# Patient Record
Sex: FEMALE | Race: BLACK OR AFRICAN AMERICAN | Employment: FULL TIME | URBAN - METROPOLITAN AREA
[De-identification: names, ages, dates, MRNs, and addresses within clinical notes are randomized per-mention and may not be internally consistent; named-entity substitution may affect disease eponyms.]

---

## 2017-02-06 ENCOUNTER — OFFICE VISIT (OUTPATIENT)
Dept: FAMILY MEDICINE CLINIC | Age: 37
End: 2017-02-06

## 2017-02-06 VITALS
TEMPERATURE: 97 F | HEART RATE: 97 BPM | OXYGEN SATURATION: 100 % | WEIGHT: 293 LBS | DIASTOLIC BLOOD PRESSURE: 69 MMHG | SYSTOLIC BLOOD PRESSURE: 104 MMHG | RESPIRATION RATE: 18 BRPM | BODY MASS INDEX: 48.82 KG/M2 | HEIGHT: 65 IN

## 2017-02-06 DIAGNOSIS — R73.03 PREDIABETES: ICD-10-CM

## 2017-02-06 DIAGNOSIS — N89.8 VAGINAL ITCHING: Primary | ICD-10-CM

## 2017-02-06 RX ORDER — FLUCONAZOLE 150 MG/1
150 TABLET ORAL DAILY
Qty: 1 TAB | Refills: 0 | Status: SHIPPED | OUTPATIENT
Start: 2017-02-06 | End: 2017-02-07

## 2017-02-06 NOTE — PROGRESS NOTES
Patient: Aicha Rick MRN: [id number]  SSN: [id number]    YOB: 1980  Age: 39 y.o. Sex: female      Date of Service: 2/6/2017   Provider: Nathalie Briggs   Office Location:   [address] [place] [address] [facility], [address] [place] [address], [address]  Office Phone: [phone]  Office Fax: [phone]        REASON FOR VISIT:   Chief Complaint   Patient presents with    Yeast Infection     pt states she is itching     Numbness     pt states she is having a tingling filling in her fingertips        VITALS:   Visit Vitals    /69 (BP 1 Location: Left arm, BP Patient Position: Sitting)    Pulse 97    Temp 97 °F (36.1 °C) (Oral)    Resp 18    Ht 5' 5\" (1.651 m)    Wt 314 lb (142.4 kg)    LMP 01/25/2017    SpO2 100%    BMI 52.25 kg/m2       MEDICATIONS:   Current Outpatient Prescriptions   Medication Sig Dispense Refill    gabapentin (NEURONTIN) 300 mg capsule Take 1 Cap by mouth three (3) times daily. 90 Cap 1    metFORMIN (GLUCOPHAGE) 500 mg tablet Take 1 Tab by mouth daily (with breakfast). 30 Tab 3    ibuprofen (MOTRIN) 800 mg tablet Take 1 Tab by mouth every eight (8) hours as needed for Pain. 90 Tab 3    methocarbamol (ROBAXIN) 500 mg tablet Take 1 Tab by mouth every twelve (12) hours as needed. 30 Tab 1    gabapentin (NEURONTIN) 300 mg capsule Take 1 Cap by mouth nightly. 30 Cap 3    HYDROcodone-acetaminophen (NORCO) 5-325 mg per tablet       methylPREDNISolone (MEDROL DOSEPACK) 4 mg tablet Per dose pack instructions 1 Dose Pack 0    amLODIPine (NORVASC) 2.5 mg tablet Take 1 Tab by mouth daily.  30 Tab 3        ALLERGIES:   No Known Allergies     ACTIVE MEDICAL PROBLEMS:  Patient Active Problem List   Diagnosis Code    Obesity E66.9    Prediabetes R73.03    Back pain M54.9        MEDICAL/SURGICAL HISTORY:  Past Medical History   Diagnosis Date    Abnormal MRI of head 2010     Nonspecific focus of low attenuation at the right internal capsule on CT and MRI    Anxiety     Chronic low back pain 2014     unknown cause- normal xrays    Depression     Hypertension     Prediabetes     Scoliosis       History reviewed. No pertinent past surgical history. FAMILY HISTORY:  Family History   Problem Relation Age of Onset    Diabetes Other     Hypertension Other     Heart Disease Other     Asthma Other     Arthritis-osteo Other         SOCIAL HISTORY:  Social History   Substance Use Topics    Smoking status: Never Smoker    Smokeless tobacco: Never Used    Alcohol use Yes      Comment: socially            HISTORY OF PRESENT ILLNESS:   Jamie Amin is a 39 y.o. female who presents to the office complaining of vaginal itching x 3-4 days and intermittent tingling in her fingers. Vaginal Itching -   Patient states she has a yeast infection, as she has had these symptoms numerous times in the past. She denies vaginal discharge, odor, or lesions. She refuses a pelvic exam today, and states \"I just want to get the pill,\" referring to Diflucan. Paresthesias -   Patient would also like to discuss a tingling sensation in her fingers. She has noticed over the past few months, whenever she bumps her hand or fingers on a surface, she will experience some transient tingling in those fingers. She is unsure if it affects both hands, or just one side. She is concerned that this may be due to her pre-diabetes. She admits she has not been very compliant with her metformin. Patient denies any skin changes or rash to her affected fingers. Denies pain in her wrists, elbows, or shoulders, but does admit to some intermittent stiffness in her neck following a fall at work a few years ago. However, she has not noted any association between neck stiffness and finger tingling. States only provocative factor is \"when I hit my fingers on something. \"     REVIEW OF SYSTEMS:  Review of Systems   Constitutional: Negative for chills (  admits to \"hot flashes\") and fever. Respiratory: Negative for cough, shortness of breath and wheezing. Cardiovascular: Negative for chest pain and palpitations. Gastrointestinal: Negative for abdominal pain, nausea and vomiting. Skin: Negative for rash. Neurological: Positive for tingling. Negative for dizziness and focal weakness. PHYSICAL EXAMINATION:  Physical Exam   Constitutional: She is well-developed, well-nourished, and in no distress. Cardiovascular: Normal rate, regular rhythm and normal heart sounds. Exam reveals no gallop and no friction rub. No murmur heard. Pulmonary/Chest: Effort normal and breath sounds normal. She has no wheezes. She has no rales. Genitourinary:   Genitourinary Comments: refuses   Musculoskeletal:        Cervical back: She exhibits decreased range of motion (active extension and rotation to both sides limited by stiffness/pain) and spasm ( bilateral). She exhibits no bony tenderness and no deformity. Negative Tinel's sign at carpal and cubital tunnel. Negative Phalen's sign. Skin: Skin is warm and dry. No rash noted. RESULTS:  No results found for this visit on 02/06/17. ASSESSMENT/PLAN:  Jessa was seen today for yeast infection and numbness. Diagnoses and all orders for this visit:    Vaginal itching  - Will send empiric Diflucan  - Advised patient to return for pelvic exam and nuswab if symptoms do not resolve after taking this medication  -     fluconazole (DIFLUCAN) 150 mg tablet; Take 1 Tab by mouth daily for 1 day. FDA advises cautious prescribing of oral fluconazole in pregnancy. Prediabetes  - Discussed importance of diet and exercise in reducing risk of progression to DM  - Discussed with patient that I feel it is unlikely that her symptoms of finger tingling are related to diabetes, but it is possible. We discussed that the best way to control diabetic neuropathy is to control blood sugar.  Differential could also include cervical radiculopathy, carpal tunnel syndrome, cubital tunnel syndrome   - Unable to check A1c at this visit due to lack of office supplies. Offered to give order for labs, but patient states she will wait for her routine follow up with her PCP   -     Cancel: AMB POC HEMOGLOBIN A1C    Follow up within 1 month for routine chronic disease follow up with PCP Dennise Phelps    Patient expresses understanding and is agreeable with the above plan.         PATIENT CARE TEAM:   Patient Care Team:  Dennise Phelps NP as PCP - General (Nurse Practitioner)  Eileen Gore MD (Physical Medicine and Rehab)       [facility] [place], [place]   February 6, 2017    6:00 PM

## 2017-02-06 NOTE — MR AVS SNAPSHOT
Visit Information Date & Time Provider Department Dept. Phone Encounter #  
 2/6/2017  5:00 PM Ford Murray, [address] [facility] [phone] [id number] Your Appointments 3/7/2017  5:00 PM  
FOLLOW UP EXAM with Richard Lord NP [address] [facility] ([address]) Appt Note: 1 month f/u  
 [address] [facility] [id number]  
[facility] [id number] Upcoming Health Maintenance Date Due DTaP/Tdap/Td series (1 - Tdap) 6/7/2001 PAP AKA CERVICAL CYTOLOGY 6/7/2001 INFLUENZA AGE 9 TO ADULT 8/1/2016 Allergies as of 2/6/2017  Review Complete On: 2/6/2017 By: Deyanira Santiago LPN No Known Allergies Current Immunizations  Never Reviewed No immunizations on file. Not reviewed this visit You Were Diagnosed With   
  
 Codes Comments Vaginal itching    -  Primary ICD-10-CM: L29.8 ICD-9-CM: 698.1 Prediabetes     ICD-10-CM: R73.03 
ICD-9-CM: 790.29 Vitals BP Pulse Temp Resp Height(growth percentile) Weight(growth percentile) 104/69 (BP 1 Location: Left arm, BP Patient Position: Sitting) 97 97 °F (36.1 °C) (Oral) 18 5' 5\" (1.651 m) 314 lb (142.4 kg) LMP SpO2 BMI OB Status Smoking Status 01/25/2017 100% 52.25 kg/m2 Having regular periods Never Smoker Vitals History BMI and BSA Data Body Mass Index Body Surface Area  
 52.25 kg/m 2 2.56 m 2 Preferred Pharmacy Pharmacy Name Phone [facility] - [address] [phone] Your Updated Medication List  
  
   
This list is accurate as of: 2/6/17  5:39 PM.  Always use your most recent med list. amLODIPine 2.5 mg tablet Commonly known as:  Izetta Harder Take 1 Tab by mouth daily. fluconazole 150 mg tablet Commonly known as:  DIFLUCAN Take 1 Tab by mouth daily for 1 day.  FDA advises cautious prescribing of oral fluconazole in pregnancy. * gabapentin 300 mg capsule Commonly known as:  NEURONTIN Take 1 Cap by mouth nightly. * gabapentin 300 mg capsule Commonly known as:  NEURONTIN Take 1 Cap by mouth three (3) times daily. HYDROcodone-acetaminophen 5-325 mg per tablet Commonly known as:  NORCO  
  
 ibuprofen 800 mg tablet Commonly known as:  MOTRIN Take 1 Tab by mouth every eight (8) hours as needed for Pain.  
  
 metFORMIN 500 mg tablet Commonly known as:  GLUCOPHAGE Take 1 Tab by mouth daily (with breakfast). methocarbamol 500 mg tablet Commonly known as:  ROBAXIN Take 1 Tab by mouth every twelve (12) hours as needed. methylPREDNISolone 4 mg tablet Commonly known as:  Acquanetta Kanner Per dose pack instructions * Notice: This list has 2 medication(s) that are the same as other medications prescribed for you. Read the directions carefully, and ask your doctor or other care provider to review them with you. Prescriptions Sent to Pharmacy Refills  
 fluconazole (DIFLUCAN) 150 mg tablet 0 Sig: Take 1 Tab by mouth daily for 1 day. FDA advises cautious prescribing of oral fluconazole in pregnancy. Class: Normal  
 Pharmacy: [facility] [address] [address] Ph #: [phone] Route: Oral  
  
We Performed the Following AMB POC HEMOGLOBIN A1C [81000 CPT(R)] Introducing [facility] SERVICES! Carol Ann Ceballos introduces Powered patient portal. Now you can access parts of your medical record, email your doctor's office, and request medication refills online. 1. In your internet browser, go to [email or web address] 2. Click on the First Time User? Click Here link in the Sign In box. You will see the New Member Sign Up page. 3. Enter your Powered Access Code exactly as it appears below. You will not need to use this code after youve completed the sign-up process.  If you do not sign up before the expiration date, you must request a new code. · miacosa Access Code: [email or web address] Expires: 5/7/2017  5:38 PM 
 
4. Enter the last four digits of your Social Security Number (xxxx) and Date of Birth (mm/dd/yyyy) as indicated and click Submit. You will be taken to the next sign-up page. 5. Create a miacosa ID. This will be your miacosa login ID and cannot be changed, so think of one that is secure and easy to remember. 6. Create a miacosa password. You can change your password at any time. 7. Enter your Password Reset Question and Answer. This can be used at a later time if you forget your password. 8. Enter your e-mail address. You will receive e-mail notification when new information is available in [address] 9. Click Sign Up. You can now view and download portions of your medical record. 10. Click the Download Summary menu link to download a portable copy of your medical information. If you have questions, please visit the Frequently Asked Questions section of the miacosa website. Remember, miacosa is NOT to be used for urgent needs. For medical emergencies, dial 911. Now available from your iPhone and Android! Please provide this summary of care documentation to your next provider. Your primary care clinician is listed as Roya Golden. If you have any questions after today's visit, please call [phone].

## 2017-02-06 NOTE — PROGRESS NOTES
Chief Complaint   Patient presents with    Yeast Infection     pt states she is itching     Numbness     pt states she is having a tingling filling in her fingertips

## 2017-03-17 ENCOUNTER — TELEPHONE (OUTPATIENT)
Dept: FAMILY MEDICINE CLINIC | Age: 37
End: 2017-03-17

## 2017-03-17 NOTE — TELEPHONE ENCOUNTER
Attempted to call Pt on both numbers, no voice mail , unable to leave message and the other was the wrong number.  Pt need to schedule a Chronic Condition 30 min follow up Per Sima Garcia NP.

## 2017-03-20 ENCOUNTER — TELEPHONE (OUTPATIENT)
Dept: FAMILY MEDICINE CLINIC | Age: 37
End: 2017-03-20

## 2017-06-27 ENCOUNTER — OFFICE VISIT (OUTPATIENT)
Dept: FAMILY MEDICINE CLINIC | Age: 37
End: 2017-06-27

## 2017-06-27 ENCOUNTER — TELEPHONE (OUTPATIENT)
Dept: FAMILY MEDICINE CLINIC | Age: 37
End: 2017-06-27

## 2017-06-27 VITALS
WEIGHT: 293 LBS | HEART RATE: 84 BPM | RESPIRATION RATE: 18 BRPM | TEMPERATURE: 98.1 F | DIASTOLIC BLOOD PRESSURE: 75 MMHG | HEIGHT: 65 IN | OXYGEN SATURATION: 100 % | SYSTOLIC BLOOD PRESSURE: 120 MMHG | BODY MASS INDEX: 48.82 KG/M2

## 2017-06-27 DIAGNOSIS — R07.89 RIGHT-SIDED CHEST WALL PAIN: ICD-10-CM

## 2017-06-27 DIAGNOSIS — R73.03 PREDIABETES: ICD-10-CM

## 2017-06-27 DIAGNOSIS — M41.9 SCOLIOSIS OF THORACIC SPINE, UNSPECIFIED SCOLIOSIS TYPE: ICD-10-CM

## 2017-06-27 DIAGNOSIS — R53.83 FATIGUE, UNSPECIFIED TYPE: ICD-10-CM

## 2017-06-27 DIAGNOSIS — I10 ESSENTIAL HYPERTENSION: Primary | ICD-10-CM

## 2017-06-27 DIAGNOSIS — M54.41 CHRONIC BILATERAL LOW BACK PAIN WITH RIGHT-SIDED SCIATICA: ICD-10-CM

## 2017-06-27 DIAGNOSIS — G89.29 CHRONIC BILATERAL LOW BACK PAIN WITH RIGHT-SIDED SCIATICA: ICD-10-CM

## 2017-06-27 LAB — HBA1C MFR BLD HPLC: 5.4 %

## 2017-06-27 RX ORDER — GABAPENTIN 300 MG/1
CAPSULE ORAL
Qty: 60 CAP | Refills: 3 | Status: SHIPPED | OUTPATIENT
Start: 2017-06-27 | End: 2017-11-18

## 2017-06-27 RX ORDER — METHOCARBAMOL 500 MG/1
500 TABLET, FILM COATED ORAL
Qty: 30 TAB | Refills: 1 | Status: SHIPPED | OUTPATIENT
Start: 2017-06-27 | End: 2017-11-18

## 2017-06-27 RX ORDER — IBUPROFEN 800 MG/1
800 TABLET ORAL
Qty: 90 TAB | Refills: 0 | Status: SHIPPED | OUTPATIENT
Start: 2017-06-27 | End: 2018-05-11 | Stop reason: SDUPTHER

## 2017-06-27 NOTE — PROGRESS NOTES
HISTORY OF PRESENT ILLNESS  Kristal Dobson is a 40 y.o. female. 6/27/2017  3:21 PM    Chief Complaint   Patient presents with    Fatigue     very tired since Friday    Shortness of Breath     chest tighness on right side since yesterday took aspirin and it got better and chest is tender on the outside       HPI: Here today for multiple complaints. Has not been seen by me in about a year- last seen in office 2/2017 by HANK Umanzor PA-C for acute complaints. Overdue for chronic disease follow up. Last labs done 8/2015 routinely. Does not follow with any other providers. HTN/PreDM: Currently not taking any medications. Interested in taking Metformin again because it helped manage her weight. Does not check BP at home or glucose. Last A1c 6/2016 5.4%. Chronic LBP: Reports taking Gabapentin 600 mg at HS. Has been using OTC Ibuprofen as well. Has been evaluated by spine specialist in past- last seen 10/2015. She reports back pain has been happening since a fall in hallway at work in 8/2014. Patient has received a trigger point injection by Dr. Breanna Martinez over a year ago which offered good relief. She attended physical therapy x2 without relief. Complains of fatigue that has been happening since Friday. She also complains of chest pain that has been happening since yesterday. Denies any possible causes that she can think of. Denies any colds or cold symptoms lately. She reports that she has to take a deep breath every now and then- feels like she is not getting enough air although denies any SOB, no cough. She took an ASA and this helped with the symptoms. Denies any headaches, dizziness, or weakness. No worsening of symptoms with activity. Review of Systems   Constitutional: Positive for malaise/fatigue. Negative for chills and fever. Eyes: Negative for double vision, photophobia and pain. Respiratory: Positive for shortness of breath. Negative for cough and wheezing.     Cardiovascular: Positive for chest pain. Negative for palpitations and leg swelling. Gastrointestinal: Negative for abdominal pain, constipation, diarrhea, nausea and vomiting. Genitourinary: Negative for dysuria, frequency and urgency. Musculoskeletal: Positive for back pain. Negative for joint pain and myalgias. Neurological: Negative for dizziness, tingling, sensory change, weakness and headaches. PHQ Screening   PHQ over the last two weeks 2/6/2017   PHQ Not Done -   Little interest or pleasure in doing things Not at all   Feeling down, depressed or hopeless Not at all   Total Score PHQ 2 0         History  Past Medical History:   Diagnosis Date    Abnormal MRI of head 2010    Nonspecific focus of low attenuation at the right internal capsule on CT and MRI    Anxiety     Chronic low back pain 2014    unknown cause- normal xrays    Depression     Hypertension     Prediabetes     Scoliosis        History reviewed. No pertinent surgical history. Social History     Social History    Marital status: SINGLE     Spouse name: N/A    Number of children: N/A    Years of education: N/A     Occupational History    Not on file. Social History Main Topics    Smoking status: Never Smoker    Smokeless tobacco: Never Used    Alcohol use 0.6 oz/week     1 Glasses of wine per week      Comment: socially    Drug use: No    Sexual activity: Yes     Partners: Female, Male     Birth control/ protection: Condom     Other Topics Concern    Not on file     Social History Narrative       No Known Allergies       Current Outpatient Prescriptions   Medication Sig Dispense Refill    ibuprofen (MOTRIN) 800 mg tablet Take 1 Tab by mouth every eight (8) hours as needed for Pain. 90 Tab 0    gabapentin (NEURONTIN) 300 mg capsule Take 2 capsules at night.  60 Cap 3       Health Maintenance and Screenings  *will follow up on      Advance Care Planning:   Patient was offered the opportunity to discuss advance care planning NO   Does patient have an Advance Directive:  NO   If no, did you provide information on Caring Connections? Patient Care Team:  Patient Care Team:  Blanca Pace NP as PCP - General (Nurse Practitioner)  Eduardo العراقي MD (Physical Medicine and Rehab)        LABS:     Ref. Range 6/27/2017 15:50   Hemoglobin A1c (POC) Latest Units: % 5.4       RADIOLOGY:    SACRUM/COCCYX  CPT CODE: 34821  HISTORY: Worsening back pain and coccygeal pain after falling one year ago. ,  shooting pain in the lower back. COMPARISON: Coccyx August 24, 2014   FINDINGS:  Two views obtained. There is no fracture. Mineralization is normal.  IMPRESSION:  Negative. Lumbar Spine Limited Two Views  CPT CODE: 24781  HISTORY: Worsening low back pain and tail bone pain status post fall one year  ago, lower back pain with pressure, burning, weakness. COMPARISON: Lumbar spine series and coccyx August 24, 2014. FINDINGS:  Two views obtained. The vertebra are normal in height. Mild left bending curve  of the lumbar spine unchanged . The disc spaces are preserved. The pedicles  are intact. There is no evidence of fracture or dislocation. Mineralization is  normal.  IMPRESSION:  No acute finding      Thoracic Spine Series  CPT CODE: 11144  HISTORY: Worsening back pain status post fall one year ago, back pain and spasm  after fall. COMPARISON: Chest x-ray October 13, 2011. FINDINGS:  Two views obtained. The lateral view demonstrates from upper thoracic level to  upper lumbar level. The vertebra are normal in height. There is moderate right  curvature of the midthoracic spine without change. There is straightening of the  usual thoracic kyphosis. The disc spaces are preserved. The pedicles are  intact. There is no evidence of fracture or dislocation. IMPRESSION:  Scoliosis        Physical Exam   Constitutional: She is oriented to person, place, and time. She appears well-developed and well-nourished. No distress.    Neck: Normal range of motion. Neck supple. Cardiovascular: Normal rate, regular rhythm and normal heart sounds. No murmur heard. Pulmonary/Chest: Effort normal and breath sounds normal. No respiratory distress. She exhibits tenderness. Abdominal: Soft. Bowel sounds are normal. There is no tenderness. Musculoskeletal: She exhibits no edema. Lumbar back: She exhibits decreased range of motion, tenderness and pain. She exhibits no swelling and no spasm.   -bilateral arm and  strength 5/5  -bilateral leg and foot strength 5/5  -straight leg raise negative  -decreased ROM due to reports of pain lumbar spine   Neurological: She is alert and oriented to person, place, and time. She exhibits normal muscle tone. Coordination normal.   Skin: Skin is warm and dry. Vitals:    06/27/17 1513   BP: 120/75   Pulse: 84   Resp: 18   Temp: 98.1 °F (36.7 °C)   SpO2: 100%   Weight: 319 lb 6.4 oz (144.9 kg)   Height: 5' 5\" (1.651 m)   PainSc:   0 - No pain   LMP: 06/13/2017       ASSESSMENT and PLAN  Jessa was seen today for fatigue and shortness of breath. Diagnoses and all orders for this visit:    Essential hypertension  *Not on medication, WNL BP today. Will continue to monitor. Prediabetes  *A1c checked today and stable. She desires to start on Metformin therapy despite normal A1c.   -     AMB POC HEMOGLOBIN A1C  -     metFORMIN (GLUCOPHAGE) 500 mg tablet; Take 1 Tab by mouth daily (with breakfast). Fatigue, unspecified type  *Discussed with patient about work up including labs and possible sleep study- declines at this time. She reports that if persisting, she might decide to do work up. Right-sided chest wall pain  *Discussed with patient about chest wall pain. Advised on EKG and chest xray- declines at this time. Will do NSAID and muscle relaxer trial.   *Discussed with patient about symptoms that would require an ER visit.  Patient understands symptoms that are an emergency and will go to the ER if they occur.   -     methocarbamol (ROBAXIN) 500 mg tablet; Take 1 Tab by mouth every twelve (12) hours as needed. -     ibuprofen (MOTRIN) 800 mg tablet; Take 1 Tab by mouth every eight (8) hours as needed for Pain. Chronic bilateral low back pain with right-sided sciatica  *Continue with Gabapentin. PRN NSAID and muscle relaxer. Recommend heat and ice PRN. Has seen orthopedics in the past.   -     methocarbamol (ROBAXIN) 500 mg tablet; Take 1 Tab by mouth every twelve (12) hours as needed. -     ibuprofen (MOTRIN) 800 mg tablet; Take 1 Tab by mouth every eight (8) hours as needed for Pain.  -     gabapentin (NEURONTIN) 300 mg capsule; Take 2 capsules at night. Scoliosis of thoracic spine, unspecified scoliosis type  *Noted on past xrays- may be a contributing factor. *Plan of care reviewed with patient. Patient in agreement with plan and expresses understanding. All questions answered and patient encouraged to call or RTO if further questions or concerns. Follow-up Disposition:  Return in about 2 weeks (around 7/11/2017) for follow up on complaints.

## 2017-06-27 NOTE — TELEPHONE ENCOUNTER
Received call from pt stating that she was having tightness on the right side of her chest. I offered pt a same day appt at 10:30a,

## 2017-06-27 NOTE — MR AVS SNAPSHOT
Visit Information Date & Time Provider Department Dept. Phone Encounter #  
 6/27/2017  3:15 PM Blaire Ding [facility] [phone] [id number] Follow-up Instructions Return in about 2 weeks (around 7/11/2017) for follow up on complaints. Your Appointments 7/14/2017 11:15 AM  
FOLLOW UP EXAM with Blaire Ding NP [facility] ([facility]) Appt Note: 2 week f/u  
 [address] [address]  
[facility] [address] Upcoming Health Maintenance Date Due DTaP/Tdap/Td series (1 - Tdap) 6/7/2001 PAP AKA CERVICAL CYTOLOGY 6/7/2001 INFLUENZA AGE 9 TO ADULT 8/1/2017 Allergies as of 6/27/2017  Review Complete On: 6/27/2017 By: Blaire Ding NP No Known Allergies Current Immunizations  Never Reviewed No immunizations on file. Not reviewed this visit You Were Diagnosed With   
  
 Codes Comments Essential hypertension    -  Primary ICD-10-CM: I10 
ICD-9-CM: 401.9 Prediabetes     ICD-10-CM: R73.03 
ICD-9-CM: 790.29 Right-sided chest wall pain     ICD-10-CM: R07.89 ICD-9-CM: 786.52 Chronic bilateral low back pain with right-sided sciatica     ICD-10-CM: M54.41, G89.29 ICD-9-CM: 724.2, 724.3, 338.29 Vitals BP Pulse Temp Resp Height(growth percentile) Weight(growth percentile) 120/75 (BP 1 Location: Right arm, BP Patient Position: Sitting) 84 98.1 °F (36.7 °C) 18 5' 5\" (1.651 m) 319 lb 6.4 oz (144.9 kg) LMP SpO2 BMI OB Status Smoking Status 06/13/2017 (Approximate) 100% 53.15 kg/m2 Having regular periods Never Smoker Vitals History BMI and BSA Data Body Mass Index Body Surface Area  
 53.15 kg/m 2 2.58 m 2 Preferred Pharmacy Pharmacy Name Phone [facility] [address] [phone] Your Updated Medication List  
  
   
 This list is accurate as of: 6/27/17  4:06 PM.  Always use your most recent med list.  
  
  
  
  
 gabapentin 300 mg capsule Commonly known as:  NEURONTIN Take 2 capsules at night. ibuprofen 800 mg tablet Commonly known as:  MOTRIN Take 1 Tab by mouth every eight (8) hours as needed for Pain.  
  
 metFORMIN 500 mg tablet Commonly known as:  GLUCOPHAGE Take 1 Tab by mouth daily (with breakfast). methocarbamol 500 mg tablet Commonly known as:  ROBAXIN Take 1 Tab by mouth every twelve (12) hours as needed. Prescriptions Sent to Pharmacy Refills  
 methocarbamol (ROBAXIN) 500 mg tablet 1 Sig: Take 1 Tab by mouth every twelve (12) hours as needed. Class: Normal  
 Pharmacy: [place] [address]. Ph #: [phone] Route: Oral  
 ibuprofen (MOTRIN) 800 mg tablet 0 Sig: Take 1 Tab by mouth every eight (8) hours as needed for Pain. Class: Normal  
 Pharmacy: [place] [address]. Ph #: [phone] Route: Oral  
 gabapentin (NEURONTIN) 300 mg capsule 3 Sig: Take 2 capsules at night. Class: Normal  
 Pharmacy: [place] [address]. Ph #: [phone] We Performed the Following AMB POC HEMOGLOBIN A1C [97734 CPT(R)] Follow-up Instructions Return in about 2 weeks (around 7/11/2017) for follow up on complaints. Introducing [facility] HEALTH SERVICES! [facility] introduces [employer] patient portal. Now you can access parts of your medical record, email your doctor's office, and request medication refills online. 1. In your internet browser, go to [email or web address] 2. Click on the First Time User? Click Here link in the Sign In box. You will see the New Member Sign Up page. 3. Enter your [employer] Access Code exactly as it appears below.  You will not need to use this code after youve completed the sign-up process. If you do not sign up before the expiration date, you must request a new code. · [employer] Access Code: [email or web address] Expires: 9/25/2017  4:06 PM 
 
4. Enter the last four digits of your Social Security Number (xxxx) and Date of Birth (mm/dd/yyyy) as indicated and click Submit. You will be taken to the next sign-up page. 5. Create a [employer] ID. This will be your [employer] login ID and cannot be changed, so think of one that is secure and easy to remember. 6. Create a [employer] password. You can change your password at any time. 7. Enter your Password Reset Question and Answer. This can be used at a later time if you forget your password. 8. Enter your e-mail address. You will receive e-mail notification when new information is available in [address]. 9. Click Sign Up. You can now view and download portions of your medical record. 10. Click the Download Summary menu link to download a portable copy of your medical information. If you have questions, please visit the Frequently Asked Questions section of the [employer] website. Remember, [employer] is NOT to be used for urgent needs. For medical emergencies, dial 911. Now available from your iPhone and Android! Please provide this summary of care documentation to your next provider. Your primary care clinician is listed as Rachel Kerr. If you have any questions after today's visit, please call [phone].

## 2017-06-29 PROBLEM — M41.9 SCOLIOSIS OF THORACIC SPINE: Status: ACTIVE | Noted: 2017-06-29

## 2017-06-29 RX ORDER — METFORMIN HYDROCHLORIDE 500 MG/1
500 TABLET ORAL
Qty: 30 TAB | Refills: 3 | Status: SHIPPED | OUTPATIENT
Start: 2017-06-29 | End: 2017-11-18

## 2017-07-07 ENCOUNTER — TELEPHONE (OUTPATIENT)
Dept: FAMILY MEDICINE CLINIC | Age: 37
End: 2017-07-07

## 2017-07-07 NOTE — LETTER
NOTIFICATION RETURN TO WORK / SCHOOL 
 
7/11/2017 9:11 AM 
 
Ms. Kole Mei [address] [facility] [address] To Whom It May Concern: 
 
Kole Mei is currently under the care of [facility]. She has chronic low back pain and it would be beneficial for her health to have access to the elevator at her current place of residence. If there are questions or concerns please have the patient contact our office.  
 
 
 
Sincerely, 
 
 
Osvaldo Garcia, NP

## 2017-07-07 NOTE — TELEPHONE ENCOUNTER
Patient called requesting a letter to see if she can elevator access due to her back pain. 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

## 2017-07-11 ENCOUNTER — TELEPHONE (OUTPATIENT)
Dept: FAMILY MEDICINE CLINIC | Age: 37
End: 2017-07-11

## 2017-07-21 ENCOUNTER — OFFICE VISIT (OUTPATIENT)
Dept: FAMILY MEDICINE CLINIC | Age: 37
End: 2017-07-21

## 2017-07-21 VITALS
RESPIRATION RATE: 18 BRPM | BODY MASS INDEX: 48.82 KG/M2 | DIASTOLIC BLOOD PRESSURE: 99 MMHG | OXYGEN SATURATION: 100 % | HEART RATE: 92 BPM | HEIGHT: 65 IN | TEMPERATURE: 98.4 F | WEIGHT: 293 LBS | SYSTOLIC BLOOD PRESSURE: 150 MMHG

## 2017-07-21 DIAGNOSIS — R73.03 PREDIABETES: ICD-10-CM

## 2017-07-21 DIAGNOSIS — Z11.3 SCREEN FOR STD (SEXUALLY TRANSMITTED DISEASE): ICD-10-CM

## 2017-07-21 DIAGNOSIS — Z11.59 SCREENING FOR VIRAL DISEASE: ICD-10-CM

## 2017-07-21 DIAGNOSIS — M79.10 MYALGIA: ICD-10-CM

## 2017-07-21 DIAGNOSIS — G89.29 CHRONIC BILATERAL LOW BACK PAIN WITH RIGHT-SIDED SCIATICA: ICD-10-CM

## 2017-07-21 DIAGNOSIS — Z13.6 ENCOUNTER FOR LIPID SCREENING FOR CARDIOVASCULAR DISEASE: ICD-10-CM

## 2017-07-21 DIAGNOSIS — Z13.220 ENCOUNTER FOR LIPID SCREENING FOR CARDIOVASCULAR DISEASE: ICD-10-CM

## 2017-07-21 DIAGNOSIS — R07.9 CHEST PAIN, UNSPECIFIED TYPE: ICD-10-CM

## 2017-07-21 DIAGNOSIS — M41.9 SCOLIOSIS OF THORACIC SPINE, UNSPECIFIED SCOLIOSIS TYPE: ICD-10-CM

## 2017-07-21 DIAGNOSIS — Z11.4 SCREENING FOR HIV (HUMAN IMMUNODEFICIENCY VIRUS): ICD-10-CM

## 2017-07-21 DIAGNOSIS — E66.01 MORBID OBESITY, UNSPECIFIED OBESITY TYPE (HCC): ICD-10-CM

## 2017-07-21 DIAGNOSIS — Z11.3 SCREENING FOR BACTERIAL AND SPIROCHETAL SEXUALLY TRANSMITTED DISEASES: ICD-10-CM

## 2017-07-21 DIAGNOSIS — I10 ESSENTIAL HYPERTENSION: Primary | ICD-10-CM

## 2017-07-21 DIAGNOSIS — M54.41 CHRONIC BILATERAL LOW BACK PAIN WITH RIGHT-SIDED SCIATICA: ICD-10-CM

## 2017-07-21 DIAGNOSIS — G89.29 OTHER CHRONIC PAIN: ICD-10-CM

## 2017-07-21 DIAGNOSIS — R53.83 FATIGUE, UNSPECIFIED TYPE: ICD-10-CM

## 2017-07-21 RX ORDER — DULOXETIN HYDROCHLORIDE 30 MG/1
30 CAPSULE, DELAYED RELEASE ORAL DAILY
Qty: 30 CAP | Refills: 0 | Status: SHIPPED | OUTPATIENT
Start: 2017-07-21 | End: 2017-11-18

## 2017-07-21 NOTE — PROGRESS NOTES
HISTORY OF PRESENT ILLNESS  Monique Greer is a 40 y.o. female. 7/21/2017  3:38 PM    Chief Complaint   Patient presents with    Follow-up     fatique and back pain  pt states \" that pt continues to still have pain in back pt states \" that pt has her good days and bad days \"        HPI: Here today for short term follow up on chronic disease and follow up on complaints. Last labs done 8/2015 routinely. Does not follow with any other providers. HTN/PreDM: Currently not taking any medications for BP. Started on Metformin a few weeks ago to help manage weight and prediabetes- denies any bothersome side effects- reports some diarrhea intermittently. Does not check BP at home or glucose. Last A1c 6/2017 5.4%. Chronic LBP: Reports taking Gabapentin 600 mg at HS. Uses Ibuprofen and muscle relaxer PRN. Has been evaluated by spine specialist in past- last seen 10/2015. She reports back pain has been happening since a fall in hallway at work in 8/2014. Patient has received a trigger point injection by Dr. Alberto Stoner over a year ago which offered good relief. She attended physical therapy x2 without relief. In addition, she reports that she just hurts all over- states that her neck hurts, her entire back hurts, her legs hurt, just all over body pain. She reports this has been happening since her fall in 2014 and no one seems to be able to figure out why it is happening. She states that it is hard to get out of bed sometimes and she hurts even just sitting in a chair. Denies any headaches, dizziness, or weakness. At 6/27 visit, she complained of fatigue and right sided chest wall pain. Fatigue has not improved. Still has intermittent right sided chest wall pain- states that it comes and goes- no new symptoms- using ASA when it occurs which helps. Chest pain not worse with activity. Review of Systems   Constitutional: Positive for malaise/fatigue. Negative for chills and fever.    Eyes: Negative for double vision, photophobia and pain. Respiratory: Negative for cough, shortness of breath and wheezing. Cardiovascular: Positive for chest pain. Negative for palpitations and leg swelling. Gastrointestinal: Negative for abdominal pain, constipation, diarrhea, nausea and vomiting. Genitourinary: Negative for dysuria, frequency and urgency. Musculoskeletal: Positive for back pain, joint pain, myalgias and neck pain. Neurological: Negative for dizziness, tingling, sensory change, weakness and headaches. Psychiatric/Behavioral: Negative for depression and substance abuse. The patient is not nervous/anxious. PHQ Screening   PHQ over the last two weeks 2/6/2017   PHQ Not Done -   Little interest or pleasure in doing things Not at all   Feeling down, depressed or hopeless Not at all   Total Score PHQ 2 0         History  Past Medical History:   Diagnosis Date    Abnormal MRI of head 2010    Nonspecific focus of low attenuation at the right internal capsule on CT and MRI    Anxiety     Chronic low back pain 2014    unknown cause- normal xrays    Depression     Hypertension     Prediabetes     Scoliosis        History reviewed. No pertinent surgical history. Social History     Social History    Marital status: SINGLE     Spouse name: N/A    Number of children: N/A    Years of education: N/A     Occupational History    Not on file. Social History Main Topics    Smoking status: Never Smoker    Smokeless tobacco: Never Used    Alcohol use 0.6 oz/week     1 Glasses of wine per week      Comment: socially    Drug use: No    Sexual activity: Yes     Partners: Female, Male     Birth control/ protection: Condom     Other Topics Concern    Not on file     Social History Narrative       No Known Allergies       Current Outpatient Prescriptions   Medication Sig Dispense Refill    metFORMIN (GLUCOPHAGE) 500 mg tablet Take 1 Tab by mouth daily (with breakfast).  30 Tab 3    methocarbamol (ROBAXIN) 500 mg tablet Take 1 Tab by mouth every twelve (12) hours as needed. 30 Tab 1    ibuprofen (MOTRIN) 800 mg tablet Take 1 Tab by mouth every eight (8) hours as needed for Pain. 90 Tab 0    gabapentin (NEURONTIN) 300 mg capsule Take 2 capsules at night. 60 Cap 3         Health Maintenance and Screenings  Colon Cancer: Not indicated for early screening   COPD Screen/ Lung Cancer: Never smoker  CAD Screen: LDL 68 Trig 143 8/2015  Diabetes: PreDM history- A1c 6/2017 5.4%- on Metformin  STD and HIV Screen: Opts to have labs ordered today- well woman recommended  Breast Cancer: Not indicated for early screening   Cervical Cancer: PAP recommended  Osteoporosis Screen: Not indicated for early screening   Abdominal Aneurysm Screen: Not indicated  Opthalmology:   Dentistry Services:   Hearing Impairment:   Skin Cancer Screen:   Obesity Screen: Body mass index is 53.25 kg/(m^2). Flu Vaccination: Out of season  Pneumococcal Vaccine: Not indicated for early vaccination   Shingles Vaccine: Not indicated for early vaccination  Tetanus Booster: Unsure of last booster- no medical indication today  Hepatitis B Vaccinations: Unsure if she had series- not high risk    *will follow up on        Advance Care Planning:   Patient was offered the opportunity to discuss advance care planning NO   Does patient have an Advance Directive:  NO   If no, did you provide information on C[facility] Connections? Patient Care Team:  Patient Care Team:  Steven Osuna NP as PCP - General (Nurse Practitioner)  Dm Cates MD (Physical Medicine and Rehab)        LABS:  None new to review    RADIOLOGY:    SACRUM/COCCYX  CPT CODE: 09619  HISTORY: Worsening back pain and coccygeal pain after falling one year ago. ,  shooting pain in the lower back. COMPARISON: Coccyx August 24, 2014   FINDINGS:  Two views obtained. There is no fracture. Mineralization is normal.  IMPRESSION:  Negative.       Lumbar Spine Limited Two Views  CPT CODE: 00433  HISTORY: Worsening low back pain and tail bone pain status post fall one year  ago, lower back pain with pressure, burning, weakness. COMPARISON: Lumbar spine series and coccyx August 24, 2014. FINDINGS:  Two views obtained. The vertebra are normal in height. Mild left bending curve  of the lumbar spine unchanged . The disc spaces are preserved. The pedicles  are intact. There is no evidence of fracture or dislocation. Mineralization is  normal.  IMPRESSION:  No acute finding      Thoracic Spine Series  CPT CODE: 64209  HISTORY: Worsening back pain status post fall one year ago, back pain and spasm  after fall. COMPARISON: Chest x-ray October 13, 2011. FINDINGS:  Two views obtained. The lateral view demonstrates from upper thoracic level to  upper lumbar level. The vertebra are normal in height. There is moderate right  curvature of the midthoracic spine without change. There is straightening of the  usual thoracic kyphosis. The disc spaces are preserved. The pedicles are  intact. There is no evidence of fracture or dislocation. IMPRESSION:  Scoliosis      Physical Exam   Constitutional: She is oriented to person, place, and time. She appears well-developed and well-nourished. No distress. Neck: Normal range of motion. Neck supple. No thyromegaly present. Cardiovascular: Normal rate, regular rhythm and normal heart sounds. No murmur heard. Pulmonary/Chest: Effort normal and breath sounds normal. No respiratory distress. She exhibits no tenderness. Abdominal: Soft. Bowel sounds are normal. There is no tenderness. Musculoskeletal: She exhibits no edema. Lumbar back: She exhibits decreased range of motion, tenderness and pain.  She exhibits no swelling and no spasm.   -bilateral arm and  strength 5/5  -bilateral leg and foot strength 5/5  -straight leg raise negative  -decreased ROM due to reports of pain lumbar spine  -all over pain reported   Neurological: She is alert and oriented to person, place, and time. She exhibits normal muscle tone. Coordination normal.   Skin: Skin is warm and dry. Vitals:    07/21/17 1528   BP: (!) 150/99   Pulse: 92   Resp: 18   Temp: 98.4 °F (36.9 °C)   TempSrc: Oral   SpO2: 100%   Weight: 320 lb (145.2 kg)   Height: 5' 5\" (1.651 m)   PainSc:   4   PainLoc: Back   LMP: 06/13/2017     BP Readings from Last 3 Encounters:   07/21/17 (!) 150/99   06/27/17 120/75   02/06/17 104/69       ASSESSMENT and PLAN  Jessa was seen today for follow up on complaints from last visit. Diagnoses and all orders for this visit:      Essential hypertension  *Not on medication, BP elevated today but has been normal recently. Will continue to monitor.   - METABOLIC PANEL, COMPREHENSIVE; Future    Prediabetes  *Continue on Metformin. Will continue to monitor.   - METABOLIC PANEL, COMPREHENSIVE; Future    Chronic bilateral low back pain with right-sided sciatica/ Other chronic pain/ Myalgia  *Discussed with patient. I am not sure exactly what all work up she has had completed- no joint labs noted in system- will check. Appears to have scoliosis as below but not sure if this is contributing to the amount of pain she is reporting. *Consider fibromyalgia evaluation with rheumatology. *Discussed with patient about management- will trial on Cymbalta. Continue with HS Gabapentin, PRN NSAID, and PRN muscle relaxer.   - SED RATE (ESR); Future  - HOLLI COMPREHENSIVE PANEL; Future  - C REACTIVE PROTEIN, QT; Future  - RHEUMATOID FACTOR, QL; Future  - DULoxetine (CYMBALTA) 30 mg capsule; Take 1 Cap by mouth daily. Dispense: 30 Cap; Refill: 0    Scoliosis of thoracic spine, unspecified scoliosis type  *As above. Fatigue, unspecified type  *Persisting. Will check some labs. Advised on healthy diet, exercise, and good night sleep. She declines sleep study still.   - CBC W/O DIFF; Future  - TSH 3RD GENERATION;  Future  - VITAMIN D, 1, 25 DIHYDROXY; Future    Chest pain, unspecified type  *At last visit, chest pain reproducible to palpation of chest wall- today this is not occurring. Will do cardiology referral due to persistent symptoms.   - REFERRAL TO CARDIOLOGY    Morbid obesity, unspecified obesity type ([facility]  *Advised on calorie counting, healthy lifestyle, and weight loss. Encounter for lipid screening for cardiovascular disease  - LIPID PANEL; Future    Screening for HIV (human immunodeficiency virus)/ Screening for viral disease/ Screening for bacterial and spirochetal sexually transmitted diseases/ Screen for STD (sexually transmitted disease)  - RPR; Future  - HEPATITIS C AB; Future  - HEP B SURFACE AG; Future  - HIV 1/2 AG/AB, 4TH GENERATION,W RFLX CONFIRM; Future        *Plan of care reviewed with patient. Patient in agreement with plan and expresses understanding. All questions answered and patient encouraged to call or RTO if further questions or concerns. Follow-up Disposition:  Return in about 1 month (around 8/21/2017) for review of studies, well woman, and short term follow up on chronic conditions- 45 mins. Rigoberto Valentine

## 2017-07-21 NOTE — MR AVS SNAPSHOT
Visit Information Date & Time Provider Department Dept. Phone Encounter #  
 7/21/2017  3:30 PM Ann Rod, [facility] [phone] [id number] Follow-up Instructions Return in about 3 weeks (around 8/10/2017) for review of studies, well woman, and short term follow up on chronic conditions- 45 mins. Tom Duncan Upcoming Health Maintenance Date Due DTaP/Tdap/Td series (1 - Tdap) 6/7/2001 PAP AKA CERVICAL CYTOLOGY 6/7/2001 INFLUENZA AGE 9 TO ADULT 8/1/2017 Allergies as of 7/21/2017  Review Complete On: 7/21/2017 By: Ann Rod NP No Known Allergies Current Immunizations  Never Reviewed No immunizations on file. Not reviewed this visit You Were Diagnosed With   
  
 Codes Comments Essential hypertension    -  Primary ICD-10-CM: I10 
ICD-9-CM: 401.9 Prediabetes     ICD-10-CM: R73.03 
ICD-9-CM: 790.29 Fatigue, unspecified type     ICD-10-CM: R53.83 ICD-9-CM: 780.79 Chest pain, unspecified type     ICD-10-CM: R07.9 ICD-9-CM: 786.50 Chronic bilateral low back pain with right-sided sciatica     ICD-10-CM: M54.41, G89.29 ICD-9-CM: 724.2, 724.3, 338.29 Other chronic pain     ICD-10-CM: G89.29 ICD-9-CM: 338.29 Myalgia     ICD-10-CM: M79.1 ICD-9-CM: 729.1 Scoliosis of thoracic spine, unspecified scoliosis type     ICD-10-CM: M41.9 ICD-9-CM: 737.30 Morbid obesity, unspecified obesity type ([facility]     ICD-10-CM: E66.01 
ICD-9-CM: 278.01 Encounter for lipid screening for cardiovascular disease     ICD-10-CM: Z13.220, Z13.6 ICD-9-CM: V77.91, V81.2 Screening for HIV (human immunodeficiency virus)     ICD-10-CM: Z11.4 ICD-9-CM: V73.89 Screening for viral disease     ICD-10-CM: Z11.59 
ICD-9-CM: V73.99 Screening for bacterial and spirochetal sexually transmitted diseases     ICD-10-CM: Z11.3 ICD-9-CM: V74.5 Screen for STD (sexually transmitted disease)     ICD-10-CM: Z11.3 ICD-9-CM: V74.5 Vitals BP Pulse Temp Resp Height(growth percentile) Weight(growth percentile) (!) 150/99 92 98.4 °F (36.9 °C) (Oral) 18 5' 5\" (1.651 m) 320 lb (145.2 kg) LMP SpO2 BMI OB Status Smoking Status 06/13/2017 (Approximate) 100% 53.25 kg/m2 Having regular periods Never Smoker Vitals History BMI and BSA Data Body Mass Index Body Surface Area  
 53.25 kg/m 2 2.58 m 2 Preferred Pharmacy Pharmacy Name Phone [facility] [address] [phone] Your Updated Medication List  
  
   
This list is accurate as of: 7/21/17  4:05 PM.  Always use your most recent med list.  
  
  
  
  
 DULoxetine 30 mg capsule Commonly known as:  CYMBALTA Take 1 Cap by mouth daily. gabapentin 300 mg capsule Commonly known as:  NEURONTIN Take 2 capsules at night. ibuprofen 800 mg tablet Commonly known as:  MOTRIN Take 1 Tab by mouth every eight (8) hours as needed for Pain.  
  
 metFORMIN 500 mg tablet Commonly known as:  GLUCOPHAGE Take 1 Tab by mouth daily (with breakfast). methocarbamol 500 mg tablet Commonly known as:  ROBAXIN Take 1 Tab by mouth every twelve (12) hours as needed. Prescriptions Sent to Pharmacy Refills DULoxetine (CYMBALTA) 30 mg capsule 0 Sig: Take 1 Cap by mouth daily. Class: Normal  
 Pharmacy: [place] [address]. Ph #: [phone] Route: Oral  
  
We Performed the Following REFERRAL TO CARDIOLOGY [BKL96 Custom] Comments:  
 Please evaluate for chest pains. Follow-up Instructions Return in about 3 weeks (around 8/10/2017) for review of studies, well woman, and short term follow up on chronic conditions- 45 mins. Jeri Cleveland To-Do List   
 07/21/2017 Lab:  HOLLI COMPREHENSIVE PANEL Around 07/21/2017 Lab:  C REACTIVE PROTEIN, QT   
  
 07/21/2017   Lab:  HEP B SURFACE AG   
  
 07/21/2017 Lab:  HEPATITIS C AB   
  
 07/21/2017 Lab:  HIV 1/2 AG/AB, 4TH GENERATION,W RFLX CONFIRM   
  
 07/21/2017 Lab:  RHEUMATOID FACTOR, QL Around 07/22/2017 Lab:  CBC W/O DIFF Around 07/22/2017 Lab:  LIPID PANEL Around 07/22/2017 Lab:  METABOLIC PANEL, COMPREHENSIVE Around 07/22/2017 Lab:  RPR Around 07/22/2017 Lab:  SED RATE (ESR) Around 07/22/2017 Lab:  TSH 3RD GENERATION   
  
 07/22/2017 Lab:  VITAMIN D, 1, 25 DIHYDROXY Referral Information Referral ID Referred By Referred To  
  
 [id number] Jackie Velasquez MD   
   [address] [facility] [address], [address] [place] [address]  Phone: [phone] Fax: [phone] Visits Status Start Date End Date 1 New Request 7/21/17 7/21/18 If your referral has a status of pending review or denied, additional information will be sent to support the outcome of this decision. Introducing [facility] & HEALTH SERVICES! [facility] introduces [employer] patient portal. Now you can access parts of your medical record, email your doctor's office, and request medication refills online. 1. In your internet browser, go to [email or web address] 2. Click on the First Time User? Click Here link in the Sign In box. You will see the New Member Sign Up page. 3. Enter your [employer] Access Code exactly as it appears below. You will not need to use this code after youve completed the sign-up process. If you do not sign up before the expiration date, you must request a new code. · [employer] Access Code: [email or web address] Expires: 9/25/2017  4:06 PM 
 
4. Enter the last four digits of your Social Security Number (xxxx) and Date of Birth (mm/dd/yyyy) as indicated and click Submit. You will be taken to the next sign-up page. 5. Create a [employer] ID.  This will be your [employer] login ID and cannot be changed, so think of one that is secure and easy to remember. 6. Create a [employer] password. You can change your password at any time. 7. Enter your Password Reset Question and Answer. This can be used at a later time if you forget your password. 8. Enter your e-mail address. You will receive e-mail notification when new information is available in [address] 9. Click Sign Up. You can now view and download portions of your medical record. 10. Click the Download Summary menu link to download a portable copy of your medical information. If you have questions, please visit the Frequently Asked Questions section of the [employer] website. Remember, [employer] is NOT to be used for urgent needs. For medical emergencies, dial 911. Now available from your iPhone and Android! Please provide this summary of care documentation to your next provider. Your primary care clinician is listed as Chevis Second. If you have any questions after today's visit, please call [phone].

## 2017-07-22 DIAGNOSIS — M79.10 MYALGIA: ICD-10-CM

## 2017-07-22 DIAGNOSIS — R73.03 PREDIABETES: ICD-10-CM

## 2017-07-22 DIAGNOSIS — M54.41 CHRONIC BILATERAL LOW BACK PAIN WITH RIGHT-SIDED SCIATICA: ICD-10-CM

## 2017-07-22 DIAGNOSIS — G89.29 CHRONIC BILATERAL LOW BACK PAIN WITH RIGHT-SIDED SCIATICA: ICD-10-CM

## 2017-07-22 DIAGNOSIS — Z11.3 SCREENING FOR BACTERIAL AND SPIROCHETAL SEXUALLY TRANSMITTED DISEASES: ICD-10-CM

## 2017-07-22 DIAGNOSIS — Z13.220 ENCOUNTER FOR LIPID SCREENING FOR CARDIOVASCULAR DISEASE: ICD-10-CM

## 2017-07-22 DIAGNOSIS — R53.83 FATIGUE, UNSPECIFIED TYPE: ICD-10-CM

## 2017-07-22 DIAGNOSIS — I10 ESSENTIAL HYPERTENSION: ICD-10-CM

## 2017-07-22 DIAGNOSIS — Z11.3 SCREEN FOR STD (SEXUALLY TRANSMITTED DISEASE): ICD-10-CM

## 2017-07-22 DIAGNOSIS — Z13.6 ENCOUNTER FOR LIPID SCREENING FOR CARDIOVASCULAR DISEASE: ICD-10-CM

## 2017-07-22 DIAGNOSIS — R07.9 CHEST PAIN, UNSPECIFIED TYPE: ICD-10-CM

## 2017-07-25 ENCOUNTER — TELEPHONE (OUTPATIENT)
Dept: FAMILY MEDICINE CLINIC | Age: 37
End: 2017-07-25

## 2017-07-25 DIAGNOSIS — B37.31 CANDIDA VAGINITIS: Primary | ICD-10-CM

## 2017-07-25 RX ORDER — FLUCONAZOLE 150 MG/1
150 TABLET ORAL DAILY
Qty: 1 TAB | Refills: 0 | Status: SHIPPED | OUTPATIENT
Start: 2017-07-25 | End: 2017-07-26

## 2017-07-25 NOTE — TELEPHONE ENCOUNTER
7/25/2017  2:46 PM    Chief Complaint   Patient presents with    Request For New Medication       Noted request for Diflucan and that this was discussed previously. I am unable to recall this exactly; however, Rx sent at this time. Will need to have GYN exam if continues to have symptoms.

## 2017-07-25 NOTE — TELEPHONE ENCOUNTER
Patient states that she was suppose to be called in  [address] [place] [address] during her last office visit and phone call with the provider and it was never called in.

## 2017-08-04 ENCOUNTER — TELEPHONE (OUTPATIENT)
Dept: FAMILY MEDICINE CLINIC | Age: 37
End: 2017-08-04

## 2017-08-04 NOTE — TELEPHONE ENCOUNTER
It looks like Kristen sent Diflucan to [facility] on 7/25.  It says receipt confirmed by pharmacy 7/25/17 2:47 PM. If she is still experiencing symptoms, Kristen had recommended an office visit

## 2017-08-04 NOTE — TELEPHONE ENCOUNTER
I called pt to inform pt that I spoke with Valdez Yap and medication Diflucan was put back due to it took to long for pt to  but they will refill it. I called pt on contact number [phone] could not leave message mailbox was full.

## 2017-08-04 NOTE — TELEPHONE ENCOUNTER
Patient called and states she never received her Rx for Diflucan. I checked the chart and confirmed that Rx was not sent. Patient is requesting Diflucan again.

## 2017-08-16 ENCOUNTER — OFFICE VISIT (OUTPATIENT)
Dept: FAMILY MEDICINE CLINIC | Age: 37
End: 2017-08-16

## 2017-08-16 VITALS
DIASTOLIC BLOOD PRESSURE: 75 MMHG | OXYGEN SATURATION: 98 % | BODY MASS INDEX: 48.82 KG/M2 | SYSTOLIC BLOOD PRESSURE: 116 MMHG | HEIGHT: 65 IN | WEIGHT: 293 LBS | TEMPERATURE: 99.5 F | RESPIRATION RATE: 18 BRPM | HEART RATE: 85 BPM

## 2017-08-16 DIAGNOSIS — R07.9 CHEST PAIN, UNSPECIFIED TYPE: ICD-10-CM

## 2017-08-16 DIAGNOSIS — M79.10 MYALGIA: ICD-10-CM

## 2017-08-16 DIAGNOSIS — M54.41 CHRONIC BILATERAL LOW BACK PAIN WITH RIGHT-SIDED SCIATICA: Primary | ICD-10-CM

## 2017-08-16 DIAGNOSIS — G89.29 OTHER CHRONIC PAIN: ICD-10-CM

## 2017-08-16 DIAGNOSIS — G89.29 CHRONIC BILATERAL LOW BACK PAIN WITH RIGHT-SIDED SCIATICA: Primary | ICD-10-CM

## 2017-08-16 DIAGNOSIS — M41.9 SCOLIOSIS OF THORACIC SPINE, UNSPECIFIED SCOLIOSIS TYPE: ICD-10-CM

## 2017-08-16 DIAGNOSIS — R53.83 FATIGUE, UNSPECIFIED TYPE: ICD-10-CM

## 2017-08-16 NOTE — LETTER
NOTIFICATION RETURN TO WORK / SCHOOL 
 
8/16/2017 2:35 PM 
 
Ms. Reji Richmond [address] [facility] [address] To Whom It May Concern: 
 
Reji Richmond is currently under the care of [facility]. She was seen today in our office. Please excuse her from work today. If there are questions or concerns please have the patient contact our office.  
 
 
 
Sincerely, 
 
 
Vanda Haas NP

## 2017-08-16 NOTE — PROGRESS NOTES
HISTORY OF PRESENT ILLNESS  Luis Amaya is a 40 y.o. female. 8/16/2017  2:26 PM    Chief Complaint   Patient presents with   [address]     started having bad lower back pain two days ago, denies any urinary issues or abdominal pain    Medication Reaction     Started on Cymbalta a week ago and started feeling dizziness and light headed, and feels tired all the time medication helped with the tenderness        HPI: Here today for complaints of worsening back pain the last few days and possible reaction to Cymbalta. Diagnosed with chronic LBP with findings of scoliosis on xrays. She is not sure of possible cause for increased back pain the last few days- radiation into the right buttock. Cymbalta started a week ago and she reports that although it helped with overall tenderness, she has been having some dizziness and fatigue. No new symptoms such as urinary or bowel incontinence. Has not gotten joint panel labs done as ordered at last visit. Last HPI:  Chronic LBP: Reports taking Gabapentin 600 mg at HS. Uses Ibuprofen and muscle relaxer PRN. Has been evaluated by spine specialist in past- last seen 10/2015. She reports back pain has been happening since a fall in hallway at work in 8/2014. Patient has received a trigger point injection by Dr. Corby Humphries over a year ago which offered good relief. She attended physical therapy x2 without relief. In addition, she reports that she just hurts all over- states that her neck hurts, her entire back hurts, her legs hurt, just all over body pain. She reports this has been happening since her fall in 2014 and no one seems to be able to figure out why it is happening. She states that it is hard to get out of bed sometimes and she hurts even just sitting in a chair. Denies any headaches, dizziness, or weakness. At 6/27 visit, she complained of fatigue and right sided chest wall pain. Fatigue has not improved.  Still has intermittent right sided chest wall pain- states that it comes and goes- no new symptoms- using ASA when it occurs which helps. Chest pain not worse with activity. No show to cardiology appointment. Review of Systems   Constitutional: Positive for malaise/fatigue. Cardiovascular: Positive for chest pain. Gastrointestinal: Negative for abdominal pain, diarrhea, nausea and vomiting. Genitourinary: Negative for dysuria, frequency, hematuria and urgency. Musculoskeletal: Positive for back pain, joint pain and myalgias. Neurological: Negative for dizziness, tingling, sensory change, weakness and headaches. PHQ Screening   PHQ over the last two weeks 2/6/2017   PHQ Not Done -   Little interest or pleasure in doing things Not at all   Feeling down, depressed or hopeless Not at all   Total Score PHQ 2 0         History  Past Medical History:   Diagnosis Date    Abnormal MRI of head 2010    Nonspecific focus of low attenuation at the right internal capsule on CT and MRI    Anxiety     Chronic low back pain 2014    unknown cause- normal xrays    Depression     Hypertension     Prediabetes     Scoliosis        History reviewed. No pertinent surgical history. Social History     Social History    Marital status: SINGLE     Spouse name: N/A    Number of children: N/A    Years of education: N/A     Occupational History    Not on file. Social History Main Topics    Smoking status: Never Smoker    Smokeless tobacco: Never Used    Alcohol use 0.6 oz/week     1 Glasses of wine per week      Comment: socially    Drug use: No    Sexual activity: Yes     Partners: Male     Birth control/ protection: Condom     Other Topics Concern    Not on file     Social History Narrative       No Known Allergies       Current Outpatient Prescriptions   Medication Sig Dispense Refill    DULoxetine (CYMBALTA) 30 mg capsule Take 1 Cap by mouth daily.  30 Cap 0    ibuprofen (MOTRIN) 800 mg tablet Take 1 Tab by mouth every eight (8) hours as needed for Pain. 90 Tab 0    gabapentin (NEURONTIN) 300 mg capsule Take 2 capsules at night. 60 Cap 3    metFORMIN (GLUCOPHAGE) 500 mg tablet Take 1 Tab by mouth daily (with breakfast). 30 Tab 3    methocarbamol (ROBAXIN) 500 mg tablet Take 1 Tab by mouth every twelve (12) hours as needed. 30 Tab 1         Patient Care Team:  Patient Care Team:  Magdalena Mccracken NP as PCP - General (Nurse Practitioner)  Daja Nolasco MD (Physical Medicine and Rehab)        LABS:  None new to review    RADIOLOGY:    SACRUM/COCCYX  CPT CODE: 00307  HISTORY: Worsening back pain and coccygeal pain after falling one year ago. ,  shooting pain in the lower back. COMPARISON: Coccyx August 24, 2014   FINDINGS:  Two views obtained. There is no fracture. Mineralization is normal.  IMPRESSION:  Negative. Lumbar Spine Limited Two Views  CPT CODE: 77815  HISTORY: Worsening low back pain and tail bone pain status post fall one year  ago, lower back pain with pressure, burning, weakness. COMPARISON: Lumbar spine series and coccyx August 24, 2014. FINDINGS:  Two views obtained. The vertebra are normal in height. Mild left bending curve  of the lumbar spine unchanged . The disc spaces are preserved. The pedicles  are intact. There is no evidence of fracture or dislocation. Mineralization is  normal.  IMPRESSION:  No acute finding      Thoracic Spine Series  CPT CODE: 60052  HISTORY: Worsening back pain status post fall one year ago, back pain and spasm  after fall. COMPARISON: Chest x-ray October 13, 2011. FINDINGS:  Two views obtained. The lateral view demonstrates from upper thoracic level to  upper lumbar level. The vertebra are normal in height. There is moderate right  curvature of the midthoracic spine without change. There is straightening of the  usual thoracic kyphosis. The disc spaces are preserved. The pedicles are  intact. There is no evidence of fracture or dislocation.   IMPRESSION:  Scoliosis      Physical Exam   Constitutional: She is oriented to person, place, and time. She appears well-developed and well-nourished. No distress. Neck: Normal range of motion. Neck supple. Pulmonary/Chest: Effort normal. No respiratory distress. She exhibits no tenderness. Abdominal: Soft. Musculoskeletal: She exhibits no edema. Lumbar back: She exhibits decreased range of motion, tenderness and pain. She exhibits no swelling and no spasm.   -bilateral arm and  strength 5/5  -bilateral leg and foot strength 5/5  -straight leg raise negative  -decreased ROM due to reports of pain lumbar spine  -all over pain reported   Neurological: She is alert and oriented to person, place, and time. She exhibits normal muscle tone. Coordination normal.   Skin: Skin is warm and dry. Vitals:    08/16/17 1409   BP: 116/75   Pulse: 85   Resp: 18   Temp: 99.5 °F (37.5 °C)   TempSrc: Oral   SpO2: 98%   Weight: 317 lb 3.2 oz (143.9 kg)   Height: 5' 5\" (1.651 m)   PainSc:   5   PainLoc: Back   LMP: 08/06/2017     BP Readings from Last 3 Encounters:   08/16/17 116/75   07/21/17 (!) 150/99   06/27/17 120/75       ASSESSMENT and PLAN    Diagnoses and all orders for this visit:      Chronic bilateral low back pain with right-sided sciatica/ Other chronic pain/ Myalgia  *Discussed with patient about current acute on chronic back pain. Advised on medication changes that can be done- she is not sure if she wants to make changes. Advised on side effects of Cymbalta and that if tolerable, they should resolve in about 2 weeks. She is not sure if she will continue or not. *Awaiting joint panel of labs- encouraged to get done. *Appears to have scoliosis as below but not sure if this is contributing to the amount of pain she is reporting. Weight may also be a contributing factor; however, she denies desire to work out or attend PT due to increase in pain. *Consider fibromyalgia evaluation with rheumatology.      Scoliosis of thoracic spine, unspecified scoliosis type  *As above. Fatigue, unspecified type  *Persisting. Awaiting labs. Chest pain, unspecified type  *Did not attend cardiology appointment. Given contact information for her to reschedule. *Plan of care reviewed with patient. Patient in agreement with plan and expresses understanding. All questions answered and patient encouraged to call or RTO if further questions or concerns. Follow-up Disposition:  Return for already scheduled appointment in 2 days for well woman and review of labs. Josie Valadez

## 2017-08-16 NOTE — PATIENT INSTRUCTIONS
Cardiology    [facility]  [address] [place] [address], [address] [place] [address], [facility]   Phone: [phone]  Fax: [phone]  &  Address: [address], [address] [place] [address]   Phone: [phone]

## 2017-08-17 ENCOUNTER — HOSPITAL ENCOUNTER (OUTPATIENT)
Dept: LAB | Age: 37
Discharge: HOME | End: 2017-08-17

## 2017-08-17 ENCOUNTER — TELEPHONE (OUTPATIENT)
Dept: FAMILY MEDICINE CLINIC | Age: 37
End: 2017-08-17

## 2017-08-17 LAB — SENTARA SPECIMEN COL,SENBCF: NORMAL

## 2017-08-17 PROCEDURE — 99001 SPECIMEN HANDLING PT-LAB: CPT | Performed by: NURSE PRACTITIONER

## 2017-08-17 NOTE — TELEPHONE ENCOUNTER
Call made to Pt, no answer, left message for her to get ordered labs completed prior to scheduled visit tomorrow and to call office if she has any questions or need to reschedule. Forwarded to provider for review.

## 2017-08-18 LAB
A-G RATIO,AGRAT: 1.1 RATIO (ref 1.1–2.6)
ALBUMIN SERPL-MCNC: 3.9 G/DL (ref 3.5–5)
ALP SERPL-CCNC: 89 U/L (ref 25–115)
ALT SERPL-CCNC: 11 U/L (ref 5–40)
ANION GAP SERPL CALC-SCNC: 16 MMOL/L
ANTI-DNA (DS) AB QN, 1189: 1 IU/ML
AST SERPL W P-5'-P-CCNC: 11 U/L (ref 10–37)
BILIRUB SERPL-MCNC: 0.2 MG/DL (ref 0.2–1.2)
BUN SERPL-MCNC: 6 MG/DL (ref 6–22)
C-REACTIVE PROTEIN, QT, 006627: 0.8 MG/DL (ref 0–0.5)
CALCIUM SERPL-MCNC: 8.8 MG/DL (ref 8.4–10.5)
CENTROMERE B ANTIBODY, 601143: <0.2 AI
CHLORIDE SERPL-SCNC: 93 MMOL/L (ref 98–110)
CHOLEST SERPL-MCNC: 143 MG/DL (ref 110–200)
CHROMATIN ANTIBODY: <0.2 AI
CO2 SERPL-SCNC: 24 MMOL/L (ref 20–32)
CREAT SERPL-MCNC: 0.6 MG/DL (ref 0.5–1.2)
ENA SS-A AB SER-ACNC: <0.2 AI
ENA SS-B AB SER-ACNC: <0.2 AI
ERYTHROCYTE [DISTWIDTH] IN BLOOD BY AUTOMATED COUNT: 17 % (ref 10–16)
GFRAA, 66117: >60
GFRNA, 66118: >60
GLOBULIN,GLOB: 3.5 G/DL (ref 2–4)
GLUCOSE SERPL-MCNC: 86 MG/DL (ref 65–99)
HCT VFR BLD AUTO: 37.1 % (ref 35.1–46.5)
HCV AB SER IA-ACNC: NORMAL
HDLC SERPL-MCNC: 38 MG/DL (ref 40–59)
HEP B SURFACE AG SCRN, 006510: NORMAL
HGB BLD-MCNC: 10.8 G/DL (ref 11.7–15.5)
HIV -1/0/2 AG/AB WITH REFLEX, 13463: NON REACTIVE
HIV 1 & 2 AB SER-IMP: NORMAL
JO1 ANTIBODY, 8107: <0.2 AI
LDLC SERPL CALC-MCNC: 76 MG/DL (ref 50–99)
MCH RBC QN AUTO: 22 PG (ref 26–34)
MCHC RBC AUTO-ENTMCNC: 29 G/DL (ref 32–36)
MCV RBC AUTO: 74 FL (ref 80–95)
PLATELET # BLD AUTO: 278 K/UL (ref 140–440)
POTASSIUM SERPL-SCNC: 4.1 MMOL/L (ref 3.5–5.5)
PROT SERPL-MCNC: 7.4 G/DL (ref 6.4–8.3)
RBC # BLD AUTO: 5.03 M/UL (ref 3.8–5.2)
RHEUMATOID FACTOR QUANT, IMMUNOTURBIDIMETRIC: <20 IU/ML (ref 0–20)
RNP ABS, 016354: <0.2 AI
SCLERODERMA AB (SCL-70), 601116: <0.2 AI
SED RATE (ESR): 28 MM/HR (ref 0–20)
SMITH ABS, 016362: <0.2 AI
SODIUM SERPL-SCNC: 133 MMOL/L (ref 133–145)
SYPHILIS (T. PALLIDUM) IGG, 15809: NON REACTIVE
TRIGL SERPL-MCNC: 145 MG/DL (ref 40–149)
TSH SERPL DL<=0.005 MIU/L-ACNC: 2.26 MCU/ML (ref 0.27–4.2)
VLDLC SERPL CALC-MCNC: 29 MG/DL (ref 8–30)
WBC # BLD AUTO: 7.6 K/UL (ref 4–11)

## 2017-08-21 LAB — CALCITRIOL, 081092: 37.2 PG/ML

## 2017-11-18 ENCOUNTER — HOSPITAL ENCOUNTER (EMERGENCY)
Age: 37
Discharge: HOME | End: 2017-11-18
Attending: EMERGENCY MEDICINE
Payer: MEDICAID

## 2017-11-18 VITALS
HEIGHT: 66 IN | DIASTOLIC BLOOD PRESSURE: 106 MMHG | SYSTOLIC BLOOD PRESSURE: 147 MMHG | HEART RATE: 105 BPM | RESPIRATION RATE: 20 BRPM | OXYGEN SATURATION: 100 % | WEIGHT: 293 LBS | BODY MASS INDEX: 47.09 KG/M2

## 2017-11-18 DIAGNOSIS — L50.9 URTICARIAL RASH: Primary | ICD-10-CM

## 2017-11-18 DIAGNOSIS — R03.0 ELEVATED BLOOD PRESSURE READING: ICD-10-CM

## 2017-11-18 PROCEDURE — 74011636637 HC RX REV CODE- 636/637: Performed by: PHYSICIAN ASSISTANT

## 2017-11-18 PROCEDURE — 99283 EMERGENCY DEPT VISIT LOW MDM: CPT

## 2017-11-18 RX ORDER — DIPHENHYDRAMINE HCL 25 MG
CAPSULE ORAL
Qty: 16 CAP | Refills: 0 | Status: SHIPPED | OUTPATIENT
Start: 2017-11-18 | End: 2018-01-09

## 2017-11-18 RX ORDER — PREDNISONE 20 MG/1
60 TABLET ORAL
Status: COMPLETED | OUTPATIENT
Start: 2017-11-18 | End: 2017-11-18

## 2017-11-18 RX ORDER — PREDNISONE 20 MG/1
60 TABLET ORAL DAILY
Qty: 15 TAB | Refills: 0 | Status: SHIPPED | OUTPATIENT
Start: 2017-11-18 | End: 2017-11-23

## 2017-11-18 RX ADMIN — PREDNISONE 60 MG: 20 TABLET ORAL at 16:57

## 2017-11-18 NOTE — DISCHARGE INSTRUCTIONS
Elevated Blood Pressure: Care Instructions  Your Care Instructions    Blood pressure is a measure of how hard the blood pushes against the walls of your arteries. It's normal for blood pressure to go up and down throughout the day. But if it stays up over time, you have high blood pressure. Two numbers tell you your blood pressure. The first number is the systolic pressure. It shows how hard the blood pushes when your heart is pumping. The second number is the diastolic pressure. It shows how hard the blood pushes between heartbeats, when your heart is relaxed and filling with blood. An ideal blood pressure in adults is less than 120/80 (say \"120 over 80\"). High blood pressure is 140/90 or higher. You have high blood pressure if your top number is 140 or higher or your bottom number is 90 or higher, or both. The main test for high blood pressure is simple, fast, and painless. To diagnose high blood pressure, your doctor will test your blood pressure at different times. After testing your blood pressure, your doctor may ask you to test it again when you are home. If you are diagnosed with high blood pressure, you can work with your doctor to make a long-term plan to manage it. Follow-up care is a key part of your treatment and safety. Be sure to make and go to all appointments, and call your doctor if you are having problems. It's also a good idea to know your test results and keep a list of the medicines you take. How can you care for yourself at home? · Do not smoke. Smoking increases your risk for heart attack and stroke. If you need help quitting, talk to your doctor about stop-smoking programs and medicines. These can increase your chances of quitting for good. · Stay at a healthy weight. · Try to limit how much sodium you eat to less than 2,300 milligrams (mg) a day. Your doctor may ask you to try to eat less than 1,500 mg a day. · Be physically active.  Get at least 30 minutes of exercise on most days of the week. Walking is a good choice. You also may want to do other activities, such as running, swimming, cycling, or playing tennis or team sports. · Avoid or limit alcohol. Talk to your doctor about whether you can drink any alcohol. · Eat plenty of fruits, vegetables, and low-fat dairy products. Eat less saturated and total fats. · Learn how to check your blood pressure at home. When should you call for help? Call your doctor now or seek immediate medical care if:  ? · Your blood pressure is much higher than normal (such as 180/110 or higher). ? · You think high blood pressure is causing symptoms such as:  ¨ Severe headache. ¨ Blurry vision. ? Watch closely for changes in your health, and be sure to contact your doctor if:  ? · You do not get better as expected. Where can you learn more? Go to http://vij[employer] Technologiesjeff.info/. Enter Y882 in the search box to learn more about \"Elevated Blood Pressure: Care Instructions. \"  Current as of: September 21, 2016  Content Version: 11.4  © 4939-8333 [employer]. Care instructions adapted under license by [employer] (which disclaims liability or warranty for this information). If you have questions about a medical condition or this instruction, always ask your healthcare professional. [address] any warranty or liability for your use of this information. Hives: Care Instructions  Your Care Instructions  Hives are raised, red, itchy patches of skin. They are also called wheals or welts. They usually have red borders and pale centers. Hives range in size from ¼ inch to 3 inches or more across. They may seem to move from place to place on the skin. Several hives may form a large area of raised, red skin. You can get hives after an insect sting, after taking medicine or eating certain foods, or because of infection or stress.  Other causes include plants, things you breathe in, makeup, heat, cold, sunlight, and latex. You cannot spread hives to other people. Hives may last a few minutes or a few days, but a single spot may last less than 36 hours. Follow-up care is a key part of your treatment and safety. Be sure to make and go to all appointments, and call your doctor if you are having problems. It's also a good idea to know your test results and keep a list of the medicines you take. How can you care for yourself at home? · Avoid whatever you think may have caused your hives, such as a certain food or medicine. However, you may not know the cause. · Put a cool, wet towel on the area to relieve itching. · Take an over-the-counter antihistamine, such as diphenhydramine (Benadryl), cetirizine (Zyrtec), or loratadine (Claritin), to help stop the hives and calm the itching. Read and follow directions on the label. These medicines can make you feel sleepy. Do not drive while using them. · Stay away from strong soaps, detergents, and chemicals. These can make itching worse. When should you call for help? Call 911 anytime you think you may need emergency care. For example, call if:  ? · You have symptoms of a severe allergic reaction. These may include:  ¨ Sudden raised, red areas (hives) all over your body. ¨ Swelling of the throat, mouth, lips, or tongue. ¨ Trouble breathing. ¨ Passing out (losing consciousness). Or you may feel very lightheaded or suddenly feel weak, confused, or restless. ?Call your doctor now or seek immediate medical care if:  ? · You have symptoms of an allergic reaction, such as:  ¨ A rash or hives (raised, red areas on the skin). ¨ Itching. ¨ Swelling. ¨ Belly pain, nausea, or vomiting. ? · You get hives after you start a new medicine. ? · Hives have not gone away after 24 hours. ? Watch closely for changes in your health, and be sure to contact your doctor if:  ? · You do not get better as expected. Where can you learn more?   Go to http://vijay-jeff.info/. Enter P106 in the search box to learn more about \"Hives: Care Instructions. \"  Current as of: March 20, 2017  Content Version: 11.4  © 7490-9130 [employer]. Care instructions adapted under license by [employer] (which disclaims liability or warranty for this information). If you have questions about a medical condition or this instruction, always ask your healthcare professional. [place] [address] any warranty or liability for your use of this information.

## 2017-11-18 NOTE — ED TRIAGE NOTES
Pt c/o rash to arms and legs for a week. States thinks she has bed bugs.  Stopped taking her BP meds \"a while ago\"

## 2017-11-18 NOTE — ED PROVIDER NOTES
HPI Comments: Pt reports \"insect bites\" to arms and legs for 1 week. Reports worsening itching and fluctuating \"raised bumps. \"  States bumps have improved but itching still present. Denies fever, chills, abdominal pain, NVD, swelling, sob, cp, wheezing or any other complaints. Denies new exposures, no new medications. The history is provided by the patient. Past Medical History:   Diagnosis Date    Abnormal MRI of head 2010    Nonspecific focus of low attenuation at the right internal capsule on CT and MRI    Anxiety     Chronic low back pain 2014    unknown cause- normal xrays    Depression     Hypertension     Prediabetes     Scoliosis        No past surgical history on file. Family History:   Problem Relation Age of Onset    Diabetes Other     Hypertension Other     Heart Disease Other     Asthma Other     Arthritis-osteo Other     Diabetes Mother     Hypertension Mother        Social History     Social History    Marital status: SINGLE     Spouse name: N/A    Number of children: N/A    Years of education: N/A     Occupational History    Not on file. Social History Main Topics    Smoking status: Never Smoker    Smokeless tobacco: Never Used    Alcohol use 0.6 oz/week     1 Glasses of wine per week      Comment: socially    Drug use: No    Sexual activity: Yes     Partners: Male     Birth control/ protection: Condom     Other Topics Concern    Not on file     Social History Narrative         ALLERGIES: Review of patient's allergies indicates no known allergies. Review of Systems    There were no vitals filed for this visit. Physical Exam   Constitutional: She is oriented to person, place, and time. She appears well-developed and well-nourished. No distress. HENT:   Head: Normocephalic and atraumatic. Mouth/Throat: Oropharynx is clear and moist.   Eyes: Conjunctivae are normal.   Neck: Normal range of motion. Neck supple.    Cardiovascular: Normal rate, regular rhythm and normal heart sounds. Pulmonary/Chest: Effort normal and breath sounds normal. No respiratory distress. She has no decreased breath sounds. She has no wheezes. She exhibits no tenderness. Abdominal: Soft. She exhibits no distension. There is no tenderness. Musculoskeletal: Normal range of motion. Neurological: She is alert and oriented to person, place, and time. No cranial nerve deficit. Coordination normal.   Skin: Skin is warm and dry. No rash noted. She is not diaphoretic. Mild urticarial rash to arms and legs. No vesicles, pustules or lesions c/w insect bites. No swelling to lips or tongue or throat. Psychiatric: She has a normal mood and affect. Nursing note and vitals reviewed. MDM  Number of Diagnoses or Management Options  Elevated blood pressure reading:   Urticarial rash:   Diagnosis management comments: Rx for prednisone, benadryl and f/u if worse or as needed. BP reading elevated while in ED with no previous or mentioned hx. Pt is made aware and will f/u with PCP. She understands risks of prolonged elevated BP including end organ disease. Discussed proper way to take medications. Discussed treatment plan, return precautions, symptomatic relief, and expected time to improvement. All questions answered. Patient is stable for discharge and outpatient management.        ED Course       Procedures

## 2017-11-29 ENCOUNTER — OFFICE VISIT (OUTPATIENT)
Dept: FAMILY MEDICINE CLINIC | Age: 37
End: 2017-11-29

## 2017-11-29 VITALS
HEIGHT: 66 IN | TEMPERATURE: 98.5 F | OXYGEN SATURATION: 99 % | HEART RATE: 108 BPM | WEIGHT: 293 LBS | SYSTOLIC BLOOD PRESSURE: 136 MMHG | BODY MASS INDEX: 47.09 KG/M2 | RESPIRATION RATE: 18 BRPM | DIASTOLIC BLOOD PRESSURE: 72 MMHG

## 2017-11-29 DIAGNOSIS — N89.8 VAGINAL DISCHARGE: ICD-10-CM

## 2017-11-29 DIAGNOSIS — N89.8 VAGINAL DISCHARGE: Primary | ICD-10-CM

## 2017-11-29 PROBLEM — E66.01 OBESITY, MORBID (HCC): Status: ACTIVE | Noted: 2017-11-29

## 2017-11-29 RX ORDER — FLUCONAZOLE 150 MG/1
TABLET ORAL
Qty: 1 TAB | Refills: 0 | Status: SHIPPED | OUTPATIENT
Start: 2017-11-29 | End: 2018-01-09

## 2017-11-29 NOTE — PROGRESS NOTES
Patient: Marcial Canas MRN: [id number]  SSN: [id number]    YOB: 1980  Age: 40 y.o. Sex: female      Date of Service: 11/29/2017   Provider: FIFI Lemus   Office Location:   [address] [facility], [address] [place] [address], [address]  Office Phone: [phone]  Office Fax: [phone]        REASON FOR VISIT:   Chief Complaint   Patient presents with    Vaginal Discharge     pt presents for vaginal discharge for about 3 weeks and irritation         VITALS:   Visit Vitals    /72    Pulse (!) 108    Temp 98.5 °F (36.9 °C) (Oral)    Resp 18    Ht 5' 6\" (1.676 m)    Wt 326 lb (147.9 kg)    LMP 11/06/2017    SpO2 99%    BMI 52.62 kg/m2       MEDICATIONS:   Current Outpatient Prescriptions   Medication Sig Dispense Refill    diphenhydrAMINE (BENADRYL) 25 mg capsule Take 1-2 tabs every 6 hours as needed. 16 Cap 0    ibuprofen (MOTRIN) 800 mg tablet Take 1 Tab by mouth every eight (8) hours as needed for Pain. 90 Tab 0        ALLERGIES:   No Known Allergies     ACTIVE MEDICAL PROBLEMS:  Patient Active Problem List   Diagnosis Code    Obesity E66.9    Prediabetes R73.03    Back pain M54.9    Scoliosis of thoracic spine M41.9    Obesity, morbid ([facility] [address] E66.01        MEDICAL/SURGICAL HISTORY:  Past Medical History:   Diagnosis Date    Abnormal MRI of head 2010    Nonspecific focus of low attenuation at the right internal capsule on CT and MRI    Anxiety     Chronic low back pain 2014    unknown cause- normal xrays    Depression     Hypertension     Prediabetes     Scoliosis       History reviewed. No pertinent surgical history.      FAMILY HISTORY:  Family History   Problem Relation Age of Onset    Diabetes Other     Hypertension Other     Heart Disease Other     Asthma Other     Arthritis-osteo Other     Diabetes Mother     Hypertension Mother         SOCIAL HISTORY:  Social History   Substance Use Topics    Smoking status: Never Smoker  Smokeless tobacco: Never Used    Alcohol use 0.6 oz/week     1 Glasses of wine per week      Comment: socially        HISTORY OF PRESENT ILLNESS:   Bruno Yap is a 40 y.o. female who presents to the office for acute care. Patient complains of 3 week history of vaginal irritation, described as \"burning\" and vaginal discharge, described as clear-white \"jelly-like,\" with slight fishy odor. She does report a history of frequent yeast infections, and states this feels similar. She is sexually active and has had a new male partner, but reports she always uses protection. LMP: 3 weeks ago, denies chance of pregnancy. Unable to leave specimen for pregnancy test today. REVIEW OF SYSTEMS:  Review of Systems   Constitutional: Negative for chills, fever and malaise/fatigue. Respiratory: Negative for cough, shortness of breath and wheezing. Cardiovascular: Negative for chest pain and palpitations. Gastrointestinal: Negative for abdominal pain, nausea and vomiting. Genitourinary: Negative for dysuria, frequency, hematuria and urgency. PHYSICAL EXAMINATION:  Physical Exam   Constitutional: She is well-developed, well-nourished, and in no distress. Cardiovascular: Normal rate, regular rhythm and normal heart sounds. Exam reveals no gallop and no friction rub. No murmur heard. Pulmonary/Chest: Effort normal and breath sounds normal. She has no wheezes. She has no rales. Abdominal: Soft. Bowel sounds are normal. She exhibits no distension. There is no tenderness. There is no rebound and no guarding. Genitourinary: Cervix normal and vulva normal. Cervix exhibits no motion tenderness and no lesion. Vulva exhibits no erythema, no lesion and no rash. Thin  thick  fishy  white  yellow and vaginal discharge found. RESULTS:  No results found for this visit on 11/29/17. ASSESSMENT/PLAN:  Diagnoses and all orders for this visit:    1.  Vaginal discharge  - Patient reports symptoms seem consistent with prior yeast infections, so will treat empirically with Diflucan as below  - Based on odor I am also suspicious for BV or trich but will send nuswab for confirmation prior to treating  Orders:    -     NUSWAB VAGINITIS PLUS; Future  -     fluconazole (DIFLUCAN) 150 mg tablet; take 1 tab PO x 1 dose now      Follow up as needed if symptoms persist or worsen    Patient expresses understanding and is agreeable with the above plan.       PATIENT CARE TEAM:   Patient Care Team:  Mateo Abbott NP as PCP - General (Nurse Practitioner)  Tania Rogers MD (Physical Medicine and Rehab)       FIFI Perez   November 29, 2017    4:12 PM

## 2018-01-09 ENCOUNTER — OFFICE VISIT (OUTPATIENT)
Dept: FAMILY MEDICINE CLINIC | Facility: CLINIC | Age: 38
End: 2018-01-09

## 2018-01-09 VITALS
RESPIRATION RATE: 16 BRPM | WEIGHT: 293 LBS | BODY MASS INDEX: 47.09 KG/M2 | HEIGHT: 66 IN | OXYGEN SATURATION: 97 % | TEMPERATURE: 97.9 F | HEART RATE: 99 BPM | DIASTOLIC BLOOD PRESSURE: 75 MMHG | SYSTOLIC BLOOD PRESSURE: 142 MMHG

## 2018-01-09 DIAGNOSIS — J20.9 BRONCHITIS WITH BRONCHOSPASM: Primary | ICD-10-CM

## 2018-01-09 RX ORDER — AZITHROMYCIN 250 MG/1
TABLET, FILM COATED ORAL
Qty: 6 TAB | Refills: 0 | Status: SHIPPED | OUTPATIENT
Start: 2018-01-09 | End: 2018-09-12

## 2018-01-09 RX ORDER — CODEINE PHOSPHATE AND GUAIFENESIN 10; 100 MG/5ML; MG/5ML
5 SOLUTION ORAL
Qty: 118 ML | Refills: 0 | Status: SHIPPED | OUTPATIENT
Start: 2018-01-09 | End: 2018-09-12

## 2018-01-09 RX ORDER — PREDNISONE 20 MG/1
40 TABLET ORAL
Qty: 14 TAB | Refills: 0 | Status: SHIPPED | OUTPATIENT
Start: 2018-01-09 | End: 2018-01-16

## 2018-01-09 RX ORDER — ALBUTEROL SULFATE 90 UG/1
2 AEROSOL, METERED RESPIRATORY (INHALATION)
Qty: 1 INHALER | Refills: 1 | Status: SHIPPED | OUTPATIENT
Start: 2018-01-09 | End: 2018-09-12

## 2018-01-09 NOTE — PROGRESS NOTES
HISTORY OF PRESENT ILLNESS  Kelli Delgado is a 40 y.o. female. HPI Comments: Presents with 3 days of cough, nasal congestion, scratchy sore throat, chills/sweats (no measured temperatures), myalgias, wheezing, lightheadedness. No GI symptoms, facial pain. No ill contacts. Taking OTC cough syrup (not much help). No history of asthma. She has been out of work with this. Cold Symptoms   Associated symptoms include chills, sore throat, myalgias and wheezing. Pertinent negatives include no headaches (mild), no nausea and no vomiting. Cough   Pertinent negatives include no headaches (mild). Past Medical History:   Diagnosis Date    Abnormal MRI of head 2010    Nonspecific focus of low attenuation at the right internal capsule on CT and MRI    Anxiety     Chronic low back pain 2014    unknown cause- normal xrays    Depression     Hypertension     Prediabetes     Scoliosis        History reviewed. No pertinent surgical history. History   Smoking Status    Never Smoker   Smokeless Tobacco    Never Used     Current Outpatient Prescriptions   Medication Sig    ibuprofen (MOTRIN) 800 mg tablet Take 1 Tab by mouth every eight (8) hours as needed for Pain. No current facility-administered medications for this visit. Review of Systems   Constitutional: Positive for chills and diaphoresis. HENT: Positive for congestion and sore throat. Negative for sinus pain. Respiratory: Positive for cough and wheezing. Gastrointestinal: Negative for nausea and vomiting. Musculoskeletal: Positive for myalgias. Neurological: Positive for dizziness. Negative for headaches (mild). Visit Vitals    /75    Pulse 99    Temp 97.9 °F (36.6 °C)    Resp 16    Ht 5' 6\" (1.676 m)    Wt 326 lb (147.9 kg)    LMP 01/04/2018    SpO2 97%    BMI 52.62 kg/m2       Physical Exam   Constitutional: She is oriented to person, place, and time. She appears well-developed and well-nourished.  No distress. HENT:   Right Ear: Tympanic membrane, external ear and ear canal normal.   Left Ear: Tympanic membrane, external ear and ear canal normal.   Nose: Mucosal edema present. Mouth/Throat: Oropharynx is clear and moist.   Neck: Neck supple. No thyromegaly present. Cardiovascular: Normal rate and regular rhythm. Exam reveals no gallop and no friction rub. No murmur heard. Pulmonary/Chest: Effort normal. No respiratory distress. She has wheezes (coarse). She has no rales. Lymphadenopathy:     She has no cervical adenopathy. Neurological: She is alert and oriented to person, place, and time. Skin: Skin is warm and dry. Psychiatric: She has a normal mood and affect. Her behavior is normal. Judgment and thought content normal.       ASSESSMENT and PLAN    ICD-10-CM ICD-9-CM    1. Bronchitis with bronchospasm J20.9 490 azithromycin (ZITHROMAX) 250 mg tablet      predniSONE (DELTASONE) 20 mg tablet      guaiFENesin-codeine (ROBITUSSIN AC) 100-10 mg/5 mL solution      albuterol (PROVENTIL HFA, VENTOLIN HFA, PROAIR HFA) 90 mcg/actuation inhaler     Follow-up Disposition:  Return if symptoms worsen or fail to improve. the following changes in treatment are made: Will treat for bronchitis as noted. Push fluids, fever control as needed. reviewed medications and side effects in detail  Plan of care reviewed - patient verbalize(s) understanding and agreement.

## 2018-01-09 NOTE — MR AVS SNAPSHOT
Visit Information Date & Time Provider Department Dept. Phone Encounter #  
 1/9/2018  1:00 PM Hayder Hernandez MD [facility] [phone] [id number] Follow-up Instructions Return if symptoms worsen or fail to improve. Upcoming Health Maintenance Date Due DTaP/Tdap/Td series (1 - Tdap) 6/7/2001 PAP AKA CERVICAL CYTOLOGY 6/7/2001 Influenza Age 5 to Adult 8/1/2017 Allergies as of 1/9/2018  Review Complete On: 1/9/2018 By: Hayder Hernandez MD  
 No Known Allergies Current Immunizations  Never Reviewed No immunizations on file. Not reviewed this visit You Were Diagnosed With   
  
 Codes Comments Bronchitis with bronchospasm    -  Primary ICD-10-CM: J20.9 ICD-9-CM: 804 Vitals BP Pulse Temp Resp Height(growth percentile) Weight(growth percentile) 142/75 99 97.9 °F (36.6 °C) 16 5' 6\" (1.676 m) 326 lb (147.9 kg) LMP SpO2 BMI OB Status Smoking Status 01/04/2018 97% 52.62 kg/m2 Having regular periods Never Smoker Vitals History BMI and BSA Data Body Mass Index Body Surface Area  
 52.62 kg/m 2 2.62 m 2 Preferred Pharmacy Pharmacy Name Phone [address] [place] [address]. [phone] Your Updated Medication List  
  
   
This list is accurate as of: 1/9/18  2:25 PM.  Always use your most recent med list.  
  
  
  
  
 albuterol 90 mcg/actuation inhaler Commonly known as:  PROVENTIL HFA, VENTOLIN HFA, PROAIR HFA Take 2 Puffs by inhalation every six (6) hours as needed for Wheezing. azithromycin 250 mg tablet Commonly known as:  Sydney Melvin Take 2 tablets today, then take 1 tablet daily  
  
 guaiFENesin-codeine 100-10 mg/5 mL solution Commonly known as:  ROBITUSSIN AC Take 5 mL by mouth four (4) times daily as needed for Cough. Max Daily Amount: 20 mL. Indications: Cough  
  
 ibuprofen 800 mg tablet Commonly known as:  MOTRIN Take 1 Tab by mouth every eight (8) hours as needed for Pain. predniSONE 20 mg tablet Commonly known as:  Lewis Haynes Take 2 Tabs by mouth daily (with breakfast) for 7 days. Prescriptions Printed Refills  
 guaiFENesin-codeine (ROBITUSSIN AC) 100-10 mg/5 mL solution 0 Sig: Take 5 mL by mouth four (4) times daily as needed for Cough. Max Daily Amount: 20 mL. Indications: Cough Class: Print Route: Oral  
  
Prescriptions Sent to Pharmacy Refills  
 azithromycin (ZITHROMAX) 250 mg tablet 0 Sig: Take 2 tablets today, then take 1 tablet daily Class: Normal  
 [facility] [address] [place] [address]. Ph #: [phone]  
 predniSONE (DELTASONE) 20 mg tablet 0 Sig: Take 2 Tabs by mouth daily (with breakfast) for 7 days. Class: Normal  
 [facility] [place] [address]. Ph #: [phone] Route: Oral  
 albuterol (PROVENTIL HFA, VENTOLIN HFA, PROAIR HFA) 90 mcg/actuation inhaler 1 Sig: Take 2 Puffs by inhalation every six (6) hours as needed for Wheezing. Class: Normal  
 Pharmacy: [facility] [place] [address]. Ph #: [phone] Route: Inhalation Follow-up Instructions Return if symptoms worsen or fail to improve. Introducing [facility]! Corey Fisher introduces [employer] patient portal. Now you can access parts of your medical record, email your doctor's office, and request medication refills online. 1. In your internet browser, go to [email or web address] 2. Click on the First Time User? Click Here link in the Sign In box. You will see the New Member Sign Up page. 3. Enter your [employer] Access Code exactly as it appears below. You will not need to use this code after youve completed the sign-up process. If you do not sign up before the expiration date, you must request a new code. · [employer] Access Code: [email or web address] Expires: 2/16/2018  4:52 PM 
 
4. Enter the last four digits of your Social Security Number (xxxx) and Date of Birth (mm/dd/yyyy) as indicated and click Submit. You will be taken to the next sign-up page. 5. Create a [employer] ID. This will be your [employer] login ID and cannot be changed, so think of one that is secure and easy to remember. 6. Create a [employer] password. You can change your password at any time. 7. Enter your Password Reset Question and Answer. This can be used at a later time if you forget your password. 8. Enter your e-mail address. You will receive e-mail notification when new information is available in [address] 9. Click Sign Up. You can now view and download portions of your medical record. 10. Click the Download Summary menu link to download a portable copy of your medical information. If you have questions, please visit the Frequently Asked Questions section of the [employer] website. Remember, [employer] is NOT to be used for urgent needs. For medical emergencies, dial 911. Now available from your iPhone and Android! Please provide this summary of care documentation to your next provider. Your primary care clinician is listed as Deni Suggs. If you have any questions after today's visit, please call [phone].

## 2018-01-09 NOTE — LETTER
1/9/2018 2:22 PM 
 
Ms. Janelle Fenton [address] [facility] [address] To Whom It May Concern: 
 
 
Janelle Fenton is under the care of [facility]. She should be excused from work/school from January 8, 2018 to January 10, 2018. She may return to work/school Thursday if she is feeling better. If there are questions or concerns, please have the patient contact our office. Sincerely, Jac Stone MD

## 2018-05-11 ENCOUNTER — OFFICE VISIT (OUTPATIENT)
Dept: FAMILY MEDICINE CLINIC | Age: 38
End: 2018-05-11

## 2018-05-11 VITALS
BODY MASS INDEX: 47.09 KG/M2 | HEIGHT: 66 IN | TEMPERATURE: 99 F | RESPIRATION RATE: 18 BRPM | OXYGEN SATURATION: 98 % | SYSTOLIC BLOOD PRESSURE: 118 MMHG | HEART RATE: 92 BPM | WEIGHT: 293 LBS | DIASTOLIC BLOOD PRESSURE: 78 MMHG

## 2018-05-11 DIAGNOSIS — E66.01 MORBID OBESITY WITH BMI OF 50.0-59.9, ADULT (HCC): ICD-10-CM

## 2018-05-11 DIAGNOSIS — Z13.220 SCREENING, LIPID: ICD-10-CM

## 2018-05-11 DIAGNOSIS — R53.83 FATIGUE, UNSPECIFIED TYPE: ICD-10-CM

## 2018-05-11 DIAGNOSIS — M54.41 CHRONIC BILATERAL LOW BACK PAIN WITH RIGHT-SIDED SCIATICA: ICD-10-CM

## 2018-05-11 DIAGNOSIS — F32.A DEPRESSION, UNSPECIFIED DEPRESSION TYPE: ICD-10-CM

## 2018-05-11 DIAGNOSIS — F41.9 ANXIETY: ICD-10-CM

## 2018-05-11 DIAGNOSIS — R07.89 RIGHT-SIDED CHEST WALL PAIN: Primary | ICD-10-CM

## 2018-05-11 DIAGNOSIS — R61 DIAPHORESIS: ICD-10-CM

## 2018-05-11 DIAGNOSIS — F32.89 OTHER DEPRESSION: ICD-10-CM

## 2018-05-11 DIAGNOSIS — G89.29 CHRONIC BILATERAL LOW BACK PAIN WITH RIGHT-SIDED SCIATICA: ICD-10-CM

## 2018-05-11 RX ORDER — IBUPROFEN 800 MG/1
800 TABLET ORAL
Qty: 60 TAB | Refills: 0 | Status: SHIPPED | OUTPATIENT
Start: 2018-05-11 | End: 2018-09-12

## 2018-05-11 RX ORDER — ESCITALOPRAM OXALATE 10 MG/1
10 TABLET ORAL DAILY
Qty: 30 TAB | Refills: 0 | Status: SHIPPED | OUTPATIENT
Start: 2018-05-11 | End: 2018-09-12

## 2018-05-11 NOTE — MR AVS SNAPSHOT
[address] [place] [address] 
 
 
 [address] [address] 
[phone] Patient: Vaughn Vanegas MRN: [id number] Visit Information Date & Time Provider Department Dept. Phone Encounter #  
 5/11/2018  1:30 PM Yayo Mcpherson [phone] [facility] [id number] Follow-up Instructions Return in about 1 week (around 5/18/2018) for to review labs. Upcoming Health Maintenance Date Due DTaP/Tdap/Td series (1 - Tdap) 6/7/2001 PAP AKA CERVICAL CYTOLOGY 6/7/2001 Influenza Age 5 to Adult 8/1/2018 Allergies as of 5/11/2018  Review Complete On: 5/11/2018 By: Francis Orozco No Known Allergies Current Immunizations  Never Reviewed No immunizations on file. Not reviewed this visit You Were Diagnosed With   
  
 Codes Comments Anxiety    -  Primary ICD-10-CM: F41.9 ICD-9-CM: 300.00 Right-sided chest wall pain     ICD-10-CM: R07.89 ICD-9-CM: 786.52 Chronic bilateral low back pain with right-sided sciatica     ICD-10-CM: M54.41, G89.29 ICD-9-CM: 724.2, 724.3, 338.29 Depression, unspecified depression type     ICD-10-CM: F32.9 ICD-9-CM: 264 Other depression     ICD-10-CM: F32.89 ICD-9-CM: 444 Morbid obesity with BMI of 50.0-59.9, adult (HCC)     ICD-10-CM: E66.01, Z68.43 
ICD-9-CM: 278.01, V85.43 Diaphoresis     ICD-10-CM: R61 
ICD-9-CM: 780.8 Fatigue, unspecified type     ICD-10-CM: R53.83 ICD-9-CM: 780.79 Screening, lipid     ICD-10-CM: R52.831 ICD-9-CM: V77.91 Vitals BP Pulse Temp Resp Height(growth percentile) Weight(growth percentile) 118/78 (BP 1 Location: Left arm, BP Patient Position: Sitting) 92 99 °F (37.2 °C) (Oral) 18 5' 6\" (1.676 m) 333 lb (151 kg) LMP SpO2 BMI OB Status Smoking Status 05/01/2018 98% 53.75 kg/m2 Having regular periods Never Smoker BMI and BSA Data Body Mass Index Body Surface Area 53.75 kg/m 2 2.65 m 2 Preferred Pharmacy Pharmacy Name Phone [address] [place] [address]. [phone] Your Updated Medication List  
  
   
This list is accurate as of 5/11/18  2:00 PM.  Always use your most recent med list.  
  
  
  
  
 albuterol 90 mcg/actuation inhaler Commonly known as:  PROVENTIL HFA, VENTOLIN HFA, PROAIR HFA Take 2 Puffs by inhalation every six (6) hours as needed for Wheezing. azithromycin 250 mg tablet Commonly known as:  [place] Take 2 tablets today, then take 1 tablet daily  
  
 escitalopram oxalate 10 mg tablet Commonly known as:  Chely Li Take 1 Tab by mouth daily. guaiFENesin-codeine 100-10 mg/5 mL solution Commonly known as:  ROBITUSSIN AC Take 5 mL by mouth four (4) times daily as needed for Cough. Max Daily Amount: 20 mL. Indications: Cough  
  
 ibuprofen 800 mg tablet Commonly known as:  MOTRIN Take 1 Tab by mouth every eight (8) hours as needed for Pain. Prescriptions Sent to Pharmacy Refills  
 ibuprofen (MOTRIN) 800 mg tablet 0 Sig: Take 1 Tab by mouth every eight (8) hours as needed for Pain. Class: Normal  
 Pharmacy: [facility] DR RENATA ALONSO [address] [place] [address]. Ph #: [phone] Route: Oral  
 escitalopram oxalate (LEXAPRO) 10 mg tablet 0 Sig: Take 1 Tab by mouth daily. Class: Normal  
 Pharmacy: [facility] DR RENATA ALONSO [address] [place] [address]. Ph #: [phone] Route: Oral  
  
Follow-up Instructions Return in about 1 week (around 5/18/2018) for to review labs. To-Do List   
 05/11/2018 Lab:  CBC WITH AUTOMATED DIFF   
  
 05/11/2018 Lab:  HEMOGLOBIN A1C WITH EAG   
  
 05/11/2018 Lab:  LIPID PANEL   
  
 05/11/2018 Lab:  METABOLIC PANEL, COMPREHENSIVE   
  
 05/11/2018 Lab:  T4, FREE   
  
 05/11/2018 Lab:  TSH 3RD GENERATION   
  
 05/11/2018   Lab:  VITAMIN B12   
  
 05/11/2018 Lab:  VITAMIN D, 25 HYDROXY Patient Instructions Learning About the [address] Diet What is the Mediterranean diet? The Mediterranean diet is a style of eating rather than a diet plan. It features foods eaten in [place], [place], [place] and [place], and other countries along the [facility]. It emphasizes eating foods like fish, fruits, vegetables, beans, high-fiber breads and whole grains, nuts, and olive oil. This style of eating includes limited red meat, cheese, and sweets. Why choose the Mediterranean diet? A Mediterranean-style diet may improve heart health. It contains more fat than other heart-healthy diets. But the fats are mainly from nuts, unsaturated oils (such as fish oils and olive oil), and certain nut or seed oils (such as canola, soybean, or flaxseed oil). These fats may help protect the heart and blood vessels. How can you get started on the Mediterranean diet? Here are some things you can do to switch to a more Mediterranean way of eating. What to eat · Eat a variety of fruits and vegetables each day, such as grapes, blueberries, tomatoes, broccoli, peppers, figs, olives, spinach, eggplant, beans, lentils, and chickpeas. · Eat a variety of whole-grain foods each day, such as oats, brown rice, and whole wheat bread, pasta, and couscous. · Eat fish at least 2 times a week. Try tuna, salmon, mackerel, lake trout, herring, or sardines. · Eat moderate amounts of low-fat dairy products, such as milk, cheese, or yogurt. · Eat moderate amounts of poultry and eggs. · Choose healthy (unsaturated) fats, such as nuts, olive oil, and certain nut or seed oils like canola, soybean, and flaxseed. · Limit unhealthy (saturated) fats, such as butter, palm oil, and coconut oil. And limit fats found in animal products, such as meat and dairy products made with whole milk. Try to eat red meat only a few times a month in very small amounts. · Limit sweets and desserts to only a few times a week. This includes sugar-sweetened drinks like soda. The Mediterranean diet may also include red wine with your meal-1 glass each day for women and up to 2 glasses a day for men. Tips for eating at home · Use herbs, spices, garlic, lemon zest, and citrus juice instead of salt to add flavor to foods. · Add avocado slices to your sandwich instead of reevse. · Have fish for lunch or dinner instead of red meat. Brush the fish with olive oil, and broil or grill it. · Sprinkle your salad with seeds or nuts instead of cheese. · Cook with olive or canola oil instead of butter or oils that are high in saturated fat. · Switch from 2% milk or whole milk to 1% or fat-free milk. · Dip raw vegetables in a vinaigrette dressing or hummus instead of dips made from mayonnaise or sour cream. 
· Have a piece of fruit for dessert instead of a piece of cake. Try baked apples, or have some dried fruit. Tips for eating out · Try broiled, grilled, baked, or poached fish instead of having it fried or breaded. · Ask your  to have your meals prepared with olive oil instead of butter. · Order dishes made with marinara sauce or sauces made from olive oil. Avoid sauces made from cream or mayonnaise. · Choose whole-grain breads, whole wheat pasta and pizza crust, brown rice, beans, and lentils. · Cut back on butter or margarine on bread. Instead, you can dip your bread in a small amount of olive oil. · Ask for a side salad or grilled vegetables instead of french fries or chips. Where can you learn more? Go to http://vijay-jeff.info/. Enter 441-776-4924 in the search box to learn more about \"Learning About the Mediterranean Diet. \" Current as of: May 12, 2017 Content Version: 11.4 © 1163-4975 [employer].  Care instructions adapted under license by [employer] (which disclaims liability or warranty for this information). If you have questions about a medical condition or this instruction, always ask your healthcare professional. [address] any warranty or liability for your use of this information. Fatigue: Care Instructions Your Care Instructions Fatigue is a feeling of tiredness, exhaustion, or lack of energy. You may feel fatigue because of too much or not enough activity. It can also come from stress, lack of sleep, boredom, and poor diet. Many medical problems, such as viral infections, can cause fatigue. Emotional problems, especially depression, are often the cause of fatigue. Fatigue is most often a symptom of another problem. Treatment for fatigue depends on the cause. For example, if you have fatigue because you have a certain health problem, treating this problem also treats your fatigue. If depression or anxiety is the cause, treatment may help. Follow-up care is a key part of your treatment and safety. Be sure to make and go to all appointments, and call your doctor if you are having problems. It's also a good idea to know your test results and keep a list of the medicines you take. How can you care for yourself at home? · Get regular exercise. But don't overdo it. Go back and forth between rest and exercise. · Get plenty of rest. 
· Eat a healthy diet. Do not skip meals, especially breakfast. 
· Reduce your use of caffeine, tobacco, and alcohol. Caffeine is most often found in coffee, tea, cola drinks, and chocolate. · Limit medicines that can cause fatigue. This includes tranquilizers and cold and allergy medicines. When should you call for help? Watch closely for changes in your health, and be sure to contact your doctor if: 
? · You have new symptoms such as fever or a rash. ? · Your fatigue gets worse. ? · You have been feeling down, depressed, or hopeless. Or you may have lost interest in things that you usually enjoy. ? · You are not getting better as expected. Where can you learn more? Go to http://vijay-jeff.info/. Enter M280 in the search box to learn more about \"Fatigue: Care Instructions. \" Current as of: March 20, 2017 Content Version: 11.4 © 8797-0247 [employer]. Care instructions adapted under license by [employer] (which disclaims liability or warranty for this information). If you have questions about a medical condition or this instruction, always ask your healthcare professional. [address] any warranty or liability for your use of this information. Starting a Weight Loss Plan: Care Instructions Your Care Instructions If you are thinking about losing weight, it can be hard to know where to start. Your doctor can help you set up a weight loss plan that best meets your needs. You may want to take a class on nutrition or exercise, or join a weight loss support group. If you have questions about how to make changes to your eating or exercise habits, ask your doctor about seeing a registered dietitian or an exercise specialist. 
It can be a big challenge to lose weight. But you do not have to make huge changes at once. Make small changes, and stick with them. When those changes become habit, add a few more changes. If you do not think you are ready to make changes right now, try to pick a date in the future. Make an appointment to see your doctor to discuss whether the time is right for you to start a plan. Follow-up care is a key part of your treatment and safety. Be sure to make and go to all appointments, and call your doctor if you are having problems. It's also a good idea to know your test results and keep a list of the medicines you take. How can you care for yourself at home? · Set realistic goals. Many people expect to lose much more weight than is likely.  A weight loss of 5% to 10% of your body weight may be enough to improve your health. · Get family and friends involved to provide support. Talk to them about why you are trying to lose weight, and ask them to help. They can help by participating in exercise and having meals with you, even if they may be eating something different. · Find what works best for you. If you do not have time or do not like to cook, a program that offers meal replacement bars or shakes may be better for you. Or if you like to prepare meals, finding a plan that includes daily menus and recipes may be best. 
· Ask your doctor about other health professionals who can help you achieve your weight loss goals. ¨ A dietitian can help you make healthy changes in your diet. ¨ An exercise specialist or  can help you develop a safe and effective exercise program. 
¨ A counselor or psychiatrist can help you cope with issues such as depression, anxiety, or family problems that can make it hard to focus on weight loss. · Consider joining a support group for people who are trying to lose weight. Your doctor can suggest groups in your area. Where can you learn more? Go to http://vijay-jeff.info/. Enter Y571 in the search box to learn more about \"Starting a Weight Loss Plan: Care Instructions. \" Current as of: October 13, 2016 Content Version: 11.4 © 2264-3236 [employer]. Care instructions adapted under license by [employer] (which disclaims liability or warranty for this information). If you have questions about a medical condition or this instruction, always ask your healthcare professional. [place] [address] any warranty or liability for your use of this information. Introducing [facility]! Yina Cornejo introduces [employer] patient portal. Now you can access parts of your medical record, email your doctor's office, and request medication refills online.    
 
1. In your internet browser, go to [email or web address] 2. Click on the First Time User? Click Here link in the Sign In box. You will see the New Member Sign Up page. 3. Enter your [employer] Access Code exactly as it appears below. You will not need to use this code after youve completed the sign-up process. If you do not sign up before the expiration date, you must request a new code. · [employer] Access Code: [email or web address] Expires: 8/9/2018  2:00 PM 
 
4. Enter the last four digits of your Social Security Number (xxxx) and Date of Birth (mm/dd/yyyy) as indicated and click Submit. You will be taken to the next sign-up page. 5. Create a Mindbloomt ID. This will be your [employer] login ID and cannot be changed, so think of one that is secure and easy to remember. 6. Create a [employer] password. You can change your password at any time. 7. Enter your Password Reset Question and Answer. This can be used at a later time if you forget your password. 8. Enter your e-mail address. You will receive e-mail notification when new information is available in [address] 9. Click Sign Up. You can now view and download portions of your medical record. 10. Click the Download Summary menu link to download a portable copy of your medical information. If you have questions, please visit the Frequently Asked Questions section of the [employer] website. Remember, [employer] is NOT to be used for urgent needs. For medical emergencies, dial 911. Now available from your iPhone and Android! Please provide this summary of care documentation to your next provider. Your primary care clinician is listed as Rosa Murphy. If you have any questions after today's visit, please call [phone].

## 2018-05-11 NOTE — LETTER
NOTIFICATION RETURN TO WORK  
 
5/11/2018 2:07 PM 
 
Ms. Swann Patient [address] [facility] [phone] To Whom It May Concern: 
 
Hue Patient [facility] the [facility]. She will return to work on: Monday 5/14/2018 If there are questions or concerns please have the patient contact our office.  
 
 
 
Sincerely, 
 
 
Fito Fletcher, DO

## 2018-05-11 NOTE — PROGRESS NOTES
Chief Complaint   Patient presents with   Jason Saint    Stress     patient states that job is stressful       HPI:    Chanel Guaman is a 40 y.o. morbidly obese  female who comes in today with several complaints. She has been feeling bad and anxious lately resulting in night sweats, diaphoresis, and fatigue, and also feeling depressed lately causing sleep disturbance with stress at job where she is employed in [employer]. She also feels sad especially as she continues to gain weight though she eat late at night and consume junk food, fast food, and chinese food. She has chronic low back pain for which she takes Ibuprofen and she requests refill today. Otherwise, she denies CP, SOB, palpitation, orthopnea, PND, headache, dizziness, abdominal pain, or other complaints today. Past Medical History:   Diagnosis Date    Abnormal MRI of head 2010    Nonspecific focus of low attenuation at the right internal capsule on CT and MRI    Anxiety     Chronic low back pain 2014    unknown cause- normal xrays    Depression     Hypertension     Prediabetes     Scoliosis      No Known Allergies    Current Outpatient Prescriptions   Medication Sig Dispense Refill    ibuprofen (MOTRIN) 800 mg tablet Take 1 Tab by mouth every eight (8) hours as needed for Pain. 60 Tab 0    escitalopram oxalate (LEXAPRO) 10 mg tablet Take 1 Tab by mouth daily. 30 Tab 0    azithromycin (ZITHROMAX) 250 mg tablet Take 2 tablets today, then take 1 tablet daily 6 Tab 0    guaiFENesin-codeine (ROBITUSSIN AC) 100-10 mg/5 mL solution Take 5 mL by mouth four (4) times daily as needed for Cough. Max Daily Amount: 20 mL. Indications: Cough 118 mL 0    albuterol (PROVENTIL HFA, VENTOLIN HFA, PROAIR HFA) 90 mcg/actuation inhaler Take 2 Puffs by inhalation every six (6) hours as needed for Wheezing. 1 Inhaler 1     No past surgical history on file. Constitutional: Positive for fatigue.  Negative for fever or chills  Neurological: Negative for headache, dizziness, visual disturbance, or loss of conciousness  Respiratory: Negative for SOB, hemoptysis, or wheezing  Cardiovascular: Negative for chest pain, palpitation, or leg swelling  Gastrointestinal: Negative for abdominal pain, nausea, vomiting, diarrhea, blood in stool, melena, or heartburn  Musculoskeletal: Negative for falls  Psych: Positive for Anxiety and Depression        Physical Exam:  Visit Vitals    /78 (BP 1 Location: Left arm, BP Patient Position: Sitting)    Pulse 92    Temp 99 °F (37.2 °C) (Oral)    Resp 18    Ht 5' 6\" (1.676 m)    Wt 333 lb (151 kg)    LMP 05/01/2018    SpO2 98%    BMI 53.75 kg/m2       BP Readings from Last 3 Encounters:   05/11/18 118/78   01/09/18 142/75   11/29/17 136/72         General: Morbidly obese black female, a & o x 3, afebrile, well-nourished, interacting appropriately, in no acute distress  HEENT: head normocephalic and atraumatic, conjuctiva clear, PERRLA, EOMI, nose clear, ear canals clear, no erythema or swelling, pharynx and tonsils with no erythema or exudates, no sinus tenderness  Skin: warm and dry, no rashes , no bruises  Neck: supple, symmetrical, no thyromegaly  Respiratory: symmetrical chest expansion, lung sounds clear bilaterally, good air entry, good respiratory effort, no wheezes or crackles  Cardiovascular: normal S1S2, regular rate and rhythm, no murmurs, capillary refills < 2 sec, pulses palpable, no thrill, no carotid or abdominal bruits, no peripheral edema, no JVD  Abdomen: non-distended, normoactive bowel sounds x 4 quadrants, soft, non-tender to palpation, no guarding or rigidity, no palpable mass, no hepatomegaly or splenomegaly, no CVA tenderness  Musculoskeletal: Mild tenderness low back  Lymphatic: no cervical lymphadenopathy  Neurological: DTRs intact symmetrically and bilaterally, sensation and motor function intact  Psychiatry: Anxious appearing          Assessment/Plan:    ICD-10-CM ICD-9-CM    1. Chronic bilateral low back pain with right-sided sciatica M54.41 724.2 ibuprofen (MOTRIN) 800 mg tablet    G89.29 724.3      338.29    2. Anxiety F41.9 300.00 Lexapro started today  escitalopram oxalate (LEXAPRO) 10 mg tablet   3. Depression, unspecified depression type F32.9 311 Lexapro started today  escitalopram oxalate (LEXAPRO) 10 mg tablet   4. Other depression F32.89 311    5. Morbid obesity with BMI of 50.0-59.9, adult ([facility] [address] E66.01 278.01 I have reviewed/discussed the above normal BMI with the patient. I have recommended the following interventions: dietary management education, guidance, and counseling, encourage exercise and monitor weight . .      Z68.43 V85.43    6. Diaphoresis R61 780.8 TSH 3RD GENERATION      T4, FREE      TSH 3RD GENERATION      T4, FREE   7. Fatigue, unspecified type Z16.76 913.40 METABOLIC PANEL, COMPREHENSIVE      CBC WITH AUTOMATED DIFF      VITAMIN D, 25 HYDROXY      TSH 3RD GENERATION      T4, FREE      HEMOGLOBIN A1C WITH EAG      VITAMIN J08      METABOLIC PANEL, COMPREHENSIVE      CBC WITH AUTOMATED DIFF      VITAMIN D, 25 HYDROXY      TSH 3RD GENERATION      T4, FREE      HEMOGLOBIN A1C WITH EAG      VITAMIN B12   8.  Screening, lipid Z13.220 V77.91 LIPID PANEL      LIPID PANEL         Orders Placed This Encounter    METABOLIC PANEL, COMPREHENSIVE     Standing Status:   Future     Number of Occurrences:   1     Standing Expiration Date:   5/12/2019    CBC WITH AUTOMATED DIFF     Standing Status:   Future     Number of Occurrences:   1     Standing Expiration Date:   5/12/2019    LIPID PANEL     Standing Status:   Future     Number of Occurrences:   1     Standing Expiration Date:   5/12/2019    VITAMIN D, 25 HYDROXY     Standing Status:   Future     Number of Occurrences:   1     Standing Expiration Date:   5/6/2019    TSH 3RD GENERATION     Standing Status:   Future     Number of Occurrences:   1 Standing Expiration Date:   5/12/2019    T4, FREE     Standing Status:   Future     Number of Occurrences:   1     Standing Expiration Date:   5/12/2019    HEMOGLOBIN A1C WITH EAG     Standing Status:   Future     Number of Occurrences:   1     Standing Expiration Date:   5/12/2019    VITAMIN B12     Standing Status:   Future     Number of Occurrences:   1     Standing Expiration Date:   5/12/2019    HEMOGLOBIN A1C W/O EAG    REFLEX SLIDE REVIEW    ibuprofen (MOTRIN) 800 mg tablet     Sig: Take 1 Tab by mouth every eight (8) hours as needed for Pain. Dispense:  60 Tab     Refill:  0    escitalopram oxalate (LEXAPRO) 10 mg tablet     Sig: Take 1 Tab by mouth daily. Dispense:  30 Tab     Refill:  0         Additional Notes: Discussed today's diagnosis, treatment plans. Discussed medication indications and side effects. Weight Management: Counseled  After Visit Summary: Discussed provided printed patient instructions. Answered questions accordingly. Follow-up Disposition: In 1 week to review labs        Jus Humphrey DO, MPH  Internal Medicine        Total time spent for today's visit was 40 minutes with greater than 50% of time spent involved in counseling and coordination of care as outlined above.

## 2018-05-11 NOTE — PATIENT INSTRUCTIONS
Learning About the [facility] Diet  What is the Mediterranean diet? The Mediterranean diet is a style of eating rather than a diet plan. It features foods eaten in [place], [place], [place] and [place], and other countries along the [facility]. It emphasizes eating foods like fish, fruits, vegetables, beans, high-fiber breads and whole grains, nuts, and olive oil. This style of eating includes limited red meat, cheese, and sweets. Why choose the Mediterranean diet? A Mediterranean-style diet may improve heart health. It contains more fat than other heart-healthy diets. But the fats are mainly from nuts, unsaturated oils (such as fish oils and olive oil), and certain nut or seed oils (such as canola, soybean, or flaxseed oil). These fats may help protect the heart and blood vessels. How can you get started on the Mediterranean diet? Here are some things you can do to switch to a more Mediterranean way of eating. What to eat  · Eat a variety of fruits and vegetables each day, such as grapes, blueberries, tomatoes, broccoli, peppers, figs, olives, spinach, eggplant, beans, lentils, and chickpeas. · Eat a variety of whole-grain foods each day, such as oats, brown rice, and whole wheat bread, pasta, and couscous. · Eat fish at least 2 times a week. Try tuna, salmon, mackerel, lake trout, herring, or sardines. · Eat moderate amounts of low-fat dairy products, such as milk, cheese, or yogurt. · Eat moderate amounts of poultry and eggs. · Choose healthy (unsaturated) fats, such as nuts, olive oil, and certain nut or seed oils like canola, soybean, and flaxseed. · Limit unhealthy (saturated) fats, such as butter, palm oil, and coconut oil. And limit fats found in animal products, such as meat and dairy products made with whole milk. Try to eat red meat only a few times a month in very small amounts. · Limit sweets and desserts to only a few times a week.  This includes sugar-sweetened drinks like soda. The Mediterranean diet may also include red wine with your meal-1 glass each day for women and up to 2 glasses a day for men. Tips for eating at home  · Use herbs, spices, garlic, lemon zest, and citrus juice instead of salt to add flavor to foods. · Add avocado slices to your sandwich instead of reeves. · Have fish for lunch or dinner instead of red meat. Brush the fish with olive oil, and broil or grill it. · Sprinkle your salad with seeds or nuts instead of cheese. · Cook with olive or canola oil instead of butter or oils that are high in saturated fat. · Switch from 2% milk or whole milk to 1% or fat-free milk. · Dip raw vegetables in a vinaigrette dressing or hummus instead of dips made from mayonnaise or sour cream.  · Have a piece of fruit for dessert instead of a piece of cake. Try baked apples, or have some dried fruit. Tips for eating out  · Try broiled, grilled, baked, or poached fish instead of having it fried or breaded. · Ask your  to have your meals prepared with olive oil instead of butter. · Order dishes made with marinara sauce or sauces made from olive oil. Avoid sauces made from cream or mayonnaise. · Choose whole-grain breads, whole wheat pasta and pizza crust, brown rice, beans, and lentils. · Cut back on butter or margarine on bread. Instead, you can dip your bread in a small amount of olive oil. · Ask for a side salad or grilled vegetables instead of french fries or chips. Where can you learn more? Go to http://vijay-jeff.info/. Enter [phone] in the search box to learn more about \"Learning About the Mediterranean Diet. \"  Current as of: May 12, 2017  Content Version: 11.4  © 6977-4176 [employer]. Care instructions adapted under license by [employer] (which disclaims liability or warranty for this information).  If you have questions about a medical condition or this instruction, always ask your healthcare professional. [employer], [facility] disclaims any warranty or liability for your use of this information. Fatigue: Care Instructions  Your Care Instructions    Fatigue is a feeling of tiredness, exhaustion, or lack of energy. You may feel fatigue because of too much or not enough activity. It can also come from stress, lack of sleep, boredom, and poor diet. Many medical problems, such as viral infections, can cause fatigue. Emotional problems, especially depression, are often the cause of fatigue. Fatigue is most often a symptom of another problem. Treatment for fatigue depends on the cause. For example, if you have fatigue because you have a certain health problem, treating this problem also treats your fatigue. If depression or anxiety is the cause, treatment may help. Follow-up care is a key part of your treatment and safety. Be sure to make and go to all appointments, and call your doctor if you are having problems. It's also a good idea to know your test results and keep a list of the medicines you take. How can you care for yourself at home? · Get regular exercise. But don't overdo it. Go back and forth between rest and exercise. · Get plenty of rest.  · Eat a healthy diet. Do not skip meals, especially breakfast.  · Reduce your use of caffeine, tobacco, and alcohol. Caffeine is most often found in coffee, tea, cola drinks, and chocolate. · Limit medicines that can cause fatigue. This includes tranquilizers and cold and allergy medicines. When should you call for help? Watch closely for changes in your health, and be sure to contact your doctor if:  ? · You have new symptoms such as fever or a rash. ? · Your fatigue gets worse. ? · You have been feeling down, depressed, or hopeless. Or you may have lost interest in things that you usually enjoy. ? · You are not getting better as expected. Where can you learn more? Go to http://vijay-jeff.info/.   Enter M748 in the search box to learn more about \"Fatigue: Care Instructions. \"  Current as of: March 20, 2017  Content Version: 11.4  © 4829-0353 [employer]. Care instructions adapted under license by [employer] (which disclaims liability or warranty for this information). If you have questions about a medical condition or this instruction, always ask your healthcare professional. [facility] any warranty or liability for your use of this information. Starting a Weight Loss Plan: Care Instructions  Your Care Instructions    If you are thinking about losing weight, it can be hard to know where to start. Your doctor can help you set up a weight loss plan that best meets your needs. You may want to take a class on nutrition or exercise, or join a weight loss support group. If you have questions about how to make changes to your eating or exercise habits, ask your doctor about seeing a registered dietitian or an exercise specialist.  It can be a big challenge to lose weight. But you do not have to make huge changes at once. Make small changes, and stick with them. When those changes become habit, add a few more changes. If you do not think you are ready to make changes right now, try to pick a date in the future. Make an appointment to see your doctor to discuss whether the time is right for you to start a plan. Follow-up care is a key part of your treatment and safety. Be sure to make and go to all appointments, and call your doctor if you are having problems. It's also a good idea to know your test results and keep a list of the medicines you take. How can you care for yourself at home? · Set realistic goals. Many people expect to lose much more weight than is likely. A weight loss of 5% to 10% of your body weight may be enough to improve your health. · Get family and friends involved to provide support. Talk to them about why you are trying to lose weight, and ask them to help.  They can help by participating in exercise and having meals with you, even if they may be eating something different. · Find what works best for you. If you do not have time or do not like to cook, a program that offers meal replacement bars or shakes may be better for you. Or if you like to prepare meals, finding a plan that includes daily menus and recipes may be best.  · Ask your doctor about other health professionals who can help you achieve your weight loss goals. ¨ A dietitian can help you make healthy changes in your diet. ¨ An exercise specialist or  can help you develop a safe and effective exercise program.  ¨ A counselor or psychiatrist can help you cope with issues such as depression, anxiety, or family problems that can make it hard to focus on weight loss. · Consider joining a support group for people who are trying to lose weight. Your doctor can suggest groups in your area. Where can you learn more? Go to http://vijay-jeff.info/. Enter M319 in the search box to learn more about \"Starting a Weight Loss Plan: Care Instructions. \"  Current as of: October 13, 2016  Content Version: 11.4  © 4657-7192 [employer]. Care instructions adapted under license by [employer] Â® (which disclaims liability or warranty for this information). If you have questions about a medical condition or this instruction, always ask your healthcare professional. [address] any warranty or liability for your use of this information.

## 2018-05-11 NOTE — PROGRESS NOTES
Luis Amaya is a  40 y.o. female presents today for Anxiety patient presents no chest pains, SOB or palpations, she talks about being depress and that her job is stressful. 1. Have you been to the ER, urgent care clinic or hospitalized since your last visit? NO    2. Have you seen or consulted any other health care providers outside of the [address] [place] [address] since your last visit (Include any pap smears or colon screening)?  Yes Oncologist

## 2018-05-12 LAB
25(OH)D3 SERPL-MCNC: 11.3 NG/ML (ref 32–100)
A-G RATIO,AGRAT: 1.3 RATIO (ref 1.1–2.6)
ABSOLUTE LYMPHOCYTE COUNT, 10803: 3.5 K/UL (ref 1–4.8)
ALBUMIN SERPL-MCNC: 4 G/DL (ref 3.5–5)
ALP SERPL-CCNC: 100 U/L (ref 25–115)
ALT SERPL-CCNC: 10 U/L (ref 5–40)
ANION GAP SERPL CALC-SCNC: 14 MMOL/L
ANISOCYTOSIS: ABNORMAL
AST SERPL W P-5'-P-CCNC: 15 U/L (ref 10–37)
AVG GLU, 10930: 116 MG/DL (ref 91–123)
BASOPHILS # BLD: 0 K/UL (ref 0–0.2)
BASOPHILS NFR BLD: 0 % (ref 0–2)
BILIRUB SERPL-MCNC: 0.2 MG/DL (ref 0.2–1.2)
BUN SERPL-MCNC: 10 MG/DL (ref 6–22)
CALCIUM SERPL-MCNC: 8.9 MG/DL (ref 8.4–10.5)
CHLORIDE SERPL-SCNC: 99 MMOL/L (ref 98–110)
CHOLEST SERPL-MCNC: 136 MG/DL (ref 110–200)
CO2 SERPL-SCNC: 27 MMOL/L (ref 20–32)
CREAT SERPL-MCNC: 0.6 MG/DL (ref 0.5–1.2)
EOSINOPHIL # BLD: 0.2 K/UL (ref 0–0.5)
EOSINOPHIL NFR BLD: 2 % (ref 0–6)
ERYTHROCYTE [DISTWIDTH] IN BLOOD BY AUTOMATED COUNT: 17.1 % (ref 10–15.5)
GFRAA, 66117: >60
GFRNA, 66118: >60
GLOBULIN,GLOB: 3.2 G/DL (ref 2–4)
GLUCOSE SERPL-MCNC: 83 MG/DL (ref 70–99)
GRANULOCYTES,GRANS: 61 % (ref 40–75)
HBA1C MFR BLD HPLC: 5.7 % (ref 4.8–5.9)
HCT VFR BLD AUTO: 35.8 % (ref 35.1–46.5)
HDLC SERPL-MCNC: 3.7 MG/DL (ref 0–5)
HDLC SERPL-MCNC: 37 MG/DL (ref 40–59)
HGB BLD-MCNC: 10.3 G/DL (ref 11.7–15.5)
HYPOCHROMIA, 12007: ABNORMAL
LDLC SERPL CALC-MCNC: 58 MG/DL (ref 50–99)
LYMPHOCYTES, LYMLT: 32 % (ref 20–45)
MCH RBC QN AUTO: 21 PG (ref 26–34)
MCHC RBC AUTO-ENTMCNC: 29 G/DL (ref 31–36)
MCV RBC AUTO: 73 FL (ref 80–95)
MICROCYTES, 12013: ABNORMAL
MONOCYTES # BLD: 0.6 K/UL (ref 0.1–1)
MONOCYTES NFR BLD: 5 % (ref 3–12)
NEUTROPHILS # BLD AUTO: 6.7 K/UL (ref 1.8–7.7)
PLATELET # BLD AUTO: 300 K/UL (ref 140–440)
PMV BLD AUTO: 11.8 FL (ref 9–13)
POTASSIUM SERPL-SCNC: 4 MMOL/L (ref 3.5–5.5)
PROT SERPL-MCNC: 7.2 G/DL (ref 6.4–8.3)
RBC # BLD AUTO: 4.91 M/UL (ref 3.8–5.2)
SMEAR EVAL, 1131: ABNORMAL
SODIUM SERPL-SCNC: 140 MMOL/L (ref 133–145)
T4 FREE SERPL-MCNC: 1.2 NG/DL (ref 0.9–1.8)
TRIGL SERPL-MCNC: 206 MG/DL (ref 40–149)
TSH SERPL DL<=0.005 MIU/L-ACNC: 3.41 MCU/ML (ref 0.27–4.2)
VIT B12 SERPL-MCNC: 247 PG/ML (ref 211–911)
VLDLC SERPL CALC-MCNC: 41 MG/DL (ref 8–30)
WBC # BLD AUTO: 10.9 K/UL (ref 4–11)

## 2018-09-12 ENCOUNTER — OFFICE VISIT (OUTPATIENT)
Dept: FAMILY MEDICINE CLINIC | Facility: CLINIC | Age: 38
End: 2018-09-12

## 2018-09-12 VITALS
SYSTOLIC BLOOD PRESSURE: 136 MMHG | WEIGHT: 293 LBS | HEIGHT: 66 IN | RESPIRATION RATE: 18 BRPM | DIASTOLIC BLOOD PRESSURE: 79 MMHG | TEMPERATURE: 97.9 F | OXYGEN SATURATION: 99 % | HEART RATE: 92 BPM | BODY MASS INDEX: 47.09 KG/M2

## 2018-09-12 DIAGNOSIS — N39.3 STRESS INCONTINENCE IN FEMALE: ICD-10-CM

## 2018-09-12 DIAGNOSIS — M72.2 PLANTAR FASCIITIS, BILATERAL: Primary | ICD-10-CM

## 2018-09-12 PROBLEM — R61 DIAPHORESIS: Status: RESOLVED | Noted: 2018-05-11 | Resolved: 2018-09-12

## 2018-09-12 PROBLEM — R53.83 FATIGUE: Status: RESOLVED | Noted: 2018-05-11 | Resolved: 2018-09-12

## 2018-09-12 RX ORDER — MELOXICAM 15 MG/1
15 TABLET ORAL DAILY
Qty: 30 TAB | Refills: 11 | Status: SHIPPED | OUTPATIENT
Start: 2018-09-12 | End: 2020-03-26

## 2018-09-12 NOTE — MR AVS SNAPSHOT
[address] [place] [id number] 
[phone] Patient: Laura Malik MRN: [id number] TRO:2/3/3192 Visit Information Date & Time Provider Department Dept. Phone Encounter #  
 9/12/2018  2:00 PM Juventino Ha MD [employer] [phone] [id number] Follow-up Instructions Return if symptoms worsen or fail to improve. Upcoming Health Maintenance Date Due DTaP/Tdap/Td series (1 - Tdap) 6/7/2001 PAP AKA CERVICAL CYTOLOGY 6/7/2001 Influenza Age 5 to Adult 8/1/2018 Allergies as of 9/12/2018  Review Complete On: 9/12/2018 By: Juventino Ha MD  
 No Known Allergies Current Immunizations  Never Reviewed No immunizations on file. Not reviewed this visit You Were Diagnosed With   
  
 Codes Comments Plantar fasciitis, bilateral    -  Primary ICD-10-CM: M72.2 ICD-9-CM: 728.71 Stress incontinence in female     ICD-10-CM: N39.3 ICD-9-CM: 625.6 Vitals BP Pulse Temp Resp Height(growth percentile) Weight(growth percentile) 136/79 92 97.9 °F (36.6 °C) (Oral) 18 5' 6\" (1.676 m) 328 lb (148.8 kg) SpO2 BMI OB Status Smoking Status 99% 52.94 kg/m2 Having regular periods Never Smoker Vitals History BMI and BSA Data Body Mass Index Body Surface Area 52.94 kg/m 2 2.63 m 2 Preferred Pharmacy Pharmacy Name Phone [facility] [phone] Your Updated Medication List  
  
   
This list is accurate as of 9/12/18  3:06 PM.  Always use your most recent med list.  
  
  
  
  
 meloxicam 15 mg tablet Commonly known as:  MOBIC Take 1 Tab by mouth daily. Prescriptions Sent to Pharmacy Refills  
 meloxicam (MOBIC) 15 mg tablet 11 Sig: Take 1 Tab by mouth daily.   
 Class: Normal  
 Pharmacy: [address] [place] [address] Romie Cabrera.  #: [phone] Route: Oral  
  
Follow-up Instructions Return if symptoms worsen or fail to improve. To-Do List   
 Around 09/12/2018 Microbiology:  CULTURE, URINE   
  
 09/12/2018 Lab:  URINALYSIS W/ RFLX MICROSCOPIC Patient Instructions Plantar Fasciitis: Care Instructions Your Care Instructions Plantar fasciitis is pain and inflammation of the plantar fascia, the tissue at the bottom of your foot that connects the heel bone to the toes. The plantar fascia also supports the arch. If you strain the plantar fascia, it can develop small tears and cause heel pain when you stand or walk. Plantar fasciitis can be caused by running or other sports. It also may occur in people who are overweight or who have high arches or flat feet. You may get plantar fasciitis if you walk or stand for long periods, or have a tight Achilles tendon or calf muscles. You can improve your foot pain with rest and other care at home. It might take a few weeks to a few months for your foot to heal completely. Follow-up care is a key part of your treatment and safety. Be sure to make and go to all appointments, and call your doctor if you are having problems. It's also a good idea to know your test results and keep a list of the medicines you take. How can you care for yourself at home? · Rest your feet often. Reduce your activity to a level that lets you avoid pain. If possible, do not run or walk on hard surfaces. · Take pain medicines exactly as directed. ¨ If the doctor gave you a prescription medicine for pain, take it as prescribed. ¨ If you are not taking a prescription pain medicine, take an over-the-counter anti-inflammatory medicine for pain and swelling, such as ibuprofen (Advil, Motrin) or naproxen (Aleve). Read and follow all instructions on the label. · Use ice massage to help with pain and swelling. You can use an ice cube or an ice cup several times a day. To make an ice cup, fill a paper cup with water and freeze it. Cut off the top of the cup until a half-inch of ice shows. Hold onto the remaining paper to use the cup. Rub the ice in small circles over the area for 5 to 7 minutes. · Contrast baths, which alternate hot and cold water, can also help reduce swelling. But because heat alone may make pain and swelling worse, end a contrast bath with a soak in cold water. · Wear a night splint if your doctor suggests it. A night splint holds your foot with the toes pointed up and the foot and ankle at a 90-degree angle. This position gives the bottom of your foot a constant, gentle stretch. · Do simple exercises such as calf stretches and towel stretches 2 to 3 times each day, especially when you first get up in the morning. These can help the plantar fascia become more flexible. They also make the muscles that support your arch stronger. Hold these stretches for 15 to 30 seconds per stretch. Repeat 2 to 4 times. ¨ Stand about 1 foot from a wall. Place the palms of both hands against the wall at chest level. Lean forward against the wall, keeping one leg with the knee straight and heel on the ground while bending the knee of the other leg. ¨ Sit down on the floor or a mat with your feet stretched in front of you. Roll up a towel lengthwise, and loop it over the ball of your foot. Holding the towel at both ends, gently pull the towel toward you to stretch your foot. · Wear shoes with good arch support. Athletic shoes or shoes with a well-cushioned sole are good choices. · Try heel cups or shoe inserts (orthotics) to help cushion your heel. You can buy these at many shoe stores. · Put on your shoes as soon as you get out of bed. Going barefoot or wearing slippers may make your pain worse. · Reach and stay at a good weight for your height. This puts less strain on your feet. When should you call for help? Call your doctor now or seek immediate medical care if: 
  · You have heel pain with fever, redness, or warmth in your heel.  
  · You cannot put weight on the sore foot.  
 Watch closely for changes in your health, and be sure to contact your doctor if: 
  · You have numbness or tingling in your heel.  
  · Your heel pain lasts more than 2 weeks. Where can you learn more? Go to http://vijay-jeff.info/. Jaqui Holt in the search box to learn more about \"Plantar Fasciitis: Care Instructions. \" Current as of: November 29, 2017 Content Version: 11.7 © 8394-2509 [employer]. Care instructions adapted under license by [employer] (which disclaims liability or warranty for this information). If you have questions about a medical condition or this instruction, always ask your healthcare professional. [place] [address] any warranty or liability for your use of this information. Plantar Fasciitis: Exercises Your Care Instructions Here are some examples of typical rehabilitation exercises for your condition. Start each exercise slowly. Ease off the exercise if you start to have pain. Your doctor or physical therapist will tell you when you can start these exercises and which ones will work best for you. How to do the exercises Towel stretch 1. Sit with your legs extended and knees straight. 2. Place a towel around your foot just under the toes. 3. Hold each end of the towel in each hand, with your hands above your knees. 4. Pull back with the towel so that your foot stretches toward you. 5. Hold the position for at least 15 to 30 seconds. 6. Repeat 2 to 4 times a session, up to 5 sessions a day. Calf stretch This exercise stretches the muscles at the back of the lower leg (the calf) and the Achilles tendon. Do this exercise 3 or 4 times a day, 5 days a week. 1. Stand facing a wall with your hands on the wall at about eye level. Put the leg you want to stretch about a step behind your other leg. 2. Keeping your back heel on the floor, bend your front knee until you feel a stretch in the back leg. 3. Hold the stretch for 15 to 30 seconds. Repeat 2 to 4 times. Plantar fascia and calf stretch Stretching the plantar fascia and calf muscles can increase flexibility and decrease heel pain. You can do this exercise several times each day and before and after activity. 1. Stand on a step as shown above. Be sure to hold on to the banister. 2. Slowly let your heels down over the edge of the step as you relax your calf muscles. You should feel a gentle stretch across the bottom of your foot and up the back of your leg to your knee. 3. Hold the stretch about 15 to 30 seconds, and then tighten your calf muscle a little to bring your heel back up to the level of the step. Repeat 2 to 4 times. Towel curls Make this exercise more challenging by placing a weighted object, such as a soup can, on the other end of the towel. 1. While sitting, place your foot on a towel on the floor and scrunch the towel toward you with your toes. 2. Then, also using your toes, push the towel away from you. [place] pickups 1. Put marbles on the floor next to a cup. 
2. Using your toes, try to lift the marbles up from the floor and put them in the cup. Follow-up care is a key part of your treatment and safety. Be sure to make and go to all appointments, and call your doctor if you are having problems. It's also a good idea to know your test results and keep a list of the medicines you take. Where can you learn more? Go to http://vijay-jeff.info/. Mariann Charles in the search box to learn more about \"Plantar Fasciitis: Exercises. \" Current as of: November 29, 2017 Content Version: 11.7 © 6441-7298 Healthwise, [employer]. Care instructions adapted under license by [employer] (which disclaims liability or warranty for this information). If you have questions about a medical condition or this instruction, always ask your healthcare professional. [address] any warranty or liability for your use of this information. Stress Incontinence in Women: Care Instructions Your Care Instructions Stress incontinence is the accidental release of urine caused by activities that put pressure on your bladder. It may happen most often when you sneeze, cough, laugh, jog, or lift something heavy. This condition does not cause major health problems, but it can be embarrassing and interfere with your life. Treatment can cure or improve your symptoms. Follow-up care is a key part of your treatment and safety. Be sure to make and go to all appointments, and call your doctor if you are having problems. It's also a good idea to know your test results and keep a list of the medicines you take. How can you care for yourself at home? · Take your medicines exactly as prescribed. Call your doctor if you think you are having a problem with your medicine. · Limit caffeine and alcohol. They make you urinate more. · Do pelvic floor (Kegel) exercises, which tighten and strengthen pelvic muscles. To do Kegel exercises: 
¨ Squeeze the same muscles you would use to stop your urine. Your belly and thighs should not move. ¨ Hold the squeeze for 3 seconds, and then relax for 3 seconds. ¨ Start with 3 seconds. Then add 1 second each week until you are able to squeeze for 10 seconds. ¨ Repeat the exercise 10 to 15 times a session. Do three or more sessions a day. · Try wearing pads that absorb leaks. Or you may want to try to prevent leaks with a product like Poise Impressa, which you insert like a tampon. · Keep skin in the genital area dry.  Petroleum jelly (like Vaseline) spread on the area may help protect your skin. When should you call for help? Call your doctor now or seek immediate medical care if: 
  · You have new urinary symptoms. These may include leaking urine, having pain when urinating, or feeling like you need to urinate often.  
 Watch closely for changes in your health, and be sure to contact your doctor if: 
  · You do not get better as expected. Where can you learn more? Go to http://vijay-jeff.info/. Enter F937 in the search box to learn more about \"Stress Incontinence in Women: Care Instructions. \" Current as of: October 6, 2017 Content Version: 11.7 © 8204-6785 [employer]us. Care instructions adapted under license by [employer] (which disclaims liability or warranty for this information). If you have questions about a medical condition or this instruction, always ask your healthcare professional. [address] any warranty or liability for your use of this information. Kegel Exercises: Care Instructions Your Care Instructions Kegel exercises strengthen muscles around the bladder. These muscles control the flow of urine. Kegel exercises are sometime called \"pelvic floor\" exercises. They can help prevent urine leakage and keep the pelvic organs in place. A woman who just had a baby might want to try Kegel exercises. They can strengthen pelvic muscles that have been weakened by pregnancy and childbirth. A man or woman may use Kegel exercises to treat urine leakage. You do Kegel exercises by tightening the muscles you use when you urinate. You will likely need to do these exercises for several weeks to get better. Follow-up care is a key part of your treatment and safety. Be sure to make and go to all appointments, and call your doctor if you are having problems. It's also a good idea to know your test results and keep a list of the medicines you take. How can you care for yourself at home? · Do Kegel exercises. ¨ Find the muscles you need to strengthen. To do this, tighten the muscles that stop your urine while you are going to the bathroom. These are the same muscles you squeeze during Kegel exercises. ¨ Squeeze the muscles as hard as you can. Your belly and thighs should not move. ¨ Hold the squeeze for 3 seconds. Then relax for 3 seconds. ¨ Start with 3 seconds, and then add 1 second each week until you are able to squeeze for 10 seconds. ¨ Repeat the exercise 10 to 15 times for each session. Do three or more sessions each day. · You can check to see if you are using the right muscles. Place a finger in your vagina and squeeze around it. You are doing them right when you feel pressure around your finger. Your doctor may also suggest that you put special weights in your vagina while you do the exercises. · Do not smoke. It can irritate the bladder. If you need help quitting, talk to your doctor about stop-smoking programs and medicines. These can increase your chances of quitting for good. Where can you learn more? Go to http://vijay-jeff.info/. Enter L161 in the search box to learn more about \"Kegel Exercises: Care Instructions. \" Current as of: May 12, 2017 Content Version: 11.7 © 4521-2480 Healthwise, Incorporated. Care instructions adapted under license by [employer] (which disclaims liability or warranty for this information). If you have questions about a medical condition or this instruction, always ask your healthcare professional. [place] [address] any warranty or liability for your use of this information. Introducing [facility]! Jens Rasheed introduces [employer] patient portal. Now you can access parts of your medical record, email your doctor's office, and request medication refills online. 1. In your internet browser, go to [email or web address] 2. Click on the First Time User? Click Here link in the Sign In box. You will see the New Member Sign Up page. 3. Enter your Beestar Access Code exactly as it appears below. You will not need to use this code after youve completed the sign-up process. If you do not sign up before the expiration date, you must request a new code. · Beestar Access Code: [email or web address] Expires: 12/11/2018  3:06 PM 
 
4. Enter the last four digits of your Social Security Number (xxxx) and Date of Birth (mm/dd/yyyy) as indicated and click Submit. You will be taken to the next sign-up page. 5. Create a Beestar ID. This will be your Beestar login ID and cannot be changed, so think of one that is secure and easy to remember. 6. Create a Beestar password. You can change your password at any time. 7. Enter your Password Reset Question and Answer. This can be used at a later time if you forget your password. 8. Enter your e-mail address. You will receive e-mail notification when new information is available in [address] 9. Click Sign Up. You can now view and download portions of your medical record. 10. Click the Download Summary menu link to download a portable copy of your medical information. If you have questions, please visit the Frequently Asked Questions section of the Beestar website. Remember, Beestar is NOT to be used for urgent needs. For medical emergencies, dial 911. Now available from your iPhone and Android! Please provide this summary of care documentation to your next provider. Your primary care clinician is listed as Tinnie Ready. If you have any questions after today's visit, please call [phone].

## 2018-09-12 NOTE — PROGRESS NOTES
HISTORY OF PRESENT ILLNESS  Aicha Rick is a 45 y.o. female. HPI Comments: Presents with 2 complaints today. She has had pain in the bottom of her feet with weight bearing for the past 10 days, with some swelling and some numbness in her toes. This is not dependant on footgear. No previous trouble with this. Also concerned about stress incontinence of urine for the same period, without dysuria, but with urgency and frequency. She has had this trouble before (possibly OAB). Foot Pain   Pertinent negatives include no chest pain, no abdominal pain and no shortness of breath. Urgency   Pertinent negatives include no chest pain, no abdominal pain and no shortness of breath. Past Medical History:   Diagnosis Date    Abnormal MRI of head 2010    Nonspecific focus of low attenuation at the right internal capsule on CT and MRI    Anxiety     Chronic low back pain 2014    unknown cause- normal xrays    Depression     Hypertension     Prediabetes     Scoliosis        History reviewed. No pertinent surgical history. History   Smoking Status    Never Smoker   Smokeless Tobacco    Never Used     Current Outpatient Prescriptions   Medication Sig    ibuprofen (MOTRIN) 800 mg tablet Take 1 Tab by mouth every eight (8) hours as needed for Pain. No current facility-administered medications for this visit. Review of Systems   Constitutional: Negative for chills and fever. Respiratory: Negative for shortness of breath. Cardiovascular: Negative for chest pain. Gastrointestinal: Negative for abdominal pain. Genitourinary: Positive for frequency and urgency. Musculoskeletal: Positive for joint pain. Visit Vitals    /79    Pulse 92    Temp 97.9 °F (36.6 °C) (Oral)    Resp 18    Ht 5' 6\" (1.676 m)    Wt 328 lb (148.8 kg)    SpO2 99%    BMI 52.94 kg/m2       Physical Exam   Constitutional: She is oriented to person, place, and time.  She appears well-developed and well-nourished. No distress. Neck: Neck supple. No thyromegaly present. Cardiovascular: Normal rate, regular rhythm and intact distal pulses. Exam reveals no gallop and no friction rub. No murmur heard. Pulmonary/Chest: Effort normal and breath sounds normal. No respiratory distress. Abdominal: There is no tenderness. Musculoskeletal: She exhibits no edema. No foot tenderness - somewhat flat feet   Lymphadenopathy:     She has no cervical adenopathy. Neurological: She is alert and oriented to person, place, and time. Skin: Skin is warm and dry. Psychiatric: She has a normal mood and affect. Her behavior is normal. Judgment and thought content normal.       ASSESSMENT and PLAN    ICD-10-CM ICD-9-CM    1. Plantar fasciitis, bilateral M72.2 728.71 meloxicam (MOBIC) 15 mg tablet   2. Stress incontinence in female N39.3 625.6 URINALYSIS W/ RFLX MICROSCOPIC      CULTURE, URINE     Follow-up Disposition:  Return if symptoms worsen or fail to improve. Given handouts as noted - trial of Meloxicam.   lab results and schedule of future lab studies reviewed with patient  reviewed medications and side effects in detail  Plan of care reviewed - patient verbalize(s) understanding and agreement.

## 2018-09-12 NOTE — PATIENT INSTRUCTIONS
Plantar Fasciitis: Care Instructions  Your Care Instructions    Plantar fasciitis is pain and inflammation of the plantar fascia, the tissue at the bottom of your foot that connects the heel bone to the toes. The plantar fascia also supports the arch. If you strain the plantar fascia, it can develop small tears and cause heel pain when you stand or walk. Plantar fasciitis can be caused by running or other sports. It also may occur in people who are overweight or who have high arches or flat feet. You may get plantar fasciitis if you walk or stand for long periods, or have a tight Achilles tendon or calf muscles. You can improve your foot pain with rest and other care at home. It might take a few weeks to a few months for your foot to heal completely. Follow-up care is a key part of your treatment and safety. Be sure to make and go to all appointments, and call your doctor if you are having problems. It's also a good idea to know your test results and keep a list of the medicines you take. How can you care for yourself at home? · Rest your feet often. Reduce your activity to a level that lets you avoid pain. If possible, do not run or walk on hard surfaces. · Take pain medicines exactly as directed. ¨ If the doctor gave you a prescription medicine for pain, take it as prescribed. ¨ If you are not taking a prescription pain medicine, take an over-the-counter anti-inflammatory medicine for pain and swelling, such as ibuprofen (Advil, Motrin) or naproxen (Aleve). Read and follow all instructions on the label. · Use ice massage to help with pain and swelling. You can use an ice cube or an ice cup several times a day. To make an ice cup, fill a paper cup with water and freeze it. Cut off the top of the cup until a half-inch of ice shows. Hold onto the remaining paper to use the cup. Rub the ice in small circles over the area for 5 to 7 minutes.   · Contrast baths, which alternate hot and cold water, can also help reduce swelling. But because heat alone may make pain and swelling worse, end a contrast bath with a soak in cold water. · Wear a night splint if your doctor suggests it. A night splint holds your foot with the toes pointed up and the foot and ankle at a 90-degree angle. This position gives the bottom of your foot a constant, gentle stretch. · Do simple exercises such as calf stretches and towel stretches 2 to 3 times each day, especially when you first get up in the morning. These can help the plantar fascia become more flexible. They also make the muscles that support your arch stronger. Hold these stretches for 15 to 30 seconds per stretch. Repeat 2 to 4 times. ¨ Stand about 1 foot from a wall. Place the palms of both hands against the wall at chest level. Lean forward against the wall, keeping one leg with the knee straight and heel on the ground while bending the knee of the other leg. ¨ Sit down on the floor or a mat with your feet stretched in front of you. Roll up a towel lengthwise, and loop it over the ball of your foot. Holding the towel at both ends, gently pull the towel toward you to stretch your foot. · Wear shoes with good arch support. Athletic shoes or shoes with a well-cushioned sole are good choices. · Try heel cups or shoe inserts (orthotics) to help cushion your heel. You can buy these at many shoe stores. · Put on your shoes as soon as you get out of bed. Going barefoot or wearing slippers may make your pain worse. · Reach and stay at a good weight for your height. This puts less strain on your feet. When should you call for help? Call your doctor now or seek immediate medical care if:    · You have heel pain with fever, redness, or warmth in your heel.     · You cannot put weight on the sore foot.    Watch closely for changes in your health, and be sure to contact your doctor if:    · You have numbness or tingling in your heel.     · Your heel pain lasts more than 2 weeks. Where can you learn more? Go to http://vijay-jeff.info/. [place] in the search box to learn more about \"Plantar Fasciitis: Care Instructions. \"  Current as of: November 29, 2017  Content Version: 11.7  © 4717-9045 [employer]. Care instructions adapted under license by [employer] (which disclaims liability or warranty for this information). If you have questions about a medical condition or this instruction, always ask your healthcare professional. [address] any warranty or liability for your use of this information. Plantar Fasciitis: Exercises  Your Care Instructions  Here are some examples of typical rehabilitation exercises for your condition. Start each exercise slowly. Ease off the exercise if you start to have pain. Your doctor or physical therapist will tell you when you can start these exercises and which ones will work best for you. How to do the exercises  Towel stretch    1. Sit with your legs extended and knees straight. 2. Place a towel around your foot just under the toes. 3. Hold each end of the towel in each hand, with your hands above your knees. 4. Pull back with the towel so that your foot stretches toward you. 5. Hold the position for at least 15 to 30 seconds. 6. Repeat 2 to 4 times a session, up to 5 sessions a day. Calf stretch    This exercise stretches the muscles at the back of the lower leg (the calf) and the Achilles tendon. Do this exercise 3 or 4 times a day, 5 days a week. 1. Stand facing a wall with your hands on the wall at about eye level. Put the leg you want to stretch about a step behind your other leg. 2. Keeping your back heel on the floor, bend your front knee until you feel a stretch in the back leg. 3. Hold the stretch for 15 to 30 seconds. Repeat 2 to 4 times.   Plantar fascia and calf stretch    Stretching the plantar fascia and calf muscles can increase flexibility and decrease heel pain. You can do this exercise several times each day and before and after activity. 1. Stand on a step as shown above. Be sure to hold on to the banister. 2. Slowly let your heels down over the edge of the step as you relax your calf muscles. You should feel a gentle stretch across the bottom of your foot and up the back of your leg to your knee. 3. Hold the stretch about 15 to 30 seconds, and then tighten your calf muscle a little to bring your heel back up to the level of the step. Repeat 2 to 4 times. Towel curls    Make this exercise more challenging by placing a weighted object, such as a soup can, on the other end of the towel. 1. While sitting, place your foot on a towel on the floor and scrunch the towel toward you with your toes. 2. Then, also using your toes, push the towel away from you. [place] pickups    1. Put marbles on the floor next to a cup.  2. Using your toes, try to lift the marbles up from the floor and put them in the cup. Follow-up care is a key part of your treatment and safety. Be sure to make and go to all appointments, and call your doctor if you are having problems. It's also a good idea to know your test results and keep a list of the medicines you take. Where can you learn more? Go to http://vijay-jeff.info/. Gabe Villar in the search box to learn more about \"Plantar Fasciitis: Exercises. \"  Current as of: November 29, 2017  Content Version: 11.7  © 4068-1271 [employer]. Care instructions adapted under license by [employer] (which disclaims liability or warranty for this information). If you have questions about a medical condition or this instruction, always ask your healthcare professional. [place] [address] any warranty or liability for your use of this information.        Stress Incontinence in Women: Care Instructions  Your Care Instructions  Stress incontinence is the accidental release of urine caused by activities that put pressure on your bladder. It may happen most often when you sneeze, cough, laugh, jog, or lift something heavy. This condition does not cause major health problems, but it can be embarrassing and interfere with your life. Treatment can cure or improve your symptoms. Follow-up care is a key part of your treatment and safety. Be sure to make and go to all appointments, and call your doctor if you are having problems. It's also a good idea to know your test results and keep a list of the medicines you take. How can you care for yourself at home? · Take your medicines exactly as prescribed. Call your doctor if you think you are having a problem with your medicine. · Limit caffeine and alcohol. They make you urinate more. · Do pelvic floor (Kegel) exercises, which tighten and strengthen pelvic muscles. To do Kegel exercises:  ¨ Squeeze the same muscles you would use to stop your urine. Your belly and thighs should not move. ¨ Hold the squeeze for 3 seconds, and then relax for 3 seconds. ¨ Start with 3 seconds. Then add 1 second each week until you are able to squeeze for 10 seconds. ¨ Repeat the exercise 10 to 15 times a session. Do three or more sessions a day. · Try wearing pads that absorb leaks. Or you may want to try to prevent leaks with a product like Po[employer] Impressa, which you insert like a tampon. · Keep skin in the genital area dry. Petroleum jelly (like Vaseline) spread on the area may help protect your skin. When should you call for help? Call your doctor now or seek immediate medical care if:    · You have new urinary symptoms. These may include leaking urine, having pain when urinating, or feeling like you need to urinate often.    Watch closely for changes in your health, and be sure to contact your doctor if:    · You do not get better as expected. Where can you learn more? Go to http://vijay-jeff.info/.   Enter X892 in the search box to learn more about \"Stress Incontinence in Women: Care Instructions. \"  Current as of: October 6, 2017  Content Version: 11.7  © 3609-3131 [employer]. Care instructions adapted under license by [employer] (which disclaims liability or warranty for this information). If you have questions about a medical condition or this instruction, always ask your healthcare professional. [address] any warranty or liability for your use of this information. Kegel Exercises: Care Instructions  Your Care Instructions    Kegel exercises strengthen muscles around the bladder. These muscles control the flow of urine. Kegel exercises are sometime called \"pelvic floor\" exercises. They can help prevent urine leakage and keep the pelvic organs in place. A woman who just had a baby might want to try Kegel exercises. They can strengthen pelvic muscles that have been weakened by pregnancy and childbirth. A man or woman may use Kegel exercises to treat urine leakage. You do Kegel exercises by tightening the muscles you use when you urinate. You will likely need to do these exercises for several weeks to get better. Follow-up care is a key part of your treatment and safety. Be sure to make and go to all appointments, and call your doctor if you are having problems. It's also a good idea to know your test results and keep a list of the medicines you take. How can you care for yourself at home? · Do Kegel exercises. ¨ Find the muscles you need to strengthen. To do this, tighten the muscles that stop your urine while you are going to the bathroom. These are the same muscles you squeeze during Kegel exercises. ¨ Squeeze the muscles as hard as you can. Your belly and thighs should not move. ¨ Hold the squeeze for 3 seconds. Then relax for 3 seconds. ¨ Start with 3 seconds, and then add 1 second each week until you are able to squeeze for 10 seconds.   ¨ Repeat the exercise 10 to 15 times for each session. Do three or more sessions each day. · You can check to see if you are using the right muscles. Place a finger in your vagina and squeeze around it. You are doing them right when you feel pressure around your finger. Your doctor may also suggest that you put special weights in your vagina while you do the exercises. · Do not smoke. It can irritate the bladder. If you need help quitting, talk to your doctor about stop-smoking programs and medicines. These can increase your chances of quitting for good. Where can you learn more? Go to http://vijay-jeff.info/. Enter U885 in the search box to learn more about \"Kegel Exercises: Care Instructions. \"  Current as of: May 12, 2017  Content Version: 11.7  © 9504-5431 [employer]. Care instructions adapted under license by [employer] (which disclaims liability or warranty for this information). If you have questions about a medical condition or this instruction, always ask your healthcare professional. [address] any warranty or liability for your use of this information.

## 2018-09-12 NOTE — PROGRESS NOTES
Chief Complaint   Patient presents with    Foot Pain     pt presents for left and right foot pain and swelling     Urgency     pt states \" that pt can't hold her urine \"

## 2018-09-13 LAB — RESULT: NORMAL

## 2018-10-10 ENCOUNTER — TELEPHONE (OUTPATIENT)
Dept: FAMILY MEDICINE CLINIC | Facility: CLINIC | Age: 38
End: 2018-10-10

## 2018-10-10 DIAGNOSIS — N39.3 SUI (STRESS URINARY INCONTINENCE, FEMALE): Primary | ICD-10-CM

## 2018-10-10 NOTE — TELEPHONE ENCOUNTER
Patient says she is still smelling a sweet smell in her urin amosnd was checking on the results from her labs   and she also said that cant even stand up without urine just running down her leg please advise.  I also let the patient know that I can try top get her an acute appointment she asked could it be for Friday around 10:00

## 2018-10-23 NOTE — TELEPHONE ENCOUNTER
Called patients mother to request pt to call office because phone is not accepting calls. This was not relayed to the mother. ..but per Dr. Roger Castillo, she will give her a referral to urology and get labs drawn.

## 2019-03-05 ENCOUNTER — HOSPITAL ENCOUNTER (OUTPATIENT)
Dept: LAB | Age: 39
Discharge: HOME | End: 2019-03-05
Payer: SELF-PAY

## 2019-03-05 ENCOUNTER — OFFICE VISIT (OUTPATIENT)
Dept: FAMILY MEDICINE CLINIC | Age: 39
End: 2019-03-05

## 2019-03-05 VITALS
WEIGHT: 293 LBS | RESPIRATION RATE: 16 BRPM | TEMPERATURE: 98.5 F | DIASTOLIC BLOOD PRESSURE: 72 MMHG | BODY MASS INDEX: 47.09 KG/M2 | SYSTOLIC BLOOD PRESSURE: 126 MMHG | HEIGHT: 66 IN | OXYGEN SATURATION: 95 % | HEART RATE: 72 BPM

## 2019-03-05 DIAGNOSIS — N39.0 URINARY TRACT INFECTION WITHOUT HEMATURIA, SITE UNSPECIFIED: ICD-10-CM

## 2019-03-05 DIAGNOSIS — J40 BRONCHITIS: Primary | ICD-10-CM

## 2019-03-05 DIAGNOSIS — R82.90 ABNORMAL URINE ODOR: ICD-10-CM

## 2019-03-05 DIAGNOSIS — N39.3 SUI (STRESS URINARY INCONTINENCE, FEMALE): ICD-10-CM

## 2019-03-05 DIAGNOSIS — B37.31 CANDIDA VAGINITIS: ICD-10-CM

## 2019-03-05 LAB
BILIRUB UR QL STRIP: NEGATIVE
EST. AVERAGE GLUCOSE BLD GHB EST-MCNC: 120 MG/DL
GLUCOSE UR-MCNC: NEGATIVE MG/DL
HBA1C MFR BLD: 5.8 % (ref 4.2–5.6)
KETONES P FAST UR STRIP-MCNC: NORMAL MG/DL
PH UR STRIP: 7 [PH] (ref 4.6–8)
PROT UR QL STRIP: NORMAL
SP GR UR STRIP: 1.02 (ref 1–1.03)
UA UROBILINOGEN AMB POC: NORMAL (ref 0.2–1)
URINALYSIS CLARITY POC: NORMAL
URINALYSIS COLOR POC: NORMAL
URINE BLOOD POC: NEGATIVE
URINE LEUKOCYTES POC: NORMAL
URINE NITRITES POC: NEGATIVE

## 2019-03-05 PROCEDURE — 36415 COLL VENOUS BLD VENIPUNCTURE: CPT

## 2019-03-05 PROCEDURE — 83036 HEMOGLOBIN GLYCOSYLATED A1C: CPT

## 2019-03-05 RX ORDER — FLUCONAZOLE 150 MG/1
150 TABLET ORAL DAILY
Qty: 1 TAB | Refills: 0 | Status: SHIPPED | OUTPATIENT
Start: 2019-03-05 | End: 2019-03-06

## 2019-03-05 RX ORDER — ALBUTEROL SULFATE 90 UG/1
2 AEROSOL, METERED RESPIRATORY (INHALATION)
Qty: 1 INHALER | Refills: 0 | Status: SHIPPED | OUTPATIENT
Start: 2019-03-05 | End: 2020-06-24 | Stop reason: ALTCHOICE

## 2019-03-05 RX ORDER — SULFAMETHOXAZOLE AND TRIMETHOPRIM 400; 80 MG/1; MG/1
1 TABLET ORAL 2 TIMES DAILY
Qty: 10 TAB | Refills: 0 | Status: SHIPPED | OUTPATIENT
Start: 2019-03-05 | End: 2019-03-10

## 2019-03-05 RX ORDER — PROMETHAZINE HYDROCHLORIDE AND DEXTROMETHORPHAN HYDROBROMIDE 6.25; 15 MG/5ML; MG/5ML
5 SYRUP ORAL
Qty: 115 ML | Refills: 0 | Status: SHIPPED | OUTPATIENT
Start: 2019-03-05 | End: 2019-03-12

## 2019-03-05 RX ORDER — AZITHROMYCIN 250 MG/1
TABLET, FILM COATED ORAL
Qty: 6 TAB | Refills: 0 | Status: SHIPPED | OUTPATIENT
Start: 2019-03-05 | End: 2019-03-10

## 2019-03-05 NOTE — PROGRESS NOTES
SUBJECTIVE  Chief Complaint   Patient presents with    Cough     onset Wednesday February 27,2019     Shortness of Breath      along with chest tightness onset Wednesday February 27,2019     Hoarse     onset Wednesday February 27,2019     Urinary Odor     sweet odor     Vaginal Itching     The patient presents complaining of a 6 day history of cough. The cough is described as unproductive. The patient denies nasal congestion and drainage. The patient denies sinus pressure. The patient denies fevers. The patient denies shortness of breath, chest pain, chest tightness, or wheezing. The patient has tried Robitussin without significant relief. Also urine has a sweet odor at times. No dysuria. Has had a few days of vaginal itching. That is intermittent as well. She has had vaginal yeast infections and thinks she has one now. OBJECTIVE    Blood pressure 126/72, pulse 72, temperature 98.5 °F (36.9 °C), temperature source Oral, resp. rate 16, height 5' 6\" (1.676 m), weight 321 lb (145.6 kg), SpO2 95 %. General:  Alert, cooperative, well appearing, in no apparent distress. Eyes: The lids are without swelling, lesions, or drainage. The conjunctiva is clear and noninjected. ENT:  The septum is midline. The maxillary and frontal sinuses are nontender to palpation. The nasal turbinates are engorged with clear drainage. The tympanic membranes and external auditory canal are normal bilaterally. The tongue and mucous membranes are pink and moist without lesions. The pharynx is non-erythematous without exudates. There is scant evidence of postnasal drainage. Neck:  supple without adenopathy. CV:  The heart sounds are regular in rate and rhythm. There is a normal S1 and S2. There or no murmurs. Lungs: Inspiratory and expiratory efforts are full and unlabored. Lung sounds are clear and equal to auscultation throughout all lung fields without rhonchi, wheezing or rales.    Skin:  No rashes or jaundice. Psych: normal affect. Mood good. Oriented x 3. Judgement and insight intact. Results for orders placed or performed in visit on 03/05/19   AMB POC URINALYSIS DIP STICK AUTO W/ MICRO   Result Value Ref Range    Color (UA POC) Dark Yellow     Clarity (UA POC) Cloudy     Glucose (UA POC) Negative Negative    Bilirubin (UA POC) Negative Negative    Ketones (UA POC) Trace Negative    Specific gravity (UA POC) 1.025 1.001 - 1.035    Blood (UA POC) Negative Negative    pH (UA POC) 7.0 4.6 - 8.0    Protein (UA POC) 1+ Negative    Urobilinogen (UA POC) 1 mg/dL 0.2 - 1    Nitrites (UA POC) Negative Negative    Leukocyte esterase (UA POC) Trace Negative     ASSESSMENT / PLAN    ICD-10-CM ICD-9-CM    1. Bronchitis J40 490    2. Candida vaginitis B37.3 112.1 fluconazole (DIFLUCAN) 150 mg tablet      AMB POC URINALYSIS DIP STICK AUTO W/ MICRO      CULTURE, URINE   3. Abnormal urine odor R82.90 791.9 AMB POC URINALYSIS DIP STICK AUTO W/ MICRO      CULTURE, URINE   4. Urinary tract infection without hematuria, site unspecified N39.0 599.0      Bronchitis - start phenergan DM 5mL at night. Plenty of fluids. Monitor to resolution. Vaginal yeast infection - presumed based on her history of being familiar with this. Diflucan 150mg po x 1 dose. UTI - start Bactrin DS 1 tab po bid for days. Advised to establish care with one of our providers who also does well woman exams since she does not have an OB/GYN. All chart history elements were reviewed by me at the time of the visit even though marked at time of note closure. Patient understands our medical plan. Patient has provided input and agrees with goals. Alternatives have been explained and offered. All questions answered. The patient is to call if condition worsens or fails to improve.      Follow-up Disposition:  Return in about 1 month (around 4/5/2019) for routine care /well woman exam.

## 2019-03-05 NOTE — PATIENT INSTRUCTIONS
Candidiasis: Care Instructions  Your Care Instructions  Candidiasis (say \"sgl-ioe-IZ-uh-jarad\") is a yeast infection. Yeast normally lives in your body. But it can cause problems if your body's defenses don't work as they should. Some medicines can increase your chance of getting a yeast infection. These include antibiotics, steroids, and cancer drugs. And some diseases like AIDS and diabetes can make you more likely to get yeast infections. There are different types of yeast infections. John Serge is a yeast infection in the mouth. It usually occurs in people with weak immune systems. It causes white patches inside the mouth and throat. Yeast infections of the skin usually occur in skin folds where the skin stays moist. They cause red, oozing patches on your skin. Babies can get these infections under the diaper. People who often wear gloves can get them on their hands. Many women get vaginal yeast infections. They are most common when women take antibiotics. These infections can cause the vagina to itch and burn. They also cause white discharge that looks like cottage cheese. In rare cases, yeast infects the blood. This can cause serious disease. This kind of infection is treated with medicine given through a needle into a vein (IV). After you start treatment, a yeast infection usually goes away quickly. But if your immune system is weak, the infection may come back. Tell your doctor if you get yeast infections often. Follow-up care is a key part of your treatment and safety. Be sure to make and go to all appointments, and call your doctor if you are having problems. It's also a good idea to know your test results and keep a list of the medicines you take. How can you care for yourself at home? · Take your medicines exactly as prescribed. Call your doctor if you think you are having a problem with your medicine. · Use antibiotics only as directed by your doctor. · Eat yogurt with live cultures.  It has bacteria called lactobacillus. It may help prevent some types of yeast infections. · Keep your skin clean and dry. Put powder on moist places. · If you are using a cream or suppository to treat a vaginal yeast infection, don't use condoms or a diaphragm. Use a different type of birth control. · Eat a healthy diet and get regular exercise. This will help keep your immune system strong. When should you call for help? Watch closely for changes in your health, and be sure to contact your doctor if:    · You do not get better as expected. Where can you learn more? Go to http://vijay-jeff.info/. Enter C831 in the search box to learn more about \"Candidiasis: Care Instructions. \"  Current as of: May 14, 2018  Content Version: 11.9  © 4997-5450 [employer]. Care instructions adapted under license by [employer] (which disclaims liability or warranty for this information). If you have questions about a medical condition or this instruction, always ask your healthcare professional. [address] any warranty or liability for your use of this information. Hemoglobin A1c: About This Test  What is it? Hemoglobin A1c is a blood test that checks your average blood sugar level over the past 2 to 3 months. This test also is called a glycohemoglobin test or an A1c test.  Why is this test done? The A1c test is done to check how well your diabetes has been controlled over the past 2 to 3 months. Your doctor can use this information to adjust your medicine and diabetes treatment, if needed. This test is also one of the tests used to diagnose diabetes in adults.   How can you prepare for the test?  You don't need to stop eating before you have an A1c test. This test can be done at any time during the day, even after a meal.  What happens during the test?  The health professional taking a sample of your blood will:  · Wrap an elastic band around your upper arm. This makes the veins below the band larger so it is easier to put a needle into the vein. · Clean the needle site with alcohol. · Put the needle into the vein. · Attach a tube to the needle to fill it with blood. · Remove the band from your arm when enough blood is collected. · Put a gauze pad or cotton ball over the needle site as the needle is removed. · Put pressure on the site and then put on a bandage. What else should you know about the test?  The test result is usually given as a percentage. The normal A1c is less than 5.7%. You have a higher risk for diabetes if your A1c is 5.7% to 6.4%. If your level is 6.5% or higher, you have diabetes. The A1c test result also can be used to find your estimated average glucose, or eAG. Your eAG and A1c show the same thing in two different ways. They both help you learn more about your average blood sugar range over the past 2 to 3 months. A1c is shown as a percentage, while eAG uses the same units (mg/dl) as your glucose meter. Examples:  · 6% A1c = 126 mg/dL  · 7% A1c = 154 mg/dL  · 8% A1c = 183 mg/dL  · 9% A1c = 212 mg/dL  · 10% A1c = 240 mg/dL  · 11% A1c = 269 mg/dL  · 12% A1c = 298 mg/dL  Where can you learn more? Go to http://vijay-jeff.info/. Enter U216 in the search box to learn more about \"Hemoglobin A1c: About This Test.\"  Current as of: July 25, 2018  Content Version: 11.9  © 4566-7178 [employer]. Care instructions adapted under license by [employer] (which disclaims liability or warranty for this information). If you have questions about a medical condition or this instruction, always ask your healthcare professional. [place] [address] any warranty or liability for your use of this information.     Please follow up in 1 month to establish care, well woman exam with one of [facility] female providers

## 2019-03-05 NOTE — PROGRESS NOTES
1. Have you been to the ER, urgent care clinic since your last visit? Hospitalized since your last visit? No    2. Have you seen or consulted any other health care providers outside of the [address] [place] [address] since your last visit? Include any pap smears or colon screening.  No

## 2019-03-06 ENCOUNTER — HOSPITAL ENCOUNTER (OUTPATIENT)
Dept: LAB | Age: 39
Discharge: HOME | End: 2019-03-06
Payer: SELF-PAY

## 2019-03-06 DIAGNOSIS — B37.31 CANDIDA VAGINITIS: ICD-10-CM

## 2019-03-06 DIAGNOSIS — R82.90 ABNORMAL URINE ODOR: ICD-10-CM

## 2019-03-06 PROCEDURE — 87086 URINE CULTURE/COLONY COUNT: CPT

## 2019-03-08 LAB
BACTERIA SPEC CULT: NORMAL
SERVICE CMNT-IMP: NORMAL

## 2019-05-30 ENCOUNTER — OFFICE VISIT (OUTPATIENT)
Dept: FAMILY MEDICINE CLINIC | Age: 39
End: 2019-05-30

## 2019-05-30 ENCOUNTER — HOSPITAL ENCOUNTER (OUTPATIENT)
Dept: LAB | Age: 39
Discharge: HOME | End: 2019-05-30
Payer: SELF-PAY

## 2019-05-30 VITALS
OXYGEN SATURATION: 98 % | DIASTOLIC BLOOD PRESSURE: 89 MMHG | WEIGHT: 293 LBS | BODY MASS INDEX: 47.09 KG/M2 | HEIGHT: 66 IN | TEMPERATURE: 98.4 F | RESPIRATION RATE: 18 BRPM | SYSTOLIC BLOOD PRESSURE: 135 MMHG | HEART RATE: 88 BPM

## 2019-05-30 DIAGNOSIS — R35.0 URINARY FREQUENCY: Primary | ICD-10-CM

## 2019-05-30 DIAGNOSIS — R35.0 URINARY FREQUENCY: ICD-10-CM

## 2019-05-30 DIAGNOSIS — M54.16 CHRONIC RADICULAR LUMBAR PAIN: ICD-10-CM

## 2019-05-30 DIAGNOSIS — G89.29 CHRONIC RADICULAR LUMBAR PAIN: ICD-10-CM

## 2019-05-30 PROCEDURE — 87086 URINE CULTURE/COLONY COUNT: CPT

## 2019-05-30 RX ORDER — CYCLOBENZAPRINE HCL 10 MG
10 TABLET ORAL
Qty: 15 TAB | Refills: 0 | Status: SHIPPED | OUTPATIENT
Start: 2019-05-30 | End: 2019-06-04

## 2019-05-30 RX ORDER — METHYLPREDNISOLONE 4 MG/1
TABLET ORAL
Qty: 1 DOSE PACK | Refills: 0 | Status: SHIPPED | OUTPATIENT
Start: 2019-05-30 | End: 2020-01-31 | Stop reason: ALTCHOICE

## 2019-05-30 NOTE — PROGRESS NOTES
Chief Complaint   Patient presents with    Urinary Frequency    Tingling    Back Pain     1. Have you been to the ER, urgent care clinic since your last visit? Hospitalized since your last visit? No    2. Have you seen or consulted any other health care providers outside of the [address] [place] [address] since your last visit? Include any pap smears or colon screening.  No

## 2019-05-30 NOTE — PATIENT INSTRUCTIONS
Learning About How to Have a Healthy Back  What causes back pain? Back pain is often caused by overuse, strain, or injury. For example, people often hurt their backs playing sports or working in the yard, being jolted in a car accident, or lifting something too heavy. Aging plays a part too. Your bones and muscles tend to lose strength as you age, which makes injury more likely. The spongy discs between the bones of the spine (vertebrae) may suffer from wear and tear and no longer provide enough cushion between the bones. A disc that bulges or breaks open (herniated disc) can press on nerves, causing back pain. In some people, back pain is the result of arthritis, broken vertebrae caused by bone loss (osteoporosis), illness, or a spine problem. Although most people have back pain at one time or another, there are steps you can take to make it less likely. How can you have a healthy back? Reduce stress on your back through good posture  Slumping or slouching alone may not cause low back pain. But after the back has been strained or injured, bad posture can make pain worse. · Sleep in a position that maintains your back's normal curves and on a mattress that feels comfortable. Sleep on your side with a pillow between your knees, or sleep on your back with a pillow under your knees. These positions can reduce strain on your back. · Stand and sit up straight. \"Good posture\" generally means your ears, shoulders, and hips are in a straight line. · If you must stand for a long time, put one foot on a stool, ledge, or box. Switch feet every now and then. · Sit in a chair that is low enough to let you place both feet flat on the floor with both knees nearly level with your hips. If your chair or desk is too high, use a footrest to raise your knees. Place a small pillow, a rolled-up towel, or a lumbar roll in the curve of your back if you need extra support.   · Try a kneeling chair, which helps tilt your hips forward. This takes pressure off your lower back. · Try sitting on an exercise ball. It can rock from side to side, which helps keep your back loose. · When driving, keep your knees nearly level with your hips. Sit straight, and drive with both hands on the steering wheel. Your arms should be in a slightly bent position. Reduce stress on your back through careful lifting  · Squat down, bending at the hips and knees only. If you need to, put one knee to the floor and extend your other knee in front of you, bent at a right angle (half kneeling). · Press your chest straight forward. This helps keep your upper back straight while keeping a slight arch in your low back. · Hold the load as close to your body as possible, at the level of your belly button (navel). · Use your feet to change direction, taking small steps. · Lead with your hips as you change direction. Keep your shoulders in line with your hips as you move. · Set down your load carefully, squatting with your knees and hips only. Exercise and stretch your back  · Do some exercise on most days of the week, if your doctor says it is okay. You can walk, run, swim, or cycle. · Stretch your back muscles. Here are a few exercises to try:  ? Lie on your back, and gently pull one bent knee to your chest. Put that foot back on the floor, and then pull the other knee to your chest.  ? Do pelvic tilts. Lie on your back with your knees bent. Tighten your stomach muscles. Pull your belly button (navel) in and up toward your ribs. You should feel like your back is pressing to the floor and your hips and pelvis are slightly lifting off the floor. Hold for 6 seconds while breathing smoothly. ? Sit with your back flat against a wall. · Keep your core muscles strong. The muscles of your back, belly (abdomen), and buttocks support your spine. ? Pull in your belly and imagine pulling your navel toward your spine. Hold this for 6 seconds, then relax.  Remember to keep breathing normally as you tense your muscles. ? Do curl-ups. Always do them with your knees bent. Keep your low back on the floor, and curl your shoulders toward your knees using a smooth, slow motion. Keep your arms folded across your chest. If this bothers your neck, try putting your hands behind your neck (not your head), with your elbows spread apart. ? Lie on your back with your knees bent and your feet flat on the floor. Tighten your belly muscles, and then push with your feet and raise your buttocks up a few inches. Hold this position 6 seconds as you continue to breathe normally, then lower yourself slowly to the floor. Repeat 8 to 12 times. ? If you like group exercise, try Pilates or yoga. These classes have poses that strengthen the core muscles. Lead a healthy lifestyle  · Stay at a healthy weight to avoid strain on your back. · Do not smoke. Smoking increases the risk of osteoporosis, which weakens the spine. If you need help quitting, talk to your doctor about stop-smoking programs and medicines. These can increase your chances of quitting for good. Where can you learn more? Go to http://vijayJoopLoopjeff.info/. Enter L315 in the search box to learn more about \"Learning About How to Have a Healthy Back. \"  Current as of: September 20, 2018  Content Version: 11.9  © 7122-5476 [employer]. Care instructions adapted under license by [employer] (which disclaims liability or warranty for this information). If you have questions about a medical condition or this instruction, always ask your healthcare professional. [place] [address] any warranty or liability for your use of this information. Frequent Urination: Care Instructions  Your Care Instructions  An urge to urinate frequently but usually passing only small amounts of urine is a common symptom of urinary problems, such as urinary tract infections. The bladder may become inflamed. This can cause the urge to urinate. You may try to urinate more often than usual to try to soothe that urge. Frequent urination also may be caused by sexually transmitted infections (STIs) or kidney stones. Or it may happen when something irritates the tube that carries urine from the bladder to the outside of the body (urethra). It may also be a sign of diabetes. The cause may be hard to find. You may need tests. Follow-up care is a key part of your treatment and safety. Be sure to make and go to all appointments, and call your doctor if you are having problems. It's also a good idea to know your test results and keep a list of the medicines you take. How can you care for yourself at home? · Drink extra water for the next day or two. This will help make the urine less concentrated. (If you have kidney, heart, or liver disease and have to limit fluids, talk with your doctor before you increase the amount of fluids you drink.)  · Avoid drinks that are carbonated or have caffeine. They can irritate the bladder. For women:  · Urinate right after you have sex. · After you go to the bathroom, wipe from front to back. · Avoid douches, bubble baths, and feminine hygiene sprays. And avoid other feminine hygiene products that have deodorants. When should you call for help? Call your doctor now or seek immediate medical care if:    · You have new symptoms, such as fever, nausea, or vomiting.     · You have new or worse symptoms of a urinary problem. For example:  ? You have blood or pus in your urine. ? You have chills or body aches. ? It hurts to urinate. ? You have groin or belly pain. ? You have pain in your back just below your rib cage (the flank area).    Watch closely for changes in your health, and be sure to contact your doctor if you feel thirstier than usual.  Where can you learn more? Go to http://vijay-jeff.info/.   Enter 840 5615 in the search box to learn more about \"Frequent Urination: Care Instructions. \"  Current as of: March 20, 2018  Content Version: 11.9  © 5426-9599 [employer]. Care instructions adapted under license by "[employer] (which disclaims liability or warranty for this information). If you have questions about a medical condition or this instruction, always ask your healthcare professional. [place] [address] any warranty or liability for your use of this information.

## 2019-05-30 NOTE — PROGRESS NOTES
RUDDY Modi is a 45 y.o. female who presents today for back pain and urinary frequency. Pt had an injury causing low back pain 3 week(s) ago. The pain is positional with bending or lifting, with radiation down the legs. Mechanism of injury: pt was in the shower and leaned against a shower rail that broke and caused her to twist her back. Symptoms have been constant since that time. Prior history of back problems: recurrent self limited episodes of low back pain in the past. There is intermittent numbness in the legs. Pt also notes she has been dealing with urinary frequency and dry mouth for several months. Current Outpatient Medications   Medication Sig Dispense Refill    methylPREDNISolone (MEDROL DOSEPACK) 4 mg tablet Take as directed on pack 1 Dose Pack 0    cyclobenzaprine (FLEXERIL) 10 mg tablet Take 1 Tab by mouth three (3) times daily as needed for Muscle Spasm(s) for up to 5 days. 15 Tab 0    albuterol (PROVENTIL HFA, VENTOLIN HFA, PROAIR HFA) 90 mcg/actuation inhaler Take 2 Puffs by inhalation every four (4) hours as needed for Wheezing. 1 Inhaler 0    meloxicam (MOBIC) 15 mg tablet Take 1 Tab by mouth daily. 30 Tab 11      No Known Allergies    SUBJECTIVE:  Review of Systems   Constitutional: Negative for chills, fever and malaise/fatigue. Eyes: Negative for blurred vision. Respiratory: Negative for shortness of breath. Cardiovascular: Negative for chest pain, palpitations and leg swelling. Gastrointestinal: Negative for abdominal pain, diarrhea, nausea and vomiting. Genitourinary: Positive for frequency and urgency. Negative for dysuria. Musculoskeletal: Positive for back pain. Negative for falls. Neurological: Positive for sensory change. Negative for dizziness, tingling, weakness and headaches.         OBJECTIVE:  Visit Vitals  /89 (BP 1 Location: Left arm, BP Patient Position: Sitting)   Pulse 88   Temp 98.4 °F (36.9 °C) (Oral)   Resp 18   Ht 5' 6\" (1.676 m) Wt 319 lb (144.7 kg)   LMP 05/01/2019 (Approximate)   SpO2 98%   BMI 51.49 kg/m²      I have reviewed/discussed the above normal BMI with the patient. I have recommended the following interventions: dietary management education, guidance, and counseling, encourage exercise and monitor weight . Physical Exam   Constitutional: She is oriented to person, place, and time and well-developed, well-nourished, and in no distress. HENT:   Head: Normocephalic. Eyes: Pupils are equal, round, and reactive to light. Conjunctivae and EOM are normal.   Cardiovascular: Normal rate, regular rhythm and normal heart sounds. Pulmonary/Chest: Effort normal and breath sounds normal. She has no wheezes. She has no rales. She exhibits no tenderness. Musculoskeletal: She exhibits tenderness (lumbar-sacral tenderness, positive straight leg raise). She exhibits no edema. Neurological: She is alert and oriented to person, place, and time. Antalgic gait   Skin: Skin is warm and dry. Nursing note and vitals reviewed. ASSESSMENT:  Diagnoses and all orders for this visit:    1. Urinary frequency  -     CULTURE, URINE; Future    2. Chronic radicular lumbar pain  -     methylPREDNISolone (MEDROL DOSEPACK) 4 mg tablet; Take as directed on pack  -     cyclobenzaprine (FLEXERIL) 10 mg tablet; Take 1 Tab by mouth three (3) times daily as needed for Muscle Spasm(s) for up to 5 days. PLAN:  Urine culture ordered  Start Medrol dose pack and Flexeril PRN  For acute pain, rest, intermittent application of cold packs (later, may switch to heat, but do not sleep on heating pad), analgesics and muscle relaxants are recommended. Discussed longer term treatment plan of prn Tylenol and discussed a home back care exercise program with flexion exercise routine. Proper lifting with avoidance of heavy lifting discussed. Consider Physical Therapy and XRay studies if not improving.     I have discussed the diagnosis with the patient and the intended plan as seen in the above orders. The patient has received an after-visit summary and questions were answered concerning future plans. I have discussed medication side effects and warnings with the patient as well. Patient will call for further questions. Follow-up and Dispositions    · Return in about 1 month (around 6/27/2019) for follow up.        Naveed Simpson NP

## 2019-06-01 LAB
BACTERIA SPEC CULT: NORMAL
SERVICE CMNT-IMP: NORMAL

## 2019-07-26 ENCOUNTER — OFFICE VISIT (OUTPATIENT)
Dept: FAMILY MEDICINE CLINIC | Age: 39
End: 2019-07-26

## 2019-07-26 ENCOUNTER — HOSPITAL ENCOUNTER (OUTPATIENT)
Dept: LAB | Age: 39
Discharge: HOME | End: 2019-07-26
Payer: COMMERCIAL

## 2019-07-26 VITALS
TEMPERATURE: 98.7 F | BODY MASS INDEX: 47.09 KG/M2 | DIASTOLIC BLOOD PRESSURE: 84 MMHG | RESPIRATION RATE: 18 BRPM | HEIGHT: 66 IN | OXYGEN SATURATION: 97 % | WEIGHT: 293 LBS | SYSTOLIC BLOOD PRESSURE: 140 MMHG | HEART RATE: 86 BPM

## 2019-07-26 DIAGNOSIS — R53.83 FATIGUE, UNSPECIFIED TYPE: ICD-10-CM

## 2019-07-26 DIAGNOSIS — R35.0 URINARY FREQUENCY: Primary | ICD-10-CM

## 2019-07-26 DIAGNOSIS — R35.0 URINARY FREQUENCY: ICD-10-CM

## 2019-07-26 DIAGNOSIS — E66.01 CLASS 3 SEVERE OBESITY WITHOUT SERIOUS COMORBIDITY WITH BODY MASS INDEX (BMI) OF 50.0 TO 59.9 IN ADULT, UNSPECIFIED OBESITY TYPE (HCC): ICD-10-CM

## 2019-07-26 DIAGNOSIS — N39.41 URGE INCONTINENCE: ICD-10-CM

## 2019-07-26 LAB
BASOPHILS # BLD: 0 K/UL (ref 0–0.1)
BASOPHILS NFR BLD: 0 % (ref 0–2)
BILIRUB UR QL STRIP: NEGATIVE
DIFFERENTIAL METHOD BLD: ABNORMAL
EOSINOPHIL # BLD: 0.2 K/UL (ref 0–0.4)
EOSINOPHIL NFR BLD: 2 % (ref 0–5)
ERYTHROCYTE [DISTWIDTH] IN BLOOD BY AUTOMATED COUNT: 16.5 % (ref 11.6–14.5)
EST. AVERAGE GLUCOSE BLD GHB EST-MCNC: 114 MG/DL
GLUCOSE UR-MCNC: NEGATIVE MG/DL
HBA1C MFR BLD: 5.6 % (ref 4.2–5.6)
HCT VFR BLD AUTO: 34.1 % (ref 35–45)
HGB BLD-MCNC: 10.1 G/DL (ref 12–16)
KETONES P FAST UR STRIP-MCNC: NEGATIVE MG/DL
LYMPHOCYTES # BLD: 2.5 K/UL (ref 0.9–3.6)
LYMPHOCYTES NFR BLD: 28 % (ref 21–52)
MCH RBC QN AUTO: 20.7 PG (ref 24–34)
MCHC RBC AUTO-ENTMCNC: 29.5 G/DL (ref 31–37)
MCV RBC AUTO: 69.9 FL (ref 74–97)
MONOCYTES # BLD: 0.5 K/UL (ref 0.05–1.2)
MONOCYTES NFR BLD: 6 % (ref 3–10)
NEUTS SEG # BLD: 5.7 K/UL (ref 1.8–8)
NEUTS SEG NFR BLD: 64 % (ref 40–73)
PH UR STRIP: 6 [PH] (ref 4.6–8)
PLATELET # BLD AUTO: 261 K/UL (ref 135–420)
PROT UR QL STRIP: NEGATIVE
RBC # BLD AUTO: 4.89 M/UL (ref 4.2–5.3)
SP GR UR STRIP: 1.02 (ref 1–1.03)
UA UROBILINOGEN AMB POC: NORMAL (ref 0.2–1)
URINALYSIS CLARITY POC: CLEAR
URINALYSIS COLOR POC: YELLOW
URINE BLOOD POC: NEGATIVE
URINE LEUKOCYTES POC: NEGATIVE
URINE NITRITES POC: NEGATIVE
WBC # BLD AUTO: 9 K/UL (ref 4.6–13.2)

## 2019-07-26 PROCEDURE — 36415 COLL VENOUS BLD VENIPUNCTURE: CPT

## 2019-07-26 PROCEDURE — 83036 HEMOGLOBIN GLYCOSYLATED A1C: CPT

## 2019-07-26 PROCEDURE — 87086 URINE CULTURE/COLONY COUNT: CPT

## 2019-07-26 PROCEDURE — 85025 COMPLETE CBC W/AUTO DIFF WBC: CPT

## 2019-07-26 PROCEDURE — 82306 VITAMIN D 25 HYDROXY: CPT

## 2019-07-26 NOTE — PROGRESS NOTES
Chief Complaint   Patient presents with    Urinary Frequency     1. Have you been to the ER, urgent care clinic since your last visit? Hospitalized since your last visit? No    2. Have you seen or consulted any other health care providers outside of the [address] [place] [address] since your last visit? Include any pap smears or colon screening.  No

## 2019-07-26 NOTE — PROGRESS NOTES
RUDDY Clark is a 44 y.o. female who presents today for urinary frequency. Pt notes she continues to have urinary frequency and incontinence spells some time as soon as she gets up transitioning from sitting to stand. Pt also notes for several months she has noticed sweet smelling urine and fatigue. Pt is asking for referral for specialist for weight loss. Current Outpatient Medications   Medication Sig Dispense Refill    albuterol (PROVENTIL HFA, VENTOLIN HFA, PROAIR HFA) 90 mcg/actuation inhaler Take 2 Puffs by inhalation every four (4) hours as needed for Wheezing. 1 Inhaler 0    methylPREDNISolone (MEDROL DOSEPACK) 4 mg tablet Take as directed on pack 1 Dose Pack 0    meloxicam (MOBIC) 15 mg tablet Take 1 Tab by mouth daily. 30 Tab 11      No Known Allergies    SUBJECTIVE:  Review of Systems   Constitutional: Positive for malaise/fatigue. Negative for chills and fever. Eyes: Negative for blurred vision. Respiratory: Negative for shortness of breath. Cardiovascular: Negative for chest pain, palpitations and leg swelling. Gastrointestinal: Negative for abdominal pain, diarrhea, nausea and vomiting. Genitourinary: Positive for frequency. Negative for dysuria and urgency. Musculoskeletal: Positive for back pain. Negative for myalgias. Neurological: Negative for dizziness, tingling, sensory change and headaches. OBJECTIVE:  Visit Vitals  /84 (BP 1 Location: Left arm, BP Patient Position: Sitting)   Pulse 86   Temp 98.7 °F (37.1 °C) (Oral)   Resp 18   Ht 5' 6\" (1.676 m)   Wt 323 lb (146.5 kg)   LMP 06/26/2019 (Approximate)   SpO2 97%   BMI 52.13 kg/m²      I have reviewed/discussed the above normal BMI with the patient. I have recommended the following interventions: dietary management education, guidance, and counseling, encourage exercise and monitor weight .        Physical Exam   Constitutional: She is oriented to person, place, and time and well-developed, well-nourished, and in no distress. HENT:   Head: Normocephalic. Eyes: Pupils are equal, round, and reactive to light. Conjunctivae and EOM are normal.   Cardiovascular: Normal rate, regular rhythm and normal heart sounds. Pulmonary/Chest: Effort normal and breath sounds normal. She has no wheezes. She has no rales. She exhibits no tenderness. Musculoskeletal: She exhibits no edema or tenderness. Neurological: She is alert and oriented to person, place, and time. Gait normal.   Skin: Skin is warm and dry. No erythema. Nursing note and vitals reviewed. ASSESSMENT:  Diagnoses and all orders for this visit:    1. Urinary frequency  -     HEMOGLOBIN A1C WITH EAG; Future  -     CULTURE, URINE; Future  -     REFERRAL TO UROLOGY    2. Urge incontinence  -     REFERRAL TO UROLOGY    3. Fatigue, unspecified type  -     VITAMIN D, 25 HYDROXY; Future  -     CBC WITH AUTOMATED DIFF; Future    PLAN:  Labs today  Referral placed for urology    I have discussed the diagnosis with the patient and the intended plan as seen in the above orders. The patient has received an after-visit summary and questions were answered concerning future plans. I have discussed medication side effects and warnings with the patient as well. Patient will call for further questions. Follow-up and Dispositions    · Return in about 6 weeks (around 9/6/2019) for urinary symptoms.        Charlotte Harvey NP

## 2019-07-26 NOTE — LETTER
NOTIFICATION RETURN TO WORK / SCHOOL 
 
7/26/2019 1:45 PM 
 
Ms. Krysta Ramos [address] [id number] [facility] [address] To Whom It May Concern: 
 
Krysta Ramos [facility] the [facility] She will return to work/school on: 7/29/2019 If there are questions or concerns please have the patient contact our office. Sincerely, Nimisha Paul NP

## 2019-07-26 NOTE — PATIENT INSTRUCTIONS
Bladder Training: Care Instructions  Your Care Instructions    Bladder training is used to treat urge incontinence and stress incontinence. Urge incontinence means that the need to urinate comes on so fast that you can't get to a toilet in time. Stress incontinence means that you leak urine because of pressure on your bladder. For example, it may happen when you laugh, cough, or lift something heavy. Bladder training can increase how long you can wait before you have to urinate. It can also help your bladder hold more urine. And it can give you better control over the urge to urinate. It is important to remember that bladder training takes a few weeks to a few months to make a difference. You may not see results right away, but don't give up. Follow-up care is a key part of your treatment and safety. Be sure to make and go to all appointments, and call your doctor if you are having problems. It's also a good idea to know your test results and keep a list of the medicines you take. How can you care for yourself at home? Work with your doctor to come up with a bladder training program that is right for you. You may use one or more of the following methods. Delayed urination  · In the beginning, try to keep from urinating for 5 minutes after you first feel the need to go. · While you wait, take deep, slow breaths to relax. Kegel exercises can also help you delay the need to go to the bathroom. · After some practice, when you can easily wait 5 minutes to urinate, try to wait 10 minutes before you urinate. · Slowly increase the waiting period until you are able to control when you have to urinate. Scheduled urination  · Empty your bladder when you first wake up in the morning. · Schedule times throughout the day when you will urinate. · Start by going to the bathroom every hour, even if you don't need to go. · Slowly increase the time between trips to the bathroom.   · When you have found a schedule that works well for you, keep doing it. · If you wake up during the night and have to urinate, do it. Apply your schedule to waking hours only. Kegel exercises  These tighten and strengthen pelvic muscles, which can help you control the flow of urine. To do Kegel exercises:  · Squeeze the same muscles you would use to stop your urine. Your belly and thighs should not move. · Hold the squeeze for 3 seconds, and then relax for 3 seconds. · Start with 3 seconds. Then add 1 second each week until you are able to squeeze for 10 seconds. · Repeat the exercise 10 to 15 times a session. Do three or more sessions a day. When should you call for help? Watch closely for changes in your health, and be sure to contact your doctor if:    · Your incontinence is getting worse.     · You do not get better as expected. Where can you learn more? Go to http://vijay-jeff.info/. Enter N357 in the search box to learn more about \"Bladder Training: Care Instructions. \"  Current as of: December 19, 2018  Content Version: 12.1  © 9944-8093 [employer]. Care instructions adapted under license by [employer] (which disclaims liability or warranty for this information). If you have questions about a medical condition or this instruction, always ask your healthcare professional. [address] any warranty or liability for your use of this information. Fatigue: Care Instructions  Your Care Instructions    Fatigue is a feeling of tiredness, exhaustion, or lack of energy. You may feel fatigue because of too much or not enough activity. It can also come from stress, lack of sleep, boredom, and poor diet. Many medical problems, such as viral infections, can cause fatigue. Emotional problems, especially depression, are often the cause of fatigue. Fatigue is most often a symptom of another problem. Treatment for fatigue depends on the cause.  For example, if you have fatigue because you have a certain health problem, treating this problem also treats your fatigue. If depression or anxiety is the cause, treatment may help. Follow-up care is a key part of your treatment and safety. Be sure to make and go to all appointments, and call your doctor if you are having problems. It's also a good idea to know your test results and keep a list of the medicines you take. How can you care for yourself at home? · Get regular exercise. But don't overdo it. Go back and forth between rest and exercise. · Get plenty of rest.  · Eat a healthy diet. Do not skip meals, especially breakfast.  · Reduce your use of caffeine, tobacco, and alcohol. Caffeine is most often found in coffee, tea, cola drinks, and chocolate. · Limit medicines that can cause fatigue. This includes tranquilizers and cold and allergy medicines. When should you call for help? Watch closely for changes in your health, and be sure to contact your doctor if:    · You have new symptoms such as fever or a rash.     · Your fatigue gets worse.     · You have been feeling down, depressed, or hopeless. Or you may have lost interest in things that you usually enjoy.     · You are not getting better as expected. Where can you learn more? Go to http://vijay-jeff.info/. Enter U856 in the search box to learn more about \"Fatigue: Care Instructions. \"  Current as of: September 23, 2018  Content Version: 12.1  © 5649-2231 [employer]. Care instructions adapted under license by [employer] (which disclaims liability or warranty for this information). If you have questions about a medical condition or this instruction, always ask your healthcare professional. [address] any warranty or liability for your use of this information.

## 2019-07-27 LAB — 25(OH)D3 SERPL-MCNC: 11.5 NG/ML (ref 30–100)

## 2019-07-28 LAB
BACTERIA SPEC CULT: NORMAL
SERVICE CMNT-IMP: NORMAL

## 2019-10-08 ENCOUNTER — OFFICE VISIT (OUTPATIENT)
Dept: FAMILY MEDICINE CLINIC | Age: 39
End: 2019-10-08

## 2019-10-08 VITALS
TEMPERATURE: 98.5 F | RESPIRATION RATE: 17 BRPM | SYSTOLIC BLOOD PRESSURE: 134 MMHG | BODY MASS INDEX: 47.09 KG/M2 | OXYGEN SATURATION: 98 % | HEIGHT: 66 IN | HEART RATE: 86 BPM | WEIGHT: 293 LBS | DIASTOLIC BLOOD PRESSURE: 78 MMHG

## 2019-10-08 DIAGNOSIS — B37.31 YEAST VAGINITIS: Primary | ICD-10-CM

## 2019-10-08 RX ORDER — AMOXICILLIN 250 MG/1
CAPSULE ORAL 3 TIMES DAILY
COMMUNITY
End: 2020-01-31 | Stop reason: ALTCHOICE

## 2019-10-08 RX ORDER — FLUCONAZOLE 150 MG/1
TABLET ORAL
Qty: 2 TAB | Refills: 0 | Status: SHIPPED | OUTPATIENT
Start: 2019-10-08 | End: 2020-01-31 | Stop reason: ALTCHOICE

## 2019-10-08 NOTE — PROGRESS NOTES
HISTORY OF PRESENT ILLNESS  Cristhian Shrestha is a 44 y.o. female. Patient presents for concerns regarding vaginal discharge. HPI  Pt recently had dental work done and was given an antibiotic. She now has vaginal discharge and itching for the past three days. Review of Systems   Constitutional: Negative. Respiratory: Negative. Cardiovascular: Negative. Gastrointestinal: Positive for abdominal pain (suprapubic). Visit Vitals  /78 (BP 1 Location: Left arm, BP Patient Position: Sitting)   Pulse 86   Temp 98.5 °F (36.9 °C) (Oral)   Resp 17   Ht 5' 6\" (1.676 m)   Wt 321 lb (145.6 kg)   LMP 09/18/2019 (Approximate)   SpO2 98%   BMI 51.81 kg/m²     Physical Exam   Constitutional: She appears well-developed. No distress. Cardiovascular: Normal rate, regular rhythm and normal heart sounds. No murmur heard. Pulmonary/Chest: Effort normal and breath sounds normal. No respiratory distress. She has no wheezes. She has no rales. Abdominal: Soft. She exhibits no distension. There is tenderness (suprapubic). Genitourinary: There is no rash, tenderness or lesion on the right labia. There is no rash, tenderness or lesion on the left labia. There is tenderness in the vagina. Vaginal discharge found. ASSESSMENT and PLAN    ICD-10-CM ICD-9-CM    1. Yeast vaginitis B37.3 112.1 NUSWAB VAGINOSIS + CANDIDA      fluconazole (DIFLUCAN) 150 mg tablet     PLAN:  Continue the Amoxicillin  Pt advised to take the Diflucan now and may repeatin 7-10days. Pt is to call with any concerns. I have discussed the diagnosis with the patient and the intended plan as seen in the above orders. The patient has received an after-visit summary and questions were answered concerning future plans. I have discussed medication side effects and warnings with the patient as well. Patient will call for further questions. Follow-up and Dispositions    · Return if symptoms worsen or fail to improve.

## 2019-10-08 NOTE — PROGRESS NOTES
Chief Complaint   Patient presents with    Vaginal Discharge     for the past two days     Vaginal Itching     1. Have you been to the ER, urgent care clinic since your last visit? Hospitalized since your last visit? No     2. Have you seen or consulted any other health care providers outside of the [address] [place] [address] since your last visit? Include any pap smears or colon screening.  No

## 2019-10-22 ENCOUNTER — OFFICE VISIT (OUTPATIENT)
Dept: FAMILY MEDICINE CLINIC | Age: 39
End: 2019-10-22

## 2019-10-22 ENCOUNTER — HOSPITAL ENCOUNTER (OUTPATIENT)
Dept: LAB | Age: 39
Discharge: HOME | End: 2019-10-22
Payer: COMMERCIAL

## 2019-10-22 VITALS
SYSTOLIC BLOOD PRESSURE: 148 MMHG | WEIGHT: 293 LBS | BODY MASS INDEX: 47.09 KG/M2 | HEIGHT: 66 IN | RESPIRATION RATE: 20 BRPM | HEART RATE: 98 BPM | TEMPERATURE: 98.1 F | DIASTOLIC BLOOD PRESSURE: 83 MMHG | OXYGEN SATURATION: 98 %

## 2019-10-22 DIAGNOSIS — R41.3 MEMORY LOSS: ICD-10-CM

## 2019-10-22 DIAGNOSIS — F90.0 ATTENTION DEFICIT HYPERACTIVITY DISORDER (ADHD), PREDOMINANTLY INATTENTIVE TYPE: Primary | ICD-10-CM

## 2019-10-22 DIAGNOSIS — R35.89 POLYURIA: ICD-10-CM

## 2019-10-22 PROCEDURE — 81001 URINALYSIS AUTO W/SCOPE: CPT

## 2019-10-22 RX ORDER — DEXTROAMPHETAMINE SACCHARATE, AMPHETAMINE ASPARTATE, DEXTROAMPHETAMINE SULFATE AND AMPHETAMINE SULFATE 5; 5; 5; 5 MG/1; MG/1; MG/1; MG/1
20 TABLET ORAL 2 TIMES DAILY
Qty: 60 TAB | Refills: 0 | Status: SHIPPED | OUTPATIENT
Start: 2019-10-22 | End: 2020-06-24 | Stop reason: ALTCHOICE

## 2019-10-22 NOTE — PROGRESS NOTES
Patient is in the office today for memory loss. 1. Have you been to the ER, urgent care clinic since your last visit? Hospitalized since your last visit? No    2. Have you seen or consulted any other health care providers outside of the [address] [place] [address] since your last visit? Include any pap smears or colon screening.  No

## 2019-10-23 LAB
AMORPH CRY URNS QL MICRO: ABNORMAL
APPEARANCE UR: ABNORMAL
BACTERIA URNS QL MICRO: NEGATIVE /HPF
BILIRUB UR QL: NEGATIVE
COLOR UR: YELLOW
EPITH CASTS URNS QL MICRO: ABNORMAL /LPF (ref 0–5)
GLUCOSE UR STRIP.AUTO-MCNC: NEGATIVE MG/DL
HGB UR QL STRIP: NEGATIVE
KETONES UR QL STRIP.AUTO: NEGATIVE MG/DL
LEUKOCYTE ESTERASE UR QL STRIP.AUTO: NEGATIVE
NITRITE UR QL STRIP.AUTO: NEGATIVE
PH UR STRIP: 5.5 [PH] (ref 5–8)
PROT UR STRIP-MCNC: NEGATIVE MG/DL
RBC #/AREA URNS HPF: 0 /HPF (ref 0–5)
SP GR UR REFRACTOMETRY: 1.03 (ref 1–1.03)
UROBILINOGEN UR QL STRIP.AUTO: 0.2 EU/DL (ref 0.2–1)
WBC URNS QL MICRO: ABNORMAL /HPF (ref 0–4)

## 2019-11-02 NOTE — PROGRESS NOTES
Steven Munoz is a 44 y.o.  female and presents with Memory Loss (?ADD); Tingling; Attention Deficit Disorder; and Urinary Frequency      SUBJECTIVE:  Pt has been having problems with memory which has been going on for several years but worst with job which has a lot of responsibilities in the last few months. She has a hard time remembering different tasks and it is causing her to have problems organizing and getting tasks done at home. She has never been diagnosed with ADD. She was a C student in high school. Pt's screening for ADD showed severe inattentiveness and mild hyperactivity. Pt c/o urinary frequency. She denies any burning with urination or polydipsia or polyuria. Respiratory ROS: negative for - shortness of breath  Cardiovascular ROS: negative for - chest pain    Current Outpatient Medications   Medication Sig    dextroamphetamine-amphetamine (ADDERALL) 20 mg tablet Take 1 Tab by mouth two (2) times a day. Max Daily Amount: 40 mg.    amoxicillin (AMOXIL) 250 mg capsule Take  by mouth three (3) times daily. Indications: prescribed by dentist    fluconazole (DIFLUCAN) 150 mg tablet FDA advises cautious prescribing of oral fluconazole in pregnancy. Take one now and may repeat in 7-10days    methylPREDNISolone (MEDROL DOSEPACK) 4 mg tablet Take as directed on pack    albuterol (PROVENTIL HFA, VENTOLIN HFA, PROAIR HFA) 90 mcg/actuation inhaler Take 2 Puffs by inhalation every four (4) hours as needed for Wheezing.  meloxicam (MOBIC) 15 mg tablet Take 1 Tab by mouth daily. No current facility-administered medications for this visit.           OBJECTIVE:  alert, well appearing, and in no distress  Visit Vitals  /83 (BP 1 Location: Left arm, BP Patient Position: Sitting)   Pulse 98   Temp 98.1 °F (36.7 °C) (Oral)   Resp 20   Ht 5' 6\" (1.676 m)   Wt 323 lb (146.5 kg)   LMP 10/16/2019   SpO2 98%   BMI 52.13 kg/m²      well developed and well nourished  Chest - clear to auscultation, no wheezes, rales or rhonchi, symmetric air entry  Heart - normal rate, regular rhythm, normal S1, S2, no murmurs, rubs, clicks or gallops        Discussed the patient's BMI with her. The BMI follow up plan is as follows: I have counseled this patient on diet and exercise regimens. Assessment/Plan      ICD-10-CM ICD-9-CM    1. Attention deficit hyperactivity disorder (ADHD), predominantly inattentive type F90.0 314.00 Will start on dextroamphetamine-amphetamine (ADDERALL) 20 mg tablet BID and revaluate in 4 weeks since she is having a lot of problems with her job   2. Memory loss R41.3 780.93 REFERRAL TO NEUROPSYCHOLOGY for definitive testing    3. Polyuria R35.8 788.42 URINALYSIS W/MICROSCOPIC negative      Follow-up and Dispositions    · Return in about 4 weeks (around 11/19/2019) for ADD. Reviewed plan of care. Patient has provided input and agrees with goals.

## 2020-01-31 ENCOUNTER — OFFICE VISIT (OUTPATIENT)
Dept: FAMILY MEDICINE CLINIC | Age: 40
End: 2020-01-31

## 2020-01-31 VITALS
DIASTOLIC BLOOD PRESSURE: 77 MMHG | TEMPERATURE: 98 F | RESPIRATION RATE: 18 BRPM | SYSTOLIC BLOOD PRESSURE: 117 MMHG | OXYGEN SATURATION: 98 % | HEART RATE: 91 BPM | WEIGHT: 293 LBS | BODY MASS INDEX: 47.09 KG/M2 | HEIGHT: 66 IN

## 2020-01-31 DIAGNOSIS — M79.89 SWELLING OF HAND, UNSPECIFIED LATERALITY: ICD-10-CM

## 2020-01-31 DIAGNOSIS — R05.9 COUGH: ICD-10-CM

## 2020-01-31 DIAGNOSIS — J20.9 ACUTE BRONCHITIS, UNSPECIFIED ORGANISM: Primary | ICD-10-CM

## 2020-01-31 RX ORDER — AZITHROMYCIN 250 MG/1
TABLET, FILM COATED ORAL
Qty: 6 TAB | Refills: 0 | Status: SHIPPED | OUTPATIENT
Start: 2020-01-31 | End: 2020-02-05

## 2020-01-31 RX ORDER — HYDROCHLOROTHIAZIDE 25 MG/1
25 TABLET ORAL DAILY
Qty: 30 TAB | Refills: 0 | Status: SHIPPED | OUTPATIENT
Start: 2020-01-31 | End: 2020-06-24 | Stop reason: SDUPTHER

## 2020-01-31 RX ORDER — BENZONATATE 200 MG/1
200 CAPSULE ORAL
Qty: 21 CAP | Refills: 0 | Status: SHIPPED | OUTPATIENT
Start: 2020-01-31 | End: 2020-02-07

## 2020-01-31 NOTE — PROGRESS NOTES
HPI  Da Lofton is a 44 y.o. female who presents today for cold symptoms. Pt complains of congestion, dry cough, diarrhea and fatigue for 4 days. Pt denies a history of chest pain, dizziness, fevers, nausea, shortness of breath, wheezing and sputum production and denies a history of asthma. Patient denies smoke cigarettes. Pt has not tried any OTC medications for the symptoms. Pt has been having swelling to hands, she admits to moderate sodium intake. Current Outpatient Medications   Medication Sig Dispense Refill    dextroamphetamine-amphetamine (ADDERALL) 20 mg tablet Take 1 Tab by mouth two (2) times a day. Max Daily Amount: 40 mg. 60 Tab 0    albuterol (PROVENTIL HFA, VENTOLIN HFA, PROAIR HFA) 90 mcg/actuation inhaler Take 2 Puffs by inhalation every four (4) hours as needed for Wheezing. 1 Inhaler 0    meloxicam (MOBIC) 15 mg tablet Take 1 Tab by mouth daily. 30 Tab 11      No Known Allergies    SUBJECTIVE:  Review of Systems   Constitutional: Negative for chills, fever and malaise/fatigue. HENT: Positive for congestion. Negative for ear pain, sinus pain and sore throat. Eyes: Negative for blurred vision. Respiratory: Positive for cough. Negative for sputum production, shortness of breath and wheezing. Cardiovascular: Negative for chest pain, palpitations and leg swelling. Gastrointestinal: Negative for abdominal pain, diarrhea, nausea and vomiting. Genitourinary: Positive for frequency. Negative for dysuria and urgency. Neurological: Negative for dizziness, tingling, sensory change and headaches. OBJECTIVE:  Visit Vitals  /77   Pulse 91   Temp 98 °F (36.7 °C) (Oral)   Resp 18   Ht 5' 6\" (1.676 m)   Wt 317 lb 6.4 oz (144 kg)   LMP 01/31/2020 (Exact Date) Comment: current   SpO2 98%   BMI 51.23 kg/m²      I have reviewed/discussed the above normal BMI with the patient.   I have recommended the following interventions: dietary management education, guidance, and counseling, encourage exercise and monitor weight . Physical Exam  Vitals signs and nursing note reviewed. Constitutional:       Appearance: Normal appearance. HENT:      Head: Normocephalic. Cardiovascular:      Rate and Rhythm: Normal rate and regular rhythm. Pulses: Normal pulses. Heart sounds: Normal heart sounds. No murmur. No friction rub. No gallop. Pulmonary:      Effort: Pulmonary effort is normal.      Breath sounds: Normal breath sounds. No wheezing, rhonchi or rales. Comments: Cough present  Skin:     General: Skin is warm and dry. Neurological:      General: No focal deficit present. Mental Status: She is alert and oriented to person, place, and time. Psychiatric:         Mood and Affect: Mood normal.         ASSESSMENT:  Diagnoses and all orders for this visit:    1. Acute bronchitis, unspecified organism  -     azithromycin (ZITHROMAX) 250 mg tablet; Take 2 tablets today, then take 1 tablet daily    2. Cough  -     benzonatate (TESSALON) 200 mg capsule; Take 1 Cap by mouth three (3) times daily as needed for Cough for up to 7 days. 3. Swelling of hand, unspecified laterality  -     hydroCHLOROthiazide (HYDRODIURIL) 25 mg tablet; Take 1 Tab by mouth daily. PLAN:  Start Z-pack  Start HCTZ for edema  Start Tessalon Pearles PRN  Symptomatic therapy suggested: push fluids, rest, use vaporizer or mist prn, use acetaminophen prn and return office visit prn if symptoms persist or worsen. I have discussed the diagnosis with the patient and the intended plan as seen in the above orders. The patient has received an after-visit summary and questions were answered concerning future plans. I have discussed medication side effects and warnings with the patient as well. Patient will call for further questions. Follow-up and Dispositions    · Return in about 5 weeks (around 3/6/2020) for swelling follow up.        Richard Pressley NP

## 2020-01-31 NOTE — LETTER
NOTIFICATION RETURN TO WORK / SCHOOL 
 
1/31/2020 3:51 PM 
 
Ms. Laura Malik [address] [address] To Whom It May Concern: 
 
Laura Malik is currently under the care of [address] She will return to work/school on: 2/3/2020 If there are questions or concerns please have the patient contact our office. Sincerely, Jeremi Tellez NP

## 2020-01-31 NOTE — PROGRESS NOTES
Jamie Amin is a  44 y.o. female presents today for office visit for same day. Patient c/o chills, night sweats , body aches and chills x 3 days. Patient denies vomiting, but endorses diarrhea and nausea. 1. Have you been to the ER, urgent care clinic or hospitalized since your last visit? NO    2. Have you seen or consulted any other health care providers outside of the [address] [place] [address] since your last visit (Include any pap smears or colon screening)? NO

## 2020-01-31 NOTE — PATIENT INSTRUCTIONS
Bronchitis: Care Instructions Your Care Instructions Bronchitis is inflammation of the bronchial tubes, which carry air to the lungs. The tubes swell and produce mucus, or phlegm. The mucus and inflamed bronchial tubes make you cough. You may have trouble breathing. Most cases of bronchitis are caused by viruses like those that cause colds. Antibiotics usually do not help and they may be harmful. Bronchitis usually develops rapidly and lasts about 2 to 3 weeks in otherwise healthy people. Follow-up care is a key part of your treatment and safety. Be sure to make and go to all appointments, and call your doctor if you are having problems. It's also a good idea to know your test results and keep a list of the medicines you take. How can you care for yourself at home? · Take all medicines exactly as prescribed. Call your doctor if you think you are having a problem with your medicine. · Get some extra rest. 
· Take an over-the-counter pain medicine, such as acetaminophen (Tylenol), ibuprofen (Advil, Motrin), or naproxen (Aleve) to reduce fever and relieve body aches. Read and follow all instructions on the label. · Do not take two or more pain medicines at the same time unless the doctor told you to. Many pain medicines have acetaminophen, which is Tylenol. Too much acetaminophen (Tylenol) can be harmful. · Take an over-the-counter cough medicine that contains dextromethorphan to help quiet a dry, hacking cough so that you can sleep. Avoid cough medicines that have more than one active ingredient. Read and follow all instructions on the label. · Breathe moist air from a humidifier, hot shower, or sink filled with hot water. The heat and moisture will thin mucus so you can cough it out. · Do not smoke. Smoking can make bronchitis worse. If you need help quitting, talk to your doctor about stop-smoking programs and medicines. These can increase your chances of quitting for good. When should you call for help? Call 911 anytime you think you may need emergency care. For example, call if: 
  · You have severe trouble breathing.  
 Call your doctor now or seek immediate medical care if: 
  · You have new or worse trouble breathing.  
  · You cough up dark brown or bloody mucus (sputum).  
  · You have a new or higher fever.  
  · You have a new rash.  
 Watch closely for changes in your health, and be sure to contact your doctor if: 
  · You cough more deeply or more often, especially if you notice more mucus or a change in the color of your mucus.  
  · You are not getting better as expected. Where can you learn more? Go to http://vijay-jeff.info/. Enter H333 in the search box to learn more about \"Bronchitis: Care Instructions. \" Current as of: June 9, 2019 Content Version: 12.2 © 9474-8249 [employer]. Care instructions adapted under license by [employer] (which disclaims liability or warranty for this information). If you have questions about a medical condition or this instruction, always ask your healthcare professional. [address] any warranty or liability for your use of this information.

## 2020-03-26 ENCOUNTER — VIRTUAL VISIT (OUTPATIENT)
Dept: SURGERY | Age: 40
End: 2020-03-26

## 2020-03-26 ENCOUNTER — TELEPHONE (OUTPATIENT)
Dept: SURGERY | Age: 40
End: 2020-03-26

## 2020-03-26 VITALS — WEIGHT: 293 LBS | HEIGHT: 66 IN | BODY MASS INDEX: 47.09 KG/M2

## 2020-03-26 DIAGNOSIS — E66.01 MORBID OBESITY (HCC): Primary | ICD-10-CM

## 2020-03-26 DIAGNOSIS — I10 ESSENTIAL HYPERTENSION: ICD-10-CM

## 2020-03-26 NOTE — PROGRESS NOTES
Initial Telemedicine Consultation for Bariatric Surgery Template (Gastric Bypass)    Horacio Manning is a 44 y.o. female who consents today for a telemedicine evaluation for initial consultation for the surgical options for the treatment of morbid obesity. I am located at the [facility] office and the patient is located at her vehicle. The patient initially identified obesity at the age of 25 and at age 25 weighed 210lbs. She has tried a variety of unsupervised weight-loss attempts including self imposed and exercise, but has yet to meet with lasting success. Maximum weight lost on a diet is about 30 lbs, but that the weight loss always seems to return. She eats grits, chips, nabs, pizza, fast food. She drinks sweetened creamer in her coffee, sodas and Gatorade. Today, the patient is  Height: 5' 6\" (167.6 cm) tall, Weight: 143.8 kg (317 lb) lbs for a Body mass index is 51.17 kg/m². It is due to the patient's severe obesity, which is further complicated by hypertension, stress urinary incontinence and weight related arthopathies  that the patient is now seeking out bariatric surgery. Past Medical History:   Diagnosis Date    Abnormal MRI of head 2010    Nonspecific focus of low attenuation at the right internal capsule on CT and MRI    Anxiety     Chronic low back pain 2014    unknown cause- normal xrays    Depression     Hypertension     Prediabetes     Scoliosis        No past surgical history on file. Current Outpatient Medications on File Prior to Visit   Medication Sig Dispense Refill    albuterol (PROVENTIL HFA, VENTOLIN HFA, PROAIR HFA) 90 mcg/actuation inhaler Take 2 Puffs by inhalation every four (4) hours as needed for Wheezing. 1 Inhaler 0    hydroCHLOROthiazide (HYDRODIURIL) 25 mg tablet Take 1 Tab by mouth daily. 30 Tab 0    dextroamphetamine-amphetamine (ADDERALL) 20 mg tablet Take 1 Tab by mouth two (2) times a day.  Max Daily Amount: 40 mg. 60 Tab 0     No current facility-administered medications on file prior to visit. No Known Allergies    Social History     Tobacco Use    Smoking status: Never Smoker    Smokeless tobacco: Never Used   Substance Use Topics    Alcohol use: Yes     Alcohol/week: 1.0 standard drinks     Types: 1 Glasses of wine per week     Comment: socially    Drug use: No       Family History   Problem Relation Age of Onset    Diabetes Other     Hypertension Other     Heart Disease Other     Asthma Other     Arthritis-osteo Other     Diabetes Mother     Hypertension Mother        Family Status   Relation Name Status    Other  (Not Specified)    Mother  Alive    Father         Review of Systems:  Positive in BOLD    CONST: Fever, weight loss, fatigue or chills  GI: Nausea, vomiting, abdominal pain, change in bowel habits, hematochezia, melena, and GERD   INTEG: Dermatitis, abnormal moles  HEENT: Recent changes in vision, vertigo, epistaxis, dysphagia and hoarseness  CV: Chest pain, palpitations, HTN, edema and varicosities  RESP: Cough, shortness of breath, wheezing, hemoptysis, snoring and reactive airway disease  : Hematuria, dysuria, frequency, urgency, nocturia and stress urinary incontinence   MS: Weakness, joint pain and arthritis - feet knees and back  ENDO: Pre-diabetes, thyroid disease, polyuria, polydipsia, polyphagia, poor wound healing, heat intolerance, cold intolerance  LYMPH/HEME: Anemia, bruising and history of blood transfusions  NEURO: Dizziness, headache, fainting, seizures and stroke - says 2008  PSYCH: Anxiety and depression, ADHD      Physical Exam    Visit Vitals  Ht 5' 6\" (1.676 m)   Wt 143.8 kg (317 lb)   BMI 51.17 kg/m²               General: 44 y.o.) female in no acute distress.  Morbidly obese in abdomen, hips, arms - gynecoid pattern  HEENT: Normocephalic, atraumatic, Pupils equal and reactive, nasopharynx clear, oropharynx clear and moist without lesions  CV: Regular rate and rhythm, no murmurs, rubs or gallops. 3+/4 pulses in bilateral dorsalis pedis and posterior tibialis. No distal edema or varicosities. Psych: Alert and oriented to person, place, and time. Impression:    Jen Morrow is a 44 y.o. female who is suffering from morbid obesity with a self-reported BMI of 51  and comorbidities including hypertension, stress urinary incontinence and weight related arthopathies  who would benefit from bariatric surgery. We have had an extensive discussion with regard to the risks, benefits and likely outcomes of the operation. We've discussed the restrictive and malabsorptive nature of the gastric bypass and compared and contrasted with the sleeve gastrectomy. The patient understands the likelihood of losing approximately 80% of their excess weight in 12 to 18 months. She also understands the risks including but not limited to bleeding, infection, need for reoperation, ulcers, leaks and strictures, bowel obstruction secondary to adhesions and internal hernias, DVT, PE, heart attack, stroke, and death. Patient also understands risks of inadequate weight loss, excess weight loss, vitamin insufficiency, protein malnutrition, excess skin, and loss of hair. We have reviewed the components of a successful postoperative course including requirement for a high protein, low carbohydrate diet, 60 oz a day of zero calorie liquids, daily vitamin supplementation, daily exercise, regular follow-up, and participation in support groups. At this time we will enroll the patient in our bariatric program, undertake evaluation by  nutritionist as well as psychologist and pending their satisfactory completion of the preop evaluation, plan to perform a gastric bypass. This was primarily a counseling visit. It started at [phone] and completed at 1.

## 2020-03-31 ENCOUNTER — TELEPHONE (OUTPATIENT)
Dept: SURGERY | Age: 40
End: 2020-03-31

## 2020-03-31 NOTE — TELEPHONE ENCOUNTER
2nd attempt to call patient to talk with her about weight loss program. Her vm is full and unable to leave message.

## 2020-06-22 ENCOUNTER — TELEPHONE (OUTPATIENT)
Dept: FAMILY MEDICINE CLINIC | Age: 40
End: 2020-06-22

## 2020-06-22 NOTE — TELEPHONE ENCOUNTER
Called and spoke with patient she stated she has had increased right arm discomfort intermittently for the past week. She denied any shortness of breath, numbness or tingling. She stated her right side felt \"tight\" and that it did not radiate. Patient was advised if she experienced any chest pain, shortness of breath, numbness, tingling, dizziness, double or distorted vision to immediately go to the ED. Patient verbalized understanding and scheduled appointment for wed 06/25/20.   Robbin Dawson LPN

## 2020-06-22 NOTE — TELEPHONE ENCOUNTER
Pt called, states having pain and discomfort in her chests, numbness and tingling down her arm and rt eye pain, forwarded to  Nurse for triage.  Please adv [phone]

## 2020-06-24 ENCOUNTER — OFFICE VISIT (OUTPATIENT)
Dept: FAMILY MEDICINE CLINIC | Age: 40
End: 2020-06-24

## 2020-06-24 VITALS
RESPIRATION RATE: 16 BRPM | HEART RATE: 95 BPM | BODY MASS INDEX: 47.09 KG/M2 | TEMPERATURE: 98.6 F | DIASTOLIC BLOOD PRESSURE: 86 MMHG | WEIGHT: 293 LBS | HEIGHT: 66 IN | SYSTOLIC BLOOD PRESSURE: 144 MMHG | OXYGEN SATURATION: 99 %

## 2020-06-24 DIAGNOSIS — R07.89 OTHER CHEST PAIN: ICD-10-CM

## 2020-06-24 DIAGNOSIS — M79.89 SWELLING OF HAND, UNSPECIFIED LATERALITY: ICD-10-CM

## 2020-06-24 DIAGNOSIS — I10 ESSENTIAL HYPERTENSION: ICD-10-CM

## 2020-06-24 DIAGNOSIS — R90.89 ABNORMAL BRAIN MRI: ICD-10-CM

## 2020-06-24 DIAGNOSIS — R42 DIZZINESS: Primary | ICD-10-CM

## 2020-06-24 RX ORDER — HYDROCHLOROTHIAZIDE 25 MG/1
25 TABLET ORAL DAILY
Qty: 30 TAB | Refills: 6 | Status: SHIPPED | OUTPATIENT
Start: 2020-06-24 | End: 2021-08-18 | Stop reason: CLARIF

## 2020-06-24 NOTE — PROGRESS NOTES
Chief Complaint   Patient presents with    Arm Pain     right arm, 1 week      1. Have you been to the ER, urgent care clinic since your last visit? Hospitalized since your last visit? No    2. Have you seen or consulted any other health care providers outside of the [address] [place] [address] since your last visit? Include any pap smears or colon screening. No  HPI:  Augie Barrientos is a 36 y.o. female who presents today with   Chief Complaint   Patient presents with    Arm Pain     right arm, 1 week             Pt c/o feeling a tightness over her right eye and forehead with associated dizziness; Symptoms started last week; she thinks her body may have drifted foreward once as observed by someone else when she was talking to them. . She denies any visual changes. She has no Headache. she has not been diagnosed with HTN;  BP is some elevated today. No associated nausea or vomiting; She did not go to work. Rest did help her symptoms. She has not taken any meds for her symptoms. She states that she has had a \" stroke \" before. Per chart review, in 2010  she was admitted for similar sx as she describes today and was noted to have an abnormal MRI; she ruled out for CVA; there was a question of a demyelinating condition. She never had a follow up MRI at 6 months as recommended; she has never seen a neurologist. She reports anxiety and stress. She has difficulty going to work; she requests a work note. She also states that last week she had some chest tightness. She has none now. She has HTN; she is prescribed HCTZ; she takes med prn;  BP better at second check; advised pt to take HCTZ daily; She thinks her hands and feet are swollen; I am unable to appreciate any edema of the hands on exam today.        3 most recent PHQ Screens 3/5/2019   PHQ Not Done -   Little interest or pleasure in doing things Not at all   Feeling down, depressed, irritable, or hopeless Not at all   Total Score PHQ 2 0 PMH,  Meds, Allergies, Family History, Social history reviewed      Current Outpatient Medications   Medication Sig Dispense Refill    hydroCHLOROthiazide (HYDRODIURIL) 25 mg tablet Take 1 Tab by mouth daily. 30 Tab 6        No Known Allergies               Review of Systems   Constitutional: Positive for malaise/fatigue. Eyes: Negative for blurred vision and double vision. Respiratory: Negative for shortness of breath and wheezing. Cardiovascular: Positive for palpitations. Neurological: Positive for dizziness and tingling (in legs). All other systems reviewed and are negative. Visit Vitals  /86 (BP 1 Location: Right arm, BP Patient Position: Sitting)   Pulse 95   Temp 98.6 °F (37 °C) (Oral)   Resp 16   Ht 5' 6\" (1.676 m)   Wt 318 lb (144.2 kg)   LMP 06/15/2020   SpO2 99%   BMI 51.33 kg/m²     Physical Exam  Vitals signs and nursing note reviewed. Constitutional:       Appearance: Normal appearance. She is not ill-appearing or diaphoretic. Cardiovascular:      Rate and Rhythm: Normal rate and regular rhythm. Heart sounds: No murmur. No friction rub. No gallop. Pulmonary:      Effort: No respiratory distress. Breath sounds: Normal breath sounds. No wheezing. Neurological:      General: No focal deficit present. Mental Status: She is alert. Mental status is at baseline. Cranial Nerves: No cranial nerve deficit. Sensory: No sensory deficit. Motor: No weakness. Coordination: Coordination normal.      Deep Tendon Reflexes: Reflexes normal.       No edema of hands or feet;     Lab Results   Component Value Date/Time    Cholesterol, total 136 05/11/2018 02:03 PM    HDL Cholesterol 37 (L) 05/11/2018 02:03 PM    LDL, calculated 58 05/11/2018 02:03 PM    VLDL, calculated 41 (H) 05/11/2018 02:03 PM    Triglyceride 206 (H) 05/11/2018 02:03 PM     EKG:  wnl      Assessment/Plan:  Diagnoses and all orders for this visit:    1.  Dizziness  -     MRI BRAIN W WO CONT; Future    2. Other chest pain  -     AMB POC EKG ROUTINE W/ 12 LEADS, INTER & REP    3. Swelling of hand, unspecified laterality  -     hydroCHLOROthiazide (HYDRODIURIL) 25 mg tablet; Take 1 Tab by mouth daily. 4. Abnormal brain MRI  -     MRI BRAIN W WO CONT; Future    5. Essential hypertension        As above, not controlled   treatment plan as listed below  Orders Placed This Encounter    MRI BRAIN W WO CONT    AMB POC EKG ROUTINE W/ 12 LEADS, INTER & REP    hydroCHLOROthiazide (HYDRODIURIL) 25 mg tablet     Take HCTZ daily; this may help her perceived swelling of hand  See neurology  TO ED FOR CP , SOB, SYNCOPE, PESYNCOPE  Follow-up and Dispositions    · Return in about 4 weeks (around 7/22/2020), or BP/dizziness/MRI. This has been fully explained to the patient, who indicates understanding. An After Visit Summary was printed and given to the patient. As above, not controlled   treatment plan as listed below  Orders Placed This Encounter    MRI BRAIN W WO CONT    AMB POC EKG ROUTINE W/ 12 LEADS, INTER & REP    hydroCHLOROthiazide (HYDRODIURIL) 25 mg tablet     Take HCTZ daily  Recheck MRI   May need neuro consult  TO ED FOR CP, SOB, PRESYNCOPE, SYNCOPE  Work note given  Follow-up and Dispositions    · Return in about 4 weeks (around 7/22/2020), or BP/dizziness/MRI. An After Visit Summary was printed and given to the patient. This has been fully explained to the patient, who indicates understanding.            Hubert Griffin MD

## 2020-06-24 NOTE — PATIENT INSTRUCTIONS
Chest Pain: Care Instructions Your Care Instructions There are many things that can cause chest pain. Some are not serious and will get better on their own in a few days. But some kinds of chest pain need more testing and treatment. Your doctor may have recommended a follow-up visit in the next 8 to 12 hours. If you are not getting better, you may need more tests or treatment. Even though your doctor has released you, you still need to watch for any problems. The doctor carefully checked you, but sometimes problems can develop later. If you have new symptoms or if your symptoms do not get better, get medical care right away. If you have worse or different chest pain or pressure that lasts more than 5 minutes or you passed out (lost consciousness), [id number] or seek other emergency help right away. A medical visit is only one step in your treatment. Even if you feel better, you still need to do what your doctor recommends, such as going to all suggested follow-up appointments and taking medicines exactly as directed. This will help you recover and help prevent future problems. How can you care for yourself at home? · Rest until you feel better. · Take your medicine exactly as prescribed. Call your doctor if you think you are having a problem with your medicine. · Do not drive after taking a prescription pain medicine. When should you call for help? [id number]:  
· You passed out (lost consciousness). · You have severe difficulty breathing. · You have symptoms of a heart attack. These may include: 
? Chest pain or pressure, or a strange feeling in your chest. 
? Sweating. ? Shortness of breath. ? Nausea or vomiting. ? Pain, pressure, or a strange feeling in your back, neck, jaw, or upper belly or in one or both shoulders or arms. ? Lightheadedness or sudden weakness. ? A fast or irregular heartbeat.  
After you call 911, the  may tell you to chew 1 adult-strength or 2 to 4 low-dose aspirin. Wait for an ambulance. Do not try to drive yourself. Call your doctor today if:  
· You have any trouble breathing. · Your chest pain gets worse. · You are dizzy or lightheaded, or you feel like you may faint. · You are not getting better as expected. · You are having new or different chest pain. Where can you learn more? Go to http://vijay-jeff.info/ Enter A120 in the search box to learn more about \"Chest Pain: Care Instructions. \" Current as of: June 26, 2019               Content Version: 12.5 © 6635-2670 [employer]. Care instructions adapted under license by [employer] (which disclaims liability or warranty for this information). If you have questions about a medical condition or this instruction, always ask your healthcare professional. [place] [address] any warranty or liability for your use of this information. Anxiety Disorder: Care Instructions Your Care Instructions Anxiety is a normal reaction to stress. Difficult situations can cause you to have symptoms such as sweaty palms and a nervous feeling. In an anxiety disorder, the symptoms are far more severe. Constant worry, muscle tension, trouble sleeping, nausea and diarrhea, and other symptoms can make normal daily activities difficult or impossible. These symptoms may occur for no reason, and they can affect your work, school, or social life. Medicines, counseling, and self-care can all help. Follow-up care is a key part of your treatment and safety. Be sure to make and go to all appointments, and call your doctor if you are having problems. It's also a good idea to know your test results and keep a list of the medicines you take. How can you care for yourself at home? · Take medicines exactly as directed. Call your doctor if you think you are having a problem with your medicine. · Go to your counseling sessions and follow-up appointments. · Recognize and accept your anxiety. Then, when you are in a situation that makes you anxious, say to yourself, \"This is not an emergency. I feel uncomfortable, but I am not in danger. I can keep going even if I feel anxious. \" · Be kind to your body: 
? Relieve tension with exercise or a massage. ? Get enough rest. 
? Avoid alcohol, caffeine, nicotine, and illegal drugs. They can increase your anxiety level and cause sleep problems. ? Learn and do relaxation techniques. See below for more about these techniques. · Engage your mind. Get out and do something you enjoy. Go to a [place] movie, or take a walk or hike. Plan your day. Having too much or too little to do can make you anxious. · Keep a record of your symptoms. Discuss your fears with a good friend or family member, or join a support group for people with similar problems. Talking to others sometimes relieves stress. · Get involved in social groups, or volunteer to help others. Being alone sometimes makes things seem worse than they are. · Get at least 30 minutes of exercise on most days of the week to relieve stress. Walking is a good choice. You also may want to do other activities, such as running, swimming, cycling, or playing tennis or team sports. Relaxation techniques Do relaxation exercises 10 to 20 minutes a day. You can play soothing, relaxing music while you do them, if you wish. · Tell others in your house that you are going to do your relaxation exercises. Ask them not to disturb you. · Find a comfortable place, away from all distractions and noise. · Lie down on your back, or sit with your back straight. · Focus on your breathing. Make it slow and steady. · Breathe in through your nose. Breathe out through either your nose or mouth. · Breathe deeply, filling up the area between your navel and your rib cage. Breathe so that your belly goes up and down. · Do not hold your breath. · Breathe like this for 5 to 10 minutes. Notice the feeling of calmness throughout your whole body. As you continue to breathe slowly and deeply, relax by doing the following for another 5 to 10 minutes: · Tighten and relax each muscle group in your body. You can begin at your toes and work your way up to your head. · Imagine your muscle groups relaxing and becoming heavy. · Empty your mind of all thoughts. · Let yourself relax more and more deeply. · Become aware of the state of calmness that surrounds you. · When your relaxation time is over, you can bring yourself back to alertness by moving your fingers and toes and then your hands and feet and then stretching and moving your entire body. Sometimes people fall asleep during relaxation, but they usually wake up shortly afterward. · Always give yourself time to return to full alertness before you drive a car or do anything that might cause an accident if you are not fully alert. Never play a relaxation tape while you drive a car. When should you call for help? [id number] anytime you think you may need emergency care. For example, call if: 
· You feel you cannot stop from hurting yourself or someone else. Keep the numbers for these national suicide hotlines: 8-614-278-TALK (7-013-058-453.193.9001) and 1-112-CUSLRLE (8-478-345-547.121.9957). If you or someone you know talks about suicide or feeling hopeless, get help right away. Watch closely for changes in your health, and be sure to contact your doctor if: 
· You have anxiety or fear that affects your life. · You have symptoms of anxiety that are new or different from those you had before. Where can you learn more? Go to http://vijay-jeff.info/ Enter P754 in the search box to learn more about \"Anxiety Disorder: Care Instructions. \" Current as of: January 31, 2020               Content Version: 12.5 © 0687-0499 [employer]. Care instructions adapted under license by [employer] (which disclaims liability or warranty for this information). If you have questions about a medical condition or this instruction, always ask your healthcare professional. [address] any warranty or liability for your use of this information.

## 2020-07-14 ENCOUNTER — TELEPHONE (OUTPATIENT)
Dept: FAMILY MEDICINE CLINIC | Age: 40
End: 2020-07-14

## 2020-07-14 ENCOUNTER — HOSPITAL ENCOUNTER (OUTPATIENT)
Dept: BARIATRICS/WEIGHT MGMT | Age: 40
Discharge: HOME | End: 2020-07-14

## 2020-07-14 ENCOUNTER — DOCUMENTATION ONLY (OUTPATIENT)
Dept: BARIATRICS/WEIGHT MGMT | Age: 40
End: 2020-07-14

## 2020-07-14 DIAGNOSIS — F41.9 ANXIETY: Primary | ICD-10-CM

## 2020-07-14 NOTE — TELEPHONE ENCOUNTER
----- Message from Jeri Naranjo sent at 7/13/2020  1:36 PM EDT -----  Regarding: medication to calm nerves  Pt would like medication to calm her nerves before MRI.  Please contact pt  [phone]

## 2020-07-14 NOTE — PROGRESS NOTES
[facility] [address] [address]    Patient's Name: Fletcher Spears   Age: 36 y.o. YOB: 1980   Sex: female    Date:   7/14/2020    Insurance:              Session: 1 of 6  Revision:     Surgeon:  Dr. Marga Gitelman    Height: 5 f 6   Weight:    317      Lbs. BMI: 51.3   Pounds Lost since last month: 0                 Pounds Gained since last month: 0    Starting Weight: 317     Previous Months Weight: 317  Overall Pounds Lost: 0   Overall Pounds Gained: 0      Do you smoke? None    Alcohol intake:  Number of drinks at a time:  Socially  Number of times a week:     Class Guidelines    Guidelines are reviewed with patient at the start of every class. 1. Patient understands that weight loss trial classes must be consecutive. Patient understands if they miss a class, it is their responsibility to contact me to reschedule class. I will reach out to patient after their first no show. 2.  Patient understands the expectations that weight maintenance/weight loss is expected during the classes. Failure to demonstrate changes may result in one extra month of weight loss trial, followed by going back to see the surgeon. Patient understands that they CAN NOT gain any weight during the weight loss trial.  Gaining weight will result in extra classes. 3. Patient is also instructed to be doing their labs, blood work, psych visit, support group and any other test that the surgeon has used while they are working on their weight loss trial.  4.  Patient was instructed to bring their blue binder to every class and appointment. Other Pertinent Information:     Changes Made Since Last Class: None since seeing surgeon. Eating Habits and Behaviors    Today in class, we started talking about the key diet principles. We first focused on stopping liquid calories. Patient was also educated on carbohydrates.   Patient was instructed to start cutting out bread, rice, and pasta from the diet and start focusing more on meat and vegetables. I then gave a power point, which focused on Label Reading. In class, I gave patients a labels and we worked through a series of questions to help patients have a better understanding of label reading. Patient was instructed to review the serving size. Patient was encouraged to focus on protein and carbohydrates. We also did a few label reading activities to help the patient become more familiar with label reading. Patient's current diet habits include: Patient is eating 3 meals per day. Patient states she will normally have grits for breakfast.  Lunch:  She states she may grab something. She states she is trying to eat a salad here and there. She states she may have Popeyes chicken. Dinner: She may grab something as well. She states she does eat a lot of carbohydrates. She states she is drinking Vitamin Water with sugar, but is trying to wean to the no sugar one. She states she is drinking more water. She states she is drinking more water, but feels excess thirst because of the diabetes. Physical Activity/Exercise    Comments: We talked about exercise. Patient was given reasons of why exercise is so important and how that can help with their long-term success. I have encouraged patient to get a support system to help with the activity. Currently for activity, patient is doing very little activity. She states she had a job injury in 2014, which activity makes it worse. Patient feels she could get water aerobics in. Behavior Modification       Comments:  Behavior modifications were reinforced. This included not eating in front of the TV, which could lead to bigger portions and eating when one is not hungry. We also talked about the importance of eating 3 meals per day.   Patient was encouraged to food journal to keep their daily carbohydrates less than 30 grams per meal.      Goals that patient wants to work on includes:  1. Work on increasing activity.   [address] [facility], [address]  7/14/2020

## 2020-07-22 RX ORDER — LORAZEPAM 0.5 MG/1
0.5 TABLET ORAL ONCE
Qty: 1 TAB | Refills: 0 | Status: SHIPPED | OUTPATIENT
Start: 2020-07-22 | End: 2020-07-22

## 2020-08-05 ENCOUNTER — VIRTUAL VISIT (OUTPATIENT)
Dept: FAMILY MEDICINE CLINIC | Age: 40
End: 2020-08-05

## 2020-08-13 ENCOUNTER — VIRTUAL VISIT (OUTPATIENT)
Dept: FAMILY MEDICINE CLINIC | Age: 40
End: 2020-08-13

## 2020-08-13 DIAGNOSIS — I10 ESSENTIAL HYPERTENSION: ICD-10-CM

## 2020-08-13 DIAGNOSIS — E55.9 VITAMIN D DEFICIENCY: ICD-10-CM

## 2020-08-13 DIAGNOSIS — R73.03 PREDIABETES: ICD-10-CM

## 2020-08-13 DIAGNOSIS — R53.83 OTHER FATIGUE: Primary | ICD-10-CM

## 2020-08-13 NOTE — PROGRESS NOTES
Lanette Reyes is a 36 y.o. female who was seen by synchronous (real-time) audio-video technology on 8/13/2020 for Fatigue        Assessment & Plan:   Diagnoses and all orders for this visit:    1. Prediabetes  -     HEMOGLOBIN A1C WITH EAG; Future    2. Vitamin D deficiency  -     VITAMIN D, 25 HYDROXY; Future    3. Essential hypertension  -     METABOLIC PANEL, BASIC; Future  -     LIPID PANEL; Future      As above, fatigue is of uncertain etiology; will check blood work to rule out physiologic source. Could be due to anxiety/stress; monitor  Follow-up and Dispositions    · Return in about 3 months (around 11/13/2020) for fatigue. This has been fully explained to the patient, who indicates understanding. 712  Subjective:   Pt got a episode of  HA, nervousness, and shakiness last night  and got really tired last night. Her head was pounding. She reports a h/o prediabetes and has not had a lab work in sometime. She reports stressors unsure if she was anxious. Referred to [facility] for dietary concerns  Today she feels tired. She has no energy     Pt has prediabetes, vitamin D deficiency and HTN;  She has not had blood work in [place]. Lab Results   Component Value Date/Time    Sodium 140 05/11/2018 02:03 PM    Potassium 4.0 05/11/2018 02:03 PM    Chloride 99 05/11/2018 02:03 PM    CO2 27 05/11/2018 02:03 PM    Anion gap 14.0 05/11/2018 02:03 PM    Glucose 83 05/11/2018 02:03 PM    BUN 10 05/11/2018 02:03 PM    Creatinine 0.6 05/11/2018 02:03 PM    BUN/Creatinine ratio 16 08/11/2015 12:00 AM    GFR est  08/11/2015 12:00 AM    GFR est non- 08/11/2015 12:00 AM    Calcium 8.9 05/11/2018 02:03 PM         Prior to Admission medications    Medication Sig Start Date End Date Taking? Authorizing Provider   hydroCHLOROthiazide (HYDRODIURIL) 25 mg tablet Take 1 Tab by mouth daily.  6/24/20   Lexie Villarreal MD     Patient Active Problem List    Diagnosis Date Noted    Hypertension     Scoliosis  Morbid obesity ([facility] 03/26/2020    Essential hypertension 03/26/2020    Anxiety 05/11/2018    Depression 05/11/2018    BMI 50.0-59.9, adult ([facility] 05/11/2018    Scoliosis of thoracic spine 06/29/2017    Back pain     Prediabetes 08/19/2015     No Known Allergies  Past Medical History:   Diagnosis Date    Abnormal MRI of head 2010    Nonspecific focus of low attenuation at the right internal capsule on CT and MRI    Anxiety     Chronic low back pain 2014    unknown cause- normal xrays    Depression     Hypertension     Prediabetes     Scoliosis     Stroke ([facility]      No past surgical history on file. Family History   Problem Relation Age of Onset    Diabetes Other     Hypertension Other     Heart Disease Other     Asthma Other     Arthritis-osteo Other     Diabetes Mother     Hypertension Mother      Social History     Tobacco Use    Smoking status: Never Smoker    Smokeless tobacco: Never Used   Substance Use Topics    Alcohol use: Yes     Alcohol/week: 1.0 standard drinks     Types: 1 Glasses of wine per week     Comment: socially       Review of Systems   Constitutional: Negative for chills and fever. Respiratory: Negative for shortness of breath and wheezing. Cardiovascular: Negative for chest pain and palpitations. Objective:   No flowsheet data found. General: alert, cooperative, no distress   Mental  status: normal mood, behavior, speech, dress, motor activity, and thought processes, able to follow commands   HENT: NCAT   Neck: no visualized mass   Resp: no respiratory distress   Neuro: no gross deficits   Skin: no discoloration or lesions of concern on visible areas   Psychiatric: normal affect, consistent with stated mood, no evidence of hallucinations     Additional exam findings: none      We discussed the expected course, resolution and complications of the diagnosis(es) in detail.   Medication risks, benefits, costs, interactions, and alternatives were discussed as indicated. I advised her to contact the office if her condition worsens, changes or fails to improve as anticipated. She expressed understanding with the diagnosis(es) and plan. Lambert Norton, who was evaluated through a patient-initiated, synchronous (real-time) audio-video encounter, and/or her healthcare decision maker, is aware that it is a billable service, with coverage as determined by her insurance carrier. She provided verbal consent to proceed: Yes, and patient identification was verified. It was conducted pursuant to the emergency declaration under the [facility], [address] [place] [address] authority and the [email or web address] and [employer] Act. A caregiver was present when appropriate. Ability to conduct physical exam was limited. I was in the office. The patient was at home.       Cristina Her MD

## 2020-08-13 NOTE — PATIENT INSTRUCTIONS
DASH Diet: Care Instructions Your Care Instructions The DASH diet is an eating plan that can help lower your blood pressure. DASH stands for Dietary Approaches to Stop Hypertension. Hypertension is high blood pressure. The DASH diet focuses on eating foods that are high in calcium, potassium, and magnesium. These nutrients can lower blood pressure. The foods that are highest in these nutrients are fruits, vegetables, low-fat dairy products, nuts, seeds, and legumes. But taking calcium, potassium, and magnesium supplements instead of eating foods that are high in those nutrients does not have the same effect. The DASH diet also includes whole grains, fish, and poultry. The DASH diet is one of several lifestyle changes your doctor may recommend to lower your high blood pressure. Your doctor may also want you to decrease the amount of sodium in your diet. Lowering sodium while following the DASH diet can lower blood pressure even further than just the DASH diet alone. Follow-up care is a key part of your treatment and safety. Be sure to make and go to all appointments, and call your doctor if you are having problems. It's also a good idea to know your test results and keep a list of the medicines you take. How can you care for yourself at home? Following the DASH diet · Eat 4 to 5 servings of fruit each day. A serving is 1 medium-sized piece of fruit, ½ cup chopped or canned fruit, 1/4 cup dried fruit, or 4 ounces (½ cup) of fruit juice. Choose fruit more often than fruit juice. · Eat 4 to 5 servings of vegetables each day. A serving is 1 cup of lettuce or raw leafy vegetables, ½ cup of chopped or cooked vegetables, or 4 ounces (½ cup) of vegetable juice. Choose vegetables more often than vegetable juice. · Get 2 to 3 servings of low-fat and fat-free dairy each day. A serving is 8 ounces of milk, 1 cup of yogurt, or 1 ½ ounces of cheese. · Eat 6 to 8 servings of grains each day. A serving is 1 slice of bread, 1 ounce of dry cereal, or ½ cup of cooked rice, pasta, or cooked cereal. Try to choose whole-grain products as much as possible. · Limit lean meat, poultry, and fish to 2 servings each day. A serving is 3 ounces, about the size of a deck of cards. · Eat 4 to 5 servings of nuts, seeds, and legumes (cooked dried beans, lentils, and split peas) each week. A serving is 1/3 cup of nuts, 2 tablespoons of seeds, or ½ cup of cooked beans or peas. · Limit fats and oils to 2 to 3 servings each day. A serving is 1 teaspoon of vegetable oil or 2 tablespoons of salad dressing. · Limit sweets and added sugars to 5 servings or less a week. A serving is 1 tablespoon jelly or jam, ½ cup sorbet, or 1 cup of lemonade. · Eat less than 2,300 milligrams (mg) of sodium a day. If you limit your sodium to 1,500 mg a day, you can lower your blood pressure even more. Tips for success · Start small. Do not try to make dramatic changes to your diet all at once. You might feel that you are missing out on your favorite foods and then be more likely to not follow the plan. Make small changes, and stick with them. Once those changes become habit, add a few more changes. · Try some of the following: ? Make it a goal to eat a fruit or vegetable at every meal and at snacks. This will make it easy to get the recommended amount of fruits and vegetables each day. ? Try yogurt topped with fruit and nuts for a snack or healthy dessert. ? Add lettuce, tomato, cucumber, and onion to sandwiches. ? Combine a ready-made pizza crust with low-fat mozzarella cheese and lots of vegetable toppings. Try using tomatoes, squash, spinach, broccoli, carrots, cauliflower, and onions. ? Have a variety of cut-up vegetables with a low-fat dip as an appetizer instead of chips and dip. ? Sprinkle sunflower seeds or chopped almonds over salads.  Or try adding chopped walnuts or almonds to cooked vegetables. ? Try some vegetarian meals using beans and peas. Add garbanzo or kidney beans to salads. Make burritos and tacos with mashed burnett beans or black beans. Where can you learn more? Go to http://vijay-jeff.info/ Enter N611 in the search box to learn more about \"DASH Diet: Care Instructions. \" Current as of: December 16, 2019               Content Version: 12.5 © 7622-4531 [employer]. Care instructions adapted under license by [employer] (which disclaims liability or warranty for this information). If you have questions about a medical condition or this instruction, always ask your healthcare professional. [address] any warranty or liability for your use of this information.

## 2020-08-18 ENCOUNTER — HOSPITAL ENCOUNTER (OUTPATIENT)
Dept: BARIATRICS/WEIGHT MGMT | Age: 40
Discharge: HOME | End: 2020-08-18

## 2020-08-18 ENCOUNTER — DOCUMENTATION ONLY (OUTPATIENT)
Dept: BARIATRICS/WEIGHT MGMT | Age: 40
End: 2020-08-18

## 2020-08-18 NOTE — PROGRESS NOTES
[address] [facility] [address]    Patient's Name: Fletcher Spears   Age: 36 y.o. YOB: 1980   Sex: female    Date:   8/11/2020    Insurance:            Session: 2 of 6  Revision:   Surgeon:  Dr. Marga Gitelman    Height: 5 f 6 Weight:    317      Lbs. BMI:    Pounds Lost since last month: 0               Pounds Gained since last month: 0      Starting Weight: 317   Previous Months Weight: 317  Overall Pounds Lost: 0 Overall Pounds Gained: 0      Do you smoke? None    Alcohol intake:  Number of drinks at a time:  Socially 1-2 beers  Number of times a week:     Class Guidelines    Guidelines are reviewed with patient at the start of every class. 1. Patient understands that weight loss trial classes must be consecutive. Patient understands if they miss a class, it is their responsibility to contact me to reschedule class. I will reach out to patient after their first no show. 2.  Patient understands the expectations that weight maintenance/weight loss is expected during the classes. Failure to demonstrate changes may result in one extra month of weight loss trial, followed by going back to see the surgeon. Patient understands that they CAN NOT gain any weight during the weight loss trial.  Gaining weight will result in extra classes. 3. Patient is also instructed to be doing their labs, blood work, psych visit, support group and any other test that the surgeon has used while they are working on their weight loss trial.  4.  Patient was instructed to bring their blue binder to every class and appointment. Other Pertinent Information:     Changes Made Since Last Class: Less sugar. Eating Habits and Behaviors    Today in class, we reviewed the key diet principles. I have talked to patient about pushing the fluid and working towards 64 ounces per day. We focused on following a low-calorie diet.   Patient was instructed to count their carbohydrates and try to keep their daily intake under 75 grams per day and try to keep their daily protein at 80 grams per day. Patient was given examples of carbohydrates in starches. Patient was encouraged to focus on meat and vegetables and begin cutting carbohydrates out. We talked about foods that are protein-based and how to incorporate those into their meals. I also reviewed with patient the importance of eating 3 meals per day and suggestions were made for breakfast items. Patient's current diet habits include: Patient states she is eating far less sugar. She states when she eats sugar, she can't control her urine. She states when she eats something sweet, it is too sweet. She states that her carbohydrate intake is okay. She states she is slowly trying to improve that. She is eating cereal, which I have talked to her about not eating any cereal.  We talked about some other alternatives. She states when eats carbohydrates, she gets very sluggish. She states she has never been a big fluid drinker. She states it is not drinking soda because it doesn't make her feel good. Physical Activity/Exercise    Comments: We talked about exercise. Patient was given reasons of why exercise is so important and how that can help with their long-term success. I have encouraged patient to get a support system to help with the activity. Currently for activity, patient is doing very little. Behavior Modification       Comments:  During today's lesson, I gave a presentation called The 100-200 Calorie \"Mindless Margin. \"  The goal is to make modest daily 100-200 calorie reductions in certain things that the body won't notice. One, 100-200 calorie change and would will look 10-20 pounds in one year. An example could be cutting soda. Patient was given a check off list and was encouraged to come up with 1-3 100 calories changes they could make.   The check off list is a daily tracker to see if these goals are being met. Goals that patient wants to work on includes:  1. Cut back on her carbohydrates even more.   [address], [place] [address]  8/11/2020

## 2020-08-25 ENCOUNTER — APPOINTMENT (OUTPATIENT)
Dept: GENERAL RADIOLOGY | Age: 40
End: 2020-08-25
Attending: EMERGENCY MEDICINE
Payer: COMMERCIAL

## 2020-08-25 ENCOUNTER — HOSPITAL ENCOUNTER (EMERGENCY)
Age: 40
Discharge: HOME | End: 2020-08-25
Attending: EMERGENCY MEDICINE | Admitting: EMERGENCY MEDICINE
Payer: COMMERCIAL

## 2020-08-25 VITALS
DIASTOLIC BLOOD PRESSURE: 90 MMHG | TEMPERATURE: 98.9 F | SYSTOLIC BLOOD PRESSURE: 138 MMHG | WEIGHT: 293 LBS | HEART RATE: 94 BPM | HEIGHT: 65 IN | BODY MASS INDEX: 48.82 KG/M2 | RESPIRATION RATE: 14 BRPM | OXYGEN SATURATION: 100 %

## 2020-08-25 DIAGNOSIS — S39.012A BACK STRAIN, INITIAL ENCOUNTER: ICD-10-CM

## 2020-08-25 DIAGNOSIS — M41.9 SCOLIOSIS, UNSPECIFIED SCOLIOSIS TYPE, UNSPECIFIED SPINAL REGION: Primary | ICD-10-CM

## 2020-08-25 PROCEDURE — 74011250637 HC RX REV CODE- 250/637: Performed by: EMERGENCY MEDICINE

## 2020-08-25 PROCEDURE — 99283 EMERGENCY DEPT VISIT LOW MDM: CPT

## 2020-08-25 PROCEDURE — 72110 X-RAY EXAM L-2 SPINE 4/>VWS: CPT

## 2020-08-25 RX ORDER — HYDROCODONE BITARTRATE AND ACETAMINOPHEN 5; 325 MG/1; MG/1
1 TABLET ORAL
Status: COMPLETED | OUTPATIENT
Start: 2020-08-25 | End: 2020-08-25

## 2020-08-25 RX ORDER — CYCLOBENZAPRINE HCL 5 MG
10 TABLET ORAL
Qty: 9 TAB | Refills: 0 | Status: SHIPPED | OUTPATIENT
Start: 2020-08-25 | End: 2021-09-08

## 2020-08-25 RX ORDER — NAPROXEN 500 MG/1
500 TABLET ORAL 2 TIMES DAILY WITH MEALS
Qty: 20 TAB | Refills: 0 | Status: SHIPPED | OUTPATIENT
Start: 2020-08-25 | End: 2020-09-04

## 2020-08-25 RX ADMIN — HYDROCODONE BITARTRATE AND ACETAMINOPHEN 1 TABLET: 5; 325 TABLET ORAL at 17:41

## 2020-08-25 NOTE — DISCHARGE INSTRUCTIONS
Patient Education        Back Strain: Care Instructions  Overview     A back strain happens when you overstretch, or pull, a muscle in your back. You may hurt your back in an accident or when you exercise or lift something. Sometimes you may not know how you hurt your back. Most back pain will get better with rest and time. You can take care of yourself at home to help your back heal.  Follow-up care is a key part of your treatment and safety. Be sure to make and go to all appointments, and call your doctor if you are having problems. It's also a good idea to know your test results and keep a list of the medicines you take. How can you care for yourself at home? · Try to stay as active as you can, but stop or reduce any activity that causes pain. · Put ice or a cold pack on the sore muscle for 10 to 20 minutes at a time to stop swelling. Try this every 1 to 2 hours for 3 days (when you are awake) or until the swelling goes down. Put a thin cloth between the ice pack and your skin. · After 2 or 3 days, apply a heating pad on low or a warm cloth to your back. Some doctors suggest that you go back and forth between hot and cold treatments. · Take pain medicines exactly as directed. ? If the doctor gave you a prescription medicine for pain, take it as prescribed. ? If you are not taking a prescription pain medicine, ask your doctor if you can take an over-the-counter medicine. · Try sleeping on your side with a pillow between your legs. Or put a pillow under your knees when you lie on your back. These measures can ease pain in your lower back. · Return to your usual level of activity slowly. When should you call for help? [id number] anytime you think you may need emergency care. For example, call if:  · You are unable to move a leg at all. Call your doctor now or seek immediate medical care if:  · You have new or worse symptoms in your legs, belly, or buttocks.  Symptoms may include:  ? Numbness or tingling. ? Weakness. ? Pain. · You lose bladder or bowel control. Watch closely for changes in your health, and be sure to contact your doctor if:  · You have a fever, lose weight, or don't feel well. · You are not getting better as expected. Where can you learn more? Go to http://vijay-jeff.info/  Enter P090 in the search box to learn more about \"Back Strain: Care Instructions. \"  Current as of: March 2, 2020               Content Version: 12.5  © 8941-5540 [employer]. Care instructions adapted under license by [employer] (which disclaims liability or warranty for this information). If you have questions about a medical condition or this instruction, always ask your healthcare professional. [address] any warranty or liability for your use of this information. Patient Education        Strain or Sprain: Care Instructions  Your Care Instructions     A strain happens when you overstretch, or pull, a muscle. A sprain occurs when you stretch or tear a ligament, the tough tissue that connects one bone to another. These problems can happen when you exercise or lift something or when you are in an accident. Rest and other home care can help strains and sprains heal.  The doctor has checked you carefully, but problems can develop later. If you notice any problems or new symptoms,  get medical treatment right away. Follow-up care is a key part of your treatment and safety. Be sure to make and go to all appointments, and call your doctor if you are having problems. It's also a good idea to know your test results and keep a list of the medicines you take. How can you care for yourself at home? · If your doctor gave you a sling, splint, brace, or immobilizer, use it exactly as directed. · Rest the strained or sprained area, and follow your doctor's advice about when you can be active again.   · Put ice or a cold pack on the sore area for 10 to 20 minutes at a time to stop swelling. Try this every 1 to 2 hours for 3 days (when you are awake) or until the swelling goes down. Put a thin cloth between the ice pack and your skin. Keep your splint or brace dry. · Prop up a sore arm or leg on a pillow when you ice it or anytime you sit or lie down. Try to keep it higher than the level of your heart. This will help reduce swelling. · Take pain medicines exactly as directed. ? If the doctor gave you a prescription medicine for pain, take it as prescribed. ? If you are not taking a prescription pain medicine, ask your doctor if you can take an over-the-counter medicine. · Do exercises as directed by your doctor or physical therapist.  · Return to your usual level of activity slowly. · Do not do anything that makes the pain worse. When should you call for help? Call your doctor now or seek immediate medical care if:  · You have severe or increasing pain. · You have tingling, weakness, or numbness in the area. · The area turns cold or changes color. · Your cast or splint feels too tight. · You have symptoms of a blood clot, such as:  ? Pain in your calf, back of the knee, thigh, or groin. ? Redness and swelling in your leg or groin. · You cannot move the strained part of your body. Watch closely for changes in your health, and be sure to contact your doctor if:  · You do not get better as expected. Where can you learn more? Go to http://vjiay-jeff.info/  Enter H024 in the search box to learn more about \"Strain or Sprain: Care Instructions. \"  Current as of: March 2, 2020               Content Version: 12.5  © 5101-0461 [employer]. Care instructions adapted under license by [employer] (which disclaims liability or warranty for this information).  If you have questions about a medical condition or this instruction, always ask your healthcare professional. [employer] Leiva any warranty or liability for your use of this information.

## 2020-08-25 NOTE — ROUTINE PROCESS
Arnetha Schirmer is a 36 y.o. female that was discharged in stable. Pt was accompanied by friend. Pt is not driving. The patients diagnosis, condition and treatment were explained to  patient and aftercare instructions were given. The patient verbalized understanding. Patient armband removed and shredded.

## 2020-08-25 NOTE — LETTER
[facility] EMERGENCY DEPT 
[address] [facility] [id number] [phone] Work/School Note Date: 8/25/2020 To Whom It May concern: 
 
Cristhian Shrestha was seen and treated today in the emergency room by the following provider(s): 
No providers found. Cristhian Shrestha may return to work on 8/26/2020. Sincerely, Kim Fowler RN

## 2020-08-25 NOTE — ED PROVIDER NOTES
EMERGENCY DEPARTMENT HISTORY AND PHYSICAL EXAM    5:30 PM      Date: 8/25/2020  Patient Name: Lambert Norton    History of Presenting Illness     Chief Complaint   Patient presents with    Back Pain         History Provided By: Patient    Additional History (Context): Lambert Norton is a 36 y.o. female with hypertension and stroke who presents with left back pain. Patient states her back \"gave out\". She is been having back pain since last night. She denies any trauma, fall or injury. Patient denies fever, radiculopathy, saddle anesthesia or incontinence. Patient denies chest pain, cough, nausea, vomiting or diarrhea. Patient denies smoking or recreational drug use. PCP: Stefany Ellis MD        Current Outpatient Medications   Medication Sig Dispense Refill    cyclobenzaprine (FLEXERIL) 5 mg tablet Take 2 Tabs by mouth three (3) times daily (with meals). 9 Tab 0    naproxen (Naprosyn) 500 mg tablet Take 1 Tab by mouth two (2) times daily (with meals) for 10 days. 20 Tab 0    hydroCHLOROthiazide (HYDRODIURIL) 25 mg tablet Take 1 Tab by mouth daily. 30 Tab 6       Past History     Past Medical History:  Past Medical History:   Diagnosis Date    Abnormal MRI of head 2010    Nonspecific focus of low attenuation at the right internal capsule on CT and MRI    Anxiety     Chronic low back pain 2014    unknown cause- normal xrays    Depression     Hypertension     Prediabetes     Scoliosis     [facility]roke [facility])        Past Surgical History:  History reviewed. No pertinent surgical history. Family History:  Family History   Problem Relation Age of Onset    Diabetes Other     Hypertension Other     Heart Disease Other     Asthma Other     Arthritis-osteo Other     Diabetes Mother     Hypertension Mother        Social History:  Social History     Tobacco Use    Smoking status: Never Smoker    Smokeless tobacco: Never Used   Substance Use Topics    Alcohol use:  Yes     Alcohol/week: 1.0 standard drinks     Types: 1 Glasses of wine per week     Comment: socially    Drug use: No       Allergies:  No Known Allergies      Review of Systems       Review of Systems   Constitutional: Negative. Negative for chills, diaphoresis and fever. HENT: Negative. Negative for congestion, rhinorrhea and sore throat. Eyes: Negative. Negative for pain, discharge and redness. Respiratory: Negative. Negative for cough, chest tightness, shortness of breath and wheezing. Cardiovascular: Negative. Negative for chest pain. Gastrointestinal: Negative. Negative for abdominal pain, constipation, diarrhea, nausea and vomiting. Genitourinary: Negative. Negative for dysuria, flank pain, frequency, hematuria and urgency. Musculoskeletal: Negative. Negative for back pain and neck pain. Skin: Negative. Negative for rash. Neurological: Negative. Negative for syncope, weakness, numbness and headaches. Psychiatric/Behavioral: Negative. All other systems reviewed and are negative. Physical Exam     Visit Vitals  /90 (BP 1 Location: Left arm, BP Patient Position: At rest)   Pulse 94   Temp 98.9 °F (37.2 °C)   Resp 14   Ht 5' 5\" (1.651 m)   Wt 144.2 kg (318 lb)   SpO2 100%   BMI 52.92 kg/m²         Physical Exam  Vitals signs and nursing note reviewed. Constitutional:       General: She is not in acute distress. Appearance: Normal appearance. She is well-developed. She is not ill-appearing, toxic-appearing or diaphoretic. HENT:      Head: Normocephalic and atraumatic. Mouth/Throat:      Pharynx: No oropharyngeal exudate. Eyes:      General: No scleral icterus. Conjunctiva/sclera: Conjunctivae normal.      Pupils: Pupils are equal, round, and reactive to light. Neck:      Musculoskeletal: Normal range of motion and neck supple. Thyroid: No thyromegaly. Vascular: No hepatojugular reflux or JVD. Trachea: No tracheal deviation.    Cardiovascular:      Rate and Rhythm: Normal rate and regular rhythm. Pulses: Normal pulses. Radial pulses are 2+ on the right side and 2+ on the left side. Dorsalis pedis pulses are 2+ on the right side and 2+ on the left side. Heart sounds: Normal heart sounds, S1 normal and S2 normal. No murmur. No gallop. No S3 or S4 sounds. Pulmonary:      Effort: Pulmonary effort is normal. No respiratory distress. Breath sounds: Normal breath sounds. No decreased breath sounds, wheezing, rhonchi or rales. Abdominal:      General: Bowel sounds are normal. There is no distension. Palpations: Abdomen is soft. Abdomen is not rigid. There is no mass. Tenderness: There is no abdominal tenderness. There is no guarding or rebound. Negative signs include Gil's sign and McBurney's sign. Musculoskeletal: Normal range of motion. Lymphadenopathy:      Head:      Right side of head: No submental, submandibular, preauricular or occipital adenopathy. Left side of head: No submental, submandibular, preauricular or occipital adenopathy. Cervical: No cervical adenopathy. Upper Body:      Right upper body: No supraclavicular adenopathy. Left upper body: No supraclavicular adenopathy. Skin:     General: Skin is warm and dry. Findings: No rash. Neurological:      Mental Status: She is alert. She is not disoriented. GCS: GCS eye subscore is 4. GCS verbal subscore is 5. GCS motor subscore is 6. Cranial Nerves: No cranial nerve deficit. Sensory: No sensory deficit. Coordination: Coordination normal.      Gait: Gait normal.      Deep Tendon Reflexes: Reflexes are normal and symmetric. Psychiatric:         Speech: Speech normal.         Behavior: Behavior normal.         Thought Content:  Thought content normal.         Judgment: Judgment normal.           Diagnostic Study Results     Labs -  No results found for this or any previous visit (from the past 12 hour(s)). Radiologic Studies -   XR SPINE LUMB MIN 4 V    (Results Pending)         Medical Decision Making   Provider Notes (Medical Decision Making):  MDM  Number of Diagnoses or Management Options  Diagnosis management comments: Back strain vs fracture   Neoplasm      I am the first provider for this patient. I reviewed the vital signs, available nursing notes, past medical history, past surgical history, family history and social history. Vital Signs-Reviewed the patient's vital signs. Records Reviewed: Nursing Notes (Time of Review: 5:30 PM)    ED Course: Progress Notes, Reevaluation, and Consults:    X-Ray lumbar spine showed mild scoliosis and degenerative disc disease. Moderate stool burden. 6:40 PM 8/25/2020        Diagnosis       I have reassessed the patient. Patient is feeling better. Patient will be prescribed Naproxen and Flexeril. Patient was discharged in stable condition. Patient is to return to emergency department if any new or worsening condition. Clinical Impression:   1. Scoliosis, unspecified scoliosis type, unspecified spinal region    2. Back strain, initial encounter        Disposition: Discharged home     Follow-up Information     Follow up With Specialties Details Why Contact Info    Annamaria Jeffery MD Family Medicine In 2 days  [address] [place] [address]  [id number]  [phone]               [email or web address]        Provider Attestation:     I personally performed the services described in the documentation, reviewed the documentation and it accurately and completely records my words and actions utilizing the [address] [place] August 25, 2020 at 6:51 PM - [place], [address]. It is dictated using utilizing voice recognition software. Unfortunately this leads to occasional typographical errors. I apologize in advance if the situation occurs. If questions arise please do not hesitate to contact me or call our department.

## 2020-09-08 ENCOUNTER — DOCUMENTATION ONLY (OUTPATIENT)
Dept: BARIATRICS/WEIGHT MGMT | Age: 40
End: 2020-09-08

## 2020-09-08 ENCOUNTER — HOSPITAL ENCOUNTER (OUTPATIENT)
Dept: BARIATRICS/WEIGHT MGMT | Age: 40
Discharge: HOME | End: 2020-09-08

## 2020-09-08 NOTE — PROGRESS NOTES
[address] [place] [facility] [address] [address]    Patient's Name: Rhoda Ragland   Age: 36 y.o. YOB: 1980   Sex: female    Date:   9/8/2020    Insurance:            Session: 3 of  6  Revision:   Surgeon:  Dr. Armando Walls    Height: 5 f 6 Weight:    320      Lbs. BMI:    Pounds Lost since last month: 0               Pounds Gained since last month: 3    Starting Weight: 317   Previous Months Weight: 317  Overall Pounds Lost: 0 Overall Pounds Gained: 3      Do you smoke? None    Alcohol intake:  Number of drinks at a time:  1  Number of times a week: 1    Class Guidelines    Guidelines are reviewed with patient at the start of every class. 1. Patient understands that weight loss trial classes must be consecutive. Patient understands if they miss a class, it is their responsibility to contact me to reschedule class. I will reach out to patient after their first no show. 2.  Patient understands the expectations that weight maintenance/weight loss is expected during the classes. Failure to demonstrate changes may result in one extra month of weight loss trial, followed by going back to see the surgeon. 3. Patient is also instructed to be doing their labs, blood work, psych visit, support group and any other test that the surgeon has used while they are working on their weight loss trial.    Other Pertinent Information:     Changes Made Since Last Class: Stay away from sweet drinks. Eating Habits and Behaviors      Today we reviewed key diet principles. We talked about ways that patient can follow a low calorie diet. These included: Stopping all liquid calories and patient was given a list of fluid choices that would be appropriate. We talked about carbohydrates.   Patient was educated that all carbohydrates turn to sugar and was encouraged to stick to the complex carbohydrates while in weight loss trials, but aim to keep less than 100 grams during weight loss trials. Patient was given a list of foods to not eat and drink due to high sugar, high fat, or high carbohydrate content. Patient was also given a list of foods and drinks that are high protein, low carbohydrate, and would be appropriate to eat. Patient was given a list of fruit and what a portion size is and how many carbohydrates it contains. Patient was encouraged to keep fruit intake to a minimum. Patient was also given a list of 50 low carbohydrate snack options, but was also cautioned that some of the choices still were high in calories and portion control should be practiced. Patient's current diet habits include:  Patient is eating 3 meals per day. She states when she eats, she feels very sleepy. She states this morning she didn't eat breakfast.  She had salad for lunch. She state this is a normal lunch for her. She states she may have meat, vegetables, and some rice. She states she is trying to work on her carbohydrate intake. She states she doesn't feel good if she eats sugar. She is not drinking sugary soda. She states she may snack on peanuts, popcorn. Patient is drinking 4-20 ounce bottles of water and 1 diet soda. Physical Activity/Exercise    Comments:     Currently for exercise, patient is doing 30 minutes of cardio on Monday, Wednesday, and Friday. We talked about activities for patient to do, including walking, swimming, or chair exercises. Behavior Modification       Comments:   Patient was encouraged to food journal. I talked with patient about tracking their daily carbohydrate. One helpful monserrat is My Fitness Pal.  Patient was also encouraged to track their daily fluid intake. Discussed with patient the monserrat, Water Logged, that can be helpful in tracking their fluid intake. We also talked about the importance of planning ahead. Lack of willpower is often a lack of planning. Goals:  1. Finding recipes.     2. Continuing to track the carbohydrates.       [facility] , Archie Xavi [address]  9/8/2020

## 2020-09-24 ENCOUNTER — TELEPHONE (OUTPATIENT)
Dept: FAMILY MEDICINE CLINIC | Age: 40
End: 2020-09-24

## 2020-09-24 ENCOUNTER — VIRTUAL VISIT (OUTPATIENT)
Dept: FAMILY MEDICINE CLINIC | Age: 40
End: 2020-09-24
Payer: COMMERCIAL

## 2020-09-24 DIAGNOSIS — N89.8 VAGINAL IRRITATION: Primary | ICD-10-CM

## 2020-09-24 DIAGNOSIS — R32 URINARY INCONTINENCE, UNSPECIFIED TYPE: ICD-10-CM

## 2020-09-24 PROCEDURE — 99441 PR PHYS/QHP TELEPHONE EVALUATION 5-10 MIN: CPT | Performed by: NURSE PRACTITIONER

## 2020-09-24 RX ORDER — FLUCONAZOLE 150 MG/1
150 TABLET ORAL
Qty: 2 TAB | Refills: 0 | Status: SHIPPED | OUTPATIENT
Start: 2020-09-24 | End: 2020-09-25 | Stop reason: SDUPTHER

## 2020-09-24 NOTE — PROGRESS NOTES
Guerita Olivier is a 36 y.o. female, evaluated via audio-only technology on 9/24/2020 for Vaginal Discharge      Assessment & Plan:   Diagnoses and all orders for this visit:    1. Vaginal irritation    2. Urinary incontinence, unspecified type      Advised patient to schedule an in office appt for symptoms. Patient agreeable and verbalizes understanding. 12  Subjective:   Vaginal irritation: states vaginal irritation has been present for the last week. White vaginal discharge. Admits to mild odor. Patient has not tried any otc treatment for symptoms. Denies vaginal itching. Patient states her urine does have a sweet smell which has been frequent. Comments she is not able to hold her urine. States she can't control her urine, when she gets out of bed she will urinate on herself. Admits to mild lower abd cramping. Patient was seen by another provider for fatigue in august and has not gotten previously labs ordered. States she doesn't remember labs being ordered. Prior to Admission medications    Medication Sig Start Date End Date Taking? Authorizing Provider   cyclobenzaprine (FLEXERIL) 5 mg tablet Take 2 Tabs by mouth three (3) times daily (with meals). 8/25/20   Santiago Rascon, DO   hydroCHLOROthiazide (HYDRODIURIL) 25 mg tablet Take 1 Tab by mouth daily.  6/24/20   Lennie Camacho MD     Patient Active Problem List   Diagnosis Code    Prediabetes R73.03    Back pain M54.9    Scoliosis of thoracic spine M41.9    Anxiety F41.9    Depression F32.9    BMI 50.0-59.9, adult ([address] Z68.43    Morbid obesity ([address] E66.01    Essential hypertension I10    Hypertension I10    Scoliosis M41.9     Patient Active Problem List    Diagnosis Date Noted    Hypertension     Scoliosis     Morbid obesity ([address] 03/26/2020    Essential hypertension 03/26/2020    Anxiety 05/11/2018    Depression 05/11/2018    BMI 50.0-59.9, adult ([address] 05/11/2018    Scoliosis of thoracic spine 06/29/2017    Back pain     Prediabetes 08/19/2015     Current Outpatient Medications   Medication Sig Dispense Refill    cyclobenzaprine (FLEXERIL) 5 mg tablet Take 2 Tabs by mouth three (3) times daily (with meals). 9 Tab 0    hydroCHLOROthiazide (HYDRODIURIL) 25 mg tablet Take 1 Tab by mouth daily. 30 Tab 6     No Known Allergies  Past Medical History:   Diagnosis Date    Abnormal MRI of head 2010    Nonspecific focus of low attenuation at the right internal capsule on CT and MRI    Anxiety     Chronic low back pain 2014    unknown cause- normal xrays    Depression     Hypertension     Prediabetes     Scoliosis     Stroke [address]      No past surgical history on file. Family History   Problem Relation Age of Onset    Diabetes Other     Hypertension Other     Heart Disease Other     Asthma Other     Arthritis-osteo Other     Diabetes Mother     Hypertension Mother      Social History     Tobacco Use    Smoking status: Never Smoker    Smokeless tobacco: Never Used   Substance Use Topics    Alcohol use: Yes     Alcohol/week: 1.0 standard drinks     Types: 1 Glasses of wine per week     Comment: socially     ROS    No flowsheet data found. Gordo Castañeda, who was evaluated through a patient-initiated, synchronous (real-time) audio only encounter, and/or her healthcare decision maker, is aware that it is a billable service, with coverage as determined by her insurance carrier. She provided verbal consent to proceed: Yes. She has not had a related appointment within my department in the past 7 days or scheduled within the next 24 hours.       Total Time: minutes: 5-10 minutes    Kecia Arreola NP

## 2020-09-24 NOTE — TELEPHONE ENCOUNTER
Patient stated [facility] no longer accepts her insurance and needs prescription sent to [facility] pharmacy on [facility]. fluconazole (DIFLUCAN) 150 mg tablet     09/24/20   10/02/20   Floresita Bergman NP      Take 1 Tab by mouth every seven (7) days for 2 doses.

## 2020-09-25 RX ORDER — FLUCONAZOLE 150 MG/1
150 TABLET ORAL
Qty: 2 TAB | Refills: 0 | Status: SHIPPED | OUTPATIENT
Start: 2020-09-25 | End: 2020-10-03

## 2020-09-29 ENCOUNTER — TELEPHONE (OUTPATIENT)
Dept: FAMILY MEDICINE CLINIC | Age: 40
End: 2020-09-29

## 2020-10-02 NOTE — TELEPHONE ENCOUNTER
Patient stated she is still having vaginal irritation with warm sensation. Patient requested refill       fluconazole (DIFLUCAN) 150 mg tablet     09/25/20   10/03/20   Floresita Bergman, NP      Take 1 Tab by mouth every seven (7) days for 2 doses.

## 2020-10-05 NOTE — TELEPHONE ENCOUNTER
Last Visit: 9/24/20 with NP Shiraz MOBLEY [facility], 8/13/20 with MD Robin Chambers  Next Appointment: 10/29/20 with BEATRIZ Simmons  Previous Refill Encounter(s): 9/25/20 #2    Requested Prescriptions     Pending Prescriptions Disp Refills    fluconazole (DIFLUCAN) 150 mg tablet 2 Tab 0     Sig: Take 1 Tab by mouth every seven (7) days for 2 doses.

## 2020-10-05 NOTE — TELEPHONE ENCOUNTER
Pt called, states wants to know if ok to go ahead and take the next dose of Diflucan because her symptoms have not cleared up any yet.  Please adv [phone]

## 2020-10-07 RX ORDER — FLUCONAZOLE 150 MG/1
150 TABLET ORAL
Qty: 2 TAB | Refills: 0 | OUTPATIENT
Start: 2020-10-07 | End: 2020-10-15

## 2020-10-07 NOTE — TELEPHONE ENCOUNTER
Patient may take the second tablet of diflucan if she has it.   She was advised to schedule an in office follow up for her symptoms

## 2020-10-09 NOTE — TELEPHONE ENCOUNTER
Pt called to check how to take next dosage of diflucan, has not picked up from pharmacy yet but wants to know if should take both pills at once, also to see if provider can suggest what she can use for the itching.  Please adv [phone]

## 2020-10-12 NOTE — TELEPHONE ENCOUNTER
Late entry  Spoke with the patient. Advised patient that she will need to schedule an appointment due to already completing 2 doses of Diflucan. She verbalized understanding. Due to scheduling conflicts, patient will be going urgent care for treatment.

## 2020-10-13 ENCOUNTER — DOCUMENTATION ONLY (OUTPATIENT)
Dept: BARIATRICS/WEIGHT MGMT | Age: 40
End: 2020-10-13

## 2020-10-13 ENCOUNTER — HOSPITAL ENCOUNTER (OUTPATIENT)
Dept: BARIATRICS/WEIGHT MGMT | Age: 40
Discharge: HOME | End: 2020-10-13

## 2020-10-13 NOTE — PROGRESS NOTES
[facility] [address] [facility], [address]    Patient's Name: Renuka Howard   Age: 36 y.o. YOB: 1980   Sex: female    Date:   10/13/2020    Insurance:              Session: 4  of  6  Surgeon:  Dr. Naif Gallagher    Height: 5 f 6 Weight:    320      Lbs. BMI: 51.8   Pounds Lost since last month: 0               Pounds Gained since last month: 0    Starting Weight: 317   Previous Months Weight: 320  Overall Pounds Lost: 0 Overall Pounds Gained: 3      Do you smoke? None    Alcohol intake:  Number of drinks at a time:  Socially  Number of times a week:     Class Guidelines    Guidelines are reviewed with patient at the start of every class. 1. Patient understands that weight loss trial classes must be consecutive. Patient understands if they miss a class, it is their responsibility to contact me to reschedule class. I will reach out to patient after their first no show. 2.  Patient understands the expectations that weight maintenance/weight loss is expected during the classes. Failure to demonstrate changes may result in one extra month of weight loss trial, followed by going back to see the surgeon. 3. Patient is also instructed to be doing their labs, blood work, psych visit, support group and any other test that the surgeon has used while they are working on their weight loss trial.  4. Patient is instructed to bring their education binder to all classes. Changes Made Since Last Class: Cut back on carbohydrates. No sugary items. Eating Habits and Behaviors      Today we reviewed key diet principles. We talked about patient following a low calorie/low carbohydrate diet while they are in weight loss trials. To achieve this, patient is encouraged to avoid liquid calories, including alcohol, soda, sweet tea, and fruit juices. Patient can cut carbohydrates by trying to stick to meat and vegetables.   Patient can also eat eggs, cheese, and good fat, while trying to eliminate starches, such as pasta, rice, crackers, chips, popcorn. I also gave a power point that included 21 Ways to Stay on Track with a Healthy Lifestyle. Some of the food-related suggestions included drinking plenty of water or calorie-free beverages prior to their meal.  Patient is encouraged to to fill up on protein first, which is the ultimate fill-me up food. We talked about the importance of eating breakfast and the effects that can happen if one skips meals, which includes eating larger portions, snacking more, and decreasing their metabolism. With the suggestions in the power point, patient will be able to decrease their calories and carbohydrate intake. Patient's current diet habits include: Patient is eating 2-3 meals per day. Patient states she may have a boiled egg for breakfast and a piece of sausage. Lunch:  She states this may be a salad. Dinner: This may be baked fish, some type of stir patel. She states she has been trying to cut down on the carbohydrates due to sugar in her urine. She states sugar is not doing well with her body. She states she is snacking on peanuts and popcorn. I have recommended protein shake to be used int between meals. She states she is drinking 4-5 bottles of water per day. No liquid calories. She states she may do a Vitamin water, and I have recommended the Vitamin water zero. Physical Activity/Exercise    Comments: We talked about the importance of establishing a work out routine. Patient is currently doing 3 days a week of walking for activity. I have encouraged her to increase this to 5 days a week    Behavior Modification       Comments:   Some of the behavior tips that were included in the power point, include being choosy about night time snacking.   Patient was encouraged to make the TV a no eating zone and not eat after 7 pm.  Patient is also encouraged to keep a food journal. One of the other things we talked about during class is whether or not patient has a support system. Patient indicated that their family is  supportive of the surgery and they have some that will be there for them after surgery. I also reminded all patients to make their psychologist appointment and attend at least 1 support group. Goals set by Registered Dietitian:  1.  Increase the work out intake         Archie Thomson [address]  10/13/2020

## 2020-11-10 ENCOUNTER — DOCUMENTATION ONLY (OUTPATIENT)
Dept: BARIATRICS/WEIGHT MGMT | Age: 40
End: 2020-11-10

## 2020-11-10 ENCOUNTER — HOSPITAL ENCOUNTER (OUTPATIENT)
Dept: BARIATRICS/WEIGHT MGMT | Age: 40
Discharge: HOME | End: 2020-11-10

## 2020-11-10 NOTE — PROGRESS NOTES
[address] [place] [facility] [place] [address] [facility], [address]    Patient's Name: Niles Saucedo   Age: 36 y.o. YOB: 1980   Sex: female    Date:   11/10/2020    Insurance:            Session: 5 of 6  Revision:   Surgeon:  Dr. Antionette Ayers    Height: 5 f 6 Weight:    320      Lbs. BMI: 51.8   Pounds Lost since last month: 0               Pounds Gained since last month: 0    Starting Weight: 317   Previous Months Weight: 320  Overall Pounds Lost: 0 Overall Pounds Gained: 3      Do you smoke? None    Alcohol intake:  Number of drinks at a time:  None  Number of times a week: None    Class Guidelines    Guidelines are reviewed with patient at the start of every class. 1. Patient understands that weight loss trial classes must be consecutive. Patient understands if they miss a class, it is their responsibility to contact me to reschedule class. I will reach out to patient after their first no show. 2.  Patient understands the expectations that weight maintenance/weight loss is expected during the classes. Failure to demonstrate changes may result in one extra month of weight loss trial, followed by going back to see the surgeon. 3. Patient is also instructed to be doing their labs, blood work, psych visit, support group and any other test that the surgeon has used while they are working on their weight loss trial.    Other Pertinent Information:     Changes Made Since Last Class: Patient states she started protein shakes. She states she is trying to cut the carbohydrates. Dietary Instruction    During today's class we continued to focus on the key diet principles. Patient was instructed to follow a low carbohydrate diet, focusing on meat and vegetables. Patient was instructed to stop liquid calories and aim for 64 ounces of water per day. In class, I also gave patient a power point on surviving the holidays.   Some of the tips included survival tips for parties, including bringing their own low carbohydrate dish to a potluck dinner and surveying the buffet line before they start filling up their plate. Patient was given cooking alternatives, including using Splenda for sugar, substituting applesauce for oil in recipes, and using low fat plain yogurt instead of sour cream in dips. Patient was also encouraged to be mindful of calories in alcohol. Patient's diet habits include: Patient states she has started using the protein shake for breakfast.  She states she is trying to eat more baked items. She states she gets ill with shakes and sweaty if she eats carbohydrates. Patient states she is snacking on peanuts or a piece of fruit. Patient states she is drinking 5-20 ounce water bottles. She states she may have a diet soda. Physical Activity/Exercise    Patient is currently doing walking 3 x a week for activity. Today's power point on surviving the holidays also included tips on exercising. This included being creative during the holiday, walking stairs, mall walking, getting resistance bands. Patient was encouraged not to be afraid to excuse themselves from the table to go for a walk after they eat. Comments:     Behavior Modification    Reinforced behavior changes to make. Patient was encouraged to keep their emotions in check. Try to HALT and focus on whether they are eating out of hunger or if they are eating out of emotions. Other eating behaviors included surveying the buffet line before starting to fill up their plate. Patient was given a check off list and encouraged to monitor some of their eating behaviors, such as eating slowly, chewing their food thoroughly, and taking 20-30 minutes to eat a meal.    Goals that patient set for next month include:  1. Get back to her starting weight.       Margorie Leventhal, [address]  11/10/2020

## 2020-11-17 ENCOUNTER — VIRTUAL VISIT (OUTPATIENT)
Dept: FAMILY MEDICINE CLINIC | Age: 40
End: 2020-11-17

## 2020-11-17 NOTE — PROGRESS NOTES
Attempted text message call to pt for telehealth visit. . No connection made. Will close encounter at this time. This encounter was created in error - please disregard.

## 2020-12-04 ENCOUNTER — DOCUMENTATION ONLY (OUTPATIENT)
Dept: SURGERY | Age: 40
End: 2020-12-04

## 2020-12-04 NOTE — PROGRESS NOTES
Patient was a no show to her midtrial today as part of her SWL work up. If she would like to proceed with our program she needs to reschedule this appt but with Dr. Mei Hernandez. Lab orders will be placed and she should have them preformed prior to her next appt. Additionally please let her know she needs to reach out to schedule with Dr. Dane France within the next week. If she is unavailable by phone please send a letter.       5/6 WLT, start weight 317lbs   Labs: needs to be preformed/obtained   EKG: needs to be preformed/obtained   Psychiatric evaluation: needs to be preformed/obtained   Support Group:  Needs to attend       Signed By: Osvaldo Welsh NP     December 4, 2020

## 2020-12-08 ENCOUNTER — HOSPITAL ENCOUNTER (OUTPATIENT)
Dept: BARIATRICS/WEIGHT MGMT | Age: 40
Discharge: HOME | End: 2020-12-08

## 2020-12-08 ENCOUNTER — DOCUMENTATION ONLY (OUTPATIENT)
Dept: BARIATRICS/WEIGHT MGMT | Age: 40
End: 2020-12-08

## 2020-12-08 NOTE — PROGRESS NOTES
[address] [place] [facility] [place] [facility]    Patient's Name: Malick Morelos   Age: 36 y.o. YOB: 1980   Sex: female    Date:   12/8/2020          Session: 6 of  6   Surgeon:  Dr. Keenan Hess    Height: 5 f 6 Weight:    320      Lbs. BMI:    Pounds Lost since last month: 0               Pounds Gained since last month: 3    Starting Weight: 317   Previous Months Weight: 317  Overall Pounds Lost: 0 Overall Pounds Gained: 3      Do you smoke? None    Alcohol intake:  Number of drinks at a time:  Socially here and there. Number of times a week:     Class Guidelines    Guidelines are reviewed with patient at the start of every class. 1. Patient understands that weight loss trial classes must be consecutive. Patient understands if they miss a class, it is their responsibility to contact me to reschedule class. I will reach out to patient after their first no show. 2.  Patient understands the expectations that weight maintenance/weight loss is expected during the classes. Failure to demonstrate changes may result in one extra month of weight loss trial, followed by going back to see the surgeon. 3. Patient is also instructed to be doing their labs, blood work, psych visit, support group and any other test that the surgeon has used while they are working on their weight loss trial.    Other Pertinent Information:     Changes Made Since Last Class: Patient states she is trying to cut down on carbohydrates and eat more grilled food. Eating Habits and Behaviors      Today we reviewed key diet principles. We talked about protein drinks and ones that would be okay. Patient was encouraged to start getting into a routine now and drinking a shake. Patient may use for a meal replacement or a snack. Patient was also encouraged to stop liquid calories. We talked about fluid choices that would be okay.   We also spent a lot of time talking about carbohydrates. Patient was encouraged to start cutting their carbohydrates out and keep them less than 100 grams per day. Patient was given examples of carbohydrates that are in food. We also talked about the power of protein and the importance of getting more protein in per day than carbohydrates. I also reviewed with patient the vitamins that they will need to take post op. Patient will hear this information again at pre op class prior to surgery, but I felt it was important to prepare them now. Patient will be taking 2 multivitamin complete per day, 100 mg of Vitamin B1, 5000 IU of Vitamin D3, 1000 mcg Vitamin B12, 1500 mg of calcium citrate. We also spent some time talking about the post op diet protocol. Patient is aware they will be on a liquid diet before surgery and then a week of liquids after surgery followed by 5 weeks of soft protein. Patient's current diet habits include: Patient is eating 2-3 meals per day. Patient is eating bread, rice pasta, cereal few times a week. Patient states they are eating cookies, candy, ice cream, or other sweets 0 times a week. Patient is encouraged to focus on protein and try to keep this less than 75 grams per day. Patient is snacking on peanuts, popcorn. Patient is eating out 2 week. Patient is eating fast food 0 times a week. Patient is drinking 64 ounces of water. Patient is drinking some cranberry juice or orange juice. We talked about no fruit juice. Physical Activity/Exercise    Comments:     Currently for exercise, patient is doing 3 days a week of walking. We talked about activities for patient to do, including walking, swimming, or chair exercises. Goals have been set. Behavior Modification       Comments:  Emphasized the importance of eating slowly, not eating and drinking meals at the same time. At the end of today's weight loss trial class, we opened it up for a discussion.   This gave patients an opportunity to ask questions or concerns they may have about post op protocol. Patient understands she needs to do her final nutrition video and send me a final weight before she can move forward.       Margorie Leventhal, MS RD  12/8/2020

## 2020-12-11 ENCOUNTER — DOCUMENTATION ONLY (OUTPATIENT)
Dept: BARIATRICS/WEIGHT MGMT | Age: 40
End: 2020-12-11

## 2020-12-11 NOTE — PROGRESS NOTES
Nutrition Evaluation    Patient's Name: Mariola Gonzalez   Age: 36 y.o. YOB: 1980   Sex: female    Height: 5 f 6 Weight: 317 BMI:    Starting Weight:  317        Smoking Status:  None  Alcohol Intake:  Number of Drinks at a Time: None  Number of Times a Week: None    Changes made during classes include:          Summary:  Patient has completed her 6 month weight loss trial with and although she did not gain weight, I do have some concerns about her commitment to the surgery. She has not completed any of the other requirements and will often say that she was not aware she didn't need to, no one told her she had to do this. Throughout the process, I had to continuously remind patient to watch the required nutrition video, fill out diet history, etc.  She has also missed 2 mid trial visits. Her nutrition is pending and she will be cleared once she is able to demonstrate more commitment to the program.  She will be following up with Dr Shelly Baker.     Candidate for surgery: Pending  Re-evaluation Date:     Procedure:  Gastric Bypass     Archie [address]burn [address]  12/11/2020

## 2020-12-23 DIAGNOSIS — R73.03 PREDIABETES: ICD-10-CM

## 2020-12-23 DIAGNOSIS — Z01.818 PRE-OP TESTING: ICD-10-CM

## 2020-12-23 DIAGNOSIS — I10 ESSENTIAL HYPERTENSION: ICD-10-CM

## 2020-12-23 DIAGNOSIS — E66.01 MORBID OBESITY (HCC): Primary | ICD-10-CM

## 2020-12-23 DIAGNOSIS — E66.01 MORBID OBESITY (HCC): ICD-10-CM

## 2021-01-04 ENCOUNTER — OFFICE VISIT (OUTPATIENT)
Dept: SURGERY | Age: 41
End: 2021-01-04
Payer: COMMERCIAL

## 2021-01-04 ENCOUNTER — HOSPITAL ENCOUNTER (OUTPATIENT)
Dept: LAB | Age: 41
Discharge: HOME | End: 2021-01-04
Payer: COMMERCIAL

## 2021-01-04 VITALS
HEART RATE: 84 BPM | SYSTOLIC BLOOD PRESSURE: 147 MMHG | BODY MASS INDEX: 47.09 KG/M2 | TEMPERATURE: 97.2 F | HEIGHT: 66 IN | RESPIRATION RATE: 20 BRPM | DIASTOLIC BLOOD PRESSURE: 100 MMHG | WEIGHT: 293 LBS

## 2021-01-04 DIAGNOSIS — E66.01 MORBID OBESITY (HCC): Primary | ICD-10-CM

## 2021-01-04 DIAGNOSIS — E66.01 MORBID OBESITY (HCC): ICD-10-CM

## 2021-01-04 LAB
25(OH)D3 SERPL-MCNC: 7.3 NG/ML (ref 30–100)
ALBUMIN SERPL-MCNC: 3.4 G/DL (ref 3.4–5)
AMPHET UR QL SCN: NEGATIVE
ANION GAP SERPL CALC-SCNC: 7 MMOL/L (ref 3–18)
ATRIAL RATE: 77 BPM
BARBITURATES UR QL SCN: NEGATIVE
BASOPHILS # BLD: 0 K/UL (ref 0–0.1)
BASOPHILS NFR BLD: 0 % (ref 0–2)
BENZODIAZ UR QL: NEGATIVE
BUN SERPL-MCNC: 8 MG/DL (ref 7–18)
BUN/CREAT SERPL: 13 (ref 12–20)
CALCIUM SERPL-MCNC: 9.2 MG/DL (ref 8.5–10.1)
CALCULATED P AXIS, ECG09: 47 DEGREES
CALCULATED R AXIS, ECG10: 4 DEGREES
CALCULATED T AXIS, ECG11: 35 DEGREES
CANNABINOIDS UR QL SCN: NEGATIVE
CHLORIDE SERPL-SCNC: 106 MMOL/L (ref 100–111)
CO2 SERPL-SCNC: 28 MMOL/L (ref 21–32)
COCAINE UR QL SCN: NEGATIVE
CREAT SERPL-MCNC: 0.64 MG/DL (ref 0.6–1.3)
DIAGNOSIS, 93000: NORMAL
DIFFERENTIAL METHOD BLD: ABNORMAL
EOSINOPHIL # BLD: 0.1 K/UL (ref 0–0.4)
EOSINOPHIL NFR BLD: 2 % (ref 0–5)
ERYTHROCYTE [DISTWIDTH] IN BLOOD BY AUTOMATED COUNT: 17 % (ref 11.6–14.5)
FOLATE SERPL-MCNC: 12.4 NG/ML (ref 3.1–17.5)
GLUCOSE SERPL-MCNC: 90 MG/DL (ref 74–99)
HCT VFR BLD AUTO: 34.9 % (ref 35–45)
HDSCOM,HDSCOM: NORMAL
HGB BLD-MCNC: 10.4 G/DL (ref 12–16)
IRON SERPL-MCNC: 24 UG/DL (ref 50–175)
LYMPHOCYTES # BLD: 2.8 K/UL (ref 0.9–3.6)
LYMPHOCYTES NFR BLD: 33 % (ref 21–52)
MCH RBC QN AUTO: 21.1 PG (ref 24–34)
MCHC RBC AUTO-ENTMCNC: 29.8 G/DL (ref 31–37)
MCV RBC AUTO: 70.8 FL (ref 74–97)
METHADONE UR QL: NEGATIVE
MONOCYTES # BLD: 0.5 K/UL (ref 0.05–1.2)
MONOCYTES NFR BLD: 5 % (ref 3–10)
NEUTS SEG # BLD: 5.1 K/UL (ref 1.8–8)
NEUTS SEG NFR BLD: 60 % (ref 40–73)
OPIATES UR QL: NEGATIVE
P-R INTERVAL, ECG05: 164 MS
PCP UR QL: NEGATIVE
PLATELET # BLD AUTO: 324 K/UL (ref 135–420)
PMV BLD AUTO: 11.3 FL (ref 9.2–11.8)
POTASSIUM SERPL-SCNC: 4 MMOL/L (ref 3.5–5.5)
Q-T INTERVAL, ECG07: 420 MS
QRS DURATION, ECG06: 84 MS
QTC CALCULATION (BEZET), ECG08: 475 MS
RBC # BLD AUTO: 4.93 M/UL (ref 4.2–5.3)
SODIUM SERPL-SCNC: 141 MMOL/L (ref 136–145)
TSH SERPL DL<=0.05 MIU/L-ACNC: 2.1 UIU/ML (ref 0.36–3.74)
VENTRICULAR RATE, ECG03: 77 BPM
VIT B12 SERPL-MCNC: 272 PG/ML (ref 211–911)
WBC # BLD AUTO: 8.5 K/UL (ref 4.6–13.2)

## 2021-01-04 PROCEDURE — 85025 COMPLETE CBC W/AUTO DIFF WBC: CPT

## 2021-01-04 PROCEDURE — 93005 ELECTROCARDIOGRAM TRACING: CPT

## 2021-01-04 PROCEDURE — 84425 ASSAY OF VITAMIN B-1: CPT

## 2021-01-04 PROCEDURE — 82607 VITAMIN B-12: CPT

## 2021-01-04 PROCEDURE — 36415 COLL VENOUS BLD VENIPUNCTURE: CPT

## 2021-01-04 PROCEDURE — 83540 ASSAY OF IRON: CPT

## 2021-01-04 PROCEDURE — 80307 DRUG TEST PRSMV CHEM ANLYZR: CPT

## 2021-01-04 PROCEDURE — 99213 OFFICE O/P EST LOW 20 MIN: CPT | Performed by: SURGERY

## 2021-01-04 PROCEDURE — 86677 HELICOBACTER PYLORI ANTIBODY: CPT

## 2021-01-04 PROCEDURE — 82306 VITAMIN D 25 HYDROXY: CPT

## 2021-01-04 PROCEDURE — 82040 ASSAY OF SERUM ALBUMIN: CPT

## 2021-01-04 PROCEDURE — 84443 ASSAY THYROID STIM HORMONE: CPT

## 2021-01-04 PROCEDURE — 80048 BASIC METABOLIC PNL TOTAL CA: CPT

## 2021-01-04 NOTE — PROGRESS NOTES
Bariatric Midtrial       The patient continues in the WLT to discuss status. Having no issues. Making positive changes. Still wants a bypass. She has not completed her reqruiements but has completed the WLT    Past Medical History:   Diagnosis Date    Abnormal MRI of head     Nonspecific focus of low attenuation at the right internal capsule on CT and MRI    Anxiety     Chronic low back pain     unknown cause- normal xrays    Depression     Hypertension     Prediabetes     Scoliosis     Stroke ([facility] [address]      No past surgical history on file. No Known Allergies  Current Outpatient Medications   Medication Sig Dispense Refill    cyclobenzaprine (FLEXERIL) 5 mg tablet Take 2 Tabs by mouth three (3) times daily (with meals). 9 Tab 0    hydroCHLOROthiazide (HYDRODIURIL) 25 mg tablet Take 1 Tab by mouth daily. 30 Tab 6     Social History     Socioeconomic History    Marital status: SINGLE     Spouse name: Not on file    Number of children: Not on file    Years of education: Not on file    Highest education level: Not on file   Occupational History    Occupation: apartment complex   Tobacco Use    Smoking status: Never Smoker    Smokeless tobacco: Never Used   Substance and Sexual Activity    Alcohol use:  Yes     Alcohol/week: 1.0 standard drinks     Types: 1 Glasses of wine per week     Comment: socially    Drug use: No    Sexual activity: Yes     Partners: Male     Birth control/protection: Condom     Family History   Problem Relation Age of Onset    Diabetes Other     Hypertension Other     Heart Disease Other     Asthma Other     Arthritis-osteo Other     Diabetes Mother     Hypertension Mother      Family Status   Relation Name Status    Other  (Not Specified)    Mother  Alive    Father       Review of Systems:  Positive in BOLD     CONST: Fever, weight loss, fatigue or chills  GI: Nausea, vomiting, abdominal pain, change in bowel habits, hematochezia, melena, and GERD INTEG: Dermatitis, abnormal moles  HEENT: Recent changes in vision, vertigo, epistaxis, dysphagia and hoarseness  CV: Chest pain, palpitations, HTN, edema and varicosities  RESP: Cough, shortness of breath, wheezing, hemoptysis, snoring and reactive airway disease  : Hematuria, dysuria, frequency, urgency, nocturia and stress urinary incontinence   MS: Weakness, joint pain and arthritis - feet knees and back  ENDO: Pre-diabetes, thyroid disease, polyuria, polydipsia, polyphagia, poor wound healing, heat intolerance, cold intolerance  LYMPH/HEME: Anemia, bruising and history of blood transfusions  NEURO: Dizziness, headache, fainting, seizures and stroke - says 2008  PSYCH: Anxiety and depression, ADHD    Visit Vitals  BP (!) 147/100 (BP 1 Location: Left arm, BP Patient Position: At rest)   Pulse 84   Temp 97.2 °F (36.2 °C) (Oral)   Resp 20   Ht 5' 6\" (1.676 m)   Wt 143.8 kg (317 lb)   BMI 51.17 kg/m²     Visit Vitals  BP (!) 147/100 (BP 1 Location: Left arm, BP Patient Position: At rest)   Pulse 84   Temp 97.2 °F (36.2 °C) (Oral)   Resp 20   Ht 5' 6\" (1.676 m)   Wt 143.8 kg (317 lb)   BMI 51.17 kg/m²       Pre op weight: 317  EBW: 181  Wt loss to date: 0       General: 36 y.o.) female in no acute distress. Morbidly obese in abdomen and hips - gynecoid pattern  HEENT: Normocephalic, atraumatic, Pupils equal and reactive, nasopharynx clear, oropharynx clear and moist without lesions  NECK: Supple, no lymphadenopathy, thyromegaly, carotid bruits or jugular venous distension. trachea midline  RESP: Clear to auscultation bilaterally, no wheezes, rhonchi, or rales, normal respiratory excursion  CV: Regular rate and rhythm, no murmurs, rubs or gallops. 3+/4 pulses in bilateral dorsalis pedis and posterior tibialis. No distal edema or varicosities.   ABD: Soft, nontender, nondistended, normoactive bowel sounds, no hernias, no hepatosplenomegaly, easily palpable costal margins, gynecoid distribution  Extremities: Warm, well perfused, no tenderness or swelling, normal gait/station  Neuro: Sensation and strength grossly intact and symmetrical  Psych: Alert and oriented to person, place, and time. Labs: NOT DONE   EKG - NOT DONE  Psych- NOT DONE  Nutr - 6/6 complete    Imp:   She needs to complete her requirements by 2/4/2021. She has again been told to complete the items or she will not be allowed to proceed. . All questions answered. Hoping for surgery in March if she completes her requirements. Again discussed the r/b/a of gastric bypass and lifestyle changes expected.

## 2021-01-04 NOTE — PROGRESS NOTES
Tamara Nicholson is a 36 y.o. female who presents today with   Chief Complaint   Patient presents with    Morbid Obesity     Mid trial          Body mass index is 51.17 kg/m². 1. Have you been to the ER, urgent care clinic since your last visit? Hospitalized since your last visit? No    2. Have you seen or consulted any other health care providers outside of the [address] [place] [address] since your last visit? Include any pap smears or colon screening.  No

## 2021-01-05 LAB
COTININE UR QL SCN: NEGATIVE NG/ML
DRUG SCREEN COMMENT:, 753798: NORMAL
H PYLORI IGA SER-ACNC: <9 UNITS (ref 0–8.9)
H PYLORI IGM SER-ACNC: <9 UNITS (ref 0–8.9)

## 2021-01-08 LAB — VIT B1 BLD-SCNC: 100 NMOL/L (ref 66.5–200)

## 2021-01-12 ENCOUNTER — DOCUMENTATION ONLY (OUTPATIENT)
Dept: BARIATRICS/WEIGHT MGMT | Age: 41
End: 2021-01-12

## 2021-01-12 NOTE — PROGRESS NOTES
1/12/2021:  Patient was left a voice mail asking to call me, so I can review with her what her final nutrition requirements are.     Alda Rogers MS RD

## 2021-02-04 ENCOUNTER — DOCUMENTATION ONLY (OUTPATIENT)
Dept: BARIATRICS/WEIGHT MGMT | Age: 41
End: 2021-02-04

## 2021-02-04 NOTE — PROGRESS NOTES
Nutrition Evaluation    Patient's Name: Tamara Nicholson   Age: 36 y.o. YOB: 1980   Sex: female    Height: 5 f 6 Weight: 317 BMI:    Starting Weight:  317      Summary:  Patient completed her weight loss trial in December, but I had concerns about her commitment to the program as she has not completed anything else. Since our December visit though, patient has finished everything she needed to. She continues to make changes with her diet and has been showing more commitment to surgery. Ok to proceed.        Candidate for surgery: Yes  Re-evaluation Date:     Procedure:  Gastric Bypass       Archie Oliveira Xavi 87 RD  2/4/2021

## 2021-02-12 ENCOUNTER — VIRTUAL VISIT (OUTPATIENT)
Dept: FAMILY MEDICINE CLINIC | Age: 41
End: 2021-02-12
Payer: COMMERCIAL

## 2021-02-12 DIAGNOSIS — R41.3 MEMORY LOSS: Primary | ICD-10-CM

## 2021-02-12 DIAGNOSIS — R41.840 INATTENTION: ICD-10-CM

## 2021-02-12 PROCEDURE — 99213 OFFICE O/P EST LOW 20 MIN: CPT | Performed by: NURSE PRACTITIONER

## 2021-02-12 RX ORDER — BUPROPION HYDROCHLORIDE 100 MG/1
TABLET, EXTENDED RELEASE ORAL
Qty: 60 TAB | Refills: 1 | Status: SHIPPED | OUTPATIENT
Start: 2021-02-12 | End: 2021-04-04

## 2021-02-12 NOTE — PROGRESS NOTES
Yusra Cortes is a 36 y.o. female who was seen by synchronous (real-time) audio-video technology on 2/12/2021 for No chief complaint on file. Assessment & Plan:   Diagnoses and all orders for this visit:    1. Memory loss  -     REFERRAL TO NEUROPSYCHOLOGY    2. Inattention  -     REFERRAL TO NEUROPSYCHOLOGY    Other orders  -     buPROPion SR (WELLBUTRIN SR) 100 mg SR tablet; Take 1 Tab by mouth daily for 21 days, THEN 1 Tab two (2) times a day for 30 days. Referral as above for further testing  We will trial Wellbutrin to help with inattention while awaiting formal testing. Follow-up and Dispositions    · Return in about 3 weeks (around 3/5/2021) for inattention, virtual follow up. Routing History        I spent at least 20 minutes on this visit with this established patient. 712  Subjective:   Patient states she was seen a while ago for memory troubles and was seen and evaluated. Per patient she was dx with ADD. Comments she was prescribed Adderall however, she never took the medication due to beginning a new job. Requesting prescription for Adderall. Patient reports her troubles with inattention memory loss is affecting her daily life. States she almost lost her housing due to not completing paperwork in a timely manner. Further states she also has trouble maintaining employment and also paying bills and keeping her home clean and organized. Prior to Admission medications    Medication Sig Start Date End Date Taking? Authorizing Provider   cyclobenzaprine (FLEXERIL) 5 mg tablet Take 2 Tabs by mouth three (3) times daily (with meals). 8/25/20   Leatha Rascon Listen, DO   hydroCHLOROthiazide (HYDRODIURIL) 25 mg tablet Take 1 Tab by mouth daily.  6/24/20   Noris Mckeon MD     Patient Active Problem List   Diagnosis Code    Prediabetes R73.03    Back pain M54.9    Scoliosis of thoracic spine M41.9    Anxiety F41.9    Depression F32.9    BMI 50.0-59.9, adult ([facility] [address] Z68.43    Morbid obesity ([facility] E66.01    Essential hypertension I10    Hypertension I10    Scoliosis M41.9     Patient Active Problem List    Diagnosis Date Noted    Hypertension     Scoliosis     Morbid obesity ([facility] 03/26/2020    Essential hypertension 03/26/2020    Anxiety 05/11/2018    Depression 05/11/2018    BMI 50.0-59.9, adult ([facility] 05/11/2018    Scoliosis of thoracic spine 06/29/2017    Back pain     Prediabetes 08/19/2015     Current Outpatient Medications   Medication Sig Dispense Refill    cyclobenzaprine (FLEXERIL) 5 mg tablet Take 2 Tabs by mouth three (3) times daily (with meals). 9 Tab 0    hydroCHLOROthiazide (HYDRODIURIL) 25 mg tablet Take 1 Tab by mouth daily. 30 Tab 6     No Known Allergies  Past Medical History:   Diagnosis Date    Abnormal MRI of head 2010    Nonspecific focus of low attenuation at the right internal capsule on CT and MRI    Anxiety     Chronic low back pain 2014    unknown cause- normal xrays    Depression     Hypertension     Prediabetes     Scoliosis     Stroke ([facility]      No past surgical history on file. Family History   Problem Relation Age of Onset    Diabetes Other     Hypertension Other     Heart Disease Other     Asthma Other     Arthritis-osteo Other     Diabetes Mother     Hypertension Mother      Social History     Tobacco Use    Smoking status: Never Smoker    Smokeless tobacco: Never Used   Substance Use Topics    Alcohol use: Yes     Alcohol/week: 1.0 standard drinks     Types: 1 Glasses of wine per week     Comment: socially       ROS    Objective:   No flowsheet data found.    General: alert, cooperative, no distress   Mental  status: normal mood, behavior, speech, dress, motor activity, and thought processes, able to follow commands   HENT: NCAT   Neck: no visualized mass   Resp: no respiratory distress   Neuro: no gross deficits   Skin: no discoloration or lesions of concern on visible areas   Psychiatric: normal affect, consistent with stated mood, no evidence of hallucinations     Additional exam findings: We discussed the expected course, resolution and complications of the diagnosis(es) in detail. Medication risks, benefits, costs, interactions, and alternatives were discussed as indicated. I advised her to contact the office if her condition worsens, changes or fails to improve as anticipated. She expressed understanding with the diagnosis(es) and plan. Prem Shahid, who was evaluated through a patient-initiated, synchronous (real-time) audio-video encounter, and/or her healthcare decision maker, is aware that it is a billable service, with coverage as determined by her insurance carrier. She provided verbal consent to proceed: Yes, and patient identification was verified. It was conducted pursuant to the emergency declaration under the [address] [place] [address], [address] [place] [address] authority and the [employer] General Act. A caregiver was present when appropriate. Ability to conduct physical exam was limited. I was in the office. The patient was at home.       Silva Gamez NP

## 2021-02-15 ENCOUNTER — OFFICE VISIT (OUTPATIENT)
Dept: SURGERY | Age: 41
End: 2021-02-15
Payer: COMMERCIAL

## 2021-02-15 VITALS
WEIGHT: 293 LBS | OXYGEN SATURATION: 100 % | BODY MASS INDEX: 47.09 KG/M2 | HEART RATE: 80 BPM | RESPIRATION RATE: 18 BRPM | HEIGHT: 66 IN | DIASTOLIC BLOOD PRESSURE: 90 MMHG | SYSTOLIC BLOOD PRESSURE: 134 MMHG | TEMPERATURE: 97.2 F

## 2021-02-15 DIAGNOSIS — E66.01 MORBID OBESITY (HCC): Primary | ICD-10-CM

## 2021-02-15 DIAGNOSIS — I10 ESSENTIAL HYPERTENSION: ICD-10-CM

## 2021-02-15 PROCEDURE — 99215 OFFICE O/P EST HI 40 MIN: CPT | Performed by: SURGERY

## 2021-02-15 NOTE — PATIENT INSTRUCTIONS
Take the following medications as noted. - If no renny by the medication, take as prescribed until the midnight prior to surgery. - If the medication is circled, take with a sip of water on the morning of surgery prior to reporting to the hospital 
- If the medication has a line drawn through it, do not take that medication after starting your liquid diet before surgery - If the medication has a star beside it, change its dosing as written beside it on the date written beside it. Current Outpatient Medications Medication Sig Dispense Refill  buPROPion SR (WELLBUTRIN SR) 100 mg SR tablet Take 1 Tab by mouth daily for 21 days, THEN 1 Tab two (2) times a day for 30 days. 60 Tab 1  cyclobenzaprine (FLEXERIL) 5 mg tablet Take 2 Tabs by mouth three (3) times daily (with meals). 9 Tab 0  
 hydroCHLOROthiazide (HYDRODIURIL) 25 mg tablet Take 1 Tab by mouth daily. 30 Tab 6

## 2021-02-15 NOTE — PROGRESS NOTES
Pt ID confirmed    Weight Loss Metrics 2/15/2021 2/15/2021 1/4/2021 1/4/2021 8/25/2020 6/24/2020 3/26/2020   Pre op / Initial Wt 311 - 317 - - - -   Today's Wt - 311 lb - 317 lb 318 lb 318 lb 317 lb   BMI - 50.2 kg/m2 - 51.17 kg/m2 52.92 kg/m2 51.33 kg/m2 51.17 kg/m2   Ideal Body Wt 137 - 136 - - - -   Excess Body Wt 174 - 181 - - - -   Goal Wt 172 - 173 - - - -   Wt loss to date 0 - 0 - - - -   % Wt Loss 0 - 0 - - - -   80% .2 - 144.8 - - - -       Body mass index is 50.2 kg/m².

## 2021-02-15 NOTE — LETTER
2/15/2021 Patient: Efren Singer YOB: 1980 Date of Visit: 2/15/2021 Chad Gagnon MD 
[address] [facility] [address] [place] [address] [address] [employer] Dear Chad Gagnon MD, Thank you for referring Ms. Efren Singer to [address] for evaluation. My notes for this consultation are attached. If you have questions, please do not hesitate to call me. I look forward to following your patient along with you.  
 
 
Sincerely, 
 
Sally Langford MD

## 2021-02-15 NOTE — PROGRESS NOTES
Preop History and Physical Exam:    Nestor Saunders is a 36 y.o. female who comes into the office today after completing the entirety of the bariatric preoperative protocol satisfactorily. she initially identified obesity at the age of 25 and at age 25 weighed 210lbs. she has tried a variety of unsupervised weight-loss attempts, but has yet to meet with lasting success. Today, the patient is Height: 5' 6\" (167.6 cm) tall, Weight: 141.1 kg (311 lb) lbs for a Body mass index is 50.2 kg/m². It is due to their severe obesity, which is further complicated by hypertension  that the patient is now seeking out bariatric surgery, specifically, the gastric bypass      Past Medical History:   Diagnosis Date    Abnormal MRI of head 2010    Nonspecific focus of low attenuation at the right internal capsule on CT and MRI    Anxiety     Chronic low back pain 2014    unknown cause- normal xrays    Depression     Hypertension     Memory loss     Prediabetes     Scoliosis     Stroke ([facility] [address] [address]        No past surgical history on file. Current Outpatient Medications on File Prior to Visit   Medication Sig Dispense Refill    buPROPion SR (WELLBUTRIN SR) 100 mg SR tablet Take 1 Tab by mouth daily for 21 days, THEN 1 Tab two (2) times a day for 30 days. 60 Tab 1    cyclobenzaprine (FLEXERIL) 5 mg tablet Take 2 Tabs by mouth three (3) times daily (with meals). 9 Tab 0    hydroCHLOROthiazide (HYDRODIURIL) 25 mg tablet Take 1 Tab by mouth daily. 30 Tab 6     No current facility-administered medications on file prior to visit. No Known Allergies    Social History     Tobacco Use    Smoking status: Never Smoker    Smokeless tobacco: Never Used   Substance Use Topics    Alcohol use:  Yes     Alcohol/week: 1.0 standard drinks     Types: 1 Glasses of wine per week     Frequency: Monthly or less     Comment: socially    Drug use: No       Family History   Problem Relation Age of Onset    Diabetes Other     Hypertension Other     Heart Disease Other     Asthma Other     Arthritis-osteo Other     Diabetes Mother     Hypertension Mother        Family Status   Relation Name Status    Other  (Not Specified)    Mother  Alive    Father         Review of Systems:  Positive in BOLD     CONST: Fever, weight loss, fatigue or chills  GI: Nausea, vomiting, abdominal pain, change in bowel habits, hematochezia, melena, and GERD   INTEG: Dermatitis, abnormal moles  HEENT: Recent changes in vision, vertigo, epistaxis, dysphagia and hoarseness  CV: Chest pain, palpitations, HTN, edema - rarely and varicosities  RESP: Cough, shortness of breath, wheezing, hemoptysis, snoring and reactive airway disease  : Hematuria, dysuria, frequency, urgency, nocturia and stress urinary incontinence   MS: Weakness, joint pain and arthritis - feet knees and back  ENDO: Pre-diabetes, thyroid disease, polyuria, polydipsia, polyphagia, poor wound healing, heat intolerance, cold intolerance  LYMPH/HEME: Anemia, bruising and history of blood transfusions  NEURO: Dizziness, headache, fainting, seizures and stroke - says   PSYCH: Anxiety and depression, ADHD    Physical Exam    Visit Vitals  BP (!) 134/90   Pulse 80   Temp 97.2 °F (36.2 °C)   Resp 18   Ht 5' 6\" (1.676 m)   Wt 141.1 kg (311 lb)   SpO2 100%   BMI 50.20 kg/m²       General: 36 y.o.) female in no acute distress. Morbidly obese in abdomen and hips - gynecoid pattern  HEENT: Normocephalic, atraumatic, Pupils equal and reactive, nasopharynx clear, oropharynx clear and moist without lesions  NECK: Supple, no lymphadenopathy, thyromegaly, carotid bruits or jugular venous distension. trachea midline  RESP: Clear to auscultation bilaterally, no wheezes, rhonchi, or rales, normal respiratory excursion  CV: Regular rate and rhythm, no murmurs, rubs or gallops. 3+/4 pulses in bilateral dorsalis pedis and posterior tibialis. No distal edema or varicosities.   ABD: Soft, nontender, nondistended, normoactive bowel sounds, no hernias, no hepatosplenomegaly, easily palpable costal margins, gynecoid distribution  Extremities: Warm, well perfused, no tenderness or swelling, normal gait/station  Neuro: Sensation and strength grossly intact and symmetrical  Psych: Alert and oriented to person, place, and time    Studies:    LABS: within normal limits except for hypovit D  EKG: without significant abnormalities  NUTRITIONAL EVALUATION has deemed the patient a good candidate for weight loss surgery  PSYCHIATRIC EVALUATION has deemed the patient a good candidate for weight loss surgery      Impression:    Mando Jurado is a 36 y.o. female who is suffering from morbid obesity and comorbidities including hypertension who would benefit from bariatric surgery. We've discussed the restrictive and malabsorptive nature of the gastric bypass and compared and contrasted with the sleeve gastrectomy. The patient understands the likelihood of losing approximately 80% of their excess weight in 12 to 18 months. She also understands the risks including but not limited to bleeding, infection, need for reoperation, anastomotic ulcers, leaks and strictures, bowel obstruction secondary to adhesions and internal hernias, DVT, PE, heart attack, stroke, and death. The patient is aware that if a hiatal hernia is found and needs to be repaired, it will be with attendant risk of additional bleeding, injury to the diaphragm and recurrence of the hernia. If the liver were abnormal, a biopsy may be performed which also may lead to bleeding as well as bile leak. The patient also understands risks of inadequate weight loss, excess weight loss, vitamin insufficiency, protein malnutrition, excess skin, and loss of hair.   We have reviewed the components of a successful postoperative course including requirement for a high protein, low carbohydrate diet, 60 oz a day of zero calorie liquids, daily vitamin supplementation, daily exercise, regular follow-up, and participation in support groups. We have reviewed the preoperative liver shrinking clear liquid diet, as well as reviewed any medication changes required while on the clear liquid diet. In addition, the patient understands that all medications to be taken during the first 8 weeks postoperatively can be taken whole as long as the medication is approximately the size of a Bruno 325 mg aspirin tablet in size. The patient further understands that it is his/her responsibility to review these and verify with their primary care doctor and pharmacist that all medications are of the appropriate size. We will schedule the patient for laparoscopic gastric bypass in the near future.

## 2021-02-19 ENCOUNTER — TELEPHONE (OUTPATIENT)
Dept: SURGERY | Age: 41
End: 2021-02-19

## 2021-02-22 ENCOUNTER — VIRTUAL VISIT (OUTPATIENT)
Dept: NEUROLOGY | Age: 41
End: 2021-02-22
Payer: COMMERCIAL

## 2021-02-22 DIAGNOSIS — R41.3 MEMORY LOSS: Primary | ICD-10-CM

## 2021-02-22 DIAGNOSIS — R45.89 DEPRESSED MOOD: ICD-10-CM

## 2021-02-22 DIAGNOSIS — F41.8 ANXIETY ABOUT HEALTH: ICD-10-CM

## 2021-02-22 DIAGNOSIS — R41.840 ATTENTION AND CONCENTRATION DEFICIT: ICD-10-CM

## 2021-02-22 PROCEDURE — 90791 PSYCH DIAGNOSTIC EVALUATION: CPT | Performed by: PSYCHOLOGIST

## 2021-02-22 NOTE — PROGRESS NOTES
[facility]  Neuroscience   [address] [place] [address]. [facility], [address]  Office:  [phone]  Fax: [phone]                  Initial Office Exam  Patient Name: Michaelle Ye  Age: 36 y.o. Gender: female   Handedness: right handed   Presenting Concern: memory loss  Primary Care Physician: Michelle Ocasio MD  Referring Provider: Jael Puga NP      REASON FOR REFERRAL:  This comprehensive and medically necessary neuropsychological assessment was requested to assist a differential diagnosis of cognitive complaints. The use and purpose of this examination, as well as the extent and limitations of confidentiality, were explained prior to obtaining permission to participate. Instructions were provided regarding the necessity to put forth optimal effort and answer questions truthfully in order to obtain reliable and accurate test results. REVIEW OF RECORDS:   Ms. Adarsh Bradford was referred by her PCP for an evaluation of memory loss and attention deficit. She has a reported history of ADHD and was previously prescribed Adderall though she never started this medication. She was recently placed on bupropion 100 mg. An MRI on 11/6/2010 showed the following  Two areas of white matter signal hyperintensity are appreciated in   the right cerebral hemisphere, without   significant adjacent edema/mass effect, or abnormal enhancement. These are nonspecific findings, which could be related to   demyelination, previous gliosis from brain injury, or less likely,   small vessel ischemic vasculopathy. Current Outpatient Medications   Medication Sig    buPROPion SR (WELLBUTRIN SR) 100 mg SR tablet Take 1 Tab by mouth daily for 21 days, THEN 1 Tab two (2) times a day for 30 days.  cyclobenzaprine (FLEXERIL) 5 mg tablet Take 2 Tabs by mouth three (3) times daily (with meals).  hydroCHLOROthiazide (HYDRODIURIL) 25 mg tablet Take 1 Tab by mouth daily.      No current facility-administered medications for this visit. Past Medical History:   Diagnosis Date    Abnormal MRI of head 2010    Nonspecific focus of low attenuation at the right internal capsule on CT and MRI    Anxiety     Chronic low back pain 2014    unknown cause- normal xrays    Depression     Hypertension     Memory loss     Prediabetes     Scoliosis     Stroke ([facility] [address]          No past surgical history on file. Social History     Socioeconomic History    Marital status: SINGLE     Spouse name: Not on file    Number of children: Not on file    Years of education: Not on file    Highest education level: Not on file   Occupational History    Occupation: apartment complex   Tobacco Use    Smoking status: Never Smoker    Smokeless tobacco: Never Used   Substance and Sexual Activity    Alcohol use: Yes     Alcohol/week: 1.0 standard drinks     Types: 1 Glasses of wine per week     Frequency: Monthly or less     Comment: socially    Drug use: No    Sexual activity: Yes     Partners: Male     Birth control/protection: Condom       Family History   Problem Relation Age of Onset    Diabetes Other     Hypertension Other     Heart Disease Other     Asthma Other     Arthritis-osteo Other     Diabetes Mother     Hypertension Mother            CLINICAL INTERVIEW:  Ms. Roosevelt Abreu was seen virtually for her initial appointment. Consistent with records, she reported chronic problems with memory and attention. She was unable to identify the onset of these problems but did indicate that they have become worse, especially since her stroke in 2008. She is not sure exactly when her ADHD was diagnosed but believes it was approximately December 2018. She is not sure what doctor diagnosed this condition. History is negative for seizures, syncope, and significant head trauma. Sleep disturbance is significant for intermittent insomnia. Snoring was endorsed.   Pain complaints include residual pain from a back injury in 2014. There is no significant history of alcohol, tobacco, or illicit substance use. Family history of neurologic illness was denied. With regard to emotional functioning, Ms. Leatha Goodpasture reported a recent history of depression. She is not sure if there is a history of longer standing, chronic depression. History of psychological trauma was denied. Ms. Leatha Goodpasture recently started Wellbutrin and reported decreased appetite. History is negative for suicidal ideation, psychiatric hospitalization, and self-harm behaviors. Socially, Ms. Leatha Goodpasture is never been . She has 2 adult children ages 25 and 21. Academically, she completed 12 years of education and denied a history of diagnosed learning disability. However, she indicated that she did struggle in school. 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 She reported significant difficulties on the job due to her cognitive symptoms. Friend support is robust.  Ms. Leatha Goodpasture is not exercising regularly. Functionally, Ms. Leatha Goodpasture remains independent for ADL and IADL care. However, she reported having some difficulties remembering whether or not she is taking her medication. She continues to drive without incident.       MENTAL STATUS:    Sensorium  Awake, Aware, Alert   Orientation person, place, time/date, situation, day of week, month of year and year   Relations cooperative   Eye Contact appropriate   Appearance:  age appropriate   Motor Behavior:  within normal limits   Speech:  monotone   Vocabulary average   Thought Process: within normal limits   Thought Content free of delusions and free of hallucinations   Suicidal ideations none   Homicidal ideations none   Mood:  depressed   Affect:  mood-congruent   Memory recent  adequate   Memory remote:  adequate   Concentration:  adequate   Abstraction:  abstract   Insight:  fair Reliability fair   Judgment:  fair         DIAGNOSTIC IMPRESSIONS:  1. Cognitive Decline: R/O Mild Neurocognitive Disorder  2. Depressed Mood  3. Anxiety about health      PLAN:  1. Complete a comprehensive neuropsychological assessment to provide a differential diagnosis of presenting concerns as well as to assist with disposition and treatment planning as appropriate. 2. Consider compensatory and remedial cognitive training. 3. Consider nonpharmacological interventions for mood disorder. 4. Consider an adaptive driving evaluation. [id number] x 1 Review of records. Face to face interview w/ patient. Determine test protocol: 60 minutes. Total 1 unit    Dieudonne Marie, PHD  Licensed Clinical Psychologist    This note was created using voice recognition software. Despite editing, there may be syntax errors. Debbie Dave is a 36 y.o. female who was evaluated by an audio-video encounter for concerns as above. Patient identification was verified prior to start of the visit. Pursuant to the emergency declaration under the [facility], [facility] waiver authority and the [employer] Act, this Virtual Visit was conducted, with patient's (and/or legal guardian's) consent, to reduce the patient's risk of exposure to COVID-19 and provide necessary medical care. Services were provided through a synchronous discussion virtually to substitute for in-person clinic visit. I was in the office. The patient was at home. This note will not be viewable in [email or web address] for the following reason(s). This is a Psychotherapy Note.  ([address] [facility] [address] Providers Only)

## 2021-02-23 ENCOUNTER — HOSPITAL ENCOUNTER (OUTPATIENT)
Dept: BARIATRICS/WEIGHT MGMT | Age: 41
Discharge: HOME | End: 2021-02-23

## 2021-02-23 ENCOUNTER — DOCUMENTATION ONLY (OUTPATIENT)
Dept: BARIATRICS/WEIGHT MGMT | Age: 41
End: 2021-02-23

## 2021-02-23 ENCOUNTER — DOCUMENTATION ONLY (OUTPATIENT)
Dept: MEDSURG UNIT | Age: 41
End: 2021-02-23

## 2021-02-23 ENCOUNTER — TELEPHONE (OUTPATIENT)
Dept: MEDSURG UNIT | Age: 41
End: 2021-02-23

## 2021-02-23 NOTE — PROGRESS NOTES
CLINICAL NUTRITION PRE-OPERATIVE EDUCATION    Patient's Name: Corona Her   Age: 36 y.o. YOB: 1980   Sex: female    Education & Materials Provided:   - Soft Protein Diet Shopping List  -  Supplemental Resource Guide: MVI, B12, Calcium Citrate, Vitamin D, Vitamin B1,   and iron recommendations  - Protein Supplement Information  - Fluid Requirements/ No Straws  - No Caffeine or Carbonation   - No alcohol              - No Snacks or No Concentrated Sweets     - Exercising   - Support System at [facility] of Support Group meetings. Support System after surgery includes: x     - Key Diet Principles            - Addressed Current Habits/Changes to Make   - Patient has been educated on the liquid diet to begin 1 week prior to surgery. Patient understands the transition of the diet. Attendance of support group: Yes    Summary:  Patient has completed the required amount of visits with the Registered Dietitian. During these nutrition visits, we focused on dietary changes, behavior changes, and the importance of establishing an exercise routine. The diet protocol that patient was prescribed emphasized on low carbohydrates (less than 100 grams per day prior to surgery and 60-80 grams of protein per day). At today's session, patient was educated on the post-op diet protocol. Patient understands the importance of keeping total fat and sugar less than 3 grams per serving. Patient is aware of the transition of the diet stages and is aware that they will be on clear liquids for 7days, followed by soft protein for 5 weeks. Patient understands the body needs ~ 60-70 grams of protein per day. During the liquid phase, patient will need 60 grams of protein from shakes. Once eating soft protein, patient will only need 1 shake per day. Patient is aware of the importance of the vitamins and protein drinks. We spent a lot of time talking about the vitamins.   Patient understands the importance of being compliant with the diet protocol and the complications and risks that can occur if they are non-compliant with the nutritional protocol and non-compliant with the vitamins. Patient has also been educated on the pre-op liquid diet for 1 week. Patient understands that failure to comply in pre-op liquid diet could result in surgery being canceled. Patient's 6 week post-op nutrition appointment has been scheduled. At this 6 week visit, RD will assess how patient is tolerating soft protein and advance to vegetables, if tolerating soft protein without difficulty. Patient will also see RD again at 9 months post-op. This visit will assess patient's compliance with current protocol, including diet, vitamins, protein shakes, and exercise. Post-op diet guidelines will be reinforced. RD is available for questions and to meet with patient outside of the 6 week and 9 month post-op visit. Ok to proceed.      Candidate for surgery: Yes  Re-evaluation Date:     Archie Kapoor [address] RD  2/23/2021

## 2021-02-24 ENCOUNTER — TELEPHONE (OUTPATIENT)
Dept: MEDSURG UNIT | Age: 41
End: 2021-02-24

## 2021-02-24 RX ORDER — ENOXAPARIN SODIUM 100 MG/ML
40 INJECTION SUBCUTANEOUS DAILY
Qty: 7 SYRINGE | Refills: 0 | Status: SHIPPED | OUTPATIENT
Start: 2021-02-24 | End: 2021-03-03

## 2021-02-24 NOTE — PROGRESS NOTES
Attended pre op class. Patient was educated on instructions below. Patient was provided a copy and all instructions were understood. Patient  was provided a copy and instructed to call with any questions. Patient was provided phone number to call. Patient verbalized understanding. Reviewed Lovenox  7-Day Preoperative Liquid Diet  Benefits:  ? Reducing intake before surgery will shrink  your liver by depleting glycogen (a form of  stored energy). ? Reduced liver size gives better access to  stomach during surgery, which translates  to a safer surgery. ? Prevents the ?last supper? syndrome. Attended pre op class. Patient was educated on instructions below. Patient was provided a copy and all instructions were understood. Patient  was provided a copy and instructed to call with any questions. Patient was provided phone number to call. Patient verbalized understanding. ? Experiencing weight loss before the  procedure encourages postoperative  compliance and ?jump-starts? weight loss. Specifics:  ? Start seven days before surgery:  ____________________.  ? NO SOLID FOODS!!   Your surgery will be CANCELED if this  diet is not followed!!!  ? Minimum of 64 ounces of fluid daily,  including protein drinks. ? No added sugar or carbonated beverages. ? Continue to take all your prescribed  medication and your vitamin supplements  during this preoperative diet phase. Clear liquids:  ? Water. ? Sugar free, non-carbonated beverages  (crystal light, propel). ? Sugar free popsicles. ? Sugar free Jell-O.  ? Fat free, reduced sodium broth . ? Decaffeinated coffee or decaffeinated tea  with artificial sweeteners. Protein:  ? 60 grams of protein daily  (in liquid supplements). ? Pre-made protein drinks. ? Protein powder added to water. ? 3 gram rule -- limit sugar and fat to less  than 3 grams per 8 ounces.   ? 4 to 6 ounces of low fat/low sugar yogurt  OR cottage cheese three to four times  during the week.  [facility] Gastric Bypass and Sleeve Dietary Progression  Date of Surgery: _ ______________________________________________________________  Ice chips start once admitted on floor. Clear liquid diet.  Begin bariatric clear liquid diet on: _ ______________________________________________   64 ounces of fluid per day. ? Low calorie, low sugar, non-carbonated beverages:  -- Water, Crystal Light, Propel Water, Sugar Free Jell-O, Sugar Free Popsicles, bouillon. -- Start protein supplement during this stage (60 to 70 grams per day). -- Getting your fluid in and staying hydrated is your number one priority! ? The clear liquid diet will last for seven days. Bariatric soft and moist diet.  Begin bariatric soft and moist diet on: _____________________________________________  ? This stage of the diet will last for five weeks, unless otherwise instructed by your surgeon. ? Begin -- one week after surgery. ? End -- six weeks after surgery (or when you follow up with the registered dietitian). ? Soft, moist, high protein foods -- three meals per day plus protein supplements. -- Portions should emphasize on soft protein. -- Portions will be a MAXIMUM of 1 ounce of solid food and 2 to 3 ounces of cottage cheese and yogurt. -- Protein supplements should be between meals and provide 30 to 40 grams per day during  soft protein diet. -- Continue to get 64 ounces of fluid in per day. ? Protein foods that are OK (SLOW TRANSITION) on the soft and moist diet:  -- First week on soft protein should focus on yogurt, cottage cheese, eggs, VEGETARIAN refried  beans, black beans, kidney beans and white beans. (NO BAKED BEANS.)  -- Second through fourth weeks should focus on yogurt, cottage cheese, eggs, canned tuna,  canned chicken, tilapia and fish (needs to be soft enough to be cut up with a fork).   -- Fifth week on soft protein diet should focus on yogurt, cottage cheese, eggs, canned tuna,  canned chicken, tilapia, fish, salmon, chicken breast or turkey. Remember to continue to get 64 ounces of fluid daily on ALL stages. To be advanced to bariatric maintenance stage of the bariatric diet, follow up with the dietitian  six weeks after surgery, around: Things I Need to Know Before Surgery  1. How many ounces of fluid will you need to drink every day after surgery? _________________  2. List three examples of bariatric clear liquids. __________________________________________________________________________  __________________________________________________________________________  __________________________________________________________________________  3. What is the 3 gram rule? ______________________________________________________  __________________________________________________________________________  __________________________________________________________________________  4. What is the 30 minute rule? ____________________________________________________  __________________________________________________________________________  __________________________________________________________________________  5. What are examples of food you will be able to eat on the bariatric soft and moist diet?  __________________________________________________________________________  __________________________________________________________________________  __________________________________________________________________________  6. After surgery I will be able to use a straw. ? TRUE ? FALSE  7. After surgery I will NOT be able to drink carbonated beverages. ? TRUE ? FALSE  -- 26 --  PATIENT GUIDE TO BARIATRIC [address] [facility]  8. After surgery I will be able to drink caffeine. ? TRUE ? FALSE  9.  What four vitamins will you take after surgery?  _____________________________________ _ ___________________________________  _____________________________________ Fernando Staton ___________________________________  10. How much exercise should you get daily? _________________________________________  __________________________________________________________________________  11. How long should it take you to eat a meal? _ _______________________________________  12. The calcium I have purchased is: ________________________________________________ . This has _________ mg per serving. I will need to take this _________ times a day. 13. The protein drink I have decided on is: ____________________________________________ . This has _________ grams of protein. I will need to drink _________ of my protein drinks  during the full liquid phase and _________ during the soft protein phase. My protein drinks  will give me _________ ounces of fluid. 14. After having a sleeve gastrectomy I will not be able to take NSAIDs  (non-steroidal anti-inflammatory drugs) for _________ weeks. 15. After having a gastric bypass I will not be able to take NSAIDs  (non-steroidal anti-inflammatory drugs) for _____________ weeks. ___________________________________________________    Medications  Please discuss all of your current medications with your surgeon at your preoperative appointment. Your surgeon will inform you regarding which medications to stop before surgery and which  medications you are to take the morning of surgery. Medications to Stop  To minimize the risk of blood loss during surgery,  you must avoid or stop taking medicines that  contain anti-inflammatories, blood thinners, arthritis  medications, and herbal supplements seven to 14 days  before your surgery. A nurse from pre-anesthesia  testing will review your list of medication. Your current  medications will be reviewed at your preoperative  appointment with your surgeon. Note: You may take prescribed narcotics, such as  Vicodin, Ultram and Neurontin.   Diabetic Medications  Adjustments in your diabetic medications will be discussed with your surgeon at your  preoperative appointment. Birth Control  In order to decrease your chance of getting a postoperative blood clot you will be required to stop  any oral contraceptive two weeks before your surgery date. During this time it is your responsibility  to use an effective form of birth control. You will be required to take a pregnancy test the morning  of surgery at the hospital and surgery will be canceled if you are pregnant. We strongly encourage  that you resume your birth control two weeks after surgery or after your first menstrual cycle  following surgery. -- 28 --  PATIENT GUIDE TO BARIATRIC [address] [place] [address] Rd  Non-Steroidal Anti-Inflammatory Drugs (NSAIDs)  Bypass:  One class of medications to avoid after German-en-Y gastric  bypass is NSAIDs. These can cause ulcers or stomach irritation  and are linked to a kind of ulcer called a marginal ulcer after  gastric bypass. Marginal ulcers can bleed or perforate. Usually  they are not fatal, but they can cause months or years of pain,  and are a common cause of re-operation and reversal of gastric  bypass. You will NEVER be able to take NSAIDs again. Your only choice for over the counter pain medication will be  Tylenol (acetaminophen). Steroid use can also be harmful to your stomach but may be necessary in some situations. Please consult with your bariatric surgeon and prescribing physician for approval.  Sleeve gastrectomy:  Following a sleeve gastrectomy you will not be allowed to take NSAIDs unless it has been  discussed and approved by your surgeon. Most commonly taken NSAIDs to be AVOIDED!! 1. Ibuprofen  2. Advil  3. Motrin  4. Excedrin  5. Aspirin  6. Celebrex  7. Naproxen  8. Aleve  9. Voltaren  10. Mobic  The following is a list of NSAIDS that are NEVER to be taken again after gastric bypass surgery:  Ibuprofen which is also known as:  Advil, Advil Childrens, Advil Shilo Strength, Advil Liquigel,  Advil Migraine, Advil Pediatric, Childrens Ibuprofen Berry, Genpril, IBU, Midol IB, Midol Maximum  Strength Cramp Formula, Dolgesic, Motrin Childrens, Motrin IB, Motrin Infant Drops, Motrin Shilo  Strength, Motrin Migraine Pain, Nuprin, Migraine Liqui-gels, Ibu-Tab 200, Cap-Profen, Tab-Profen,  Profen, Ibuprohm, Children?s Elixsure, IB Pro, Vicoprofen, Combunox, A-G Profen, Actiprofen,  Addaprin, Advil Infants Concentrated Drops, Caldolor, [place], Q-Profen, Ibifon 600, Ibren,  [place], Midol Cramps & Bodyaches, [place], [place], [place], [place], [place], [facility]. -- 29 --  PATIENT GUIDE TO BARIATRIC [address] [place] [address]/HOSPITAL [facility]  Aspirin which has the brand name:  Arthritis Pain, Aspergum, Aspir-Low, Aspirin  Lite Coat, Bruno Aspirin, Bufferin, Easprin,  Ecotrin, Empirin, Fasprin, [place], Halfprin,  [place] Aspirin, [place] Aspirin, Excedrin. Ketoprofen which is known as: Orudis KT,  Oruvail, Actron. Sulindac which is sold as: Clinoril. Naproxen is one of the most common NSAIDs,  and is sold as: Aleve, Naprosyn, EC-Naprosyn,  Naprelan, Anaprox, All Day Pain Relief,  Aflaxen, All Day Relief, Anaprox-DS, Comfort  Pac With Naproxen, Aleve Caplet, Aleve Easy  Open Arthritis, Aleve Gelcap, Anaprox-DS,  EC-Naprosyn, Leader Naproxen Sodium, Midol  Extended Relief, Naprelan 375, Naprelan  500, Naprelan 750, Prevacid NapraPac 375,  Prevacid NapraPac, Naprapac, Vimovo. Etodolac is sold under the brand name:  Lodine XL, Lodine. Fenoprofen can be found on the market as:  Nalfon, Nalfon 200. Diclofenac sold as: Arthrotec, Cataflam,  Voltaren, Cambia, Voltaren-XR, Zipsor. Flurbiprofen sold as: Asaid. Ketorolac is sold under the brand name:  Sprix, Toradol, Toradol IM, Toradol IV/IM. Piroxicam is found on the market as: Feldene. Indomethacin known as: Indocin SR, Indocin,  Indo-Andre, Indomethegan, Indocin IV. Mefenamic Acid sold as: Ponstel.   Meloxicam is sold under the brand name:  Mobic. Nabumetone which is sold as: Relafen. Oxaprozin which has the brand name: Daypro. Ketoprofen which has the brand name:  Actron, Orudis KT, Orudis, Oruvail. Famotidine and Ibuprofen can be found as:  Duexis. Meclofenamate sold as: Meclomen. Tolmetin which can be found on the marked  as: Tolectin, Tolectin 600, Tolectin DS. Salsalate which is sold as: [facility]. Celecoxib which is sold as: Celebrex. -- 30 --  [facility]  Smoking  It is required that ALL patients stop smoking  (including e-cigarettes) and chewing tobacco  three months before surgery. Prior to surgery  your surgeon will order a test to verify that  you have quit. Your surgery will be canceled  if you are smoking. Smoking or chewing tobacco leads to  decreased blood supply to your body?s tissues  and delays healing. Smoking harms every  organ in the body and has been linked to:  ? Blood clots (the leading cause of death after  bariatric surgery). ? Marginal ulcers after gastric bypass. ? Heart disease. ? Stroke. ? Chronic obstructive pulmonary  (lung) disease. ? Increased risk for hip fracture. ? Cataracts. ? Cancer of the mouth, throat, esophagus,  larynx (voice box), stomach, pancreas,  bladder, cervix, and kidney. Pregnancy  It is in your best interest to avoid pregnancy for  12 to 18 months after surgery. Pregnancy during  the rapid weight loss phase can be dangerous  and harmful to both mother and fetus. Do you have an effective method of birth  control? Please consult with your OB/GYN or  PCP for consultation. Alcohol  Alcohol is not recommended after bariatric  surgery. Alcohol contains calories but minimal  nutrition and will work against your weight  loss goal. For example, wine contains twice  the calories per ounce that regular soda does.   The absorption of alcohol changes with gastric  bypass and gastric sleeve because an enzyme  in the stomach which usually begins to digest  alcohol is absent or greatly reduced making  alcohol more potent. Alcohol may also be absorbed more quickly  into the body after gastric bypass or gastric  sleeve. The absorbed alcohol will be more  potent, and studies have demonstrated  that obesity surgery patients reach a higher  alcohol level and maintain the higher levels for  a longer period than others. In some patients  alcohol use can increase and lead to alcohol  dependence or addiction. For all of these  reasons, it is recommended to avoid alcohol  after bariatric surgery. Things to do before surgery:  ? Start the preoperative liquid diet on: _____________________________________________  ? Stop all NSAIDS (see page 30) and aspirin seven days before my surgery: _________________ .  Richmond Butterfieldt my doctor(PCP or surgeon) regarding stopping Coumadin, Plavix or  other blood thinners. ? Purchase bariatric clear liquids (Crystal Lite, sugar free Jell-O, broth, sugar free popsicles,  protein supplement) and bariatric soft and moist foods (low fat yogurt, cottage cheese, eggs,  tuna, fish, chicken) so that I?m prepared when I get home from the hospital.  ? Purchase all vitamins that will be required following surgery. o Chewable multivitamin -- two per day (ex: Flintstones Complete). o Calcium Citrate -- 1,500 milligrams (500 milligrams, three times a day). o Vitamin B-12 -- 1,000 micrograms daily. o Vitamin D3 -- 5,000 IU daily. ? Create a system to keep track of how many ounces of fluid I am drinking daily  o Postoperative GOAL = minimum of 64 ounces per day. ? Purchase a protein supplement that I like.  o Brand: _ _________________________________________________________________ .  o Ounces: _________________________________________________________________ .  Jani Phillips of protein per serving: _________________________________________________ .   Before [address]  ? [place] your teeth -- upon awakening, you may brush your teeth and rinse with water, but do not  swallow the water. ? Take medication -- take only the medications as instructed by your provider with a small sip of  water as soon as you get up. ? Wear proper clothing that is loose-fitting and easily removed. ? Avoid back zippers and pantyhose. ? Please remove ALL jewelry (leave ALL jewelry and valuables at home). ? Avoid using perfumes, deodorant, shaving creams or any scented lotions. ? Bring a case with your name on it to hold your eyeglasses, contact lenses, hearing aids  and dentures. If you do not see your providers clean their hands, please ask them to do so.  ? Family and friends who visit you should not touch the surgical wound or dressings. ? Family and friends should clean their hands with soap and water or an alcohol-based hand  rub before and after visiting you. If you do not see them clean their hands, ask them to clean  their hands. ? Make sure you understand how to care for your incision before you leave the hospital.  ? Always clean your hands before and after caring for your incision. ? Make sure you know who to contact if you have questions or problems after you get home. ? If you have any symptoms of an infection, such as redness and pain at the surgery site, drainage,  or fever, call your doctor immediately. ? Ask your nursing staff to help you out of bed to walk within five hours of arriving at your  hospital room. Getting out of bed to walk helps to decrease complications, such as blood clots  and pneumonia. Length of Stay  You are many types of pain control methods that are available to control discomfort. Effective  pain control will allow you to be up and walking shortly after surgery. Your doctor will choose  the method that is right for you. Regardless of the method of pain medication being used, it is  important for you to communicate with your nurse if the pain medication is not enough.  Call your  nurse for pain medication when the pain is moderate instead of waiting for when pain is severe. What type of pain should I expect? ? Abdominal pain  ? Rib pain  ? Shoulder pain  ? Judieth Cue feeling in the center of your chest  Nausea:  Nausea following bariatric surgery is very  common. Causes include:  ? Anesthesia  ? Drinking too fastYou will be allowed 1 to 2 ounces of ice chips per hour when you are admitted to the floor. They are solely for your comfort. They are not required. ? You will be expected to walk the night of your surgery. Please ask your nurse or nursing assistant  for help the first time you walk. Please do not walk if you still feel groggy or unsteady on your feet. ? Your pain will be controlled with oral and IV pain medication on the day of surgery. ? In order to prevent pneumonia after surgery you please use your incentive spirometer (ICS)  at least 10 times per hour (see below). ? Pain medication  ? Surgery itself  I understand the serious potential complications include: deep venous thrombosis/pulmonary  embolism (blood clots in the legs and or lungs); gastrointestinal leak (leakage of digestive contents  into the abdomen); sepsis (serious infection); injury to adjacent organs such as esophagus, spleen,  pancreas, liver, diaphragm (requiring intervention or surgical removal); excessive bleeding; bowel  obstruction (blockage of the intestines); or organ failure. I am informed that these complications  can be life threatening. The overall mortality rate (risk of death) from bariatric surgery is close to  0.1 percent (1/1,000), but can be as high as 0.5 percent (1/200) for some patients. Patient initials: ______________  I understand that complications requiring re-operation may occur, either immediately after initial  surgery, or later in the recovery process.   Patient initials: ______________  Other potential complications which I may have include: wound infections or seromas  (fluid collection under skin); hernias (breakdown of tissue holding in abdominal contents);  gastritis or stomach ulcer (inflammation of the stomach); formation of gallstones; formation  of kidney stones or urinary tract infection; or pneumonia. Device related complications such as  foreign body reaction, band slippage, or erosion may occur with the adjustable gastric bandIwill  Pre-op instructions:  1. Shower with the antibacterial soap  the 3 days prior to surgery. 2. Pre-admission testing will contact you 1 week before surgery  3. [facility] will contact you the day before surgery to confirm your surgery time.  Email or call your surgery scheduler if you have not heard from them by 3pm  4. Nothing to eat or drink (NPO) after midnight the night before surgery.  Take any evening medications by 11pm  5. Wear comfortable clothing. 6. Do not bring any valuables. 7. Bring insurance card, prescription card, photo ID and credit card to the hospital.  **Discuss all home medications with your surgeon at you pre-op appointment. Your surgeon will inform you of what medications to stop before surgery and what medications to take the day of surgery. **STOP all NSAIDS (Ibuprofen, Aleve, Advil, Naprosyn etc.) and Aspirin 7 days before your surgery      Important Information:  1. No lifting over 15 lbs. for 6 weeks post-op  2. No driving for 7-66 days after surgery - must be off pain medication. 3. No smoking EVER again! 4. You will NOT be able to take any Non-Steroidal Anti-inflammatories (NSAIDS), Aspirin or Steroids  a. Bypass/revision patients - EVER again. (Tylenol only)  b. Sleeve patients - 6 weeks after surgery  5. Pulse/temperature- twice daily for 2 weeks post-op   6. Avoid pregnancy for 18 months  7. Key Diet principles:  a. Vitamin/mineral supplements-[place] Complete, Calcium citrate, Vitamin D3, Vitamin B12  b. Protein supplements - 60-70 grams/day  c. Minimum 64 oz. fluid per day  d.        What to Expect in the Hospital:  Pre-op area:  o Emergency door take elevator 2nd floor  o Arrive 1.5 hours before surgery  o Lovenox (blood thinner)  o Incentive Spirometer  o SCDs  o Sign consent  o Anesthesia  Start IV    Operating Room:    o Gastric bypass: 2- 2 ½ hours  o Sleeve gastrectomy: 1-1 ½ hours  o Revision: 2-3 hours    PACU(Post Anesthesia Care Unit):  o Nasal cannula  o EKG monitor  o Blood pressure cuff  o Pulse Ox  o Vines Catheter  o SCDs  o No family allowed  o Minimum one hour  There are many types of pain control methods that are available to control discomfort. Effective  pain control will allow you to be up and walking shortly after surgery. Your doctor will choose  the method that is right for you. Regardless of the method of pain medication being used, it is  important for you to communicate with your nurse if the pain medication is not enough. Call your  nurse for pain medication when the pain is moderate instead of waiting for when pain is severe. What type of pain should I expect? ? Abdominal pain  ? Rib pain  ? Shoulder pain  ? [place] Keel feeling in the center of your chest  Nausea:  Nausea following bariatric surgery is very  common. Causes include:  ? Anesthesia  ? Drinking too fast  ? Pain medication  ? Surgery itself    Expectations on your Day of Surgery  ? You will be allowed 1 to 2 ounces of ice chips per hour when you are admitted to the floor. They are solely for your comfort. They are not required. ? You will be expected to walk the night of your surgery. Please ask your nurse or nursing assistant  for help the first time you walk. Please do not walk if you still feel groggy or unsteady on your feet. ? Your pain will be controlled with oral and IV pain medication on the day of surgery.   ? In order to prevent pneumonia after surgery you please use your incentive spirometer (ICS)  at least 10 times per hour (see below    2 Central (Day of Surgery):  o Private room  o 1-2 oz. ice chips per hour   o IV Dilaudid  or liquid pain medication as needed for pain  o Discontinue mauro catheter  o Walk within 4 hours  o One family member can spend the night (must 18 years or older)  o Cell phone/computers - OK    2 Central (Post-op Day 1/Post-op Day 2):  o 7am - Gastrograffin swallow study (X-ray) for:  o All sleeve gastrectomy patients  o All revision patients   o Discontinue -nasal cannula and heart rate monitor  o Start bariatric clear liquid diet - water, crystal light, broth, Jell-O and protein supplement  o Slowly increase to 3 oz. clear liquids and 1 oz. protein supplement per hour  o Oral pain medication as needed for pain - communicate with your nurse  o Goal:  48 to 64 ounces, clear liquid per day preferable water  o Discharge instructions/Lovenox self-injection instructions   o WALK & WATER    Post-op Goals:  1. Void within 8 hours of your catheter being removed  2. Pain is well controlled with oral pain medication  3. Nausea is under control/No vomiting  4. Tolerating 3 oz. of clear liquids and 1 oz. protein supplement per hour for 3 consecutive hours. Post-op Follow-up Labs will need to be completed 1-2 weeks BEFORE your 3 month, 6 month and yearly visits. These labs are required and your appointment will be rescheduled if they are not completed. My surgical procedure and post-operative hospital stay has been discussed with me and I have been given the opportunity to ask any questions I may have.     Patient Signature: ____________________________  Date: _____________________

## 2021-02-26 ENCOUNTER — HOSPITAL ENCOUNTER (OUTPATIENT)
Dept: PREADMISSION TESTING | Age: 41
Discharge: HOME | End: 2021-02-26
Payer: COMMERCIAL

## 2021-02-26 DIAGNOSIS — I10 ESSENTIAL HYPERTENSION: ICD-10-CM

## 2021-02-26 DIAGNOSIS — E66.01 MORBID OBESITY (HCC): ICD-10-CM

## 2021-02-26 LAB
ANION GAP SERPL CALC-SCNC: 5 MMOL/L (ref 3–18)
BUN SERPL-MCNC: 8 MG/DL (ref 7–18)
BUN/CREAT SERPL: 13 (ref 12–20)
CALCIUM SERPL-MCNC: 8.8 MG/DL (ref 8.5–10.1)
CHLORIDE SERPL-SCNC: 106 MMOL/L (ref 100–111)
CO2 SERPL-SCNC: 27 MMOL/L (ref 21–32)
CREAT SERPL-MCNC: 0.62 MG/DL (ref 0.6–1.3)
GLUCOSE SERPL-MCNC: 75 MG/DL (ref 74–99)
POTASSIUM SERPL-SCNC: 4.4 MMOL/L (ref 3.5–5.5)
SODIUM SERPL-SCNC: 138 MMOL/L (ref 136–145)

## 2021-02-26 PROCEDURE — 80048 BASIC METABOLIC PNL TOTAL CA: CPT

## 2021-02-26 PROCEDURE — 36415 COLL VENOUS BLD VENIPUNCTURE: CPT

## 2021-02-26 PROCEDURE — U0003 INFECTIOUS AGENT DETECTION BY NUCLEIC ACID (DNA OR RNA); SEVERE ACUTE RESPIRATORY SYNDROME CORONAVIRUS 2 (SARS-COV-2) (CORONAVIRUS DISEASE [COVID-19]), AMPLIFIED PROBE TECHNIQUE, MAKING USE OF HIGH THROUGHPUT TECHNOLOGIES AS DESCRIBED BY CMS-2020-01-R: HCPCS

## 2021-02-27 LAB — SARS-COV-2, COV2NT: DETECTED

## 2021-03-03 ENCOUNTER — TELEPHONE (OUTPATIENT)
Dept: SURGERY | Age: 41
End: 2021-03-03

## 2021-03-03 NOTE — TELEPHONE ENCOUNTER
Patient was scheduled for surgery on 3/3/21 with Dr. Parag Bowers she got call that she had COVID and had to rescheduled. She ha:s the following symptoms no taste or smell with a small cough. She will call the office next week to let us know how she is feeling.

## 2021-03-11 ENCOUNTER — VIRTUAL VISIT (OUTPATIENT)
Dept: FAMILY MEDICINE CLINIC | Age: 41
End: 2021-03-11
Payer: COMMERCIAL

## 2021-03-11 DIAGNOSIS — U07.1 COVID-19: Primary | ICD-10-CM

## 2021-03-11 PROCEDURE — 99213 OFFICE O/P EST LOW 20 MIN: CPT | Performed by: FAMILY MEDICINE

## 2021-03-11 NOTE — PROGRESS NOTES
Darlin Alvarez is a 36 y.o. female who was seen by synchronous (real-time) audio-video technology on 3/11/2021 for Concern For COVID-19 (Coronavirus) and Nausea        Assessment & Plan:   Diagnoses and all orders for this visit:    1. COVID-19      As above,   Essentially stable  Pt advised to continue to convalesce and to stay home if she is still ill  Follow-up and Dispositions    · Return if symptoms worsen or fail to improve. This has been fully explained to the patient, who indicates understanding. AVS is accessible thru [employer] and pt has been advised of same. 712  Subjective:   Has  had COVID  She is recovering  She still feels very tired  She has a mild cough; she still has some nausea  She is essentially through her quarantine  She has been advise that she is able to ;eave quarantine when she is back at her baseline. Prior to Admission medications    Medication Sig Start Date End Date Taking? Authorizing Provider   buPROPion SR (WELLBUTRIN SR) 100 mg SR tablet Take 1 Tab by mouth daily for 21 days, THEN 1 Tab two (2) times a day for 30 days. 2/12/21 4/4/21 Yes Becki Cosby NP   cyclobenzaprine (FLEXERIL) 5 mg tablet Take 2 Tabs by mouth three (3) times daily (with meals). 8/25/20  Yes Bogdan Rascon,    hydroCHLOROthiazide (HYDRODIURIL) 25 mg tablet Take 1 Tab by mouth daily.  6/24/20  Yes Darrius Gardner MD     Patient Active Problem List    Diagnosis Date Noted    Hypertension     Scoliosis     Morbid obesity ([facility] [address] 03/26/2020    Essential hypertension 03/26/2020    Anxiety 05/11/2018    Depression 05/11/2018    BMI 50.0-59.9, adult ([facility] [address] 05/11/2018    Scoliosis of thoracic spine 06/29/2017    Back pain     Prediabetes 08/19/2015     No Known Allergies  Past Medical History:   Diagnosis Date    Abnormal MRI of head 2010    Nonspecific focus of low attenuation at the right internal capsule on CT and MRI    Anxiety     Chronic low back pain 2014    unknown cause- normal xrays    Depression     Diabetes ([facility] [address]     Pre-DM- no meds    Hypertension     no meds now    Memory loss     Prediabetes     Scoliosis     Stroke [facility]) 2008    denies residual     No past surgical history on file. Family History   Problem Relation Age of Onset    Diabetes Other     Hypertension Other     Heart Disease Other     Asthma Other     Arthritis-osteo Other     Diabetes Mother     Hypertension Mother      Social History     Tobacco Use    Smoking status: Never Smoker    Smokeless tobacco: Never Used   Substance Use Topics    Alcohol use: Yes     Alcohol/week: 1.0 standard drinks     Types: 1 Glasses of wine per week     Frequency: Monthly or less     Comment: socially       ROS  neg    Objective:   No flowsheet data found. General: alert, cooperative, no distress   Mental  status: normal mood, behavior, speech, dress, motor activity, and thought processes, able to follow commands   HENT: NCAT   Neck: no visualized mass   Resp: no respiratory distress   Neuro: no gross deficits   Skin: no discoloration or lesions of concern on visible areas   Psychiatric: normal affect, consistent with stated mood, no evidence of hallucinations     Additional exam findings: none      We discussed the expected course, resolution and complications of the diagnosis(es) in detail. Medication risks, benefits, costs, interactions, and alternatives were discussed as indicated. I advised her to contact the office if her condition worsens, changes or fails to improve as anticipated. She expressed understanding with the diagnosis(es) and plan. Bradley Chao, was evaluated through a synchronous (real-time) audio-video encounter. The patient (or guardian if applicable) is aware that this is a billable service. Verbal consent to proceed has been obtained within the past 12 months.  The visit was conducted pursuant to the emergency declaration under the [facility], [facility] waiver authority and the [employer] General Act. Patient identification was verified, and a caregiver was present when appropriate. The patient was located in a state where the provider was credentialed to provide care.     Chloe Tate MD

## 2021-03-15 NOTE — PATIENT INSTRUCTIONS
10 Things to Do When You Have COVID-19 Stay home. Don't go to school, work, or public areas. And don't use public transportation, ride-shares, or taxis unless you have no choice. Leave your home only if you need to get medical care. But call the doctor's office first so they know you're coming. And wear a cloth face cover. Ask before leaving isolation. Talk with your doctor or other health professional about when it will be safe for you to leave isolation. Wear a cloth face cover when you are around other people. It can help stop the spread of the virus when you cough or sneeze. Limit contact with people in your home. If possible, stay in a separate bedroom and use a separate bathroom. Avoid contact with pets and other animals. If possible, have a friend or family member care for them while you're sick. Cover your mouth and nose with a tissue when you cough or sneeze. Then throw the tissue in the trash right away. Wash your hands often, especially after you cough or sneeze. Use soap and water, and scrub for at least 20 seconds. If soap and water aren't available, use an alcohol-based hand . Don't share personal household items. These include bedding, towels, cups and glasses, and eating utensils. Clean and disinfect your home every day. Use household  or disinfectant wipes or sprays. Take special care to clean things that you grab with your hands. These include doorknobs, remote controls, phones, and handles on your refrigerator and microwave. And don't forget countertops, tabletops, bathrooms, and computer keyboards. Take acetaminophen (Tylenol) to relieve fever and body aches. Read and follow all instructions on the label. Current as of: December 18, 2020               Content Version: 12.7 © 2006-2021 [employer]wise, [employer].   
Care instructions adapted under license by [employer] (which disclaims liability or warranty for this information). If you have questions about a medical condition or this instruction, always ask your healthcare professional. [place] [address] any warranty or liability for your use of this information.

## 2021-03-23 ENCOUNTER — TELEPHONE (OUTPATIENT)
Dept: SURGERY | Age: 41
End: 2021-03-23

## 2021-03-23 NOTE — TELEPHONE ENCOUNTER
Called patient to let her know that I was unable to get the pcp by phone after holding for 4 hours and 10 minutes to get a recording that states due to technical issues we are unable to take your phone call. The patient needs to have a pap smear and a mammo asap in order to have weight loss surgery with Dr. Eulalia Castillo. Also sent fax over to office so that they could contact the patient and get her scheduled. She also tested positive for COVID-19 2/26/21 and was last virtually seen by pcp on 3/11, but she was unaware at the time that she needed to have these things completed since her surgery will no longer be with  as he is leaving the practice. Patient stated she was going to go up to the practice and see if she could get an appointment made or call and follow up with us when these things have been completed.

## 2021-04-07 ENCOUNTER — TELEPHONE (OUTPATIENT)
Dept: NEUROLOGY | Age: 41
End: 2021-04-07

## 2021-04-28 ENCOUNTER — HOSPITAL ENCOUNTER (OUTPATIENT)
Dept: LAB | Age: 41
Discharge: HOME | End: 2021-04-28
Payer: COMMERCIAL

## 2021-04-28 ENCOUNTER — OFFICE VISIT (OUTPATIENT)
Dept: FAMILY MEDICINE CLINIC | Age: 41
End: 2021-04-28
Payer: COMMERCIAL

## 2021-04-28 VITALS
OXYGEN SATURATION: 99 % | BODY MASS INDEX: 47.09 KG/M2 | HEART RATE: 85 BPM | DIASTOLIC BLOOD PRESSURE: 95 MMHG | HEIGHT: 66 IN | RESPIRATION RATE: 24 BRPM | SYSTOLIC BLOOD PRESSURE: 138 MMHG | WEIGHT: 293 LBS

## 2021-04-28 DIAGNOSIS — Z12.4 SCREENING FOR CERVICAL CANCER: Primary | ICD-10-CM

## 2021-04-28 DIAGNOSIS — Z12.31 OTHER SCREENING MAMMOGRAM: ICD-10-CM

## 2021-04-28 PROCEDURE — 99396 PREV VISIT EST AGE 40-64: CPT | Performed by: FAMILY MEDICINE

## 2021-04-28 PROCEDURE — 88175 CYTOPATH C/V AUTO FLUID REDO: CPT

## 2021-04-28 NOTE — PROGRESS NOTES
Mami Bennett presents today for   Chief Complaint   Patient presents with   Azucena Alina Exam       Is someone accompanying this pt? No    Is the patient using any DME equipment during OV? No    Depression Screening:  3 most recent PHQ Screens 4/28/2021   PHQ Not Done -   Little interest or pleasure in doing things Not at all   Feeling down, depressed, irritable, or hopeless Not at all   Total Score PHQ 2 0   Trouble falling or staying asleep, or sleeping too much -   Feeling tired or having little energy -   Poor appetite, weight loss, or overeating -   Feeling bad about yourself - or that you are a failure or have let yourself or your family down -   Trouble concentrating on things such as school, work, reading, or watching TV -   Moving or speaking so slowly that other people could have noticed; or the opposite being so fidgety that others notice -   How difficult have these problems made it for you to do your work, take care of your home and get along with others -       Learning Assessment:  Learning Assessment 1/31/2020   PRIMARY LEARNER Patient   HIGHEST LEVEL OF EDUCATION - PRIMARY LEARNER  -   BARRIERS PRIMARY LEARNER -   CO-LEARNER CAREGIVER -   PRIMARY LANGUAGE ENGLISH   LEARNER PREFERENCE PRIMARY LISTENING     -     -   ANSWERED BY patient   RELATIONSHIP SELF       Travel Screening:    Travel Screening      No screening recorded since 04/27/21 0000      Travel History   Travel since 03/28/21     No documented travel since 03/28/21          Health Maintenance reviewed and discussed and ordered per Provider. Health Maintenance Due   Topic Date Due    COVID-19 Vaccine (1) Never done    DTaP/Tdap/Td series (1 - Tdap) Never done    PAP AKA CERVICAL CYTOLOGY  Never done    A1C test (Diabetic or Prediabetic)  07/26/2020   . Coordination of Care:  1. Have you been to the ER, urgent care clinic since your last visit? Hospitalized since your last visit?  Yes, [facility] for vaginal irritation on 4/22/2021.    2. Have you seen or consulted any other health care providers outside of the [address] [place] [address] since your last visit? Include any pap smears or colon screening.  No

## 2021-05-03 NOTE — PATIENT INSTRUCTIONS
Pap Test: Care Instructions  Overview     The Pap test (also called a Pap smear) is a screening test for cancer of the cervix, which is the lower part of the uterus that opens into the vagina. The test can help your doctor find early changes in the cells that could lead to cancer. The sample of cells taken during your test has been sent to a lab so that an expert can look at the cells. It usually takes a week or two to get the results back. Follow-up care is a key part of your treatment and safety. Be sure to make and go to all appointments, and call your doctor if you are having problems. It's also a good idea to know your test results and keep a list of the medicines you take. What do the results mean? · A normal result means that the test did not find any abnormal cells in the sample. · An abnormal result can mean many things. Most of these are not cancer. The results of your test may be abnormal because:  ? You have an infection of the vagina or cervix, such as a yeast infection. ? You have an IUD (intrauterine device for birth control). ? You have low estrogen levels after menopause that are causing the cells to change. ? You have cell changes that may be a sign of precancer or cancer. The results are ranked based on how serious the changes might be. There are many other reasons why you might not get a normal result. If the results were abnormal, you may need to get another test within a few weeks or months. If the results show changes that could be a sign of cancer, you may need a test called a colposcopy, which provides a more complete view of the cervix. Sometimes the lab cannot use the sample because it does not contain enough cells or was not preserved well. If so, you may need to have the test again. This is not common, but it does happen from time to time. When should you call for help?   Watch closely for changes in your health, and be sure to contact your doctor if:    · You have vaginal bleeding or pain for more than 2 days after the test. It is normal to have a small amount of bleeding for a day or two after the test.   Where can you learn more? Go to http://www.gray.com/  Enter F664 in the search box to learn more about \"Pap Test: Care Instructions. \"  Current as of: December 17, 2020               Content Version: 12.8  © 2006-2021 [employer]. Care instructions adapted under license by [employer] (which disclaims liability or warranty for this information). If you have questions about a medical condition or this instruction, always ask your healthcare professional. Mell Keith any warranty or liability for your use of this information.

## 2021-05-03 NOTE — PROGRESS NOTES
HPI:  Lee Burton is a 36 y.o. female who presents today with   Chief Complaint   Patient presents with   Livan Tam Exam      Patient is here for Pap only exam.  She also requested mammogram order  She voices no specific new complaints. She did have Covid. She has had a uneventful post local recovery. She had some initial fatigue after her infection. This is apparently some better. 3 most recent PHQ Screens 4/28/2021   PHQ Not Done -   Little interest or pleasure in doing things Not at all   Feeling down, depressed, irritable, or hopeless Not at all   Total Score PHQ 2 0   Trouble falling or staying asleep, or sleeping too much -   Feeling tired or having little energy -   Poor appetite, weight loss, or overeating -   Feeling bad about yourself - or that you are a failure or have let yourself or your family down -   Trouble concentrating on things such as school, work, reading, or watching TV -   Moving or speaking so slowly that other people could have noticed; or the opposite being so fidgety that others notice -   How difficult have these problems made it for you to do your work, take care of your home and get along with others -               PMH,  Meds, Allergies, Family History, Social history reviewed      Current Outpatient Medications   Medication Sig Dispense Refill    cyclobenzaprine (FLEXERIL) 5 mg tablet Take 2 Tabs by mouth three (3) times daily (with meals). 9 Tab 0    hydroCHLOROthiazide (HYDRODIURIL) 25 mg tablet Take 1 Tab by mouth daily.  30 Tab 6        No Known Allergies               ROS as per HPI      Visit Vitals  BP (!) 138/95   Pulse 85   Resp 24   Ht 5' 6\" (1.676 m)   Wt 314 lb (142.4 kg)   LMP 04/19/2021   SpO2 99%   BMI 50.68 kg/m²     Physical Exam    Pelvic exam: VULVA: normal appearing vulva with no masses, tenderness or lesions, VAGINA: normal appearing vagina with normal color and discharge, no lesions, CERVIX: normal appearing cervix without discharge or lesions, UTERUS: uterus is normal size, shape, consistency and nontender, ADNEXA: normal adnexa in size, nontender and no masses. Assessment/Plan:    Diagnoses and all orders for this visit:    1. Screening for cervical cancer  -     PAP IG, RFX APTIMA HPV ASCUS (713129); Future    2. Other screening mammogram  -     ADONIS MAMMO BI SCREENING INCL CAD; Future      As above  Pap  Ordered  Mammo ordered  Follow-up and Dispositions    · Return in about 4 months (around 8/28/2021). This has been fully explained to the patient, who indicates understanding. Follow-up and Dispositions    · Return in about 4 months (around 8/28/2021).             Joby Mcneil MD

## 2021-05-04 LAB
CYTOLOGIST CVX/VAG CYTO: ABNORMAL
CYTOLOGY CVX/VAG DOC THIN PREP: ABNORMAL
HPV REFLEX NOTE, HPVL19: ABNORMAL
Lab: ABNORMAL
PATH REPORT.FINAL DX SPEC: ABNORMAL
PATHOLOGIST CVX/VAG CYTO: ABNORMAL
PATHOLOGIST PROVIDED ICD10: ABNORMAL
STAT OF ADQ CVX/VAG CYTO-IMP: ABNORMAL

## 2021-06-14 ENCOUNTER — TELEPHONE (OUTPATIENT)
Dept: FAMILY MEDICINE CLINIC | Age: 41
End: 2021-06-14

## 2021-06-14 NOTE — TELEPHONE ENCOUNTER
Patient states she is frequently urinating and having a hard time holding it. She would like to know what to do since next appointment date is so far out. Please advise. Thank you.    [phone]

## 2021-06-14 NOTE — TELEPHONE ENCOUNTER
Advised patient to go to a urgent care or ER. Patient was also informed that she have labs that are ordered.

## 2021-06-20 ENCOUNTER — HOSPITAL ENCOUNTER (EMERGENCY)
Age: 41
Discharge: STILL ADMITTED OR ADMINISTRATIVE | End: 2021-06-20

## 2021-06-23 ENCOUNTER — TELEPHONE (OUTPATIENT)
Dept: SURGERY | Age: 41
End: 2021-06-23

## 2021-06-23 NOTE — TELEPHONE ENCOUNTER
Called to follow up with patient regarding surgery. Scheduled for surgery in Feb and cancelled due to Rajni. Left message for patient to call back.

## 2021-07-20 ENCOUNTER — OFFICE VISIT (OUTPATIENT)
Dept: SURGERY | Age: 41
End: 2021-07-20
Payer: COMMERCIAL

## 2021-07-20 VITALS
WEIGHT: 293 LBS | HEART RATE: 84 BPM | DIASTOLIC BLOOD PRESSURE: 88 MMHG | OXYGEN SATURATION: 100 % | BODY MASS INDEX: 47.09 KG/M2 | HEIGHT: 66 IN | TEMPERATURE: 97.6 F | SYSTOLIC BLOOD PRESSURE: 136 MMHG | RESPIRATION RATE: 18 BRPM

## 2021-07-20 DIAGNOSIS — E66.01 MORBID OBESITY WITH BMI OF 50.0-59.9, ADULT (HCC): Primary | ICD-10-CM

## 2021-07-20 PROCEDURE — 99214 OFFICE O/P EST MOD 30 MIN: CPT | Performed by: SURGERY

## 2021-07-20 NOTE — PROGRESS NOTES
Preop History and Physical Exam:    Jerica Almendarez is a 39 y.o. black female initially evaluated by Dr. Maggie Bob on 26 March 2020 for discussion of the surgical options available for definitive management of her super obesity. 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 After discussing surgical options the patient elected pursue laparoscopic potential open German-en-Y gastric bypass to achieve definitive durable weight loss on a personal level with expected resolution of obesity related comorbidities. The patient completed the majority of the bariatric surgical multidisciplinary programmatic requirements necessary prior to pursuit of surgery and was seen in follow-up by Dr. Maggie Bob and scheduled for a surgical date March 3, 2021 which unfortunately had to be canceled secondary to the patient elisabeth Covid. The patient represents today for the purpose of reviewing her diagnostic evaluation to date and surgical scheduling noting no new medical surgical history except as noted to pursue surgical scheduling. 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 Ideal body weight 137 pounds, excess body weight 173 pounds, estimated postsurgical weight loss based on 8% loss of excess body weight 138 pounds, postsurgical goal weight 172 pounds.   Onset obesity: Childhood  Weight at age 25: 210 pounds and 5 foot 6 inch frame  Maximum weight 317 pounds occurring in March 2020  Pattern/progression of weight gain: Slowly progressive interrupted by dietary weight loss followed by regain lost weight as well as additional weight thus exhibiting the yoyo effect after maximum weight of 317 pounds occurring in March 2020. 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 Super obesity with body mass index of 50 with obesity related comorbidities hypertension, prediabetes, stress urinary incontinence, clinical obstructive sleep apnea, and weight related arthropathyknees  2. Depression/anxiety  3. Thoracic scoliosis  4. History of CVA without sequela did with exception of potential loss of memory  5. Memory loss of unknown etiology  Past surgical history: None  Social history:  Tobacco: None  Alcohol: 1 ounce on a weekly basis  Family history: Mother 63clinically severe obesity status post failed gastric bypass with hypertension and adult onset diabetes mellitus  Father unknown to patient  No siblings    Past Medical History:   Diagnosis Date    Abnormal MRI of head 2010    Nonspecific focus of low attenuation at the right internal capsule on CT and MRI    Anxiety     Chronic low back pain 2014    unknown cause- normal xrays    Depression     Diabetes ([facility] [address]     Pre-DM- no meds    Hypertension     no meds now    Memory loss     Prediabetes     Scoliosis     Stroke [facility]) 2008    denies residual       No past surgical history on file. Current Outpatient Medications   Medication Sig Dispense Refill    cyclobenzaprine (FLEXERIL) 5 mg tablet Take 2 Tabs by mouth three (3) times daily (with meals).  9 Tab 0  hydroCHLOROthiazide (HYDRODIURIL) 25 mg tablet Take 1 Tab by mouth daily.  (Patient not taking: Reported on 7/20/2021) 30 Tab 6       No Known Allergies    Social History     Tobacco Use    Smoking status: Never Smoker    Smokeless tobacco: Never Used   Substance Use Topics    Alcohol use: Not Currently     Alcohol/week: 1.0 standard drinks     Types: 1 Glasses of wine per week     Comment: socially    Drug use: No       Family History   Problem Relation Age of Onset    Diabetes Other     Hypertension Other     Heart Disease Other     Asthma Other     Arthritis-osteo Other     Diabetes Mother     Hypertension Mother        Review of Systems:  Positive in BOLD    CONST: Fever, weight loss, fatigue or chills  GI: Nausea, vomiting, abdominal pain, change in bowel habits, hematochezia, melena, and GERD   INTEG: Dermatitis, abnormal moles  HEENT: Recent changes in vision, vertigo, epistaxis, dysphagia and hoarseness  CV: Chest pain, palpitations, HTN, edema and varicosities  RESP: Cough, shortness of breath, wheezing, hemoptysis, snoring and reactive airway disease  : Hematuria, dysuria, frequency, urgency, nocturia and stress urinary incontinence   MS: Weakness, joint pain and arthritis  ENDO: Diabetes, thyroid disease, polyuria, polydipsia, polyphagia, poor wound healing, heat intolerance, cold intolerance  LYMPH/HEME: Anemia, bruising and history of blood transfusions  NEURO: Dizziness, headache, fainting, seizures and stroke  PSYCH: Anxiety and depression    Physical Exam    Visit Vitals  /88   Pulse 84   Temp 97.6 °F (36.4 °C)   Resp 18   Ht 5' 6\" (1.676 m)   Wt 140.6 kg (310 lb)   SpO2 100%   BMI 50.04 kg/m²         General: Super obese 39 y.o.) female in no acute distress  Head: Normocephalic, atraumatic  Mouth: Clear, no overt lesions, oral mucosa pink and moist  Neck: Supple, no masses, no adenopathy or carotid bruits, trachea midline  Resp: Clear to auscultation bilaterally, now wheeze, rhonchi, or rales, excursions normal and symmetrical  Cardio: Regular rate and rhythm, no murmurs, clicks, gallops, or rubs. No edema or varicosities. Abdomen: Obese, soft, nontender, nondistended, normoactive bowel sounds, no hernias, no hepatosplenomegaly,   Extremities: Warm, well perfused, no tenderness or swelling, normal gait/station  Neuro: Sensation and strength grossly intact and symmetrical  Psych: Alert and oriented to person, place, and time. January 4, 2021 CBC, albumin, BMP, TSH, vitamin B1, vitamin B12, iron, vitamin D, folate, H. pylori: Hematocrit 34.9, hemoglobin 10.4, iron 24, vitamin D 7.8 with remainder laboratory parameters within normal limits. The patient was begun on iron/vitamin C and vitamin D supplementation  April 30, 2021 Pap smear: Low-grade squamous intraepithelial lesion followed by her primary care physician  Chest x-ray: Pending  Bilateral mammography: Pending  February 23, 2021. Nutrition/Vargas: Concurring with pursuit of surgery  January 29, 2021 psychology/Rodney: Concurring with pursuit of surgery  January 4, 2021 EKG: Normal sinus rhythm with rate of 77normal EKG    Impression:    Felipe Malik is a 39 y.o. black female with a body mass index of 50 with obesity related comorbidities hypertension, prediabetes, clinical obstructive sleep apnea, stress urinary incontinence, and weight related arthropathyknees who would benefit from bariatric surgery. We've discussed the restrictive and malabsorptive nature of the gastric bypass and compared and contrasted with the sleeve gastrectomy. The patient understands the likelihood of losing approximately 80% of their excess weight in 12 to 18 months. She also understands the risks including but not limited to bleeding, infection, need for reoperation, anastomotic ulcers, leaks and strictures, bowel obstruction secondary to adhesions and internal hernias, DVT, PE, heart attack, stroke, and death.  Patient also understands risks of inadequate weight loss, excess weight loss, vitamin insufficiency, protein malnutrition, excess skin, and loss of hair. We have reviewed the components of a successful postoperative course including requirement for a high protein, low carbohydrate diet, 60 oz a day of zero calorie liquids, daily vitamin supplementation, daily exercise, regular follow-up, and participation in support groups. We have reviewed the preoperative liver shrinking clear liquid diet, as well as reviewed any medication changes required while on the clear liquid diet. In addition, the patient understands that all medications to be taken during the first 8 weeks postoperatively can be taken whole as long as the medication is approximately the size of a Bruno 325 mg aspirin tablet in size. The patient further understands that it is his/her responsibility to review these and verify with their primary care doctor and pharmacist that all medications are of the appropriate size.   We will schedule the patient for laparoscopic gastric bypass German-en-Y gastric bypass in the near future after obtaining the results of a lipid panel, urinalysis, chest x-ray and bilateral mammography

## 2021-08-03 ENCOUNTER — DOCUMENTATION ONLY (OUTPATIENT)
Dept: BARIATRICS/WEIGHT MGMT | Age: 41
End: 2021-08-03

## 2021-08-03 PROBLEM — I10 ESSENTIAL HYPERTENSION: Status: RESOLVED | Noted: 2020-03-26 | Resolved: 2021-08-03

## 2021-08-03 NOTE — PROGRESS NOTES
8/3/2021: Patient missed her Pre-op appointment on 8/2/2021 via Zoom. Her surgery is scheduled for 8/23/2021.     Linda Martins RD

## 2021-08-04 ENCOUNTER — HOSPITAL ENCOUNTER (OUTPATIENT)
Dept: MAMMOGRAPHY | Age: 41
Discharge: HOME | End: 2021-08-04
Attending: FAMILY MEDICINE
Payer: COMMERCIAL

## 2021-08-04 DIAGNOSIS — Z12.31 OTHER SCREENING MAMMOGRAM: ICD-10-CM

## 2021-08-04 PROCEDURE — 77063 BREAST TOMOSYNTHESIS BI: CPT

## 2021-08-07 ENCOUNTER — HOSPITAL ENCOUNTER (OUTPATIENT)
Dept: LAB | Age: 41
Discharge: HOME | End: 2021-08-07
Payer: COMMERCIAL

## 2021-08-07 ENCOUNTER — HOSPITAL ENCOUNTER (OUTPATIENT)
Dept: GENERAL RADIOLOGY | Age: 41
Discharge: HOME | End: 2021-08-07
Payer: COMMERCIAL

## 2021-08-07 DIAGNOSIS — E66.01 MORBID OBESITY WITH BMI OF 50.0-59.9, ADULT (HCC): ICD-10-CM

## 2021-08-07 LAB
APPEARANCE UR: CLEAR
BACTERIA URNS QL MICRO: ABNORMAL /HPF
BILIRUB UR QL: NEGATIVE
CHOLEST SERPL-MCNC: 109 MG/DL
COLOR UR: YELLOW
EPITH CASTS URNS QL MICRO: ABNORMAL /LPF (ref 0–5)
GLUCOSE UR STRIP.AUTO-MCNC: NEGATIVE MG/DL
HDLC SERPL-MCNC: 39 MG/DL (ref 40–60)
HDLC SERPL: 2.8 {RATIO} (ref 0–5)
HGB UR QL STRIP: NEGATIVE
KETONES UR QL STRIP.AUTO: NEGATIVE MG/DL
LDLC SERPL CALC-MCNC: 55.6 MG/DL (ref 0–100)
LEUKOCYTE ESTERASE UR QL STRIP.AUTO: NEGATIVE
LIPID PROFILE,FLP: ABNORMAL
MUCOUS THREADS URNS QL MICRO: ABNORMAL /LPF
NITRITE UR QL STRIP.AUTO: NEGATIVE
PH UR STRIP: 6.5 [PH] (ref 5–8)
PROT UR STRIP-MCNC: NEGATIVE MG/DL
RBC #/AREA URNS HPF: NEGATIVE /HPF (ref 0–5)
SP GR UR REFRACTOMETRY: 1.02 (ref 1–1.03)
TRIGL SERPL-MCNC: 72 MG/DL (ref ?–150)
UROBILINOGEN UR QL STRIP.AUTO: 1 EU/DL (ref 0.2–1)
VLDLC SERPL CALC-MCNC: 14.4 MG/DL
WBC URNS QL MICRO: ABNORMAL /HPF (ref 0–5)

## 2021-08-07 PROCEDURE — 81001 URINALYSIS AUTO W/SCOPE: CPT

## 2021-08-07 PROCEDURE — 80061 LIPID PANEL: CPT

## 2021-08-07 PROCEDURE — 36415 COLL VENOUS BLD VENIPUNCTURE: CPT

## 2021-08-07 PROCEDURE — 71046 X-RAY EXAM CHEST 2 VIEWS: CPT

## 2021-08-13 ENCOUNTER — DOCUMENTATION ONLY (OUTPATIENT)
Dept: BARIATRICS/WEIGHT MGMT | Age: 41
End: 2021-08-13

## 2021-08-13 NOTE — PROGRESS NOTES
8/13/2021: Per Dr. Larry Oconnell, patient needs to attend another pre op class. It has been 6 months since her last class and another class is required.     Shanta Garcia MS RD

## 2021-08-16 ENCOUNTER — DOCUMENTATION ONLY (OUTPATIENT)
Dept: BARIATRICS/WEIGHT MGMT | Age: 41
End: 2021-08-16

## 2021-08-17 ENCOUNTER — HOSPITAL ENCOUNTER (OUTPATIENT)
Dept: BARIATRICS/WEIGHT MGMT | Age: 41
Discharge: HOME | End: 2021-08-17

## 2021-08-17 ENCOUNTER — DOCUMENTATION ONLY (OUTPATIENT)
Dept: BARIATRICS/WEIGHT MGMT | Age: 41
End: 2021-08-17

## 2021-08-17 NOTE — PROGRESS NOTES
CLINICAL NUTRITION PRE-OPERATIVE EDUCATION    Patient's Name: Ramón Fernandez   Age: 39 y.o. YOB: 1980   Sex: female    Education & Materials Provided:   - Soft Protein Diet Shopping List  -  Supplemental Resource Guide: MVI, B12, Calcium Citrate, Vitamin D, Vitamin B1,   and iron recommendations  - Protein Supplement Information  - Fluid Requirements/ No Straws  - No Caffeine or Carbonation   - No alcohol              - No Snacks or No Concentrated Sweets     - Exercising   - Support System at [employer] [facility] of Support Group meetings. Support System after surgery includes: x     - Key Diet Principles            - Addressed Current Habits/Changes to Make   - Patient has been educated on the liquid diet to begin 1 week prior to surgery. Patient understands the transition of the diet. Attendance of support group: Yes    Summary:  Patient has completed the required amount of visits with the Registered Dietitian. During these nutrition visits, we focused on dietary changes, behavior changes, and the importance of establishing an exercise routine. The diet protocol that patient was prescribed emphasized on low carbohydrates (less than 100 grams per day prior to surgery and 60-80 grams of protein per day). At today's session, patient was educated on the post-op diet protocol. Patient understands the importance of keeping total fat and sugar less than 3 grams per serving. Patient is aware of the transition of the diet stages and is aware that they will be on clear liquids for 7days, followed by soft protein for 5 weeks. Patient understands the body needs ~ 60-70 grams of protein per day. During the liquid phase, patient will need 60 grams of protein from shakes. Once eating soft protein, patient will only need 1 shake per day. Patient is aware of the importance of the vitamins and protein drinks. We spent a lot of time talking about the vitamins.   Patient understands the importance of being compliant with the diet protocol and the complications and risks that can occur if they are non-compliant with the nutritional protocol and non-compliant with the vitamins. Patient has also been educated on the pre-op liquid diet for 1 week. Patient understands that failure to comply in pre-op liquid diet could result in surgery being canceled. Patient's 6 week post-op nutrition appointment has been scheduled. At this 6 week visit, RD will assess how patient is tolerating soft protein and advance to vegetables, if tolerating soft protein without difficulty. Patient will also see RD again at 9 months post-op. This visit will assess patient's compliance with current protocol, including diet, vitamins, protein shakes, and exercise. Post-op diet guidelines will be reinforced. RD is available for questions and to meet with patient outside of the 6 week and 9 month post-op visit. Ok to proceed.      Candidate for surgery: Yes  Re-evaluation Date:     Archie Auguste Xavi 87 RD  8/17/2021

## 2021-08-18 NOTE — PERIOP NOTES
PRE-SURGICAL INSTRUCTIONS        Patient's Name:  Vivi Plaza      Today's Date:  8/18/2021              Surgery Date:  8/25/2021                1. Do NOT eat or drink anything, including candy, gum, or ice chips after midnight on 08/24/2021, unless you have specific instructions from your surgeon or anesthesia provider to do so.  2. You may brush your teeth before coming to the hospital.  3. No smoking 24 hours prior to the day of surgery. 4. No alcohol 24 hours prior to the day of surgery. 5. No recreational drugs for one week prior to the day of surgery. 6. Leave all valuables, including money/purse, at home. 7. Remove all jewelry, nail polish, acrylic nails, and makeup (including mascara); no lotions powders, deodorant, or perfume/cologne/after shave on the skin. 8. Glasses/contact lenses and dentures may be worn to the hospital.  They will be removed prior to surgery. 9. Call your doctor if symptoms of a cold or illness develop within 24-48 hours prior to your surgery. 10.  AN ADULT MUST DRIVE YOU HOME AFTER OUTPATIENT SURGERY. 11.  If you are having an outpatient procedure, please make arrangements for a responsible adult to be with you for 24 hours after your surgery. Special Instructions:      Bring list of CURRENT medications. Bring any pertinent legal medical records. Follow physician instructions about stopping anticoagulants. On the day of surgery, [facility] Let the  at the desk know you are there for surgery. A staff member will come escort you to the surgical area on the second floor. If you have any questions or concerns, please do not hesitate to call:     (Prior to the day of surgery) PAT department:  [phone]   (Day of surgery) Pre-Op department:  [phone]    These surgical instructions were reviewed with PATIENT during the PAT phone call.

## 2021-08-19 DIAGNOSIS — Z01.818 PRE-OP TESTING: Primary | ICD-10-CM

## 2021-08-19 DIAGNOSIS — E66.01 MORBID OBESITY (HCC): ICD-10-CM

## 2021-08-20 ENCOUNTER — HOSPITAL ENCOUNTER (OUTPATIENT)
Dept: PREADMISSION TESTING | Age: 41
Discharge: HOME | End: 2021-08-20
Payer: COMMERCIAL

## 2021-08-20 DIAGNOSIS — E66.01 MORBID OBESITY (HCC): ICD-10-CM

## 2021-08-20 DIAGNOSIS — Z01.818 PRE-OP TESTING: ICD-10-CM

## 2021-08-20 PROCEDURE — U0003 INFECTIOUS AGENT DETECTION BY NUCLEIC ACID (DNA OR RNA); SEVERE ACUTE RESPIRATORY SYNDROME CORONAVIRUS 2 (SARS-COV-2) (CORONAVIRUS DISEASE [COVID-19]), AMPLIFIED PROBE TECHNIQUE, MAKING USE OF HIGH THROUGHPUT TECHNOLOGIES AS DESCRIBED BY CMS-2020-01-R: HCPCS

## 2021-08-21 LAB — SARS-COV-2, COV2NT: NOT DETECTED

## 2021-08-24 ENCOUNTER — ANESTHESIA EVENT (OUTPATIENT)
Dept: SURGERY | Age: 41
End: 2021-08-24
Payer: COMMERCIAL

## 2021-08-25 ENCOUNTER — HOSPITAL ENCOUNTER (OUTPATIENT)
Age: 41
Setting detail: OBSERVATION
Discharge: HOME | End: 2021-08-26
Attending: SURGERY | Admitting: SURGERY
Payer: COMMERCIAL

## 2021-08-25 ENCOUNTER — ANESTHESIA (OUTPATIENT)
Dept: SURGERY | Age: 41
End: 2021-08-25
Payer: COMMERCIAL

## 2021-08-25 DIAGNOSIS — G89.18 POST-OP PAIN: Primary | ICD-10-CM

## 2021-08-25 PROBLEM — E66.01 MORBID OBESITY WITH BMI OF 45.0-49.9, ADULT (HCC): Status: ACTIVE | Noted: 2021-08-25

## 2021-08-25 LAB — HCG UR QL: NEGATIVE

## 2021-08-25 PROCEDURE — 81025 URINE PREGNANCY TEST: CPT

## 2021-08-25 PROCEDURE — 74011000250 HC RX REV CODE- 250: Performed by: SURGERY

## 2021-08-25 PROCEDURE — 43645 LAP GASTR BYPASS INCL SMLL I: CPT | Performed by: SURGERY

## 2021-08-25 PROCEDURE — 77030031139 HC SUT VCRL2 J&J -A: Performed by: SURGERY

## 2021-08-25 PROCEDURE — 77030019605: Performed by: SURGERY

## 2021-08-25 PROCEDURE — 2709999900 HC NON-CHARGEABLE SUPPLY: Performed by: SURGERY

## 2021-08-25 PROCEDURE — 76010000153 HC OR TIME 1.5 TO 2 HR: Performed by: SURGERY

## 2021-08-25 PROCEDURE — 77030002996 HC SUT SLK J&J -A: Performed by: SURGERY

## 2021-08-25 PROCEDURE — 00797 ANES IPER UPR ABD GSTR PX MO: CPT | Performed by: ANESTHESIOLOGY

## 2021-08-25 PROCEDURE — 74011000250 HC RX REV CODE- 250: Performed by: NURSE ANESTHETIST, CERTIFIED REGISTERED

## 2021-08-25 PROCEDURE — 74011250636 HC RX REV CODE- 250/636: Performed by: NURSE ANESTHETIST, CERTIFIED REGISTERED

## 2021-08-25 PROCEDURE — 77030036732 HC RELD STPLR VASC J&J -F: Performed by: SURGERY

## 2021-08-25 PROCEDURE — 99218 HC RM OBSERVATION: CPT

## 2021-08-25 PROCEDURE — 77030009851 HC PCH RTVR ENDOSC AMR -B: Performed by: SURGERY

## 2021-08-25 PROCEDURE — 77030002933 HC SUT MCRYL J&J -A: Performed by: SURGERY

## 2021-08-25 PROCEDURE — 77030010031 HC SCIS ENDOSC MPLR J&J -C: Performed by: SURGERY

## 2021-08-25 PROCEDURE — 77030027876 HC STPLR ENDOSC FLX PWR J&J -G1: Performed by: SURGERY

## 2021-08-25 PROCEDURE — 65270000029 HC RM PRIVATE

## 2021-08-25 PROCEDURE — 77010033678 HC OXYGEN DAILY

## 2021-08-25 PROCEDURE — 74011250636 HC RX REV CODE- 250/636: Performed by: SURGERY

## 2021-08-25 PROCEDURE — 77030008598 HC TRCR ENDOSC BLDLS J&J -B: Performed by: SURGERY

## 2021-08-25 PROCEDURE — 76060000034 HC ANESTHESIA 1.5 TO 2 HR: Performed by: SURGERY

## 2021-08-25 PROCEDURE — 76210000006 HC OR PH I REC 0.5 TO 1 HR: Performed by: SURGERY

## 2021-08-25 PROCEDURE — 77030008437 HC REINF STRP REINF SEMGD WLGO -C: Performed by: SURGERY

## 2021-08-25 PROCEDURE — 77030008756 HC TU IRR SUC STRY -B: Performed by: SURGERY

## 2021-08-25 PROCEDURE — C9290 INJ, BUPIVACAINE LIPOSOME: HCPCS | Performed by: SURGERY

## 2021-08-25 PROCEDURE — 2709999900 HC NON-CHARGEABLE SUPPLY

## 2021-08-25 PROCEDURE — 00797 ANES IPER UPR ABD GSTR PX MO: CPT | Performed by: NURSE ANESTHETIST, CERTIFIED REGISTERED

## 2021-08-25 PROCEDURE — 74011250637 HC RX REV CODE- 250/637: Performed by: SURGERY

## 2021-08-25 PROCEDURE — 77030008603 HC TRCR ENDOSC EPATH J&J -C: Performed by: SURGERY

## 2021-08-25 PROCEDURE — 74011000258 HC RX REV CODE- 258: Performed by: SURGERY

## 2021-08-25 PROCEDURE — 77030033639 HC SHR ENDO COAG HARM 36 J&J -E: Performed by: SURGERY

## 2021-08-25 PROCEDURE — 77030009968 HC RELD STPLR ENDOSC J&J -D: Performed by: SURGERY

## 2021-08-25 PROCEDURE — 77030040361 HC SLV COMPR DVT MDII -B: Performed by: SURGERY

## 2021-08-25 PROCEDURE — 77030040922 HC BLNKT HYPOTHRM STRY -A: Performed by: SURGERY

## 2021-08-25 PROCEDURE — 77030009932 HC PRB FT CTRL J&J -B: Performed by: SURGERY

## 2021-08-25 PROCEDURE — 77030011808 HC STPLR ENDOSCOPIC J&J -D: Performed by: SURGERY

## 2021-08-25 RX ORDER — ACETAMINOPHEN 650 MG/1
650 SUPPOSITORY RECTAL ONCE
Status: DISCONTINUED | OUTPATIENT
Start: 2021-08-25 | End: 2021-08-25 | Stop reason: HOSPADM

## 2021-08-25 RX ORDER — ONDANSETRON 2 MG/ML
4 INJECTION INTRAMUSCULAR; INTRAVENOUS
Status: COMPLETED | OUTPATIENT
Start: 2021-08-25 | End: 2021-08-25

## 2021-08-25 RX ORDER — KETOROLAC TROMETHAMINE 15 MG/ML
15 INJECTION, SOLUTION INTRAMUSCULAR; INTRAVENOUS
Status: DISCONTINUED | OUTPATIENT
Start: 2021-08-25 | End: 2021-08-26 | Stop reason: HOSPADM

## 2021-08-25 RX ORDER — ROCURONIUM BROMIDE 10 MG/ML
INJECTION, SOLUTION INTRAVENOUS AS NEEDED
Status: DISCONTINUED | OUTPATIENT
Start: 2021-08-25 | End: 2021-08-25 | Stop reason: HOSPADM

## 2021-08-25 RX ORDER — SODIUM CHLORIDE, SODIUM LACTATE, POTASSIUM CHLORIDE, CALCIUM CHLORIDE 600; 310; 30; 20 MG/100ML; MG/100ML; MG/100ML; MG/100ML
150 INJECTION, SOLUTION INTRAVENOUS CONTINUOUS
Status: DISCONTINUED | OUTPATIENT
Start: 2021-08-25 | End: 2021-08-26 | Stop reason: HOSPADM

## 2021-08-25 RX ORDER — CHLORHEXIDINE GLUCONATE 4 G/100ML
SOLUTION TOPICAL AS NEEDED
Status: DISCONTINUED | OUTPATIENT
Start: 2021-08-25 | End: 2021-08-25 | Stop reason: HOSPADM

## 2021-08-25 RX ORDER — ENOXAPARIN SODIUM 100 MG/ML
40 INJECTION SUBCUTANEOUS EVERY 24 HOURS
Status: DISCONTINUED | OUTPATIENT
Start: 2021-08-26 | End: 2021-08-26 | Stop reason: HOSPADM

## 2021-08-25 RX ORDER — MIDAZOLAM HYDROCHLORIDE 1 MG/ML
INJECTION, SOLUTION INTRAMUSCULAR; INTRAVENOUS AS NEEDED
Status: DISCONTINUED | OUTPATIENT
Start: 2021-08-25 | End: 2021-08-25 | Stop reason: HOSPADM

## 2021-08-25 RX ORDER — DEXAMETHASONE SODIUM PHOSPHATE 4 MG/ML
INJECTION, SOLUTION INTRA-ARTICULAR; INTRALESIONAL; INTRAMUSCULAR; INTRAVENOUS; SOFT TISSUE AS NEEDED
Status: DISCONTINUED | OUTPATIENT
Start: 2021-08-25 | End: 2021-08-25 | Stop reason: HOSPADM

## 2021-08-25 RX ORDER — OXYCODONE HYDROCHLORIDE 5 MG/1
5 TABLET ORAL
Status: DISCONTINUED | OUTPATIENT
Start: 2021-08-25 | End: 2021-08-26 | Stop reason: HOSPADM

## 2021-08-25 RX ORDER — SODIUM CHLORIDE, SODIUM LACTATE, POTASSIUM CHLORIDE, CALCIUM CHLORIDE 600; 310; 30; 20 MG/100ML; MG/100ML; MG/100ML; MG/100ML
25 INJECTION, SOLUTION INTRAVENOUS CONTINUOUS
Status: DISCONTINUED | OUTPATIENT
Start: 2021-08-25 | End: 2021-08-25 | Stop reason: HOSPADM

## 2021-08-25 RX ORDER — FAMOTIDINE 20 MG/1
20 TABLET, FILM COATED ORAL ONCE
Status: DISCONTINUED | OUTPATIENT
Start: 2021-08-25 | End: 2021-08-25 | Stop reason: HOSPADM

## 2021-08-25 RX ORDER — HYDROMORPHONE HYDROCHLORIDE 1 MG/ML
1 INJECTION, SOLUTION INTRAMUSCULAR; INTRAVENOUS; SUBCUTANEOUS
Status: DISCONTINUED | OUTPATIENT
Start: 2021-08-25 | End: 2021-08-26 | Stop reason: HOSPADM

## 2021-08-25 RX ORDER — HYDROMORPHONE HYDROCHLORIDE 1 MG/ML
0.5 INJECTION, SOLUTION INTRAMUSCULAR; INTRAVENOUS; SUBCUTANEOUS
Status: DISCONTINUED | OUTPATIENT
Start: 2021-08-25 | End: 2021-08-25 | Stop reason: HOSPADM

## 2021-08-25 RX ORDER — LIDOCAINE HYDROCHLORIDE 20 MG/ML
INJECTION, SOLUTION EPIDURAL; INFILTRATION; INTRACAUDAL; PERINEURAL AS NEEDED
Status: DISCONTINUED | OUTPATIENT
Start: 2021-08-25 | End: 2021-08-25 | Stop reason: HOSPADM

## 2021-08-25 RX ORDER — DIPHENHYDRAMINE HYDROCHLORIDE 50 MG/ML
25 INJECTION, SOLUTION INTRAMUSCULAR; INTRAVENOUS
Status: DISCONTINUED | OUTPATIENT
Start: 2021-08-25 | End: 2021-08-26 | Stop reason: HOSPADM

## 2021-08-25 RX ORDER — PROPOFOL 10 MG/ML
INJECTION, EMULSION INTRAVENOUS AS NEEDED
Status: DISCONTINUED | OUTPATIENT
Start: 2021-08-25 | End: 2021-08-25 | Stop reason: HOSPADM

## 2021-08-25 RX ORDER — ONDANSETRON 2 MG/ML
4 INJECTION INTRAMUSCULAR; INTRAVENOUS
Status: DISCONTINUED | OUTPATIENT
Start: 2021-08-25 | End: 2021-08-26 | Stop reason: HOSPADM

## 2021-08-25 RX ORDER — ACETAMINOPHEN 325 MG/1
650 TABLET ORAL EVERY 6 HOURS
Status: DISCONTINUED | OUTPATIENT
Start: 2021-08-25 | End: 2021-08-26 | Stop reason: HOSPADM

## 2021-08-25 RX ORDER — SODIUM CHLORIDE, SODIUM LACTATE, POTASSIUM CHLORIDE, CALCIUM CHLORIDE 600; 310; 30; 20 MG/100ML; MG/100ML; MG/100ML; MG/100ML
50 INJECTION, SOLUTION INTRAVENOUS CONTINUOUS
Status: DISCONTINUED | OUTPATIENT
Start: 2021-08-25 | End: 2021-08-25 | Stop reason: HOSPADM

## 2021-08-25 RX ORDER — SODIUM CHLORIDE, SODIUM LACTATE, POTASSIUM CHLORIDE, CALCIUM CHLORIDE 600; 310; 30; 20 MG/100ML; MG/100ML; MG/100ML; MG/100ML
150 INJECTION, SOLUTION INTRAVENOUS CONTINUOUS
Status: DISCONTINUED | OUTPATIENT
Start: 2021-08-25 | End: 2021-08-25 | Stop reason: HOSPADM

## 2021-08-25 RX ORDER — LIDOCAINE HYDROCHLORIDE 10 MG/ML
0.1 INJECTION, SOLUTION EPIDURAL; INFILTRATION; INTRACAUDAL; PERINEURAL AS NEEDED
Status: DISCONTINUED | OUTPATIENT
Start: 2021-08-25 | End: 2021-08-25 | Stop reason: HOSPADM

## 2021-08-25 RX ORDER — ACETAMINOPHEN 650 MG/1
SUPPOSITORY RECTAL AS NEEDED
Status: DISCONTINUED | OUTPATIENT
Start: 2021-08-25 | End: 2021-08-25 | Stop reason: HOSPADM

## 2021-08-25 RX ORDER — SUCCINYLCHOLINE CHLORIDE 20 MG/ML
INJECTION INTRAMUSCULAR; INTRAVENOUS AS NEEDED
Status: DISCONTINUED | OUTPATIENT
Start: 2021-08-25 | End: 2021-08-25 | Stop reason: HOSPADM

## 2021-08-25 RX ORDER — PROMETHAZINE HYDROCHLORIDE 25 MG/ML
6.25 INJECTION, SOLUTION INTRAMUSCULAR; INTRAVENOUS
Status: DISCONTINUED | OUTPATIENT
Start: 2021-08-25 | End: 2021-08-25 | Stop reason: HOSPADM

## 2021-08-25 RX ORDER — NALOXONE HYDROCHLORIDE 0.4 MG/ML
0.4 INJECTION, SOLUTION INTRAMUSCULAR; INTRAVENOUS; SUBCUTANEOUS AS NEEDED
Status: DISCONTINUED | OUTPATIENT
Start: 2021-08-25 | End: 2021-08-26 | Stop reason: HOSPADM

## 2021-08-25 RX ORDER — ONDANSETRON 2 MG/ML
INJECTION INTRAMUSCULAR; INTRAVENOUS AS NEEDED
Status: DISCONTINUED | OUTPATIENT
Start: 2021-08-25 | End: 2021-08-25 | Stop reason: HOSPADM

## 2021-08-25 RX ORDER — NEOSTIGMINE METHYLSULFATE 1 MG/ML
INJECTION, SOLUTION INTRAVENOUS AS NEEDED
Status: DISCONTINUED | OUTPATIENT
Start: 2021-08-25 | End: 2021-08-25 | Stop reason: HOSPADM

## 2021-08-25 RX ORDER — GLYCOPYRROLATE 0.2 MG/ML
INJECTION INTRAMUSCULAR; INTRAVENOUS AS NEEDED
Status: DISCONTINUED | OUTPATIENT
Start: 2021-08-25 | End: 2021-08-25 | Stop reason: HOSPADM

## 2021-08-25 RX ORDER — FENTANYL CITRATE 50 UG/ML
25 INJECTION, SOLUTION INTRAMUSCULAR; INTRAVENOUS AS NEEDED
Status: DISCONTINUED | OUTPATIENT
Start: 2021-08-25 | End: 2021-08-25 | Stop reason: HOSPADM

## 2021-08-25 RX ORDER — INSULIN LISPRO 100 [IU]/ML
INJECTION, SOLUTION INTRAVENOUS; SUBCUTANEOUS ONCE
Status: DISCONTINUED | OUTPATIENT
Start: 2021-08-25 | End: 2021-08-25 | Stop reason: HOSPADM

## 2021-08-25 RX ORDER — ENOXAPARIN SODIUM 100 MG/ML
40 INJECTION SUBCUTANEOUS ONCE
Status: COMPLETED | OUTPATIENT
Start: 2021-08-25 | End: 2021-08-25

## 2021-08-25 RX ORDER — HYOSCYAMINE SULFATE 0.12 MG/1
0.12 TABLET SUBLINGUAL
Status: DISCONTINUED | OUTPATIENT
Start: 2021-08-25 | End: 2021-08-26 | Stop reason: HOSPADM

## 2021-08-25 RX ORDER — FENTANYL CITRATE 50 UG/ML
INJECTION, SOLUTION INTRAMUSCULAR; INTRAVENOUS AS NEEDED
Status: DISCONTINUED | OUTPATIENT
Start: 2021-08-25 | End: 2021-08-25 | Stop reason: HOSPADM

## 2021-08-25 RX ADMIN — GLYCOPYRROLATE 0.4 MG: 0.2 INJECTION INTRAMUSCULAR; INTRAVENOUS at 09:31

## 2021-08-25 RX ADMIN — PROPOFOL 180 MG: 10 INJECTION, EMULSION INTRAVENOUS at 07:59

## 2021-08-25 RX ADMIN — KETOROLAC TROMETHAMINE 15 MG: 15 INJECTION, SOLUTION INTRAMUSCULAR; INTRAVENOUS at 11:48

## 2021-08-25 RX ADMIN — ACETAMINOPHEN 650 MG: 325 TABLET ORAL at 12:22

## 2021-08-25 RX ADMIN — MIDAZOLAM HYDROCHLORIDE 2 MG: 2 INJECTION, SOLUTION INTRAMUSCULAR; INTRAVENOUS at 07:52

## 2021-08-25 RX ADMIN — SODIUM CHLORIDE, SODIUM LACTATE, POTASSIUM CHLORIDE, AND CALCIUM CHLORIDE 150 ML/HR: 600; 310; 30; 20 INJECTION, SOLUTION INTRAVENOUS at 07:03

## 2021-08-25 RX ADMIN — ONDANSETRON 4 MG: 2 INJECTION INTRAMUSCULAR; INTRAVENOUS at 10:06

## 2021-08-25 RX ADMIN — ROCURONIUM BROMIDE 10 MG: 50 INJECTION INTRAVENOUS at 09:01

## 2021-08-25 RX ADMIN — SODIUM CHLORIDE, SODIUM LACTATE, POTASSIUM CHLORIDE, AND CALCIUM CHLORIDE 150 ML/HR: 600; 310; 30; 20 INJECTION, SOLUTION INTRAVENOUS at 12:24

## 2021-08-25 RX ADMIN — FENTANYL CITRATE 100 MCG: 50 INJECTION, SOLUTION INTRAMUSCULAR; INTRAVENOUS at 07:59

## 2021-08-25 RX ADMIN — HYOSCYAMINE SULFATE 0.12 MG: 0.12 TABLET ORAL; SUBLINGUAL at 11:48

## 2021-08-25 RX ADMIN — SODIUM CHLORIDE, SODIUM LACTATE, POTASSIUM CHLORIDE, AND CALCIUM CHLORIDE: 600; 310; 30; 20 INJECTION, SOLUTION INTRAVENOUS at 09:06

## 2021-08-25 RX ADMIN — Medication 3 MG: at 09:31

## 2021-08-25 RX ADMIN — WATER 3 G: 1 INJECTION INTRAMUSCULAR; INTRAVENOUS; SUBCUTANEOUS at 08:08

## 2021-08-25 RX ADMIN — ACETAMINOPHEN 650 MG: 325 TABLET ORAL at 18:20

## 2021-08-25 RX ADMIN — ENOXAPARIN SODIUM 40 MG: 40 INJECTION SUBCUTANEOUS at 07:03

## 2021-08-25 RX ADMIN — LIDOCAINE HYDROCHLORIDE 50 MG: 20 INJECTION, SOLUTION EPIDURAL; INFILTRATION; INTRACAUDAL; PERINEURAL at 07:59

## 2021-08-25 RX ADMIN — ROCURONIUM BROMIDE 25 MG: 50 INJECTION INTRAVENOUS at 08:11

## 2021-08-25 RX ADMIN — FENTANYL CITRATE 50 MCG: 50 INJECTION, SOLUTION INTRAMUSCULAR; INTRAVENOUS at 08:36

## 2021-08-25 RX ADMIN — ROCURONIUM BROMIDE 15 MG: 50 INJECTION INTRAVENOUS at 07:59

## 2021-08-25 RX ADMIN — OXYCODONE HYDROCHLORIDE 5 MG: 5 TABLET ORAL at 14:27

## 2021-08-25 RX ADMIN — SUCCINYLCHOLINE CHLORIDE 150 MG: 20 INJECTION, SOLUTION INTRAMUSCULAR; INTRAVENOUS at 07:59

## 2021-08-25 RX ADMIN — OXYCODONE HYDROCHLORIDE 5 MG: 5 TABLET ORAL at 19:12

## 2021-08-25 RX ADMIN — ONDANSETRON 4 MG: 2 INJECTION INTRAMUSCULAR; INTRAVENOUS at 09:27

## 2021-08-25 RX ADMIN — DEXAMETHASONE SODIUM PHOSPHATE 4 MG: 4 INJECTION, SOLUTION INTRAMUSCULAR; INTRAVENOUS at 07:59

## 2021-08-25 RX ADMIN — FAMOTIDINE 20 MG: 10 INJECTION INTRAVENOUS at 07:10

## 2021-08-25 RX ADMIN — FENTANYL CITRATE 50 MCG: 50 INJECTION, SOLUTION INTRAMUSCULAR; INTRAVENOUS at 08:26

## 2021-08-25 NOTE — ANESTHESIA POSTPROCEDURE EVALUATION
Procedure(s):  LAPAROSCOPIC MIAK-EN-Y GASTRIC BYPASS. general    Anesthesia Post Evaluation      Multimodal analgesia: multimodal analgesia used between 6 hours prior to anesthesia start to PACU discharge  Patient location during evaluation: PACU  Patient participation: complete - patient participated  Level of consciousness: awake and alert  Pain management: adequate  Airway patency: patent  Anesthetic complications: no  Cardiovascular status: acceptable  Respiratory status: acceptable  Hydration status: acceptable  Post anesthesia nausea and vomiting:  none  Final Post Anesthesia Temperature Assessment:  Normothermia (36.0-37.5 degrees C)      INITIAL Post-op Vital signs:   Vitals Value Taken Time   /83 08/25/21 1019   Temp 36.4 °C (97.6 °F) 08/25/21 1019   Pulse 90 08/25/21 1022   Resp 26 08/25/21 1022   SpO2 99 % 08/25/21 1022   Vitals shown include unvalidated device data.

## 2021-08-25 NOTE — PROGRESS NOTES
Problem: Falls - Risk of  Goal: *Absence of Falls  Description: Document Felicity Spare Fall Risk and appropriate interventions in the flowsheet.   8/25/2021 1431 by Lety Qiu RN  Outcome: Progressing Towards Goal  Note: Fall Risk Interventions:                             8/25/2021 1240 by Lety Qiu RN  Outcome: Progressing Towards Goal  Note: Fall Risk Interventions:                                Problem: Patient Education: Go to Patient Education Activity  Goal: Patient/Family Education  8/25/2021 1431 by Lety Qiu RN  Outcome: Progressing Towards Goal  8/25/2021 1240 by Lety Qiu RN  Outcome: Progressing Towards Goal

## 2021-08-25 NOTE — PERIOP NOTES
TRANSFER - OUT REPORT:    Verbal report given to Luiz(name) on Floresita Restrepo  being transferred to [address] [place] [address](unit) for routine post - op       Report consisted of patients Situation, Background, Assessment and   Recommendations(SBAR). Information from the following report(s) SBAR, Kardex, Procedure Summary, Intake/Output and MAR was reviewed with the receiving nurse. Lines:   Peripheral IV 08/25/21 Anterior; Left Hand (Active)   Site Assessment Clean, dry, & intact 08/25/21 0950   Phlebitis Assessment 0 08/25/21 0950   Infiltration Assessment 0 08/25/21 0950   Dressing Status Clean, dry, & intact 08/25/21 0950   Dressing Type Tape;Transparent 08/25/21 0950   Hub Color/Line Status Pink; Infusing 08/25/21 0950   Action Taken Dressing reinforced 08/25/21 0714   Alcohol Cap Used No 08/25/21 0714        Opportunity for questions and clarification was provided.       Patient transported with:   [employer]

## 2021-08-25 NOTE — PROGRESS NOTES
Problem: Falls - Risk of  Goal: *Absence of Falls  Description: Document Dupont Fall Risk and appropriate interventions in the flowsheet.   Outcome: Progressing Towards Goal  Note: Fall Risk Interventions:                                Problem: Patient Education: Go to Patient Education Activity  Goal: Patient/Family Education  Outcome: Progressing Towards Goal

## 2021-08-25 NOTE — H&P
Wing Chao DO  General Surgery  Morbid obesity with BMI of 50.0-59.9, adult [facility])  Dx  Follow-up ; Referred by Unknown  Reason for Visit   Progress Notes  Wing Chao DO (Physician) [facility] General [facility]     Preop History and Physical Exam:     Frida Perez is a 39 y.o. black female initially evaluated by Dr. Thony Falk on 26 March 2020 for discussion of the surgical options available for definitive management of her super obesity. 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 After discussing surgical options the patient elected pursue laparoscopic potential open German-en-Y gastric bypass to achieve definitive durable weight loss on a personal level with expected resolution of obesity related comorbidities. The patient completed the majority of the bariatric surgical multidisciplinary programmatic requirements necessary prior to pursuit of surgery and was seen in follow-up by Dr. Thony Falk and scheduled for a surgical date March 3, 2021 which unfortunately had to be canceled secondary to the patient elisabeth Covid. The patient represents today for the purpose of reviewing her diagnostic evaluation to date and surgical scheduling noting no new medical surgical history except as noted to pursue surgical scheduling. 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 Ideal body weight 137 pounds, excess body weight 173 pounds, estimated postsurgical weight loss based on 8% loss of excess body weight 138 pounds, postsurgical goal weight 172 pounds.   Onset obesity: Childhood  Weight at age 25: 210 pounds and 5 foot 6 inch frame  Maximum weight 317 pounds occurring in March 2020  Pattern/progression of weight gain: Slowly progressive interrupted by dietary weight loss followed by regain lost weight as well as additional weight thus exhibiting the yoyo effect after maximum weight of 317 pounds occurring in March 2020. 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 Super obesity with body mass index of 50 with obesity related comorbidities hypertension, prediabetes, stress urinary incontinence, clinical obstructive sleep apnea, and weight related arthropathyknees  2. Depression/anxiety  3. Thoracic scoliosis  4. History of CVA without sequela did with exception of potential loss of memory  5. Memory loss of unknown etiology  Past surgical history: None  Social history:  Tobacco: None  Alcohol: 1 ounce on a weekly basis  Family history:   Mother 63clinically severe obesity status post failed gastric bypass with hypertension and adult onset diabetes mellitus  Father unknown to patient  No siblings          Past Medical History:   Diagnosis Date    Abnormal MRI of head 2010     Nonspecific focus of low attenuation at the right internal capsule on CT and MRI    Anxiety      Chronic low back pain 2014     unknown cause- normal xrays    Depression      Diabetes ([address]       Pre-DM- no meds    Hypertension       no meds now    Memory loss      Prediabetes      Scoliosis      Stroke [facility]) 2008   denies residual         No past surgical history on file.            Current Outpatient Medications   Medication Sig Dispense Refill    cyclobenzaprine (FLEXERIL) 5 mg tablet Take 2 Tabs by mouth three (3) times daily (with meals). 9 Tab 0    hydroCHLOROthiazide (HYDRODIURIL) 25 mg tablet Take 1 Tab by mouth daily. (Patient not taking: Reported on 7/20/2021) 30 Tab 6         No Known Allergies     Social History            Tobacco Use    Smoking status: Never Smoker    Smokeless tobacco: Never Used   Substance Use Topics    Alcohol use: Not Currently       Alcohol/week: 1.0 standard drinks       Types: 1 Glasses of wine per week       Comment: socially    Drug use:  No               Family History   Problem Relation Age of Onset    Diabetes Other      Hypertension Other      Heart Disease Other      Asthma Other      Arthritis-osteo Other      Diabetes Mother      Hypertension Mother           Review of Systems:  Positive in BOLD     CONST: Fever, weight loss, fatigue or chills  GI: Nausea, vomiting, abdominal pain, change in bowel habits, hematochezia, melena, and GERD   INTEG: Dermatitis, abnormal moles  HEENT: Recent changes in vision, vertigo, epistaxis, dysphagia and hoarseness  CV: Chest pain, palpitations, HTN, edema and varicosities  RESP: Cough, shortness of breath, wheezing, hemoptysis, snoring and reactive airway disease  : Hematuria, dysuria, frequency, urgency, nocturia and stress urinary incontinence   MS: Weakness, joint pain and arthritis  ENDO: Diabetes, thyroid disease, polyuria, polydipsia, polyphagia, poor wound healing, heat intolerance, cold intolerance  LYMPH/HEME: Anemia, bruising and history of blood transfusions  NEURO: Dizziness, headache, fainting, seizures and stroke  PSYCH: Anxiety and depression     Physical Exam     Visit Vitals  /88   Pulse 84   Temp 97.6 °F (36.4 °C)   Resp 18   Ht 5' 6\" (1.676 m)   Wt 140.6 kg (310 lb)   SpO2 100%   BMI 50.04 kg/m²          General: Super obese 39 y.o.) female in no acute distress  Head: Normocephalic, atraumatic  Mouth: Clear, no overt lesions, oral mucosa pink and moist  Neck: Supple, no masses, no adenopathy or carotid bruits, trachea midline  Resp: Clear to auscultation bilaterally, now wheeze, rhonchi, or rales, excursions normal and symmetrical  Cardio: Regular rate and rhythm, no murmurs, clicks, gallops, or rubs. No edema or varicosities. Abdomen: Obese, soft, nontender, nondistended, normoactive bowel sounds, no hernias, no hepatosplenomegaly,   Extremities: Warm, well perfused, no tenderness or swelling, normal gait/station  Neuro: Sensation and strength grossly intact and symmetrical  Psych: Alert and oriented to person, place, and time.     January 4, 2021 CBC, albumin, BMP, TSH, vitamin B1, vitamin B12, iron, vitamin D, folate, H. pylori: Hematocrit 34.9, hemoglobin 10.4, iron 24, vitamin D 7.8 with remainder laboratory parameters within normal limits. The patient was begun on iron/vitamin C and vitamin D supplementation  April 30, 2021 Pap smear: Low-grade squamous intraepithelial lesion followed by her primary care physician  Chest x-ray: Pending  Bilateral mammography: Pending  February 23, 2021. Nutrition/Vargas: Concurring with pursuit of surgery  January 29, 2021 psychology/Rodney: Concurring with pursuit of surgery  January 4, 2021 EKG: Normal sinus rhythm with rate of 77normal EKG     Impression:     Jessa Ogden is a 39 y.o. black female with a body mass index of 50 with obesity related comorbidities hypertension, prediabetes, clinical obstructive sleep apnea, stress urinary incontinence, and weight related arthropathyknees who would benefit from bariatric surgery. We've discussed the restrictive and malabsorptive nature of the gastric bypass and compared and contrasted with the sleeve gastrectomy.   The patient understands the likelihood of losing approximately 80% of their excess weight in 12 to 18 months. She also understands the risks including but not limited to bleeding, infection, need for reoperation, anastomotic ulcers, leaks and strictures, bowel obstruction secondary to adhesions and internal hernias, DVT, PE, heart attack, stroke, and death. Patient also understands risks of inadequate weight loss, excess weight loss, vitamin insufficiency, protein malnutrition, excess skin, and loss of hair. We have reviewed the components of a successful postoperative course including requirement for a high protein, low carbohydrate diet, 60 oz a day of zero calorie liquids, daily vitamin supplementation, daily exercise, regular follow-up, and participation in support groups. We have reviewed the preoperative liver shrinking clear liquid diet, as well as reviewed any medication changes required while on the clear liquid diet. In addition, the patient understands that all medications to be taken during the first 8 weeks postoperatively can be taken whole as long as the medication is approximately the size of a Bruno 325 mg aspirin tablet in size. The patient further understands that it is his/her responsibility to review these and verify with their primary care doctor and pharmacist that all medications are of the appropriate size. We will schedule the patient for laparoscopic gastric bypass German-en-Y gastric bypass in the near future after obtaining the results of a lipid panel,   urinalysis, chest x-ray and bilateral mammography    The above note was reviewed. There is been no interval medical or surgical history. The proposed laparoscopic tensely open German-en-Y gastric bypass to achieve definitive durable weight loss on a personal level expected resolution of obesity related comorbidities, risk benefits of operating versus not potential tenderness of complications up to including death were discussed with patient who voiced understanding to proceed.     Cassius Stack DO, FACS      Note

## 2021-08-25 NOTE — PROGRESS NOTES
Pt requesting new RN when this RN went in to medicate pt with oxycodone. Informed charge RN. Staffing change in progress.

## 2021-08-25 NOTE — ANESTHESIA PREPROCEDURE EVALUATION
Relevant Problems   NEUROLOGY   (+) Depression      CARDIOVASCULAR   (+) Hypertension      ENDOCRINE   (+) Morbid obesity (HCC)       Anesthetic History   No history of anesthetic complications            Review of Systems / Medical History  Patient summary reviewed, nursing notes reviewed and pertinent labs reviewed    Pulmonary  Within defined limits                 Neuro/Psych         TIA and psychiatric history    Comments: Had vertigo, slurred speech.  No residual. Cardiovascular    Hypertension: well controlled              Exercise tolerance: >4 METS     GI/Hepatic/Renal  Within defined limits              Endo/Other    Diabetes: type 2    Morbid obesity     Other Findings              Physical Exam    Airway  Mallampati: II  TM Distance: 4 - 6 cm  Neck ROM: normal range of motion   Mouth opening: Normal     Cardiovascular    Rhythm: regular  Rate: normal         Dental  No notable dental hx       Pulmonary  Breath sounds clear to auscultation               Abdominal         Other Findings            Anesthetic Plan    ASA: 3  Anesthesia type: general          Induction: Intravenous  Anesthetic plan and risks discussed with: Patient

## 2021-08-25 NOTE — PROGRESS NOTES
conducted a pre-surgery visit with Nadia Eldridge, who is a 39 y.o.,female. The  provided the following Interventions:  Initiated a relationship of care and support. Offered prayer and assurance of continued prayers on patient's behalf. Plan:  Chaplains will continue to follow and will provide pastoral care on an as needed/requested basis.  recommends bedside caregivers page  on duty if patient shows signs of acute spiritual or emotional distress.     Shanda Xavier5 [phone]

## 2021-08-26 VITALS
DIASTOLIC BLOOD PRESSURE: 65 MMHG | HEIGHT: 66 IN | HEART RATE: 86 BPM | SYSTOLIC BLOOD PRESSURE: 115 MMHG | WEIGHT: 293 LBS | BODY MASS INDEX: 47.09 KG/M2 | RESPIRATION RATE: 16 BRPM | OXYGEN SATURATION: 98 % | TEMPERATURE: 98.3 F

## 2021-08-26 LAB
ANION GAP SERPL CALC-SCNC: 7 MMOL/L (ref 3–18)
BUN SERPL-MCNC: 6 MG/DL (ref 7–18)
BUN/CREAT SERPL: 10 (ref 12–20)
CALCIUM SERPL-MCNC: 8.2 MG/DL (ref 8.5–10.1)
CHLORIDE SERPL-SCNC: 102 MMOL/L (ref 100–111)
CO2 SERPL-SCNC: 26 MMOL/L (ref 21–32)
CREAT SERPL-MCNC: 0.6 MG/DL (ref 0.6–1.3)
ERYTHROCYTE [DISTWIDTH] IN BLOOD BY AUTOMATED COUNT: 16.6 % (ref 11.6–14.5)
GLUCOSE SERPL-MCNC: 87 MG/DL (ref 74–99)
HCT VFR BLD AUTO: 32.2 % (ref 35–45)
HGB BLD-MCNC: 9.5 G/DL (ref 12–16)
MAGNESIUM SERPL-MCNC: 2 MG/DL (ref 1.6–2.6)
MCH RBC QN AUTO: 21.2 PG (ref 24–34)
MCHC RBC AUTO-ENTMCNC: 29.5 G/DL (ref 31–37)
MCV RBC AUTO: 71.9 FL (ref 78–100)
PLATELET # BLD AUTO: 284 K/UL (ref 135–420)
PMV BLD AUTO: 10.9 FL (ref 9.2–11.8)
POTASSIUM SERPL-SCNC: 3.6 MMOL/L (ref 3.5–5.5)
RBC # BLD AUTO: 4.48 M/UL (ref 4.2–5.3)
SODIUM SERPL-SCNC: 135 MMOL/L (ref 136–145)
WBC # BLD AUTO: 11.2 K/UL (ref 4.6–13.2)

## 2021-08-26 PROCEDURE — 74011250636 HC RX REV CODE- 250/636: Performed by: SURGERY

## 2021-08-26 PROCEDURE — 74011250637 HC RX REV CODE- 250/637: Performed by: SURGERY

## 2021-08-26 PROCEDURE — 36415 COLL VENOUS BLD VENIPUNCTURE: CPT

## 2021-08-26 PROCEDURE — 74011000250 HC RX REV CODE- 250: Performed by: SURGERY

## 2021-08-26 PROCEDURE — 83735 ASSAY OF MAGNESIUM: CPT

## 2021-08-26 PROCEDURE — 99218 HC RM OBSERVATION: CPT

## 2021-08-26 PROCEDURE — 80048 BASIC METABOLIC PNL TOTAL CA: CPT

## 2021-08-26 PROCEDURE — 85027 COMPLETE CBC AUTOMATED: CPT

## 2021-08-26 RX ORDER — OXYCODONE HYDROCHLORIDE 5 MG/1
5 TABLET ORAL
Qty: 10 TABLET | Refills: 0 | Status: SHIPPED | OUTPATIENT
Start: 2021-08-26 | End: 2021-08-29

## 2021-08-26 RX ORDER — ONDANSETRON 4 MG/1
4 TABLET, ORALLY DISINTEGRATING ORAL
Qty: 12 TABLET | Refills: 0 | Status: SHIPPED | OUTPATIENT
Start: 2021-08-26 | End: 2021-12-20

## 2021-08-26 RX ADMIN — FAMOTIDINE 20 MG: 10 INJECTION INTRAVENOUS at 08:44

## 2021-08-26 RX ADMIN — OXYCODONE HYDROCHLORIDE 5 MG: 5 TABLET ORAL at 04:22

## 2021-08-26 RX ADMIN — OXYCODONE HYDROCHLORIDE 5 MG: 5 TABLET ORAL at 00:18

## 2021-08-26 RX ADMIN — OXYCODONE HYDROCHLORIDE 5 MG: 5 TABLET ORAL at 10:46

## 2021-08-26 RX ADMIN — ENOXAPARIN SODIUM 40 MG: 40 INJECTION SUBCUTANEOUS at 08:44

## 2021-08-26 NOTE — ROUTINE PROCESS
Bedside and verbal shift change report given to Juvencio Rizo RN by Rodger Daniels RN.  Report included the following information:  -procedure summary  -MAR  -Recent Results  -Med Rec Status  -SBAR

## 2021-08-26 NOTE — DISCHARGE SUMMARY
Bariatric Surgery Discharge Progress Note    Admission Date: 8/25/2021    Discharge Date: 8/27/2021    PREOPERATIVE DIAGNOSIS:  Super obesity with a body mass index of 50 with obesity-related comorbidities of hypertension, prediabetes, stress urinary incontinence, clinical obstructive sleep apnea, and weight-related arthropathy of her knees. 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 PROCEDURES PERFORMED:  Laparoscopic German-en-Y gastric bypass utilizing 15 mL of separate tubularized gastric pouch based on the lesser curvature of the stomach, a 155-cm German limb, and a 40-cm biliopancreatic limb. Postop Complications: none    Hospital Course:  Patient was admitted on 8/25/2021 for scheduled bariatric surgery. Operation was without significant complication. Patient admitted to the floor postoperatively, monitored as per protocol. Diet sequentially advanced beginning POD 1, pain medications transitioned to oral during the hospital course. Currently the patient is afebrile, vital signs stable, tolerating a clear liquid diet with protein supplementation, voiding spontaneously, ambulatory with adequate pain control with oral medications and clear surgical sites without evidence of infection. Discharge Diet:  Clear Liquid Bariatric Diet for 7 days, then soft moist protein diet for 5 weeks    Discharge Medications:  Discharge Medication List as of 8/26/2021  3:50 PM        START taking these medications    Details   oxyCODONE IR (ROXICODONE) 5 mg immediate release tablet Take 1 Tablet by mouth every six (6) hours as needed for Pain for up to 3 days.  Max Daily Amount: 20 mg., Normal, Disp-10 Tablet, R-0      ondansetron (ZOFRAN ODT) 4 mg disintegrating tablet Take 1 Tablet by mouth every eight (8) hours as needed for Nausea or Vomiting., Normal, Disp-12 Tablet, R-0           CONTINUE these medications which have NOT CHANGED    Details   enoxaparin (Lovenox) 40 mg/0.4 mL 0.4 mL by SubCUTAneous route daily for 7 days. , Normal, Disp-7 Syringe, R-0surg 8/25      cyclobenzaprine (FLEXERIL) 5 mg tablet Take 2 Tabs by mouth three (3) times daily (with meals). , Print, Disp-9 Tab,R-0               Bariatric Chewable vitamins, 2 orally daily for life  Calcium Citrate 1500 mg orally daily for life  Vitamin B12 1000 micrograms sublingual daily for life  Vitamin D3 5,000 units orally daily for life   Vitamin B1 100 mg orally daily for life    Discharge disposition: home    Discharge condition: stable      Local wound care with daily showers, keep wounds clean and dry     Activity: as desired, no lifting, pushing, or pulling greater than 15lbs or situps for 30 days     Special Instructions:            - No driving until activity is not influenced by incisional pain and off narcotics            - No bath or hot tub until wounds are healed            - Check pulse and temperature twice daily for 10 days            - Notify [facility] for a Temp >100.5 or Pulse>115     Follow-up with your surgeon in 2 weeks, call office for appointment [phone] ([address] [place] [address]) [phone]([address])

## 2021-08-26 NOTE — PROGRESS NOTES
Problem: Falls - Risk of  Goal: *Absence of Falls  Description: Document Nithya Sevilla Fall Risk and appropriate interventions in the flowsheet. Outcome: Resolved/Met     Problem: Patient Education: Go to Patient Education Activity  Goal: Patient/Family Education  Outcome: Resolved/Met     Problem: Pain  Goal: *Control of Pain  Outcome: Resolved/Met     Problem: Patient Education: Go to Patient Education Activity  Goal: Patient/Family Education  Outcome: Resolved/Met     Problem: Impaired Skin Integrity/Pressure Injury Treatment  Goal: *Improvement of Existing Pressure Injury  Outcome: Resolved/Met  Goal: *Prevention of pressure injury  Description: Document Jamal Scale and appropriate interventions in the flowsheet.   Outcome: Resolved/Met     Problem: Patient Education: Go to Patient Education Activity  Goal: Patient/Family Education  Outcome: Resolved/Met

## 2021-08-26 NOTE — ROUTINE PROCESS
Patient was educated on all discharge instructions below. He/she understood and was provided a copy. He/she knows who to call for any issues post discharge. Hydration  Hydration is your NUMBER ONE priority. Dehydration is the most common reason for readmission to the hospital. Dehydration occurs when  your body does not get enough fluid to keep it functioning at its best. Your body also requires fluid  to burn its stored fat calories for energy. Carry a bottle of water with you all day, even when you are away from home; remind yourself to  drink even if you dont feel thirsty. Drinking 64 ounces of fluid is your daily goal. You can tell if  youre getting enough fluid if youre making clear, light-colored urine five to 10 times per day. Signs of dehydration can be thirst, headache, hard stools or dizziness upon sitting or standing up. You should contact your surgeons office if you are unable to drink enough fluid to stay hydrated.      General Care after Surgery   No lifting over 15 pounds for four weeks.  No driving while taking the pain medication (about seven to 10 days).  No tub baths, swimming or hot tubs until incisions are healed (about two weeks).  You may shower. Clean incisions daily /gently with soap and check incisions for signs of infection:   Redness around incision.  Swelling at site.  Drainage with an foul odor (pus).  Increase tenderness around incision.  Take your temperature and resting pulse in the morning and evening. Record on tracking form  given to you. Call if your temperature is greater than 101 or your pulse rate is greater than 115.  Please contact your surgeon if you are having excessive abdominal pain (that lasts longer than  four hours and does not improve with prescribed pain medication), vomiting or shortness of breath.  Get up and move  do not sit in one place for more than an hour.  You need to WALK (EXERCISE) for 30 minutes per day.    Walking around your house does not count.  Bike, treadmill and elliptical are OK.  NO weight lifting or sit ups.  If constipated take an adult dose of Miralax (available over the counter). Contact the doctors  office if Miralax doesnt help.  You may swallow pills starting the day after surgery as long as they fit inside this [place]:   Continue to use your incentive spirometer (breathing machine) for the next couple of weeks to  help prevent pneumonia. Temperature/Heart Rate Log  Take your temperature and heart rate (pulse) twice a day for 14 days. Take both in the morning and  evening at about the same time each day (when you wake up and before you go to bed when you  are relaxed). Please contact your doctors office if your:   Temperature is higher than 101 degrees.  Heart rate (pulse) is higher than 115 beats per minute  (normal heart rate is 60 to 100 beats per minute). How to Take Your Heart Rate (Pulse)   Turn your left hand so that your palm is face-up.  With the index and middle fingers of your right  hand, draw a line from the base of your thumb to  just below the crease in your wrist. Your fingers  should nestle just to the left of the large tendon that  pops up when you bend your wrist toward you.  Dont press too hard, that will make the pulse go  away. Use gentle pressure.  Wait. It can take several seconds  and several micro-adjustments in the placement of your two  fingers on your wrist  to find your pulse. Just keep moving your fingers down or up your wrist  in small increments (and pausing for a few seconds) until you find it. How to Take Your Pulse Rate   Find a watch with a second hand and place it on your right wrist or on the table next  to your left hand.  After finding your pulse, count the number of beats for 20 seconds.  Multiply by three to get your heart rate, or beats per minute  (or just count for 60 seconds for a math-free option).    Normal, resting heart rate is about 60 to 100 beats per minute.  40 Amarilis Ferguson  Lovenox Self Injection Guide  Prepare  Step 1: Wash and dry your hands thoroughly. Step 2: Sit or lie in a comfortable position and choose an area  on the right or left side of the abdomen at least two inches away  from the belly button. Step 3: Clean the injection site with an alcohol swab and let dry. Inject  Step 4: Remove the needle cap by pulling it straight off the syringe and  discard it in a sharps . Step 5: With your other hand, pinch an inch of the cleansed area to  make a fold in the skin. Insert the full length of the needle straight  down  at a 90° angle  into the fold of skin.  48   PATIENT GUIDE TO BARIATRIC SURGERY    Step 6: Press the plunger with your thumb until the syringe is empty. Then pull the needle straight out and release the skin fold. Dispose  Step 7: Point the needle down and away from yourself and others,  and then push down on the plunger to activate the safety shield. Step 8: Place the used syringe in the sharps . Do NOT expel the air bubble from the syringe before the injection. Administration should be alternated between the left and right abdominal wall. The whole length  of the needle should be introduced into a skin fold held between the thumb and forefinger; the  skin fold should be held throughout the injection. To minimize bruising, do not rub the injection  site after completion of the injection. Questions about LOVENOX? Call [phone]   52   PATIENT GUIDE TO BARIATRIC SURGERY    9. DIET AND LIFESTYLE  Key Diet Principles Following Bariatric Surgery   Begin each meal with soft moist high protein foods (i.e. chicken, turkey, yogurt, tuna, eggs,  cottage cheese, other fish and seafood).  Consume a minimum of 64 ounces of fluid each day to prevent dehydration. No straws.  No food and fluid together.  Stop drinking 30 minutes before a meal. You may begin fluids again  30 minutes after you finish a meal.   Eat very slowly and chew all foods completely.  Keep portions small.  No simple sugars or high fat foods. No carbonated beverages. No caffeine.  Eat three meals per day. No skipping. Avoid snacking between meals.  No alcohol. No smoking.  Two Flintstones Complete Chewable vitamins each day. Take one in the morning and one at night.  1,500 milligrams Calcium Citrate per day in separate dosages.  Vitamin D 3: 5,000 IU taken per day.  Vitamin B-12: Take 1,000 micrograms sublingually daily.  Iron: 60 milligrams per day from [facility] [employer].  Protein supplements of your choice. Must be low sugar (0 to 3 grams), low fat (0 to 3 grams) and  provide at least 35 to 40 grams of protein each day. You need 60 to 70 grams of protein (food  and supplements) each day.  Minimum of 30 minutes of physical activity daily. Do not sunita NSAIDS . Do not take Steroids without your surgeons permission.  48   PATIENT GUIDE TO BARIATRIC SURGERY    Your Priorities After Surgery  ? Fluid: 64 ounces of fluid per day. ? Protein: 60 to 70 grams of protein per day. ? Walk every day. Clear Liquid Diet  One week of clear liquids: minimum of 64 ounces of fluid per day. Fluid:   Zero calorie liquids.  No caffeine.  No carbonation.  No sugary drinks.  No alcohol.  No straws. Food   Protein drinks.  Less than 3 grams of sugar and  3 grams of fat per serving.  Protein drink should provide you with  60 to 70 grams of protein. Soft Protein Diet  Five weeks of soft protein (1 ounce for soft protein, 3 ounces of yogurt/cottage cheese). Three meals per day and 1 protein shake. Protein shakes should provide you with 30 grams of  protein on the soft protein diet.   Slow transition:   First week on soft protein diet  focus on yogurt, cottage cheese, eggs, vegetarian refried beans,  black beans, kidney beans and white beans. (NO BAKED BEANS.)   Second through fourth week on soft protein diet  focus on yogurt, cottage cheese, eggs,  canned tuna, canned chicken, tilapia and fish (needs to be soft enough to be cut up with a fork).  Fifth week on soft protein diet  focus on yogurt, cottage cheese, eggs, canned tuna, canned  chicken, tilapia, fish, salmon, chicken breast or turkey. Fluid is your #1 Priority! Continue clear liquids between meals. You will need 64 ounces of fluid per day. Fluids that you can have include:   Water.  Zero calorie liquids. You will need to sip throughout the day and should therefore have a water bottle with you at all  times! No liquids with your meals. Stop 30 minutes before a meal and wait 30 minutes after a meal.  No straws. Zero calorie liquids. No caffeine. No carbonation. No sugary drinks. No alcohol.  51   PATIENT GUIDE TO BARIATRIC SURGERY    Protein  You will need 60 to 70 grams of protein per day.  60 to 70 grams of protein shakes when on the clear liquid diet (two to three shakes per day).  30 to 50 grams of protein shakes when on the soft protein diet (one shake per day). Eat Three Times Per Day  You will need to eat three times per day. My planned times are:  _________________________________________________________  _________________________________________________________  _________________________________________________________  Nausea, Vomiting, Stomach Pain  If you have problems with nausea, vomiting or stomach pain, try:   Eating slowly: 20 to 30 minutes per meal.   Chewing food thoroughly: 20 to 30 chews before food is swallowed.  Small portions: measure portions in medicine cup.  Stopping before feeling full.  AVOIDING SUGAR and FRIED FOOD: sugar will cause dumping syndrome and lead to weight gain. Exercise  I will need to get a minimum of 30 minutes of exercise per day or 150 minutes of exercise per week.    Walking, swimming, biking or elliptical.   Find something you enjoy! Vitamins  After surgery, you will need to take the following vitamins for the rest of your life  FOREVER.  Vitamin D 3: 5,000 IU per day.  Calcium Citrate: 1,500 milligrams, taken separately.  Flintstones Complete: two per day, taken separately.  Sublingual Vitamin B-12: 1,000 micrograms daily.  Iron for menstruating women or patients with a history of low iron: 65 milligrams daily. We recommend going to www.[facility]. Good Thing and purchasing iron from there. The lemon-lime has 60 milligrams. This iron is better absorbed than over-the-counter iron.  52   PATIENT GUIDE TO BARIATRIC SURGERY    Clear Liquid Log  Getting your fluid in is top priority during this week. Fluids (MINIUM of 64 ounces per day):  ? 8 oz. ? 8 oz. ? 8 oz. ? 8 oz. ? 8 oz. ? 8 oz. ? 8 oz. ? 8 oz. ? 8 oz. ? 8 oz. ? 8 oz. ? 8 oz. Flintstones Complete Chewable: ? a.m. ? p.m. Calcium Citrate (1,500 milligrams/day):  Pill form  ? Two crushed pills (morning) ? Two crushed pills (afternoon) ? Two crushed pills (evening)  OR Upcal D (powder)  ? One pack/scoop ? One pack/scoop ? One pack/scoop  OR Celebrate Chewable Vitamins (500 mg each) or B[facility] Advantage Chewables (500 mg)  ? One chewable (morning) ? One chewable (afternoon) ? One chewable (evening)  OR Liquid Calcium Citrate  ? 1 tbsp. Calcium Citrate ? 1 tbsp. Calcium Citrate ? 1 tbsp. Calcium Citrate  Vitamin D3: ? 5,000 IU daily. Vitamin B-12: ? 1,000 micrograms per day. Vitamin b1 100 mg daily  Iron (menstruating women or patients with a history of low iron):  ? 60 milligrams per day from [employer]. Protein drinks (protein drinks should be under 3 grams of sugar and 3 grams of fat). Protein shake (60 grams per day): ? a.m. ? p.m. Exercise: ? 30 to 40 minutes per day.  48   PATIENT GUIDE TO BARIATRIC SURGERY    Bariatric Soft and Moist Diet Shopping List   alcium Citrate (1,500 milligrams/day):  Pill form  ?  Two crushed pills (morning) ? Two crushed pills (afternoon) ? Two crushed pills (evening)  OR Upcal D (powder)  ? One pack/scoop ? One pack/scoop ? One pack/scoop  OR Celebrate Chewable Vitamins (500 mg each) or [facility] [employer]age Chewables (500 mg)  ? One chewable (morning) ? One chewable (afternoon) ? One chewable (evening)  OR Liquid Calcium Citrate  ? 1 tbsp. Calcium Citrate ? 1 tbsp. Calcium Citrate ? 1 tbsp. Calcium Citrate  Vitamin D3: ? 5,000 IU daily  Vitamin B-12: ? 1,000 micrograms per day  Vitamin b1 100mg daily  Iron (menstruating women or  patients with a history of low iron):  ? 60 milligrams per day  from [employer]  Protein drinks (protein drinks should be under  3 grams of sugar and 3 grams of fat). Protein shake (30 to 40 grams per day):  ? a.m. ? p.m. Exercise: ? 30 to 40 minutes per  Educated on Diet Progression    [facility] Gastric Bypass and Sleeve Dietary Progression    Patient Name:   Date of Surgery: Ice Chips start once admitted on floor. Begin Bariatric Clear Liquid Diet on:     Clear Liquid Diet: 64 oz. of fluid per day  Low calorie, low sugar, non-carbonated beverages  Water, Crystal Light, Propel Water, Sugar Free Jell-O, Sugar Free Popsicles, Bouillon  Start protein supplement during this stage. (60-70 grams per day)  Getting your fluid in and staying hydrated is your #1 priority! The clear liquid diet will last for 7 days. Begin Bariatric Soft and Moist on: This stage of the diet will last for 5 weeks, unless otherwise instructed by your surgeon. Begin:  1 week post-op   End:  6 weeks post-op (or when you follow up with the Registered Dietitian)    Soft, moist, high protein foods: 3 meals per day plus protein supplements. Portions should emphasize on soft protein. Portions will be a MAXIMUM of:   1 ounce of solid food   2-3 ounces of cottage cheese and yogurt.   Protein supplements should be between meals and provide 30-40 grams per day during soft protein diet. Continue to get 64 ounces of fluid in per day. Protein foods that are ok on the Soft and Moist Diet include:  Slow transition:  1st week on soft protein should focus on: Yogurt, cottage cheese, eggs  2nd -4th  week on soft protein diet should focus on: yogurt, cottage cheese, eggs, canned tuna, canned chicken, tilapia, fish (needs to be soft enough to be cut up with a fork)  5th week on soft protein diet should focus on: Yogurt, cottage cheese, eggs, canned tuna, canned chicken, tilapia, fish, salmon, chicken breast, or turkey. Remember to continue to get 64 ounces of fluid daily on ALL Stages. To be advanced to Bariatric Maintenance Stage of the bariatric diet, follow up with the dietitian 6 weeks post-op, around:         For any additional questions, please refer to your blue binder that was provided to you at the start of the bariatric program.

## 2021-08-26 NOTE — PROGRESS NOTES
Surgery Progress Note    8/26/2021    Admit Date: 8/25/2021    Subjective:     Patient has complaints of pain and is controlled with current plan. Patient has been ambulating in halls. She reports no nausea and no vomiting. Bowel Movements: None    Objective:     Blood pressure 113/69, pulse 90, temperature 98.9 °F (37.2 °C), resp. rate 19, height 5' 6\" (1.676 m), weight 139.3 kg (307 lb), last menstrual period 08/18/2021, SpO2 100 %. No intake/output data recorded.     08/24 1901 - 08/26 0700  In: 2040 [P.O.:90; I.V.:1950]  Out: 1175 [Urine:1150]    EXAM: GENERAL: alert, pleasant, no distress   HEART: regular rate and rhythm   LUNGS: clear to auscultation   ABDOMEN:  Soft, obese, appropriate incisional tenderness, +BS, non-distended, surgical incisions clean, dry, no erythema or drainage   EXTREMITIES: warm, well perfused    Data Review    Recent Results (from the past 24 hour(s))   CBC W/O DIFF    Collection Time: 08/26/21  6:08 AM   Result Value Ref Range    WBC 11.2 4.6 - 13.2 K/uL    RBC 4.48 4.20 - 5.30 M/uL    HGB 9.5 (L) 12.0 - 16.0 g/dL    HCT 32.2 (L) 35.0 - 45.0 %    MCV 71.9 (L) 78.0 - 100.0 FL    MCH 21.2 (L) 24.0 - 34.0 PG    MCHC 29.5 (L) 31.0 - 37.0 g/dL    RDW 16.6 (H) 11.6 - 14.5 %    PLATELET 042 549 - 829 K/uL    MPV 10.9 9.2 - 17.2 FL   METABOLIC PANEL, BASIC    Collection Time: 08/26/21  6:08 AM   Result Value Ref Range    Sodium 135 (L) 136 - 145 mmol/L    Potassium 3.6 3.5 - 5.5 mmol/L    Chloride 102 100 - 111 mmol/L    CO2 26 21 - 32 mmol/L    Anion gap 7 3.0 - 18 mmol/L    Glucose 87 74 - 99 mg/dL    BUN 6 (L) 7.0 - 18 MG/DL    Creatinine 0.60 0.6 - 1.3 MG/DL    BUN/Creatinine ratio 10 (L) 12 - 20      GFR est AA >60 >60 ml/min/1.73m2    GFR est non-AA >60 >60 ml/min/1.73m2    Calcium 8.2 (L) 8.5 - 10.1 MG/DL   MAGNESIUM    Collection Time: 08/26/21  6:08 AM   Result Value Ref Range    Magnesium 2.0 1.6 - 2.6 mg/dL       Assessment:   Denver Ivanoff is a 39 y.o. female,  day 1 status post Laparoscopic German-en-Y gastric bypass utilizing 15 mL of separate tubularized gastric pouch based on the lesser curvature of the stomach, a 155-cm German limb, and a 40-cm biliopancreatic limb.   Condition: good    Plan:   -Ambulate every four hours  -Pain managed prior to d/c   -Nausea managed prior to d/c   -Advance to Clear liquid Gastric Bypass Diet, if able to tolerate clear liquid diet (with pro shake) 4oz per hour for 3 hours prior to d/c    If patient continues to progress d/c home later today     Janie Maza NP  8:33 AM  8/26/2021

## 2021-08-26 NOTE — PROGRESS NOTES
D/C order noted for today. Orders reviewed. No needs identified at this time.         JÚNIOR CabaN RN  Care Management  Pager: [phone]

## 2021-08-26 NOTE — OP NOTES
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 PROCEDURES PERFORMED:  Laparoscopic German-en-Y gastric bypass utilizing 15 mL of separate tubularized gastric pouch based on the lesser curvature of the stomach, a 155-cm German limb, and a 40-cm biliopancreatic limb. SURGEON:  Sharda Lang. Norma Cabrera DO.    FIRST ASSISTANT:  Sugey Don SA.    ANESTHESIA:  General endotracheal supplemented at the conclusion of the operative procedure with local infiltration of the operative sites with 266 mg of Exparel diluted in 50 mL of normal saline solution. COMPLICATIONS:  None. SPECIMENS REMOVED:  None. IMPLANTS: None. ESTIMATED BLOOD LOSS:  Less than 25 mL. OPERATIVE FINDINGS:  Normal intraabdominal anatomy. The patient had the morphologic appearance of mild hepatic steatosis with hepatomegaly. INDICATIONS FOR SURGICAL PROCEDURE:  This is a 69-year-old black female who has failed medical management of her super obesity and who after investigating the surgical options, elected to pursue laparoscopic, potentially open German-en-Y gastric bypass to achieve definitive durable weight loss on a personal level with expected resolution of obesity-related comorbidities.   The risks and benefits of operative versus nonoperative potential alternatives and potential complications up to and including death were extensively discussed with the patient prior to the surgical procedure, who voiced her understanding and wished to proceed. DESCRIPTION OF PROCEDURE:  The patient received Ancef for antibiotic prophylaxis and Lovenox for DVT prophylaxis in the preoperative staging area. After voiding and signing her operative permit, she was then transported to the operating room, where she was placed in the supine position on the operating table and SCDs were placed and inflated prior to the induction of general endotracheal anesthesia. A 34-Armenian orogastric tube was atraumatically for gravity drainage for gastric decompression and utilization as a sizing template for construction of the gastric pouch. The abdomen was then prepped and draped in the usual sterile fashion. A time-out procedure was performed according to protocol and agreed upon by all personnel in the operating suite. A transverse 12-mm cutaneous incision was then made just superior to the umbilicus in the midline through which a 12-mm OptiView trocar and cannula were placed in the peritoneal cavity under direct vision utilizing a 10 mm 0-degree laparoscope. The laparoscope and trocar were removed. The abdomen was insufflated with carbon dioxide gas never exceeding a pressure of 15 mmHg and a 30-degree 10-mm laparoscope was placed and utilized for remainder of the procedure. The remaining ports were placed through transverse cutaneous incision under direct vision utilizing OptiView trocars and cannulas; the second a 5-mm incision in the left anterior axillary line two fingerbreadths below the left costal margin; the third a 12-mm incision midway between the left upper quadrant and supraumbilical incisions; and the fourth in the right paramedian location 8 cm from the midline, 12 mm in length, midway between the costal margin at the level of the umbilicus.   After placing appropriately sized OptiView trocars and cannulas, the upper abdomen was visualized with a laparoscope with the only notable finding of hepatomegaly with the morphologic appearance of hepatic steatosis. The omentum and transverse colon were then reflected superiorly. The ligament of Treitz was identified; 40 cm distal to the ligament of Treitz, the jejunum was transected with a triple-load stapling device, 60 mm in length, loaded with 2.5-mm staples. The mesentery was then divided down to its base utilizing the Harmonic scalpel. The German limb was measured to be 155 cm in length and at this point, on its antimesenteric border, enterotomy was created with the Harmonic scalpel. A similar antimesenteric enterotomy was created 2 cm proximal to the staple line of the biliopancreatic limb and a stapled side-to-side functional end-to-side jejunojejunostomy was constructed utilizing a triple-load stapling device, 60 mm in length, loaded with 2.5-mm staples, introducing one arm of the stapling device into each of the respective limbs prior to firing the stapling device on the antimesenteric border of the bowel. The remaining enteric defect was then closed with a running 3-0 Vicryl suture. The mesenteric defect was closed with a running 2-0 silk suture. The ligament of Treitz was re-identified. Just anterior to the ligament of Treitz, a fenestration was created through the mesocolon into the lesser sac utilizing the Harmonic scalpel. The German limb was then passed through this mesocolic fenestration into the lesser sac. The omentum and transverse colon were returned to their normal anatomic positions. The monika was identified along the lesser curvature of the stomach. Just inferior to the monika along the greater curvature of the stomach, the omentum was  from the gastric wall utilizing a Harmonic scalpel until the German limb was visualized within the lesser sac.   A transverse 5-mm incision was then made one-third the distance from the xiphisternal junction to the umbilicus and a 5-mm trocar without a cannula was placed under direct vision. This was removed and replaced with a 5-mm atraumatic grasping forceps, which was utilized in conjunction with a self-retaining retractor to elevate the left lobe of the  liver and provide hiatal exposure. The peritoneum overlying the angle of His was opened with the Harmonic scalpel; 5 cm distal to the GE junction along the lesser curvature of the stomach, neurovascular elements of the lesser omentum were  from the gastric wall utilizing the Harmonic scalpel. Completion of this lesser curve fenestration into the lesser sac was accomplished with an esophageal retractor introduced posterior to the stomach through the omental fenestration along the greater curvature. The 34-Belgian orogastric tube was retracted into the esophagus. A triple-load stapling device, 60 mm in length, loaded with 2.5 mm staples was introduced into the lesser curvature fenestration that was oriented perpendicular to the lesser curve 5 cm distal to the GE junction prior to firing two-thirds the length of the staple load. The 34-Belgian orogastric tube was then advanced and utilized as a sizing template for construction of the tubularized gastric pouch based on the lesser curvature of the stomach. The vertical aspect of the gastric pouch was initiated with a triple-load stapling device, 60-mm in length, loaded with 2.5-mm staples utilizing two-thirds the length of the staple load. The remainder of the pouch was constructed with sequential firings of a triple-load stapling device, 60 mm in length, loaded with 2.5-mm staples ending the transection at the angle of His. The first full length firing of the 2.5-mm staple load utilized an Ethicon staple line reinforcement strip and this created a 15 mL separate tubularized gastric pouch based on the lesser curvature of the stomach.   The German limb was then elevated posterior to the stomach in retrogastric position, where a tension-free hand-sewn two-layered gastrojejunostomy was constructed. The geometric orientation of the gastrojejunostomy was at the distal aspect of the tubularized gastric pouch to the antimesenteric border of the German limb 2 cm distal to the staple line. The posterior row of running seromuscular 3-0 Vicryl suture was placed. A transverse 12-mm gastrotomy was made at the distal aspect of the tubularized gastric pouch with an adjacent antimesenteric 12-mm German limb enterotomy. The running inner layer of full-thickness 3-0 Vicryl suture was initiated in the left lateral posterior aspect of the anastomosis, run across the posterior aspect of the anastomosis, run around the right lateral aspect of the anastomosis of the anterior midline. A second full-thickness 3-0 Vicryl suture was initiated adjacent to the origin of the first and run around the left lateral aspect of the anastomosis to the anterior midline. The 34-Tongan orogastric tube was advanced across the gastrojejunal anastomosis into the German limb prior to tying with a running inner layer of full-thickness 3-0 Vicryl suture together anteriorly. The gastrojejunal anastomosis was then completed anteriorly utilizing a running seromuscular 3-0 Vicryl suture. The 34-Tongan orogastric tube was removed and after assuring there was adequate redundancy of the German limb above the level of the mesocolon, the omentum and transverse colon were reflected superiorly. The space through which a Dawn's hernia might occur was closed with a running 2-0 silk suture and the mesocolic defect was closed with a series of simple interrupted 2-0 silk sutures. The omentum and transverse colon were returned to their normal anatomic position. The German limb was noted to be viable with adequate redundancy above the level of the mesocolon and there was no tension noted in the gastrojejunal anastomosis. The self-retaining retractor and atraumatic grasping forceps were then removed.   The laparoscope was shifted to the left mid abdominal port site to allow visualization of the supraumbilical fascial trocar defect, which was closed with a suture passing device and #2 Vicryl suture. All operative sites were inspected and noted to be hemostatic. The abdomen was then desufflated off carbon dioxide gas. Trocars were removed under direct vision. The fascial suture were tied. The wounds were irrigated with normal saline solution and closure of the skin was accomplished with a series of simple interrupted buried deep dermal 4-0 Monocryl sutures. The incisions were then infiltrated with 266 mg of Exparel diluted in 50 mL of normal saline solution. Steri-Strips were applied as were sterile dressings. The sponge and needle counts were correct both during and at the completion of the procedure. The patient tolerated the procedure without operative complications, subsequently was extubated and transported to the recovery room in awake, alert, and stable condition. The estimated blood loss was less than 25 mL. The patient received 1 liter of crystalloid during the procedure. The start time was 0812, completion time was 0938.         Claudene Overman, DO      [id number]  D:  08/25/2021 10:09  T:  08/26/2021 0:29  JOB #:  [id number]

## 2021-08-26 NOTE — PROGRESS NOTES
Problem: Falls - Risk of  Goal: *Absence of Falls  Description: Document Omaira Ny Fall Risk and appropriate interventions in the flowsheet. Outcome: Progressing Towards Goal  Note: Fall Risk Interventions:            Medication Interventions: Teach patient to arise slowly, Assess postural VS orthostatic hypotension                   Problem: Patient Education: Go to Patient Education Activity  Goal: Patient/Family Education  Outcome: Progressing Towards Goal     Problem: Pain  Goal: *Control of Pain  Outcome: Progressing Towards Goal     Problem: Patient Education: Go to Patient Education Activity  Goal: Patient/Family Education  Outcome: Progressing Towards Goal     Problem: Impaired Skin Integrity/Pressure Injury Treatment  Goal: *Improvement of Existing Pressure Injury  Outcome: Progressing Towards Goal  Goal: *Prevention of pressure injury  Description: Document Jamal Scale and appropriate interventions in the flowsheet. Outcome: Progressing Towards Goal  Note: Pressure Injury Interventions:             Activity Interventions: Increase time out of bed         Nutrition Interventions: Document food/fluid/supplement intake                     Problem: Patient Education: Go to Patient Education Activity  Goal: Patient/Family Education  Outcome: Progressing Towards Goal

## 2021-08-26 NOTE — PROGRESS NOTES
Reason for Admission:  Morbid obesity ([facility] [address] [E66.01]  Morbid obesity with BMI of 45.0-49.9, adult ([facility] [address] [E66.01, Z68.42]                 RUR Score:    6           Plan for utilizing home health:    No                       Likelihood of Readmission:   LOW                         Transition of Care Plan:              Initial assessment completed with patient. Cognitive status of patient: oriented to time, place, person and situation. Face sheet information confirmed:  yes. The patient designates her mother Stephanie Alvarez to participate in her discharge plan and to receive any needed information. This patient lives in a home with son and daughter. Patient is able to navigate steps as needed. Prior to hospitalization, patient was considered to be independent with ADLs/IADLS : yes . Patient has a current ACP document on file: no      Healthcare Decision Maker:     Click here to complete [address] including selection of the Healthcare Decision Maker Relationship (ie \"Primary\")    The son will be available to transport patient home upon discharge. The patient already has none reported medical equipment available in the home. Patient is not currently active with home health. Patient has not stayed in a skilled nursing facility or rehab. Was  stay within last 60 days : no. This patient is on dialysis :no     Currently, the discharge plan is Home. The patient states that she can obtain her medications from the pharmacy, and take her medications as directed. Patient's current insurance is [employer]. Care Management Interventions  PCP Verified by CM: Yes  Palliative Care Criteria Met (RRAT>21 & CHF Dx)?: No  Mode of Transport at Discharge: Other (see comment) (family)  Transition of Care Consult (CM Consult): Discharge Planning  Current Support Network:  Other (pt's son and daughter live with her)  Confirm Follow Up Transport: Self  Discharge Location  Discharge Placement: Home with family assistance        JÚNIOR MaciasN RN  Care Management  Pager: [phone]

## 2021-08-31 ENCOUNTER — TELEPHONE (OUTPATIENT)
Dept: MEDSURG UNIT | Age: 41
End: 2021-08-31

## 2021-08-31 DIAGNOSIS — Z98.84 S/P GASTRIC BYPASS: Primary | ICD-10-CM

## 2021-08-31 DIAGNOSIS — E66.01 MORBID OBESITY (HCC): ICD-10-CM

## 2021-08-31 RX ORDER — ENOXAPARIN SODIUM 100 MG/ML
40 INJECTION SUBCUTANEOUS DAILY
Qty: 1 EACH | Refills: 0 | Status: SHIPPED | OUTPATIENT
Start: 2021-08-31 | End: 2021-09-01

## 2021-08-31 NOTE — TELEPHONE ENCOUNTER
Pt states that she is getting in 64 oz of fluid daily. Pt states that she has not been walking for 30 minutes on the outside. Pt  Reeducated on the importance of walking on the outside for 30 minutes to prevent blood clots. Pt stated that she broke one of her needles for the Lovenox injection. Chelita NP notified.

## 2021-09-07 ENCOUNTER — DOCUMENTATION ONLY (OUTPATIENT)
Dept: SURGERY | Age: 41
End: 2021-09-07

## 2021-09-07 ENCOUNTER — HOSPITAL ENCOUNTER (EMERGENCY)
Age: 41
Discharge: HOME | End: 2021-09-07
Attending: STUDENT IN AN ORGANIZED HEALTH CARE EDUCATION/TRAINING PROGRAM
Payer: COMMERCIAL

## 2021-09-07 ENCOUNTER — APPOINTMENT (OUTPATIENT)
Dept: GENERAL RADIOLOGY | Age: 41
End: 2021-09-07
Attending: PHYSICIAN ASSISTANT
Payer: COMMERCIAL

## 2021-09-07 ENCOUNTER — APPOINTMENT (OUTPATIENT)
Dept: VASCULAR SURGERY | Age: 41
End: 2021-09-07
Attending: STUDENT IN AN ORGANIZED HEALTH CARE EDUCATION/TRAINING PROGRAM
Payer: COMMERCIAL

## 2021-09-07 ENCOUNTER — APPOINTMENT (OUTPATIENT)
Dept: CT IMAGING | Age: 41
End: 2021-09-07
Attending: PHYSICIAN ASSISTANT
Payer: COMMERCIAL

## 2021-09-07 VITALS
BODY MASS INDEX: 48.45 KG/M2 | DIASTOLIC BLOOD PRESSURE: 82 MMHG | HEART RATE: 88 BPM | HEIGHT: 65 IN | OXYGEN SATURATION: 100 % | SYSTOLIC BLOOD PRESSURE: 134 MMHG | WEIGHT: 290.8 LBS | TEMPERATURE: 98.5 F | RESPIRATION RATE: 18 BRPM

## 2021-09-07 DIAGNOSIS — R09.1 PLEURISY: Primary | ICD-10-CM

## 2021-09-07 LAB
ALBUMIN SERPL-MCNC: 3.6 G/DL (ref 3.4–5)
ALBUMIN/GLOB SERPL: 0.8 {RATIO} (ref 0.8–1.7)
ALP SERPL-CCNC: 87 U/L (ref 45–117)
ALT SERPL-CCNC: 19 U/L (ref 13–56)
ANION GAP SERPL CALC-SCNC: 6 MMOL/L (ref 3–18)
AST SERPL-CCNC: 11 U/L (ref 10–38)
ATRIAL RATE: 87 BPM
BASOPHILS # BLD: 0 K/UL (ref 0–0.1)
BASOPHILS NFR BLD: 0 % (ref 0–2)
BILIRUB SERPL-MCNC: 0.3 MG/DL (ref 0.2–1)
BNP SERPL-MCNC: 50 PG/ML (ref 0–450)
BUN SERPL-MCNC: 10 MG/DL (ref 7–18)
BUN/CREAT SERPL: 16 (ref 12–20)
CALCIUM SERPL-MCNC: 8.9 MG/DL (ref 8.5–10.1)
CALCULATED P AXIS, ECG09: 75 DEGREES
CALCULATED R AXIS, ECG10: -14 DEGREES
CALCULATED T AXIS, ECG11: 58 DEGREES
CHLORIDE SERPL-SCNC: 104 MMOL/L (ref 100–111)
CK MB CFR SERPL CALC: NORMAL % (ref 0–4)
CK MB SERPL-MCNC: <1 NG/ML (ref 5–25)
CK SERPL-CCNC: 98 U/L (ref 26–192)
CO2 SERPL-SCNC: 28 MMOL/L (ref 21–32)
CREAT SERPL-MCNC: 0.64 MG/DL (ref 0.6–1.3)
DIAGNOSIS, 93000: NORMAL
DIFFERENTIAL METHOD BLD: ABNORMAL
EOSINOPHIL # BLD: 0.1 K/UL (ref 0–0.4)
EOSINOPHIL NFR BLD: 2 % (ref 0–5)
ERYTHROCYTE [DISTWIDTH] IN BLOOD BY AUTOMATED COUNT: 17.1 % (ref 11.6–14.5)
GLOBULIN SER CALC-MCNC: 4.7 G/DL (ref 2–4)
GLUCOSE SERPL-MCNC: 84 MG/DL (ref 74–99)
HCG SERPL QL: NEGATIVE
HCT VFR BLD AUTO: 36.9 % (ref 35–45)
HGB BLD-MCNC: 11.2 G/DL (ref 12–16)
LYMPHOCYTES # BLD: 2.3 K/UL (ref 0.9–3.6)
LYMPHOCYTES NFR BLD: 25 % (ref 21–52)
MAGNESIUM SERPL-MCNC: 2.1 MG/DL (ref 1.6–2.6)
MCH RBC QN AUTO: 21.5 PG (ref 24–34)
MCHC RBC AUTO-ENTMCNC: 30.4 G/DL (ref 31–37)
MCV RBC AUTO: 70.8 FL (ref 78–100)
MONOCYTES # BLD: 0.8 K/UL (ref 0.05–1.2)
MONOCYTES NFR BLD: 8 % (ref 3–10)
NEUTS SEG # BLD: 6 K/UL (ref 1.8–8)
NEUTS SEG NFR BLD: 65 % (ref 40–73)
P-R INTERVAL, ECG05: 134 MS
PLATELET # BLD AUTO: 347 K/UL (ref 135–420)
PMV BLD AUTO: 11.3 FL (ref 9.2–11.8)
POTASSIUM SERPL-SCNC: 3.4 MMOL/L (ref 3.5–5.5)
PROT SERPL-MCNC: 8.3 G/DL (ref 6.4–8.2)
Q-T INTERVAL, ECG07: 384 MS
QRS DURATION, ECG06: 84 MS
QTC CALCULATION (BEZET), ECG08: 462 MS
RBC # BLD AUTO: 5.21 M/UL (ref 4.2–5.3)
SODIUM SERPL-SCNC: 138 MMOL/L (ref 136–145)
TROPONIN I SERPL-MCNC: <0.02 NG/ML (ref 0–0.04)
VENTRICULAR RATE, ECG03: 87 BPM
WBC # BLD AUTO: 9.2 K/UL (ref 4.6–13.2)

## 2021-09-07 PROCEDURE — 83735 ASSAY OF MAGNESIUM: CPT

## 2021-09-07 PROCEDURE — 93970 EXTREMITY STUDY: CPT

## 2021-09-07 PROCEDURE — 99283 EMERGENCY DEPT VISIT LOW MDM: CPT

## 2021-09-07 PROCEDURE — 93005 ELECTROCARDIOGRAM TRACING: CPT

## 2021-09-07 PROCEDURE — 82553 CREATINE MB FRACTION: CPT

## 2021-09-07 PROCEDURE — 71046 X-RAY EXAM CHEST 2 VIEWS: CPT

## 2021-09-07 PROCEDURE — 83880 ASSAY OF NATRIURETIC PEPTIDE: CPT

## 2021-09-07 PROCEDURE — 71275 CT ANGIOGRAPHY CHEST: CPT

## 2021-09-07 PROCEDURE — 80053 COMPREHEN METABOLIC PANEL: CPT

## 2021-09-07 PROCEDURE — 85025 COMPLETE CBC W/AUTO DIFF WBC: CPT

## 2021-09-07 PROCEDURE — 84703 CHORIONIC GONADOTROPIN ASSAY: CPT

## 2021-09-07 PROCEDURE — 74011000636 HC RX REV CODE- 636

## 2021-09-07 RX ADMIN — IOPAMIDOL 80 ML: 755 INJECTION, SOLUTION INTRAVENOUS at 14:00

## 2021-09-07 NOTE — ED NOTES
I performed a brief evaluation, including history and physical, of the patient here in triage and I have determined that pt will need further treatment and evaluation from the main side ER physician. I have placed initial orders to help in expediting patients care.      September 07, 2021 at 12:11 PM - FIFI Wilkins

## 2021-09-07 NOTE — Clinical Note
[address] [facility] EMERGENCY DEPT  [id number] [address] [phone]    Work/School Note    Date: 9/7/2021    To Whom It May concern:    Floresita Restrepo was seen and treated today in the emergency room by the following provider(s):  Attending Provider: Yas Shelby MD.      Floresita Restrepo is excused from work/school on 9/7/2021 through 9/10/2021. She is medically clear to return to work/school on 9/11/2021.         Sincerely,          Nic Del Valle MD

## 2021-09-07 NOTE — DISCHARGE INSTRUCTIONS
As we discussed please contact your primary care doctor as soon as possible arrange for follow-up appointment in the next 3 to 7 days. Please return to the closest emergency department you have any sudden change in your symptoms including sudden/severe pain, difficulty breathing, passing out, any other sudden/severe change in your condition.
No

## 2021-09-07 NOTE — ED TRIAGE NOTES
Pt reports having gastric bypass on august 25th, states she started having pain in right arm and tingling. Pt states pain with deep breath.

## 2021-09-08 ENCOUNTER — HOSPITAL ENCOUNTER (EMERGENCY)
Age: 41
Discharge: HOME | End: 2021-09-08
Attending: EMERGENCY MEDICINE
Payer: COMMERCIAL

## 2021-09-08 ENCOUNTER — APPOINTMENT (OUTPATIENT)
Dept: GENERAL RADIOLOGY | Age: 41
End: 2021-09-08
Attending: STUDENT IN AN ORGANIZED HEALTH CARE EDUCATION/TRAINING PROGRAM
Payer: COMMERCIAL

## 2021-09-08 ENCOUNTER — TELEPHONE (OUTPATIENT)
Dept: SURGERY | Age: 41
End: 2021-09-08

## 2021-09-08 ENCOUNTER — HOSPITAL ENCOUNTER (EMERGENCY)
Age: 41
Discharge: LEFT BEFORE DISCHARGE | End: 2021-09-08
Payer: COMMERCIAL

## 2021-09-08 ENCOUNTER — APPOINTMENT (OUTPATIENT)
Dept: CT IMAGING | Age: 41
End: 2021-09-08
Attending: EMERGENCY MEDICINE
Payer: COMMERCIAL

## 2021-09-08 VITALS
OXYGEN SATURATION: 100 % | DIASTOLIC BLOOD PRESSURE: 85 MMHG | SYSTOLIC BLOOD PRESSURE: 130 MMHG | HEIGHT: 65 IN | TEMPERATURE: 98.9 F | RESPIRATION RATE: 20 BRPM | WEIGHT: 290 LBS | HEART RATE: 93 BPM | BODY MASS INDEX: 48.32 KG/M2

## 2021-09-08 VITALS
OXYGEN SATURATION: 100 % | DIASTOLIC BLOOD PRESSURE: 89 MMHG | SYSTOLIC BLOOD PRESSURE: 128 MMHG | WEIGHT: 290 LBS | HEIGHT: 65 IN | RESPIRATION RATE: 19 BRPM | TEMPERATURE: 98.7 F | HEART RATE: 93 BPM | BODY MASS INDEX: 48.32 KG/M2

## 2021-09-08 DIAGNOSIS — Z98.84 S/P GASTRIC BYPASS: ICD-10-CM

## 2021-09-08 DIAGNOSIS — R79.89 ELEVATED D-DIMER: ICD-10-CM

## 2021-09-08 DIAGNOSIS — K91.2 POST-RESECTION MALABSORPTION: ICD-10-CM

## 2021-09-08 DIAGNOSIS — R07.81 PLEURITIC CHEST PAIN: Primary | ICD-10-CM

## 2021-09-08 DIAGNOSIS — R07.9 RIGHT-SIDED CHEST PAIN: ICD-10-CM

## 2021-09-08 DIAGNOSIS — I26.99 OTHER ACUTE PULMONARY EMBOLISM WITHOUT ACUTE COR PULMONALE (HCC): Primary | ICD-10-CM

## 2021-09-08 DIAGNOSIS — E66.01 MORBID OBESITY (HCC): ICD-10-CM

## 2021-09-08 LAB
ALBUMIN SERPL-MCNC: 3.5 G/DL (ref 3.4–5)
ALBUMIN/GLOB SERPL: 0.7 {RATIO} (ref 0.8–1.7)
ALP SERPL-CCNC: 81 U/L (ref 45–117)
ALT SERPL-CCNC: 18 U/L (ref 13–56)
ANION GAP SERPL CALC-SCNC: 7 MMOL/L (ref 3–18)
AST SERPL-CCNC: 16 U/L (ref 10–38)
BASOPHILS # BLD: 0 K/UL (ref 0–0.1)
BASOPHILS NFR BLD: 0 % (ref 0–2)
BILIRUB SERPL-MCNC: 0.3 MG/DL (ref 0.2–1)
BUN SERPL-MCNC: 11 MG/DL (ref 7–18)
BUN/CREAT SERPL: 19 (ref 12–20)
CALCIUM SERPL-MCNC: 9.1 MG/DL (ref 8.5–10.1)
CHLORIDE SERPL-SCNC: 102 MMOL/L (ref 100–111)
CK MB CFR SERPL CALC: NORMAL % (ref 0–4)
CK MB SERPL-MCNC: <1 NG/ML (ref 5–25)
CK SERPL-CCNC: 93 U/L (ref 26–192)
CO2 SERPL-SCNC: 28 MMOL/L (ref 21–32)
CREAT SERPL-MCNC: 0.59 MG/DL (ref 0.6–1.3)
D DIMER PPP FEU-MCNC: 2.67 UG/ML(FEU)
DIFFERENTIAL METHOD BLD: ABNORMAL
EOSINOPHIL # BLD: 0.2 K/UL (ref 0–0.4)
EOSINOPHIL NFR BLD: 1 % (ref 0–5)
ERYTHROCYTE [DISTWIDTH] IN BLOOD BY AUTOMATED COUNT: 17.2 % (ref 11.6–14.5)
GLOBULIN SER CALC-MCNC: 4.7 G/DL (ref 2–4)
GLUCOSE SERPL-MCNC: 88 MG/DL (ref 74–99)
HCT VFR BLD AUTO: 35.4 % (ref 35–45)
HGB BLD-MCNC: 10.8 G/DL (ref 12–16)
LYMPHOCYTES # BLD: 2.1 K/UL (ref 0.9–3.6)
LYMPHOCYTES NFR BLD: 19 % (ref 21–52)
MCH RBC QN AUTO: 21.5 PG (ref 24–34)
MCHC RBC AUTO-ENTMCNC: 30.5 G/DL (ref 31–37)
MCV RBC AUTO: 70.5 FL (ref 78–100)
MONOCYTES # BLD: 0.8 K/UL (ref 0.05–1.2)
MONOCYTES NFR BLD: 7 % (ref 3–10)
NEUTS SEG # BLD: 8 K/UL (ref 1.8–8)
NEUTS SEG NFR BLD: 72 % (ref 40–73)
PLATELET # BLD AUTO: 317 K/UL (ref 135–420)
PMV BLD AUTO: 11.5 FL (ref 9.2–11.8)
POTASSIUM SERPL-SCNC: 3.8 MMOL/L (ref 3.5–5.5)
PROT SERPL-MCNC: 8.2 G/DL (ref 6.4–8.2)
RBC # BLD AUTO: 5.02 M/UL (ref 4.2–5.3)
SODIUM SERPL-SCNC: 137 MMOL/L (ref 136–145)
TROPONIN I SERPL-MCNC: <0.02 NG/ML (ref 0–0.04)
WBC # BLD AUTO: 11.2 K/UL (ref 4.6–13.2)

## 2021-09-08 PROCEDURE — 74011000636 HC RX REV CODE- 636: Performed by: EMERGENCY MEDICINE

## 2021-09-08 PROCEDURE — 71275 CT ANGIOGRAPHY CHEST: CPT

## 2021-09-08 PROCEDURE — 74011250637 HC RX REV CODE- 250/637: Performed by: STUDENT IN AN ORGANIZED HEALTH CARE EDUCATION/TRAINING PROGRAM

## 2021-09-08 PROCEDURE — 99283 EMERGENCY DEPT VISIT LOW MDM: CPT

## 2021-09-08 PROCEDURE — 82553 CREATINE MB FRACTION: CPT

## 2021-09-08 PROCEDURE — 74011250637 HC RX REV CODE- 250/637: Performed by: EMERGENCY MEDICINE

## 2021-09-08 PROCEDURE — 99284 EMERGENCY DEPT VISIT MOD MDM: CPT

## 2021-09-08 PROCEDURE — 93005 ELECTROCARDIOGRAM TRACING: CPT

## 2021-09-08 PROCEDURE — 74011250636 HC RX REV CODE- 250/636: Performed by: STUDENT IN AN ORGANIZED HEALTH CARE EDUCATION/TRAINING PROGRAM

## 2021-09-08 PROCEDURE — 80053 COMPREHEN METABOLIC PANEL: CPT

## 2021-09-08 PROCEDURE — 96372 THER/PROPH/DIAG INJ SC/IM: CPT

## 2021-09-08 PROCEDURE — 85025 COMPLETE CBC W/AUTO DIFF WBC: CPT

## 2021-09-08 PROCEDURE — 85379 FIBRIN DEGRADATION QUANT: CPT

## 2021-09-08 PROCEDURE — 96374 THER/PROPH/DIAG INJ IV PUSH: CPT

## 2021-09-08 PROCEDURE — 75810000275 HC EMERGENCY DEPT VISIT NO LEVEL OF CARE

## 2021-09-08 RX ORDER — ENOXAPARIN SODIUM 100 MG/ML
30 INJECTION SUBCUTANEOUS ONCE
Status: COMPLETED | OUTPATIENT
Start: 2021-09-08 | End: 2021-09-08

## 2021-09-08 RX ORDER — ENOXAPARIN SODIUM 100 MG/ML
1 INJECTION SUBCUTANEOUS ONCE
Status: DISCONTINUED | OUTPATIENT
Start: 2021-09-08 | End: 2021-09-08

## 2021-09-08 RX ORDER — LEVOFLOXACIN 750 MG/1
750 TABLET ORAL DAILY
Qty: 4 TABLET | Refills: 0 | Status: SHIPPED | OUTPATIENT
Start: 2021-09-09 | End: 2021-09-13

## 2021-09-08 RX ORDER — OXYCODONE AND ACETAMINOPHEN 5; 325 MG/1; MG/1
1 TABLET ORAL
Status: COMPLETED | OUTPATIENT
Start: 2021-09-08 | End: 2021-09-08

## 2021-09-08 RX ORDER — OXYCODONE AND ACETAMINOPHEN 5; 325 MG/1; MG/1
1 TABLET ORAL
Qty: 12 TABLET | Refills: 0 | Status: SHIPPED | OUTPATIENT
Start: 2021-09-08 | End: 2021-09-11

## 2021-09-08 RX ORDER — APIXABAN 5 MG (74)
KIT ORAL
Qty: 1 DOSE PACK | Refills: 0 | OUTPATIENT
Start: 2021-09-08 | End: 2021-09-11

## 2021-09-08 RX ORDER — LEVOFLOXACIN 750 MG/1
750 TABLET ORAL ONCE
Status: COMPLETED | OUTPATIENT
Start: 2021-09-08 | End: 2021-09-08

## 2021-09-08 RX ORDER — SODIUM CHLORIDE 9 MG/ML
1000 INJECTION, SOLUTION INTRAVENOUS ONCE
Status: COMPLETED | OUTPATIENT
Start: 2021-09-08 | End: 2021-09-08

## 2021-09-08 RX ORDER — ENOXAPARIN SODIUM 100 MG/ML
100 INJECTION SUBCUTANEOUS ONCE
Status: COMPLETED | OUTPATIENT
Start: 2021-09-08 | End: 2021-09-08

## 2021-09-08 RX ADMIN — SODIUM CHLORIDE 1000 ML: 900 INJECTION, SOLUTION INTRAVENOUS at 17:51

## 2021-09-08 RX ADMIN — ENOXAPARIN SODIUM 100 MG: 100 INJECTION SUBCUTANEOUS at 21:52

## 2021-09-08 RX ADMIN — OXYCODONE HYDROCHLORIDE AND ACETAMINOPHEN 1 TABLET: 5; 325 TABLET ORAL at 04:20

## 2021-09-08 RX ADMIN — LEVOFLOXACIN 750 MG: 750 TABLET, FILM COATED ORAL at 04:20

## 2021-09-08 RX ADMIN — IOPAMIDOL 80 ML: 755 INJECTION, SOLUTION INTRAVENOUS at 19:09

## 2021-09-08 RX ADMIN — OXYCODONE HYDROCHLORIDE AND ACETAMINOPHEN 1 TABLET: 5; 325 TABLET ORAL at 19:14

## 2021-09-08 RX ADMIN — ENOXAPARIN SODIUM 30 MG: 100 INJECTION SUBCUTANEOUS at 21:52

## 2021-09-08 NOTE — ED PROVIDER NOTES
EMERGENCY DEPARTMENT HISTORY AND PHYSICAL EXAM    4:45 PM    Date: 9/8/2021  Patient Name: Kiley Gutierrez    History of Presenting Illness     Chief Complaint   Patient presents with    Chest Pain       History Provided By: Patient  Location/Duration/Severity/Modifying factors   HPI     40 yo female w/ history of gastric bypass surgery on August 25th w/ Dr. Diann Saenz presents today w/ worsening right-sided chest pain. Patient reports that pain is sharp and radiates to right arm and neck. She reports worsening pain on taking a deep breath. Patient reports dry cough but otherwise no fever, SOB, nasal congestion or myalgias. No preceding LE pain or edema. Of note, patient was seen in the emergency department on 9/7, where a CTA showed no occlusive mainstem PE but imaging was suboptimal to rule out small vessel PE. CTA was also significant for focal airspace disease of RL for which she was given a one-time dose of  Levaquin. PCP: Avril Pham MD    Current Facility-Administered Medications   Medication Dose Route Frequency Provider Last Rate Last Admin    enoxaparin (LOVENOX) injection 140 mg  1 mg/kg SubCUTAneous ONCE Patt David MD         Current Outpatient Medications   Medication Sig Dispense Refill    oxyCODONE-acetaminophen (Percocet) 5-325 mg per tablet Take 1 Tablet by mouth every six (6) hours as needed for Pain for up to 3 days. Max Daily Amount: 4 Tablets. Do not take tylenol with Norco since they both contain acetaminophen. 12 Tablet 0    [START ON 9/9/2021] levoFLOXacin (LEVAQUIN) 750 mg tablet Take 1 Tablet by mouth daily for 4 days.  4 Tablet 0    apixaban (Eliquis DVT-PE Treat 30D Start) 5 mg (74 tabs) starter pack Take 10 mg (two 5 mg tablets) by mouth twice a day for 7 days   Followed by 5 mg (one 5 mg tablet) by mouth twice a day 1 Dose Pack 0    ondansetron (ZOFRAN ODT) 4 mg disintegrating tablet Take 1 Tablet by mouth every eight (8) hours as needed for Nausea or Vomiting. 12 Tablet 0       Past History     Past Medical History:  Past Medical History:   Diagnosis Date    Abnormal MRI of head 2010    Nonspecific focus of low attenuation at the right internal capsule on CT and MRI    Anxiety     Chronic low back pain 2014    unknown cause- normal xrays    Depression     Diabetes [address] [address]     Pre-DM- no meds    Hypertension     no meds now    Memory loss     Prediabetes     Scoliosis     Stroke ([facility] [address] 2008    denies residual PER PATIENT MINI STROKE( TIA)       Past Surgical History:  Past Surgical History:   Procedure Laterality Date    HX GASTRIC BYPASS         Family History:  Family History   Problem Relation Age of Onset    Diabetes Other     Hypertension Other     Heart Disease Other     Asthma Other     Arthritis-osteo Other     Diabetes Mother     Hypertension Mother        Social History:  Social History     Tobacco Use    Smoking status: Never Smoker    Smokeless tobacco: Never Used   Vaping Use    Vaping Use: Never used   Substance Use Topics    Alcohol use: Not Currently     Alcohol/week: 1.0 standard drinks     Types: 1 Glasses of wine per week     Comment: socially    Drug use: Never       Allergies:  No Known Allergies      Review of Systems     Review of Systems   Constitutional: Negative for activity change, chills, fatigue and fever. HENT: Negative for congestion, rhinorrhea and sore throat. Eyes: Negative for discharge. Respiratory: Positive for cough and chest tightness. Negative for shortness of breath and wheezing. Cardiovascular: Positive for chest pain. Negative for palpitations and leg swelling. Gastrointestinal: Negative for abdominal distention, abdominal pain, blood in stool, constipation, diarrhea, nausea and vomiting. Genitourinary: Negative for dysuria. Musculoskeletal: Negative for arthralgias, joint swelling and myalgias. Neurological: Negative for dizziness, weakness and headaches.    Psychiatric/Behavioral: Negative for agitation. Physical Exam     Visit Vitals  /85 (BP 1 Location: Left upper arm, BP Patient Position: At rest)   Pulse 93   Temp 98.9 °F (37.2 °C)   Resp 20   Ht 5' 5\" (1.651 m)   Wt 131.5 kg (290 lb)   LMP 08/18/2021 (Exact Date)   SpO2 100%   BMI 48.26 kg/m²       Physical Exam  Constitutional:       General: She is not in acute distress. Appearance: She is normal weight. She is not toxic-appearing. HENT:      Head: Normocephalic and atraumatic. Nose: Nose normal.      Mouth/Throat:      Mouth: Mucous membranes are moist.      Pharynx: Oropharynx is clear. Eyes:      Extraocular Movements: Extraocular movements intact. Conjunctiva/sclera: Conjunctivae normal.      Pupils: Pupils are equal, round, and reactive to light. Cardiovascular:      Rate and Rhythm: Normal rate and regular rhythm. Pulses: Normal pulses. Heart sounds: Normal heart sounds. No murmur heard. No friction rub. No gallop. Pulmonary:      Effort: Pulmonary effort is normal. No respiratory distress. Breath sounds: Normal breath sounds. No stridor. No wheezing or rhonchi. Chest:      Chest wall: Tenderness present. Abdominal:      General: Abdomen is flat. Bowel sounds are normal. There is no distension. Palpations: Abdomen is soft. Tenderness: There is no abdominal tenderness. There is no guarding. Musculoskeletal:         General: No swelling, tenderness or deformity. Normal range of motion. Cervical back: Normal range of motion and neck supple. Skin:     Capillary Refill: Capillary refill takes less than 2 seconds. Neurological:      General: No focal deficit present. Mental Status: She is alert and oriented to person, place, and time. Cranial Nerves: No cranial nerve deficit. Sensory: No sensory deficit. Motor: No weakness.       Gait: Gait normal.   Psychiatric:         Mood and Affect: Mood normal.         Diagnostic Study Results     Labs -  Recent Results (from the past 12 hour(s))   EKG, 12 LEAD, INITIAL    Collection Time: 09/08/21  5:14 PM   Result Value Ref Range    Ventricular Rate 88 BPM    Atrial Rate 88 BPM    P-R Interval 144 ms    QRS Duration 88 ms    Q-T Interval 368 ms    QTC Calculation (Bezet) 445 ms    Calculated P Axis 78 degrees    Calculated R Axis -8 degrees    Calculated T Axis 54 degrees    Diagnosis       Normal sinus rhythm  Normal ECG  When compared with ECG of 07-SEP-2021 11:52,  No significant change was found     CBC WITH AUTOMATED DIFF    Collection Time: 09/08/21  5:25 PM   Result Value Ref Range    WBC 11.2 4.6 - 13.2 K/uL    RBC 5.02 4.20 - 5.30 M/uL    HGB 10.8 (L) 12.0 - 16.0 g/dL    HCT 35.4 35.0 - 45.0 %    MCV 70.5 (L) 78.0 - 100.0 FL    MCH 21.5 (L) 24.0 - 34.0 PG    MCHC 30.5 (L) 31.0 - 37.0 g/dL    RDW 17.2 (H) 11.6 - 14.5 %    PLATELET 064 989 - 688 K/uL    MPV 11.5 9.2 - 11.8 FL    NEUTROPHILS 72 40 - 73 %    LYMPHOCYTES 19 (L) 21 - 52 %    MONOCYTES 7 3 - 10 %    EOSINOPHILS 1 0 - 5 %    BASOPHILS 0 0 - 2 %    ABS. NEUTROPHILS 8.0 1.8 - 8.0 K/UL    ABS. LYMPHOCYTES 2.1 0.9 - 3.6 K/UL    ABS. MONOCYTES 0.8 0.05 - 1.2 K/UL    ABS. EOSINOPHILS 0.2 0.0 - 0.4 K/UL    ABS. BASOPHILS 0.0 0.0 - 0.1 K/UL    DF AUTOMATED     METABOLIC PANEL, COMPREHENSIVE    Collection Time: 09/08/21  5:25 PM   Result Value Ref Range    Sodium 137 136 - 145 mmol/L    Potassium 3.8 3.5 - 5.5 mmol/L    Chloride 102 100 - 111 mmol/L    CO2 28 21 - 32 mmol/L    Anion gap 7 3.0 - 18 mmol/L    Glucose 88 74 - 99 mg/dL    BUN 11 7.0 - 18 MG/DL    Creatinine 0.59 (L) 0.6 - 1.3 MG/DL    BUN/Creatinine ratio 19 12 - 20      GFR est AA >60 >60 ml/min/1.73m2    GFR est non-AA >60 >60 ml/min/1.73m2    Calcium 9.1 8.5 - 10.1 MG/DL    Bilirubin, total 0.3 0.2 - 1.0 MG/DL    ALT (SGPT) 18 13 - 56 U/L    AST (SGOT) 16 10 - 38 U/L    Alk.  phosphatase 81 45 - 117 U/L    Protein, total 8.2 6.4 - 8.2 g/dL    Albumin 3.5 3.4 - 5.0 g/dL    Globulin 4.7 (H) 2.0 - 4.0 g/dL    A-G Ratio 0.7 (L) 0.8 - 1.7     CARDIAC PANEL,(CK, CKMB & TROPONIN)    Collection Time: 09/08/21  5:25 PM   Result Value Ref Range    CK - MB <1.0 <3.6 ng/ml    CK-MB Index  0.0 - 4.0 %     CALCULATION NOT PERFORMED WHEN RESULT IS BELOW LINEAR LIMIT    CK 93 26 - 192 U/L    Troponin-I, QT <0.02 0.0 - 0.045 NG/ML   D DIMER    Collection Time: 09/08/21  5:25 PM   Result Value Ref Range    D DIMER 2.67 (H) <0.46 ug/ml(FEU)       Radiologic Studies -   CTA CHEST W OR W WO CONT   Final Result   Addendum 1 of 1   Addendum: Lung/airway section should read: Interval increased size of a peripherally oriented ground groundglass    opacity   within the right lateral lower lobe, likely pulmonary infarct. Additional scattered bandlike opacities at the dependent bilateral lower    lobes   an within the right middle lobe, likely atelectasis although early    infiltrates   are not excluded. Final   1. Moderate burden of right lower lobe pulmonary emboli. Right heart strain   assessment as above. 2.  Enlarged pulmonary trunk suggestive of pulmonary arterial hypertension. 3.  Right lower lobe pulmonary infarct. Additional patchy opacities at the   posterior bilateral lower lobes and within the right middle lobe, likely   atelectasis but infiltrates not excluded. Imaging features are atypical and   uncommonly reported for Covid-19 pneumonia. Alternative diagnoses should be   considered. (Covid-19 alternative.)         These findings were discussed with Dr. Randy Bryan by Faizan Brooks on 9/8/2021   8:47 PM.          Medical Decision Making   I am the first provider for this patient. I reviewed the vital signs, available nursing notes, past medical history, past surgical history, family history and social history. Vital Signs-Reviewed the patient's vital signs. Records Reviewed:  Old Medical Records (Time of Review: 4:45 PM)    ED Course: Progress Notes, Reevaluation, and Consults:    Provider Notes (Medical Decision Making):     Ms. Keshav Mcdonough is a 40 yo F who presents today for worsening R sided chest tightness now radiating to right arm and neck. DDx to include ACS, PE, pneumonia, MSK-related chest pain and GERD (given recent gastric bypass surgery). Patient was seen in the ED on 8/7 with CTA findings showing no obvious occlusive PE in mainstem pulmonary arteries but imaging was suboptimal to rule out lobar or small branch PE. CTA showed focal airspace disease at lateral right lung base. CXR on 9/7 was significant for new small subtle density at the right lung base indicating atelectasis versus pneumonia. 5:30 PM: Ordered repeat EKG and CTA chest.  EKG and cardiac panel within normal limits. 6:30 PM: D-dimer elevated to 2.67. CTA results pending. Patient was given Percocet 5-325 mg once for pain. 8:48 PM:  Moderate burden of right lower lobe pulmonary emboli.     9:00 PM: Spoke to Dr. Kaden Paul, bariatric surgery, who confirmed that patient can be on anticoagulation. 9:05 PM: Spoke to Dr. Rubén Welsh, pulmonologist, who reviewed patient's vitals. He agreed that patient can be sent home w/ Eliquis 10 mg twice daily for 7 days followed by 5 mg twice daily for 3 months, with close follow-up with pulmonologist. Will give one dose of SubQ Lovenox now (1mg/kg). Diagnosis     Clinical Impression:   1. Other acute pulmonary embolism without acute cor pulmonale ([address]    2. Right-sided chest pain    3.  Elevated d-dimer        Disposition: Home    Follow-up Information     Follow up With Specialties Details Why Contact Info    Gordo Zhu MD Family Medicine Schedule an appointment as soon as possible for a visit in 2 days  [address]  [address]      [id number] [facility] EMERGENCY DEPT Emergency Medicine Go to  As needed [address] Jayna Peace MD Internal Medicine, Pulmonary Disease In 3 days for follow-up for pulmonary embolism. [address] [facility]             Patient's Medications   Start Taking    APIXABAN (ELIQUIS DVT-PE TREAT 30D START) 5 MG (74 TABS) STARTER PACK    Take 10 mg (two 5 mg tablets) by mouth twice a day for 7 days   Followed by 5 mg (one 5 mg tablet) by mouth twice a day   Continue Taking    LEVOFLOXACIN (LEVAQUIN) 750 MG TABLET    Take 1 Tablet by mouth daily for 4 days. ONDANSETRON (ZOFRAN ODT) 4 MG DISINTEGRATING TABLET    Take 1 Tablet by mouth every eight (8) hours as needed for Nausea or Vomiting. OXYCODONE-ACETAMINOPHEN (PERCOCET) 5-325 MG PER TABLET    Take 1 Tablet by mouth every six (6) hours as needed for Pain for up to 3 days. Max Daily Amount: 4 Tablets. Do not take tylenol with Norco since they both contain acetaminophen. These Medications have changed    No medications on file   Stop Taking    CYCLOBENZAPRINE (FLEXERIL) 5 MG TABLET    Take 2 Tabs by mouth three (3) times daily (with meals).        Nikhil Dawson MD PGY2   [facility] Family Medicine   September 8, 2021, 4:45 PM

## 2021-09-08 NOTE — ED TRIAGE NOTES
Patient states chest tightness with inhalation. She states having symptoms x 3 days. She states having gastric bypass on 8/25/21.    Patient advised by provider to return to ER for CTA chest.

## 2021-09-08 NOTE — ED PROVIDER NOTES
EMERGENCY DEPARTMENT HISTORY AND PHYSICAL EXAM    3:31 AM  Date: (Not on file)  Patient Name: Nadia Eldridge    History of Presenting Illness       History Provided By:     HPI: Nadia Eldridge is a 39 y.o. female with history of originally gastric bypass on August 25th presents with right-sided chest pain. Pain is severe, sharp constant, no associated symptoms or modifying factors. Patient has some mild dry cough. No fever or chills. Patient denies any shortness of breath.   Patient was seen in the emergency room and had a CTA that showed greater area of focal airspace disease on the right lung, no occlusive mainstem PE but study was suboptimal.         PCP: Sally Chan MD    Past History     Past Medical History:  Past Medical History:   Diagnosis Date    Abnormal MRI of head 2010    Nonspecific focus of low attenuation at the right internal capsule on CT and MRI    Anxiety     Chronic low back pain 2014    unknown cause- normal xrays    Depression     Diabetes ([address]     Pre-DM- no meds    Hypertension     no meds now    Memory loss     Prediabetes     Scoliosis     Stroke ([facility] [address] 2008    denies residual PER PATIENT MINI STROKE( TIA)       Past Surgical History:  Past Surgical History:   Procedure Laterality Date    HX GASTRIC BYPASS         Family History:  Family History   Problem Relation Age of Onset    Diabetes Other     Hypertension Other     Heart Disease Other     Asthma Other     Arthritis-osteo Other     Diabetes Mother     Hypertension Mother        Social History:  Social History     Tobacco Use    Smoking status: Never Smoker    Smokeless tobacco: Never Used   Vaping Use    Vaping Use: Never used   Substance Use Topics    Alcohol use: Not Currently     Alcohol/week: 1.0 standard drinks     Types: 1 Glasses of wine per week     Comment: socially    Drug use: Never       Allergies:  No Known Allergies    Review of Systems   Review of Systems   Constitutional: Negative for activity change, appetite change and chills. HENT: Negative for congestion, ear discharge, ear pain and sore throat. Eyes: Negative for photophobia and pain. Respiratory: Negative for cough and choking. Cardiovascular: Negative for palpitations and leg swelling. Gastrointestinal: Negative for anal bleeding and rectal pain. Endocrine: Negative for polydipsia and polyuria. Genitourinary: Negative for genital sores and urgency. Musculoskeletal: Negative for arthralgias and myalgias. Neurological: Negative for dizziness, seizures and speech difficulty. Psychiatric/Behavioral: Negative for hallucinations, self-injury and suicidal ideas. Physical Exam     No data found. Physical Exam  Vitals and nursing note reviewed. Constitutional:       Appearance: She is well-developed. HENT:      Head: Normocephalic and atraumatic. Eyes:      General:         Right eye: No discharge. Left eye: No discharge. Cardiovascular:      Rate and Rhythm: Normal rate and regular rhythm. Heart sounds: Normal heart sounds. No murmur heard. Pulmonary:      Effort: Pulmonary effort is normal. No respiratory distress. Breath sounds: Normal breath sounds. No stridor. No wheezing or rales. Chest:      Chest wall: No tenderness. Abdominal:      General: Bowel sounds are normal. There is no distension. Palpations: Abdomen is soft. Tenderness: There is no abdominal tenderness. There is no guarding or rebound. Musculoskeletal:         General: Normal range of motion. Cervical back: Normal range of motion and neck supple. Skin:     General: Skin is warm and dry. Neurological:      Mental Status: She is alert and oriented to person, place, and time. Diagnostic Study Results     Labs -  No results found for this or any previous visit (from the past 12 hour(s)).     Radiologic Studies -   XR CHEST PA LAT    Result Date: 9/7/2021  New small subtle density at the right lung base, atelectasis versus pneumonia. CTA CHEST W OR W WO CONT    Result Date: 9/7/2021  1. Suboptimal technique with inadequate contrast bolus administration. No occlusive PE in mainstem pulmonary arteries. The lobar and smaller branches are poorly seen for evaluation as above. 2.  Focal airspace disease at lateral right lung base. Recommend Clinical correlation and follow-up CT for interval resolution. Thank you for your referral.         Medical Decision Making     ED Course: Progress Notes, Reevaluation, and Consults:    3:31 AM Initial assessment performed. The patients presenting problems have been discussed, and they/their family are in agreement with the care plan formulated and outlined with them. I have encouraged them to ask questions as they arise throughout their visit. Provider Notes (Medical Decision Making):   Patient presents with right-sided pleuritic chest pain  Patient had work-up for PE yesterday  Patient not hypoxic or tachypneic at present  CTA was suboptimal but no large mainstem occlusive PE  Patient is concerned and wants to have repeat CTA  Explained that cannot repeat the CTA if less than 24 hours  Patient will be prescribed pain medication antibiotics  Explained  to patient that the area of airspace disease could be secondary to pneumonia or small PE  Patient does not want to start on anticoagulants without PE confirmation due to recent surgery  Patient will return tomorrow for repeat CTA to rule out PE  Discussed with Dr. Cristina Dykes day shift doctor                Vital Signs-Reviewed the patient's vital signs. Reviewed pt's pulse ox reading. Records Reviewed: old medical records  -I am the first provider for this patient.  -I reviewed the vital signs, available nursing notes, past medical history, past surgical history, family history and social history.     Current Outpatient Medications   Medication Sig Dispense Refill    ondansetron (ZOFRAN ODT) 4 mg disintegrating tablet Take 1 Tablet by mouth every eight (8) hours as needed for Nausea or Vomiting. 12 Tablet 0    cyclobenzaprine (FLEXERIL) 5 mg tablet Take 2 Tabs by mouth three (3) times daily (with meals). 9 Tab 0        Clinical Impression     Clinical Impression: No diagnosis found. Disposition:  Pt has been reexamined. Patient has no new complaints, changes, or physical findings. Care plan outlined and precautions discussed. Results were reviewed with the patient. All medications were reviewed with the patient; will d/c home with PMD f/u. All of pt's questions and concerns were addressed. Patient was instructed and agrees to follow up with PMD, as well as to return to the ED upon further deterioration. Patient is ready to go home. This note was dictated utilizing voice recognition software which may lead to typographical errors. I apologize in advance if the situation occurs. If questions arise please do not hesitate to contact me or call our department. This note was dictated utilizing voice recognition software which may lead to typographical errors. I apologize in advance if the situation occurs. If questions arise please do not hesitate to contact me or call our department.     Isela Morales MD  3:31 AM

## 2021-09-08 NOTE — TELEPHONE ENCOUNTER
Pt calling stating she was suppose to have cta repeated in  but no order placed discussed with Aliyah Vu who reviewed chart and order placed later received call that cta need to be authorrized called back [facility] and they are directing pt to return to er for continued chest pain to have the CTA repeated pt instructed in this and states an understanding

## 2021-09-08 NOTE — ED TRIAGE NOTES
Pt was seen here for chest wall pain and extensive work-up was completed. No medications where prescribed for her pain.

## 2021-09-09 ENCOUNTER — TELEPHONE (OUTPATIENT)
Dept: SURGERY | Age: 41
End: 2021-09-09

## 2021-09-09 DIAGNOSIS — R07.9 CHEST PAIN, UNSPECIFIED TYPE: ICD-10-CM

## 2021-09-09 DIAGNOSIS — I26.90 CHRONIC SEPTIC PULMONARY EMBOLISM WITHOUT ACUTE COR PULMONALE (HCC): ICD-10-CM

## 2021-09-09 DIAGNOSIS — Z98.84 S/P GASTRIC BYPASS: ICD-10-CM

## 2021-09-09 DIAGNOSIS — I27.82 CHRONIC SEPTIC PULMONARY EMBOLISM WITHOUT ACUTE COR PULMONALE (HCC): ICD-10-CM

## 2021-09-09 DIAGNOSIS — K91.2 POST-RESECTION MALABSORPTION: Primary | ICD-10-CM

## 2021-09-09 DIAGNOSIS — K91.2 POST-RESECTION MALABSORPTION: ICD-10-CM

## 2021-09-09 DIAGNOSIS — I26.99 OTHER ACUTE PULMONARY EMBOLISM WITHOUT ACUTE COR PULMONALE (HCC): ICD-10-CM

## 2021-09-09 LAB
ATRIAL RATE: 88 BPM
CALCULATED P AXIS, ECG09: 78 DEGREES
CALCULATED R AXIS, ECG10: -8 DEGREES
CALCULATED T AXIS, ECG11: 54 DEGREES
DIAGNOSIS, 93000: NORMAL
P-R INTERVAL, ECG05: 144 MS
Q-T INTERVAL, ECG07: 368 MS
QRS DURATION, ECG06: 88 MS
QTC CALCULATION (BEZET), ECG08: 445 MS
VENTRICULAR RATE, ECG03: 88 BPM

## 2021-09-09 RX ORDER — ENOXAPARIN SODIUM 100 MG/ML
INJECTION SUBCUTANEOUS
Qty: 2 EACH | Refills: 0 | Status: SHIPPED | OUTPATIENT
Start: 2021-09-09 | End: 2021-11-12 | Stop reason: ALTCHOICE

## 2021-09-09 RX ORDER — ENOXAPARIN SODIUM 100 MG/ML
INJECTION SUBCUTANEOUS
Qty: 2 EACH | Refills: 0 | Status: SHIPPED | OUTPATIENT
Start: 2021-09-09 | End: 2021-09-09 | Stop reason: SDUPTHER

## 2021-09-09 RX ORDER — OXYCODONE AND ACETAMINOPHEN 7.5; 325 MG/1; MG/1
1 TABLET ORAL
Qty: 4 TABLET | Refills: 0 | Status: SHIPPED | OUTPATIENT
Start: 2021-09-09 | End: 2021-09-10

## 2021-09-09 NOTE — ED NOTES
Chart entered for patient question upon arrival home. Pt asking if she can take Percocet at this time. Pt took last dose and was given ok to take next dose now for pain. All concerns addressed.

## 2021-09-09 NOTE — TELEPHONE ENCOUNTER
Pt called to say she has been contacted by [facility] and they offered her appt 9 24 2021 which she is unable to keep but has appt for 9/27 2021

## 2021-09-09 NOTE — BRIEF OP NOTE
Brief Postoperative Note    Patient: Fabiana Pace  YOB: 1980  MRN: [id number]    Date of Procedure: 8/25/2021     Pre-Op Diagnosis: Morbid obesity ([address] [E66.01]    Post-Op Diagnosis: Same as preoperative diagnosis.       Procedure(s):  LAPAROSCOPIC MIKA-EN-Y GASTRIC BYPASS    Surgeon(s):  Dee John DO    Surgical Assistant: Surg Asst-1: Sugey Don    Anesthesia: General     Estimated Blood Loss (mL): Minimal    Complications: None    Specimens: * No specimens in log *     Implants: * No implants in log *    Drains: * No LDAs found *    Findings: None    Electronically Signed by Bari Gallegos DO on 8/25/2021 at 9:52 AM Use Enhanced Medication Counseling?: No

## 2021-09-09 NOTE — ED NOTES
19:00 Assumed care by Dr. Yuniel Daley pending CTA chest   Patient with mild to moderate burden PE  No right heart strain  Will be discharged with anticoagulation  Resident Hailey Carney discussed with Dr. Piter Cochran patient's surgeon who said anticoagulation was okay  No right heart strain  Patient not hypoxic or tachypneic  Consulted Dr. Cierra Segura pulmonary who states that patient can be discharged as outpatient with anticoagulation  Received Lovenox  Patient will be discharged with Eliquis as outpatient

## 2021-09-09 NOTE — PROGRESS NOTES
Pt reports that 5-325 mg Percocet not helping with pain adequately. She is in severe pain and it is waking her from sleep. Will increase to 7-325 for one day. Script for #4 R-0 sent. After finished with 7-325 doses, can taper to 5-325 mg doses, do not take together, and do not take any additional tylenol. PDMP reviewed.

## 2021-09-09 NOTE — PROGRESS NOTES
Pt unable to start Eliquis until tomorrow due to pharmacy availability. Start Lovenox 1 dose this morning, 1 dose 12 hours later. Start Eliquis tomorrow.

## 2021-09-10 ENCOUNTER — TELEPHONE (OUTPATIENT)
Dept: SURGERY | Age: 41
End: 2021-09-10

## 2021-09-10 ENCOUNTER — HOSPITAL ENCOUNTER (OUTPATIENT)
Age: 41
Setting detail: OBSERVATION
Discharge: HOME | End: 2021-09-11
Attending: EMERGENCY MEDICINE | Admitting: INTERNAL MEDICINE
Payer: COMMERCIAL

## 2021-09-10 DIAGNOSIS — R06.02 SOB (SHORTNESS OF BREATH): ICD-10-CM

## 2021-09-10 DIAGNOSIS — R07.81 CHEST PAIN, PLEURITIC: ICD-10-CM

## 2021-09-10 DIAGNOSIS — I26.99 PULMONARY EMBOLISM ON RIGHT (HCC): Primary | ICD-10-CM

## 2021-09-10 LAB
ALBUMIN SERPL-MCNC: 3.1 G/DL (ref 3.4–5)
ALBUMIN/GLOB SERPL: 0.7 {RATIO} (ref 0.8–1.7)
ALP SERPL-CCNC: 73 U/L (ref 45–117)
ALT SERPL-CCNC: 14 U/L (ref 13–56)
ANION GAP SERPL CALC-SCNC: 10 MMOL/L (ref 3–18)
APTT PPP: 33.3 SEC (ref 23–36.4)
AST SERPL-CCNC: 16 U/L (ref 10–38)
BASOPHILS # BLD: 0 K/UL (ref 0–0.1)
BASOPHILS NFR BLD: 0 % (ref 0–2)
BILIRUB SERPL-MCNC: 0.3 MG/DL (ref 0.2–1)
BUN SERPL-MCNC: 10 MG/DL (ref 7–18)
BUN/CREAT SERPL: 22 (ref 12–20)
CALCIUM SERPL-MCNC: 9 MG/DL (ref 8.5–10.1)
CHLORIDE SERPL-SCNC: 102 MMOL/L (ref 100–111)
CO2 SERPL-SCNC: 23 MMOL/L (ref 21–32)
CREAT SERPL-MCNC: 0.46 MG/DL (ref 0.6–1.3)
DIFFERENTIAL METHOD BLD: ABNORMAL
EOSINOPHIL # BLD: 0.1 K/UL (ref 0–0.4)
EOSINOPHIL NFR BLD: 1 % (ref 0–5)
ERYTHROCYTE [DISTWIDTH] IN BLOOD BY AUTOMATED COUNT: 17.2 % (ref 11.6–14.5)
GLOBULIN SER CALC-MCNC: 4.7 G/DL (ref 2–4)
GLUCOSE SERPL-MCNC: 87 MG/DL (ref 74–99)
HCT VFR BLD AUTO: 31.5 % (ref 35–45)
HGB BLD-MCNC: 9.8 G/DL (ref 12–16)
LYMPHOCYTES # BLD: 2 K/UL (ref 0.9–3.6)
LYMPHOCYTES NFR BLD: 20 % (ref 21–52)
MCH RBC QN AUTO: 21.5 PG (ref 24–34)
MCHC RBC AUTO-ENTMCNC: 31.1 G/DL (ref 31–37)
MCV RBC AUTO: 69.2 FL (ref 78–100)
MONOCYTES # BLD: 0.7 K/UL (ref 0.05–1.2)
MONOCYTES NFR BLD: 7 % (ref 3–10)
NEUTS SEG # BLD: 7.2 K/UL (ref 1.8–8)
NEUTS SEG NFR BLD: 72 % (ref 40–73)
PLATELET # BLD AUTO: 317 K/UL (ref 135–420)
POTASSIUM SERPL-SCNC: 4 MMOL/L (ref 3.5–5.5)
PROT SERPL-MCNC: 7.8 G/DL (ref 6.4–8.2)
RBC # BLD AUTO: 4.55 M/UL (ref 4.2–5.3)
SODIUM SERPL-SCNC: 135 MMOL/L (ref 136–145)
WBC # BLD AUTO: 10.1 K/UL (ref 4.6–13.2)

## 2021-09-10 PROCEDURE — 99218 HC RM OBSERVATION: CPT

## 2021-09-10 PROCEDURE — 65660000000 HC RM CCU STEPDOWN

## 2021-09-10 PROCEDURE — 74011250637 HC RX REV CODE- 250/637: Performed by: EMERGENCY MEDICINE

## 2021-09-10 PROCEDURE — 74011250636 HC RX REV CODE- 250/636: Performed by: STUDENT IN AN ORGANIZED HEALTH CARE EDUCATION/TRAINING PROGRAM

## 2021-09-10 PROCEDURE — 96374 THER/PROPH/DIAG INJ IV PUSH: CPT

## 2021-09-10 PROCEDURE — 99284 EMERGENCY DEPT VISIT MOD MDM: CPT

## 2021-09-10 PROCEDURE — 96375 TX/PRO/DX INJ NEW DRUG ADDON: CPT

## 2021-09-10 PROCEDURE — 85730 THROMBOPLASTIN TIME PARTIAL: CPT

## 2021-09-10 PROCEDURE — 80053 COMPREHEN METABOLIC PANEL: CPT

## 2021-09-10 PROCEDURE — 74011250636 HC RX REV CODE- 250/636: Performed by: EMERGENCY MEDICINE

## 2021-09-10 PROCEDURE — 93005 ELECTROCARDIOGRAM TRACING: CPT

## 2021-09-10 PROCEDURE — 85025 COMPLETE CBC W/AUTO DIFF WBC: CPT

## 2021-09-10 RX ORDER — MORPHINE SULFATE 4 MG/ML
4 INJECTION INTRAVENOUS
Status: DISCONTINUED | OUTPATIENT
Start: 2021-09-10 | End: 2021-09-10

## 2021-09-10 RX ORDER — HEPARIN SODIUM 5000 [USP'U]/ML
60 INJECTION, SOLUTION INTRAVENOUS; SUBCUTANEOUS
Status: CANCELLED | OUTPATIENT
Start: 2021-09-10 | End: 2021-09-11

## 2021-09-10 RX ORDER — OXYCODONE AND ACETAMINOPHEN 5; 325 MG/1; MG/1
2 TABLET ORAL ONCE
Status: COMPLETED | OUTPATIENT
Start: 2021-09-10 | End: 2021-09-10

## 2021-09-10 RX ORDER — HEPARIN SODIUM 1000 [USP'U]/ML
10000 INJECTION, SOLUTION INTRAVENOUS; SUBCUTANEOUS ONCE
Status: COMPLETED | OUTPATIENT
Start: 2021-09-10 | End: 2021-09-10

## 2021-09-10 RX ORDER — HEPARIN SODIUM 1000 [USP'U]/ML
80 INJECTION, SOLUTION INTRAVENOUS; SUBCUTANEOUS ONCE
Status: DISCONTINUED | OUTPATIENT
Start: 2021-09-10 | End: 2021-09-10

## 2021-09-10 RX ORDER — MORPHINE SULFATE 4 MG/ML
4 INJECTION INTRAVENOUS
Status: COMPLETED | OUTPATIENT
Start: 2021-09-10 | End: 2021-09-10

## 2021-09-10 RX ORDER — HEPARIN SODIUM 10000 [USP'U]/100ML
17-36 INJECTION, SOLUTION INTRAVENOUS
Status: DISCONTINUED | OUTPATIENT
Start: 2021-09-10 | End: 2021-09-11 | Stop reason: HOSPADM

## 2021-09-10 RX ADMIN — OXYCODONE HYDROCHLORIDE AND ACETAMINOPHEN 2 TABLET: 5; 325 TABLET ORAL at 17:19

## 2021-09-10 RX ADMIN — MORPHINE SULFATE 4 MG: 4 INJECTION, SOLUTION INTRAMUSCULAR; INTRAVENOUS at 22:43

## 2021-09-10 RX ADMIN — HEPARIN SODIUM 10000 UNITS: 1000 INJECTION INTRAVENOUS; SUBCUTANEOUS at 20:00

## 2021-09-10 RX ADMIN — HEPARIN SODIUM 17 UNITS/KG/HR: 10000 INJECTION, SOLUTION INTRAVENOUS at 20:04

## 2021-09-10 NOTE — ED NOTES
EMERGENCY DEPARTMENT HISTORY AND PHYSICAL EXAM    5:29 PM    Date: 9/10/2021  Patient Name: Kiley Gutierrez    History of Presenting Illness     Chief Complaint   Patient presents with    Chest Pain       History Provided By: Patient  Location/Duration/Severity/Modifying factors   HPI     Ms. Kriss Vivas is a 38 yo patient w/ history of gastric bypass surgery on August 25th w/ Dr. WELLSTAR [facility] who presents again today with increasing R-sided pleuritic chest pain and SOB. She was seen at [facility] twice this week for the same concern. She was noted to have a moderate right lower lobe PE and was discharged on Eliquis. Patient reports that she couldn't  her Eliquis as it wasn't available at the 4 different pharmacies she went to. She called in and received Lovenox 40 mg BID for 2 doses, which she took yesterday night and this morning. PCP: Avril Pham MD    Current Facility-Administered Medications   Medication Dose Route Frequency Provider Last Rate Last Admin    heparin 25,000 units in D5W 250 ml infusion  17-36 Units/kg/hr (Order-Specific) IntraVENous TITRATE Patt David MD        heparin (porcine) 1,000 unit/mL injection 10,480 Units  80 Units/kg (Order-Specific) IntraVENous ONCE Patt David MD         Current Outpatient Medications   Medication Sig Dispense Refill    oxyCODONE-acetaminophen (PERCOCET 7.5) 7.5-325 mg per tablet Take 1 Tablet by mouth every six (6) hours as needed for Pain for up to 4 doses. Max Daily Amount: 4 Tablets. Once finished with 7.5-325 mg doses, can take 5-325 mg doses. Do not take together, and do not take with additional tylenol. 4 Tablet 0    enoxaparin (Lovenox) 40 mg/0.4 mL Inject one syringe subcutaneously every 12 hours for two doses. 2 Each 0    oxyCODONE-acetaminophen (Percocet) 5-325 mg per tablet Take 1 Tablet by mouth every six (6) hours as needed for Pain for up to 3 days. Max Daily Amount: 4 Tablets.  Do not take tylenol with Norco since they both contain acetaminophen. 12 Tablet 0    levoFLOXacin (LEVAQUIN) 750 mg tablet Take 1 Tablet by mouth daily for 4 days. 4 Tablet 0    apixaban (Eliquis DVT-PE Treat 30D Start) 5 mg (74 tabs) starter pack Take 10 mg (two 5 mg tablets) by mouth twice a day for 7 days   Followed by 5 mg (one 5 mg tablet) by mouth twice a day 1 Dose Pack 0    ondansetron (ZOFRAN ODT) 4 mg disintegrating tablet Take 1 Tablet by mouth every eight (8) hours as needed for Nausea or Vomiting. 12 Tablet 0       Past History     Past Medical History:  Past Medical History:   Diagnosis Date    Abnormal MRI of head 2010    Nonspecific focus of low attenuation at the right internal capsule on CT and MRI    Anxiety     Chronic low back pain 2014    unknown cause- normal xrays    Depression     Diabetes ([facility] [place] [address]     Pre-DM- no meds    Hypertension     no meds now    Memory loss     Prediabetes     Scoliosis     Stroke ([facility] [place] [address] 2008    denies residual PER PATIENT MINI STROKE( TIA)       Past Surgical History:  Past Surgical History:   Procedure Laterality Date    HX GASTRIC BYPASS         Family History:  Family History   Problem Relation Age of Onset    Diabetes Other     Hypertension Other     Heart Disease Other     Asthma Other     Arthritis-osteo Other     Diabetes Mother     Hypertension Mother        Social History:  Social History     Tobacco Use    Smoking status: Never Smoker    Smokeless tobacco: Never Used   Vaping Use    Vaping Use: Never used   Substance Use Topics    Alcohol use: Not Currently     Alcohol/week: 1.0 standard drinks     Types: 1 Glasses of wine per week     Comment: socially    Drug use: Never       Allergies:  No Known Allergies      Review of Systems     Review of Systems   Constitutional: Negative for activity change, chills, fatigue and fever. HENT: Negative for congestion, ear pain, rhinorrhea, sneezing and sore throat.     Respiratory: Positive for chest tightness and shortness of breath. Negative for apnea, cough, wheezing and stridor. Cardiovascular: Positive for chest pain. Negative for palpitations and leg swelling. Gastrointestinal: Negative for abdominal distention, abdominal pain, anal bleeding, constipation, diarrhea, nausea and vomiting. Genitourinary: Negative for decreased urine volume, pelvic pain and urgency. Musculoskeletal: Negative for arthralgias, gait problem and myalgias. Skin: Negative for color change. Neurological: Negative for dizziness. Psychiatric/Behavioral: Negative for agitation. Physical Exam     Visit Vitals  BP (!) 134/59 (BP 1 Location: Left upper arm)   Pulse (!) 105   Temp 99.3 °F (37.4 °C)   Resp 16   LMP 08/18/2021 (Exact Date)   SpO2 100%       Physical Exam  Constitutional:       General: She is not in acute distress. Appearance: Normal appearance. She is well-developed. She is not ill-appearing, toxic-appearing or diaphoretic. HENT:      Head: Normocephalic and atraumatic. Nose: Nose normal. No congestion. Mouth/Throat:      Mouth: Mucous membranes are moist.      Pharynx: Oropharynx is clear. Eyes:      General:         Right eye: No discharge. Left eye: No discharge. Extraocular Movements: Extraocular movements intact. Conjunctiva/sclera: Conjunctivae normal.      Pupils: Pupils are equal, round, and reactive to light. Cardiovascular:      Rate and Rhythm: Normal rate and regular rhythm. Pulses: Normal pulses. Heart sounds: Normal heart sounds. No murmur heard. No friction rub. No gallop. Pulmonary:      Effort: Pulmonary effort is normal. No respiratory distress. Breath sounds: Normal breath sounds. No stridor. No wheezing. Abdominal:      General: Abdomen is flat. Bowel sounds are normal. There is no distension. Palpations: Abdomen is soft. Tenderness: There is no abdominal tenderness. There is no guarding.    Musculoskeletal:         General: Normal range of motion. Cervical back: Normal range of motion. Skin:     General: Skin is warm. Capillary Refill: Capillary refill takes less than 2 seconds. Neurological:      General: No focal deficit present. Mental Status: She is alert and oriented to person, place, and time. Psychiatric:         Mood and Affect: Mood normal.         Behavior: Behavior normal.         Diagnostic Study Results     Labs -  Recent Results (from the past 12 hour(s))   EKG, 12 LEAD, INITIAL    Collection Time: 09/10/21  3:53 PM   Result Value Ref Range    Ventricular Rate 97 BPM    Atrial Rate 97 BPM    P-R Interval 138 ms    QRS Duration 82 ms    Q-T Interval 370 ms    QTC Calculation (Bezet) 469 ms    Calculated P Axis 67 degrees    Calculated R Axis -15 degrees    Calculated T Axis 61 degrees    Diagnosis       Normal sinus rhythm  Normal ECG  When compared with ECG of 08-SEP-2021 17:14,  No significant change was found     PTT    Collection Time: 09/10/21  6:02 PM   Result Value Ref Range    aPTT 33.3 23.0 - 04.8 SEC   METABOLIC PANEL, COMPREHENSIVE    Collection Time: 09/10/21  6:02 PM   Result Value Ref Range    Sodium 135 (L) 136 - 145 mmol/L    Potassium 4.0 3.5 - 5.5 mmol/L    Chloride 102 100 - 111 mmol/L    CO2 23 21 - 32 mmol/L    Anion gap 10 3.0 - 18 mmol/L    Glucose 87 74 - 99 mg/dL    BUN 10 7.0 - 18 MG/DL    Creatinine 0.46 (L) 0.6 - 1.3 MG/DL    BUN/Creatinine ratio 22 (H) 12 - 20      GFR est AA >60 >60 ml/min/1.73m2    GFR est non-AA >60 >60 ml/min/1.73m2    Calcium 9.0 8.5 - 10.1 MG/DL    Bilirubin, total 0.3 0.2 - 1.0 MG/DL    ALT (SGPT) 14 13 - 56 U/L    AST (SGOT) 16 10 - 38 U/L    Alk.  phosphatase 73 45 - 117 U/L    Protein, total 7.8 6.4 - 8.2 g/dL    Albumin 3.1 (L) 3.4 - 5.0 g/dL    Globulin 4.7 (H) 2.0 - 4.0 g/dL    A-G Ratio 0.7 (L) 0.8 - 1.7     CBC WITH AUTOMATED DIFF    Collection Time: 09/10/21  6:02 PM   Result Value Ref Range    WBC 10.1 4.6 - 13.2 K/uL    RBC 4.55 4.20 - 5.30 M/uL    HGB 9.8 (L) 12.0 - 16.0 g/dL    HCT 31.5 (L) 35.0 - 45.0 %    MCV 69.2 (L) 78.0 - 100.0 FL    MCH 21.5 (L) 24.0 - 34.0 PG    MCHC 31.1 31.0 - 37.0 g/dL    RDW 17.2 (H) 11.6 - 14.5 %    PLATELET 244 486 - 844 K/uL    NEUTROPHILS 72 40 - 73 %    LYMPHOCYTES 20 (L) 21 - 52 %    MONOCYTES 7 3 - 10 %    EOSINOPHILS 1 0 - 5 %    BASOPHILS 0 0 - 2 %    ABS. NEUTROPHILS 7.2 1.8 - 8.0 K/UL    ABS. LYMPHOCYTES 2.0 0.9 - 3.6 K/UL    ABS. MONOCYTES 0.7 0.05 - 1.2 K/UL    ABS. EOSINOPHILS 0.1 0.0 - 0.4 K/UL    ABS. BASOPHILS 0.0 0.0 - 0.1 K/UL    DF AUTOMATED         Radiologic Studies -   No orders to display       Medical Decision Making   I am the first provider for this patient. I reviewed the vital signs, available nursing notes, past medical history, past surgical history, family history and social history. Vital Signs-Reviewed the patient's vital signs. EKG: NSR    Records Reviewed: Old Medical Records (Time of Review: 5:29 PM)    ED Course: Progress Notes, Reevaluation, and Consults:    ED Course as of Sep 10 1921   Fri Sep 10, 2021   1709 I saw and evaluated the patient. Seen 2 days ago diagnosed with pulmonary embolism, written for Xarelto but pharmacy did not have it. Lovenox was called and took her first dose at 7:00 last night. Finding an adequate pain relief with Percocet, takes care of the pain for about 2-1/2 hours before it comes back. Do not think additional work-up indicated at this time, we have her diagnosis, do not suspect new onset ACS    [CB]      ED Course User Index  [CB] Radene Seip, MD       Provider Notes (Medical Decision Making):   [facility]    Ms Amanda Reese is a 38 yo patient who presents today with worsening right sided pleuritic chest pain and SOB since being discharged from [facility] ED 2 days ago. She was discharged on Eliquis for moderate burden of RLL pulmonary emboli.  Given increasing R-sided chest pain, SOB, and  tachycardia to 105 bpm, decision was made to start patient on a heparin gtt. Patient was given options and she agreed to be admitted to be started on the heparin infusion. Percocet was given for pain management. Spoke to Dr. Yara Kahn about this patient 2 days ago. Perfect served him today to consult him on this patient. 5:57 PM: Contacted hospitalist to admit patient. Discussed plan with patient. All questions and concerns addressed. Diagnosis     Clinical Impression:   1. Pulmonary embolism on right (HCC)    2. Chest pain, pleuritic    3. SOB (shortness of breath)        Disposition: Admit    Follow-up Information     Follow up With Specialties Details Why Contact Info    Amara Grimes MD Family Medicine Schedule an appointment as soon as possible for a visit in 2 days  [address] [facility]  [address]      [address] [facility] EMERGENCY DEPT Emergency Medicine  As needed [address] [facility]  [phone]           Patient's Medications   Start Taking    No medications on file   Continue Taking    APIXABAN (ELIQUIS DVT-PE TREAT 30D START) 5 MG (74 TABS) STARTER PACK    Take 10 mg (two 5 mg tablets) by mouth twice a day for 7 days   Followed by 5 mg (one 5 mg tablet) by mouth twice a day    ENOXAPARIN (LOVENOX) 40 MG/0.4 ML    Inject one syringe subcutaneously every 12 hours for two doses. LEVOFLOXACIN (LEVAQUIN) 750 MG TABLET    Take 1 Tablet by mouth daily for 4 days. ONDANSETRON (ZOFRAN ODT) 4 MG DISINTEGRATING TABLET    Take 1 Tablet by mouth every eight (8) hours as needed for Nausea or Vomiting. OXYCODONE-ACETAMINOPHEN (PERCOCET 7.5) 7.5-325 MG PER TABLET    Take 1 Tablet by mouth every six (6) hours as needed for Pain for up to 4 doses. Max Daily Amount: 4 Tablets. Once finished with 7.5-325 mg doses, can take 5-325 mg doses. Do not take together, and do not take with additional tylenol.     OXYCODONE-ACETAMINOPHEN (PERCOCET) 5-325 MG PER TABLET    Take 1 Tablet by mouth every six (6) hours as needed for Pain for up to 3 days. Max Daily Amount: 4 Tablets. Do not take tylenol with Norco since they both contain acetaminophen.    These Medications have changed    No medications on file   Stop Taking    No medications on file       Stella West MD [facility]   September 10, 2021, 5:29 PM

## 2021-09-10 NOTE — Clinical Note
Status[de-identified] INPATIENT [101]   Type of Bed: Telemetry [19]   Cardiac Monitoring Required?: No   Inpatient Hospitalization Certified Necessary for the Following Reasons: 3.  Patient receiving treatment that can only be provided in an inpatient setting (further clarification in H&P documentation)   Admitting Diagnosis: Pulmonary embolism [facility] [[id number]]   Admitting Physician: Jennie Nelson [[id number]]   Attending Physician: Ruslan Orona   Estimated Length of Stay: 2 Midnights   Discharge Plan[de-identified] Home with Office Follow-up

## 2021-09-10 NOTE — Clinical Note
[address] [facility] EMERGENCY DEPT  [address] [id number] [phone]    Work/School Note    Date: 9/10/2021    To Whom It May concern:    Shellie Mckee was seen and treated today in the emergency room by the following provider(s):  Attending Provider: Raymundo Khan MD  Resident: Niurka Jo MD.      Shellie Mckee is excused from work/school on 9/11/2021 through 9/14/2021. She is medically clear to return to work/school on 9/15/2021.         Sincerely,          Nader Villarreal MD

## 2021-09-10 NOTE — ED TRIAGE NOTES
Patient states she was diagnosed with a PE a couple of days ago. She states she had been doing ok but pain medicine is not working today.   She c/o of \"pinching\" pain to right side of chest.

## 2021-09-10 NOTE — ED NOTES
Pt has been stuck 7 times for a IV so the heparin gtt can be started, all unsuccessful. MD made aware.

## 2021-09-10 NOTE — TELEPHONE ENCOUNTER
Returned pt's call that was left on voicemail. Patient wants our office to call pulmonary to get an earlier appointment. Patient states she can't wait until Sept. 27th. I advised the pt to call pulmonary since she declined an earlier appointment that was offered to her by their office. Pt states that her chest pain is unbearable and the pain meds aren't working. I advised the pt to go to the hospital, pt declined. She states, \" She has been to the hospital 3 times this week and she is not going back\", then proceeded to hang up the phone.

## 2021-09-11 VITALS
HEART RATE: 96 BPM | DIASTOLIC BLOOD PRESSURE: 83 MMHG | TEMPERATURE: 99.3 F | RESPIRATION RATE: 26 BRPM | OXYGEN SATURATION: 98 % | HEIGHT: 65 IN | WEIGHT: 290 LBS | BODY MASS INDEX: 48.32 KG/M2 | SYSTOLIC BLOOD PRESSURE: 127 MMHG

## 2021-09-11 LAB
APTT PPP: 173.7 SEC (ref 23–36.4)
APTT PPP: 26.8 SEC (ref 23–36.4)
APTT PPP: >180 SEC (ref 23–36.4)
ATRIAL RATE: 97 BPM
BASOPHILS # BLD: 0 K/UL (ref 0–0.1)
BASOPHILS NFR BLD: 0 % (ref 0–2)
CALCULATED P AXIS, ECG09: 67 DEGREES
CALCULATED R AXIS, ECG10: -15 DEGREES
CALCULATED T AXIS, ECG11: 61 DEGREES
DIAGNOSIS, 93000: NORMAL
DIFFERENTIAL METHOD BLD: ABNORMAL
EOSINOPHIL # BLD: 0.2 K/UL (ref 0–0.4)
EOSINOPHIL NFR BLD: 2 % (ref 0–5)
ERYTHROCYTE [DISTWIDTH] IN BLOOD BY AUTOMATED COUNT: 17.2 % (ref 11.6–14.5)
HCT VFR BLD AUTO: 30.1 % (ref 35–45)
HGB BLD-MCNC: 9.3 G/DL (ref 12–16)
LYMPHOCYTES # BLD: 2 K/UL (ref 0.9–3.6)
LYMPHOCYTES NFR BLD: 22 % (ref 21–52)
MCH RBC QN AUTO: 21.7 PG (ref 24–34)
MCHC RBC AUTO-ENTMCNC: 30.9 G/DL (ref 31–37)
MCV RBC AUTO: 70.2 FL (ref 78–100)
MONOCYTES # BLD: 0.7 K/UL (ref 0.05–1.2)
MONOCYTES NFR BLD: 8 % (ref 3–10)
NEUTS SEG # BLD: 6.2 K/UL (ref 1.8–8)
NEUTS SEG NFR BLD: 68 % (ref 40–73)
P-R INTERVAL, ECG05: 138 MS
PLATELET # BLD AUTO: 281 K/UL (ref 135–420)
PLATELET COMMENTS,PCOM: ABNORMAL
PMV BLD AUTO: 11.8 FL (ref 9.2–11.8)
Q-T INTERVAL, ECG07: 370 MS
QRS DURATION, ECG06: 82 MS
QTC CALCULATION (BEZET), ECG08: 469 MS
RBC # BLD AUTO: 4.29 M/UL (ref 4.2–5.3)
RBC MORPH BLD: ABNORMAL
VENTRICULAR RATE, ECG03: 97 BPM
WBC # BLD AUTO: 9.1 K/UL (ref 4.6–13.2)

## 2021-09-11 PROCEDURE — 85730 THROMBOPLASTIN TIME PARTIAL: CPT

## 2021-09-11 PROCEDURE — 74011250636 HC RX REV CODE- 250/636: Performed by: EMERGENCY MEDICINE

## 2021-09-11 PROCEDURE — 85025 COMPLETE CBC W/AUTO DIFF WBC: CPT

## 2021-09-11 PROCEDURE — 74011250636 HC RX REV CODE- 250/636: Performed by: STUDENT IN AN ORGANIZED HEALTH CARE EDUCATION/TRAINING PROGRAM

## 2021-09-11 PROCEDURE — 74011250637 HC RX REV CODE- 250/637: Performed by: EMERGENCY MEDICINE

## 2021-09-11 PROCEDURE — 96376 TX/PRO/DX INJ SAME DRUG ADON: CPT

## 2021-09-11 PROCEDURE — 99218 HC RM OBSERVATION: CPT

## 2021-09-11 RX ORDER — HEPARIN SODIUM 5000 [USP'U]/ML
80 INJECTION, SOLUTION INTRAVENOUS; SUBCUTANEOUS ONCE
Status: DISCONTINUED | OUTPATIENT
Start: 2021-09-11 | End: 2021-09-11

## 2021-09-11 RX ORDER — APIXABAN 5 MG (74)
KIT ORAL
Qty: 1 DOSE PACK | Refills: 0 | Status: SHIPPED | OUTPATIENT
Start: 2021-09-11 | End: 2021-09-11

## 2021-09-11 RX ORDER — MORPHINE SULFATE 4 MG/ML
4 INJECTION INTRAVENOUS
Status: COMPLETED | OUTPATIENT
Start: 2021-09-11 | End: 2021-09-11

## 2021-09-11 RX ORDER — DOCUSATE SODIUM 100 MG/1
100 CAPSULE, LIQUID FILLED ORAL
Status: DISCONTINUED | OUTPATIENT
Start: 2021-09-11 | End: 2021-09-11 | Stop reason: HOSPADM

## 2021-09-11 RX ORDER — HEPARIN SODIUM 5000 [USP'U]/ML
10500 INJECTION, SOLUTION INTRAVENOUS; SUBCUTANEOUS ONCE
Status: DISCONTINUED | OUTPATIENT
Start: 2021-09-11 | End: 2021-09-11 | Stop reason: SDUPTHER

## 2021-09-11 RX ORDER — HEPARIN SODIUM 1000 [USP'U]/ML
10000 INJECTION, SOLUTION INTRAVENOUS; SUBCUTANEOUS ONCE
Status: COMPLETED | OUTPATIENT
Start: 2021-09-11 | End: 2021-09-11

## 2021-09-11 RX ADMIN — HEPARIN SODIUM 10000 UNITS: 1000 INJECTION INTRAVENOUS; SUBCUTANEOUS at 10:38

## 2021-09-11 RX ADMIN — HEPARIN SODIUM 14 UNITS/KG/HR: 10000 INJECTION, SOLUTION INTRAVENOUS at 08:00

## 2021-09-11 RX ADMIN — HEPARIN SODIUM 14 UNITS/KG/HR: 10000 INJECTION, SOLUTION INTRAVENOUS at 03:25

## 2021-09-11 RX ADMIN — MORPHINE SULFATE 4 MG: 4 INJECTION, SOLUTION INTRAMUSCULAR; INTRAVENOUS at 04:02

## 2021-09-11 RX ADMIN — DOCUSATE SODIUM 100 MG: 100 CAPSULE, LIQUID FILLED ORAL at 17:40

## 2021-09-11 NOTE — ED NOTES
The patient was signed out to me at 7 AM awaiting a placement plan and [facility] permission. The patient was admitted to the hospital with known pulmonary embolism and cannot get her outpatient medications. With the assistance of care management were able to get her medications with 0 co-pay from a pharmacy 8 which is been and arranged by Dr. Cecelia Romero. We will proceed with discharge at this point now that we have safe anticoagulation plans for home the patient will return if it worsens or concerned. Cristobal Cabrera, DO 5:57 PM    MEDICATIONS:    Medications   heparin 25,000 units in D5W 250 ml infusion (15 Units/kg/hr × 132 kg (Order-Specific) IntraVENous Restarted 9/11/21 1723)   docusate sodium (COLACE) capsule 100 mg (100 mg Oral Given 9/11/21 1740)   oxyCODONE-acetaminophen (PERCOCET) 5-325 mg per tablet 2 Tablet (2 Tablets Oral Given 9/10/21 1719)   heparin (porcine) 1,000 unit/mL injection 10,000 Units (10,000 Units IntraVENous Given 9/10/21 2000)   morphine injection 4 mg (4 mg IntraVENous Given 9/10/21 2243)   morphine injection 4 mg (4 mg IntraVENous Given 9/11/21 0402)   heparin (porcine) 1,000 unit/mL injection 10,000 Units (10,000 Units IntraVENous Given 9/11/21 1038)            RESULTS:        No orders to display           Abnormal Results this visit:  Labs Reviewed   METABOLIC PANEL, COMPREHENSIVE - Abnormal; Notable for the following components:       Result Value    Sodium 135 (*)     Creatinine 0.46 (*)     BUN/Creatinine ratio 22 (*)     Albumin 3.1 (*)     Globulin 4.7 (*)     A-G Ratio 0.7 (*)     All other components within normal limits   CBC WITH AUTOMATED DIFF - Abnormal; Notable for the following components:    HGB 9.8 (*)     HCT 31.5 (*)     MCV 69.2 (*)     MCH 21.5 (*)     RDW 17.2 (*)     LYMPHOCYTES 20 (*)     All other components within normal limits   PTT - Abnormal; Notable for the following components:    aPTT 173.7 (*)     All other components within normal limits   CBC WITH AUTOMATED DIFF - Abnormal; Notable for the following components:    HGB 9.3 (*)     HCT 30.1 (*)     MCV 70.2 (*)     MCH 21.7 (*)     MCHC 30.9 (*)     RDW 17.2 (*)     All other components within normal limits   PTT - Abnormal; Notable for the following components:    aPTT >180.0 (*)     All other components within normal limits   PTT   PTT   PTT   CBC WITH AUTOMATED DIFF   PTT       All Results past 24 hours:  Recent Results (from the past 24 hour(s))   PTT    Collection Time: 09/10/21  6:02 PM   Result Value Ref Range    aPTT 33.3 23.0 - 25.1 SEC   METABOLIC PANEL, COMPREHENSIVE    Collection Time: 09/10/21  6:02 PM   Result Value Ref Range    Sodium 135 (L) 136 - 145 mmol/L    Potassium 4.0 3.5 - 5.5 mmol/L    Chloride 102 100 - 111 mmol/L    CO2 23 21 - 32 mmol/L    Anion gap 10 3.0 - 18 mmol/L    Glucose 87 74 - 99 mg/dL    BUN 10 7.0 - 18 MG/DL    Creatinine 0.46 (L) 0.6 - 1.3 MG/DL    BUN/Creatinine ratio 22 (H) 12 - 20      GFR est AA >60 >60 ml/min/1.73m2    GFR est non-AA >60 >60 ml/min/1.73m2    Calcium 9.0 8.5 - 10.1 MG/DL    Bilirubin, total 0.3 0.2 - 1.0 MG/DL    ALT (SGPT) 14 13 - 56 U/L    AST (SGOT) 16 10 - 38 U/L    Alk. phosphatase 73 45 - 117 U/L    Protein, total 7.8 6.4 - 8.2 g/dL    Albumin 3.1 (L) 3.4 - 5.0 g/dL    Globulin 4.7 (H) 2.0 - 4.0 g/dL    A-G Ratio 0.7 (L) 0.8 - 1.7     CBC WITH AUTOMATED DIFF    Collection Time: 09/10/21  6:02 PM   Result Value Ref Range    WBC 10.1 4.6 - 13.2 K/uL    RBC 4.55 4.20 - 5.30 M/uL    HGB 9.8 (L) 12.0 - 16.0 g/dL    HCT 31.5 (L) 35.0 - 45.0 %    MCV 69.2 (L) 78.0 - 100.0 FL    MCH 21.5 (L) 24.0 - 34.0 PG    MCHC 31.1 31.0 - 37.0 g/dL    RDW 17.2 (H) 11.6 - 14.5 %    PLATELET 286 416 - 148 K/uL    NEUTROPHILS 72 40 - 73 %    LYMPHOCYTES 20 (L) 21 - 52 %    MONOCYTES 7 3 - 10 %    EOSINOPHILS 1 0 - 5 %    BASOPHILS 0 0 - 2 %    ABS. NEUTROPHILS 7.2 1.8 - 8.0 K/UL    ABS. LYMPHOCYTES 2.0 0.9 - 3.6 K/UL    ABS. MONOCYTES 0.7 0.05 - 1.2 K/UL    ABS. EOSINOPHILS 0.1 0.0 - 0.4 K/UL    ABS. BASOPHILS 0.0 0.0 - 0.1 K/UL    DF AUTOMATED     PTT    Collection Time: 09/11/21  1:51 AM   Result Value Ref Range    aPTT 173.7 (HH) 23.0 - 36.4 SEC   CBC WITH AUTOMATED DIFF    Collection Time: 09/11/21  5:00 AM   Result Value Ref Range    WBC 9.1 4.6 - 13.2 K/uL    RBC 4.29 4. 20 - 5.30 M/uL    HGB 9.3 (L) 12.0 - 16.0 g/dL    HCT 30.1 (L) 35.0 - 45.0 %    MCV 70.2 (L) 78.0 - 100.0 FL    MCH 21.7 (L) 24.0 - 34.0 PG    MCHC 30.9 (L) 31.0 - 37.0 g/dL    RDW 17.2 (H) 11.6 - 14.5 %    PLATELET 985 926 - 001 K/uL    MPV 11.8 9.2 - 11.8 FL    NEUTROPHILS 68 40 - 73 %    LYMPHOCYTES 22 21 - 52 %    MONOCYTES 8 3 - 10 %    EOSINOPHILS 2 0 - 5 %    BASOPHILS 0 0 - 2 %    ABS. NEUTROPHILS 6.2 1.8 - 8.0 K/UL    ABS. LYMPHOCYTES 2.0 0.9 - 3.6 K/UL    ABS. MONOCYTES 0.7 0.05 - 1.2 K/UL    ABS. EOSINOPHILS 0.2 0.0 - 0.4 K/UL    ABS. BASOPHILS 0.0 0.0 - 0.1 K/UL    DF AUTOMATED      PLATELET COMMENTS ADEQUATE PLATELETS      RBC COMMENTS NORMOCYTIC, NORMOCHROMIC     PTT    Collection Time: 09/11/21  9:30 AM   Result Value Ref Range    aPTT 26.8 23.0 - 36.4 SEC   PTT    Collection Time: 09/11/21  3:25 PM   Result Value Ref Range    aPTT >180.0 (HH) 23.0 - 36.4 SEC           CLINICAL IMPRESSION    1. Pulmonary embolism on right (HCC)    2. Chest pain, pleuritic    3.  SOB (shortness of breath)

## 2021-09-11 NOTE — PROGRESS NOTES
Dr Arielle Nino wanting this CM to insure that patient can get Eliquis filled. Outpatient pharmacy closed. Dr Mitch Boyd stated that Rx is at [facility] at [address] and spoke to [facility] who stated that no Rx for Eliquis is at this pharmacy. Dr Mitch Boyd informed. Dr Mitch Boyd sent Rx to [facility].  Called to check on receipt and cost.  [facility] does not have starter pack in stock. Called Dr Mitch Boyd again to order Eliquis that is not a starter pack. [facility] does have the 5mg in stock.       Chapin Oconnell, RN BSN  Care Manager  [phone]

## 2021-09-11 NOTE — ED NOTES
ED Progress note    Spoke to , Earl James, who coordinated with patient's pharmacy, [facility] pharmacy on [address] [place] [address] directly and confirmed with pharmacist that the Eliquis is ready for pick-up with zero co-pay required by patient. Discussed update with patient. Asked patient to go directly to pharmacy from ED following d/c to  her medications. Patient agreed and stated that she is ready to go home. Instructed patient that she likely needs to be on Eliquis 10 mg twice daily for 7 days followed by 5 mg twice daily for 3 months for her likely provoked right-sided pulmonary embolism from recent bariatric surgery on August 25/21. Educated her to follow up with pulmonology, Dr. Cristy Li, within 1 week and her PCP within the next 2 days. Provided return precautions for worsening chest pain or SOB. Patient has been on heparin gtt at [facility] ED for the last 24 hours. She reports that she is feeling much better. Denies pleuritic chest pain or SOB. She is hemodynamically stable and ready for discharge home. Will  discontinue admission order and inform the hospitalist on call tonight at [facility]. VS:   Blood pressure 127/83, pulse 96, temperature 99.3 °F (37.4 °C), resp. rate 26, height 5' 5\" (1.651 m), weight 131.5 kg (290 lb), last menstrual period 08/18/2021, SpO2 98 %. Labs:   Recent Results (from the past 12 hour(s))   PTT    Collection Time: 09/11/21  9:30 AM   Result Value Ref Range    aPTT 26.8 23.0 - 36.4 SEC   PTT    Collection Time: 09/11/21  3:25 PM   Result Value Ref Range    aPTT >180.0 (HH) 23.0 - 36.4 SEC       Physical Exam  Constitutional:       Appearance: Normal appearance. HENT:      Head: Normocephalic and atraumatic. Nose: Nose normal.   Eyes:      Extraocular Movements: Extraocular movements intact. Pupils: Pupils are equal, round, and reactive to light. Cardiovascular:      Rate and Rhythm: Normal rate and regular rhythm.       Pulses: Normal pulses. Heart sounds: Normal heart sounds. No murmur heard. No friction rub. No gallop. Pulmonary:      Effort: Pulmonary effort is normal. No respiratory distress. Breath sounds: Normal breath sounds. No stridor. No wheezing or rales. Chest:      Chest wall: No tenderness. Abdominal:      General: Abdomen is flat. Bowel sounds are normal. There is no distension. Palpations: Abdomen is soft. There is no mass. Tenderness: There is no abdominal tenderness. Musculoskeletal:         General: No swelling or tenderness. Normal range of motion. Cervical back: Normal range of motion. Skin:     General: Skin is warm. Capillary Refill: Capillary refill takes less than 2 seconds. Neurological:      General: No focal deficit present. Mental Status: She is alert and oriented to person, place, and time.    Psychiatric:         Mood and Affect: Mood normal.       Signed By: Jyotsna Zamudio MD     September 11, 2021

## 2021-09-13 ENCOUNTER — APPOINTMENT (OUTPATIENT)
Dept: GENERAL RADIOLOGY | Age: 41
End: 2021-09-13
Attending: EMERGENCY MEDICINE
Payer: COMMERCIAL

## 2021-09-13 ENCOUNTER — HOSPITAL ENCOUNTER (EMERGENCY)
Age: 41
Discharge: HOME | End: 2021-09-13
Attending: EMERGENCY MEDICINE
Payer: COMMERCIAL

## 2021-09-13 VITALS
DIASTOLIC BLOOD PRESSURE: 80 MMHG | TEMPERATURE: 99.2 F | HEART RATE: 99 BPM | RESPIRATION RATE: 18 BRPM | BODY MASS INDEX: 48.32 KG/M2 | WEIGHT: 290 LBS | HEIGHT: 65 IN | OXYGEN SATURATION: 100 % | SYSTOLIC BLOOD PRESSURE: 142 MMHG

## 2021-09-13 DIAGNOSIS — K59.03 DRUG-INDUCED CONSTIPATION: Primary | ICD-10-CM

## 2021-09-13 DIAGNOSIS — J18.9 PNEUMONIA OF RIGHT LOWER LOBE DUE TO INFECTIOUS ORGANISM: ICD-10-CM

## 2021-09-13 LAB
SERVICE CMNT-IMP: NORMAL
WET PREP GENITAL: NORMAL
WET PREP GENITAL: NORMAL

## 2021-09-13 PROCEDURE — 99284 EMERGENCY DEPT VISIT MOD MDM: CPT

## 2021-09-13 PROCEDURE — 74022 RADEX COMPL AQT ABD SERIES: CPT

## 2021-09-13 PROCEDURE — 87210 SMEAR WET MOUNT SALINE/INK: CPT

## 2021-09-13 NOTE — ED PROVIDER NOTES
EMERGENCY DEPARTMENT HISTORY AND PHYSICAL EXAM    5:23 AM  Date: 9/13/2021  Patient Name: Keith Lima    History of Presenting Illness     Chief Complaint   Patient presents with    Constipation    Dehydration        History Provided By: Patient    HPI: Keith Lima is a 39 y.o. female with history of multiple medical problems as below status post gastric bypass 2 weeks ago. Recently diagnosed with PE last week. Patient is presenting with constipation for approximately 10 days. States that she is able to pass flatus but has not been able to have a bowel movement. She tried MiraLAX, suppository and an enema without success. States that she felt hard stool in her rectum but could not get it out. Denies abdominal pain, nausea or vomiting. She is complaining of nonproductive cough and some chest tightness. States that she has been compliant to her anticoagulation been taking them every day. Denies fever or other symptoms concerning for COVID-19. Location:  Severity:  Timing/course:    Onset/Duration:     PCP: Hiren Donnelly MD    Past History     Past Medical History:  Past Medical History:   Diagnosis Date    Abnormal MRI of head 2010    Nonspecific focus of low attenuation at the right internal capsule on CT and MRI    Anxiety     Chronic low back pain 2014    unknown cause- normal xrays    Depression     Diabetes ([address]     Pre-DM- no meds    Hypertension     no meds now    Memory loss     Prediabetes     Scoliosis     Stroke ([address] 2008    denies residual PER PATIENT MINI STROKE( TIA)       Past Surgical History:  Past Surgical History:   Procedure Laterality Date    HX GASTRIC BYPASS         Family History:  Family History   Problem Relation Age of Onset    Diabetes Other     Hypertension Other     Heart Disease Other     Asthma Other     Arthritis-osteo Other     Diabetes Mother     Hypertension Mother        Social History:  Social History     Tobacco Use    Smoking status: Never Smoker    Smokeless tobacco: Never Used   Vaping Use    Vaping Use: Never used   Substance Use Topics    Alcohol use: Not Currently     Alcohol/week: 1.0 standard drinks     Types: 1 Glasses of wine per week     Comment: socially    Drug use: Never       Allergies:  No Known Allergies    Review of Systems   Review of Systems   Respiratory: Positive for cough and shortness of breath. Gastrointestinal: Positive for constipation. All other systems reviewed and are negative. Physical Exam     Patient Vitals for the past 12 hrs:   Temp Pulse Resp BP SpO2   09/13/21 0505 99.2 °F (37.3 °C) 99 18 (!) 142/80 100 %       Physical Exam  Vitals and nursing note reviewed. Exam conducted with a chaperone present. Constitutional:       Appearance: Normal appearance. HENT:      Head: Normocephalic and atraumatic. Eyes:      Extraocular Movements: Extraocular movements intact. Cardiovascular:      Rate and Rhythm: Normal rate. Pulmonary:      Effort: Pulmonary effort is normal. No respiratory distress. Abdominal:      Palpations: Abdomen is soft. Tenderness: There is no abdominal tenderness. Genitourinary:     Rectum: Normal.      Comments: Stool in the vault  Musculoskeletal:         General: Normal range of motion. Cervical back: Normal range of motion and neck supple. Skin:     General: Skin is warm and dry. Neurological:      General: No focal deficit present. Mental Status: She is alert and oriented to person, place, and time. Psychiatric:         Mood and Affect: Mood normal.         Behavior: Behavior normal.           Diagnostic Study Results     Labs -  No results found for this or any previous visit (from the past 12 hour(s)). Radiologic Studies -   No results found. Medical Decision Making     ED Course: Progress Notes, Reevaluation, and Consults:    5:23 AM Initial assessment performed.  The patients presenting problems have been discussed, and they/their family are in agreement with the care plan formulated and outlined with them. I have encouraged them to ask questions as they arise throughout their visit. Provider Notes (Medical Decision Making): [de-identified] 3year-old female status post gastric bypass and PE presenting with constipation for 10 days. Well-appearing on exam and not in distress. Abdomen is soft and nontender. She is complaining of some shortness of breath \"rattling in her chest\". Will order acute abdominal series to evaluate for stool burden/air-fluid level as well as her lungs. Chest x-ray is consistent with right lower lobe infiltrates. Patient was prescribed Levaquin during her first visit however she said that she has not been taking it because she did not pick it up from the pharmacy. I instructed the patient to pick it up from her pharmacy and keep taking it. Keep taking her anticoagulation and also add daily stool softeners. We will order an enema in the ER as well. Attempted rectal disimpaction, there was 1 piece of hard stool, otherwise it was higher up soft stool so the enema to take care of it. Procedures: Rectal disimpaction    Date/Time: 9/13/2021 7:18 AM  Performed by: Mallika Pineda MD  Authorized by: Mallika Pineda MD     Consent:     Consent obtained:  Verbal    Consent given by:  Patient    Alternatives discussed:  Delayed treatment and referral  Indications:     Indications:  Constipation and stool impaction  Anesthesia (see MAR for exact dosages): Anesthesia method:  None  Post-procedure details:     Patient tolerance of procedure: Tolerated well, no immediate complications  Comments:      1 piece of hard stool in the vault removed. The rest of the stool was very high in the rectum and was soft        Critical Care Time:     Vital Signs-Reviewed the patient's vital signs. Reviewed pt's pulse ox reading. EKG: Interpreted by the EP.    Time Interpreted:    Rate:    Rhythm: Interpretation:   Comparison:     Records Reviewed: Nursing Notes, Old Medical Records, Previous Radiology Studies and Previous Laboratory Studies (Time of Review: 5:23 AM)  -I am the first provider for this patient.  -I reviewed the vital signs, available nursing notes, past medical history, past surgical history, family history and social history. Current Outpatient Medications   Medication Sig Dispense Refill    apixaban (Eliquis) 5 mg tablet Take 1 Tablet by mouth two (2) times a day for 72 days. 10 mg twice daily for 7 days followed by 5 mg twice daily. 144 Tablet 0    enoxaparin (Lovenox) 40 mg/0.4 mL Inject one syringe subcutaneously every 12 hours for two doses. 2 Each 0    levoFLOXacin (LEVAQUIN) 750 mg tablet Take 1 Tablet by mouth daily for 4 days. 4 Tablet 0    ondansetron (ZOFRAN ODT) 4 mg disintegrating tablet Take 1 Tablet by mouth every eight (8) hours as needed for Nausea or Vomiting. 12 Tablet 0        Clinical Impression     Clinical Impression: No diagnosis found. Disposition: dc        This note was dictated utilizing voice recognition software which may lead to typographical errors. I apologize in advance if the situation occurs. If questions arise please do not hesitate to contact me or call our department.     Chantel Fernandez MD  5:23 AM

## 2021-09-13 NOTE — ED TRIAGE NOTES
Alert and oriented female with constipation. Reports mouth feels dry and muscle cramping in legs. Attempted suppository and enema at home with no relief.

## 2021-09-15 ENCOUNTER — OFFICE VISIT (OUTPATIENT)
Dept: SURGERY | Age: 41
End: 2021-09-15
Payer: COMMERCIAL

## 2021-09-15 ENCOUNTER — TELEPHONE (OUTPATIENT)
Dept: MEDSURG UNIT | Age: 41
End: 2021-09-15

## 2021-09-15 VITALS
OXYGEN SATURATION: 100 % | WEIGHT: 286 LBS | TEMPERATURE: 98.1 F | HEART RATE: 110 BPM | BODY MASS INDEX: 45.96 KG/M2 | HEIGHT: 66 IN | DIASTOLIC BLOOD PRESSURE: 88 MMHG | SYSTOLIC BLOOD PRESSURE: 135 MMHG | RESPIRATION RATE: 20 BRPM

## 2021-09-15 DIAGNOSIS — K91.2 POSTOPERATIVE INTESTINAL MALABSORPTION: Primary | ICD-10-CM

## 2021-09-15 PROCEDURE — 99024 POSTOP FOLLOW-UP VISIT: CPT | Performed by: SURGERY

## 2021-09-15 RX ORDER — LEVOFLOXACIN 750 MG/1
750 TABLET ORAL DAILY
COMMUNITY
End: 2021-09-27 | Stop reason: ALTCHOICE

## 2021-09-15 RX ORDER — CALCIUM CARBONATE/VITAMIN D3 600 MG-125
1 TABLET ORAL DAILY
COMMUNITY

## 2021-09-15 RX ORDER — BISMUTH SUBSALICYLATE 262 MG
1 TABLET,CHEWABLE ORAL DAILY
COMMUNITY

## 2021-09-15 RX ORDER — LANOLIN ALCOHOL/MO/W.PET/CERES
100 CREAM (GRAM) TOPICAL DAILY
COMMUNITY

## 2021-09-15 RX ORDER — AMOXICILLIN 500 MG/1
TABLET, FILM COATED ORAL
COMMUNITY
Start: 2021-08-14 | End: 2021-09-27 | Stop reason: ALTCHOICE

## 2021-09-15 RX ORDER — LANOLIN ALCOHOL/MO/W.PET/CERES
1000 CREAM (GRAM) TOPICAL DAILY
COMMUNITY

## 2021-09-15 NOTE — PROGRESS NOTES
Valerie Castleman is a 39 y.o. female that presents today to follow up post  LGBP  preformed on 8/25/2021. Pt says pain level is at a 0 on a scale from 1-10. Pt is drinking 64oz of fluid daily. Pt is currently eating yogurt,eggs,talapia. Pt says the amount of time spent exercising is 0. Body mass index is 46.16 kg/m². 1. Have you been to the ER, urgent care clinic since your last visit? Hospitalized since your last visit? No    2. Have you seen or consulted any other health care providers outside of the [address] [place] [address] since your last visit? Include any pap smears or colon screening.  No

## 2021-09-15 NOTE — PROGRESS NOTES
Bariatric Follow-Up Evaluation    Matt Aiken is a 39 y.o. black female status post laparoscopic gastric bypass August 25, 2021 whose postoperative course was complicated with a pulmonary embolus/small treated as an outpatient with anticoagulants. The patient presents today in follow-up with complaint of ongoing chest pain and shortness of breath. Tolerant of and compliant with an early post gastric bypass meeting both protein and fluid goals. The patient notes appropriate early satiety, no specific food intolerances or pathologic emesis and has not experienced dumping syndrome  Compliant with multivitamin, calcium citrate, vitamin B1, B12, vitamin D supplementation  Activity: No organized regular activity as patient states that she is limited by her shortness of breath and chest discomfort  Comorbidities: Prediabetes, hypertension-resolved                          Clinical obstructive sleep apnea, weight related arthropathy-improved                         Stress urinary continence-without change  Preoperative weight: 310  Current weight: 286. BMI: 46  Estimated weight loss: 145  Current weight loss: 31  Goal weight: 173  Percentage weight loss: 21% / 21 days    Weight Loss Metrics 9/15/2021 9/15/2021 9/13/2021 9/10/2021 9/8/2021 9/8/2021 9/7/2021   Pre op / Initial Wt 317 - - - - - -   Today's Wt - 286 lb 290 lb 290 lb 290 lb 290 lb 290 lb 12.8 oz   BMI - 46.16 kg/m2 48.26 kg/m2 48.26 kg/m2 48.26 kg/m2 48.26 kg/m2 48.39 kg/m2   Ideal Body Wt 136 - - - - - -   Excess Body Wt 181 - - - - - -   Goal Wt 173 - - - - - -   Wt loss to date 31 - - - - - -   % Wt Loss 0.21 - - - - - -   80% .8 - - - - - -       No Known Allergies    Current Outpatient Medications on File Prior to Visit   Medication Sig Dispense Refill    levoFLOXacin (LEVAQUIN) 750 mg tablet Take 750 mg by mouth daily.  multivitamin (ONE A DAY) tablet Take 1 Tablet by mouth daily.       thiamine HCL (B-1) 100 mg tablet Take  by mouth daily.  calcium-cholecalciferol, d3, (CALCIUM 600 + D) 600-125 mg-unit tab Take  by mouth.  cyanocobalamin 1,000 mcg tablet Take 1,000 mcg by mouth daily.  apixaban (Eliquis) 5 mg tablet Take 1 Tablet by mouth two (2) times a day for 72 days. 10 mg twice daily for 7 days followed by 5 mg twice daily. 144 Tablet 0    amoxicillin 500 mg tab TAKE 1 TABLET BY MOUTH THREE TIMES A DAY UNTIL FINISHED (Patient not taking: Reported on 9/15/2021)      enoxaparin (Lovenox) 40 mg/0.4 mL Inject one syringe subcutaneously every 12 hours for two doses. (Patient not taking: Reported on 9/15/2021) 2 Each 0    ondansetron (ZOFRAN ODT) 4 mg disintegrating tablet Take 1 Tablet by mouth every eight (8) hours as needed for Nausea or Vomiting. (Patient not taking: Reported on 9/15/2021) 12 Tablet 0     No current facility-administered medications on file prior to visit.        Past Medical History:   Diagnosis Date    Abnormal MRI of head 2010    Nonspecific focus of low attenuation at the right internal capsule on CT and MRI    Anxiety     Chronic low back pain 2014    unknown cause- normal xrays    Depression     Diabetes ([address]     Pre-DM- no meds    Hypertension     no meds now    Memory loss     Prediabetes     Scoliosis     Stroke ([address] 2008    denies residual PER PATIENT MINI STROKE( TIA)       Past Surgical History:   Procedure Laterality Date    HX GASTRIC BYPASS      HX GASTRIC BYPASS  08/25/2021       Social History     Tobacco Use    Smoking status: Never Smoker    Smokeless tobacco: Never Used   Vaping Use    Vaping Use: Never used   Substance Use Topics    Alcohol use: Not Currently     Alcohol/week: 1.0 standard drinks     Types: 1 Glasses of wine per week     Comment: socially    Drug use: Never       Family History   Problem Relation Age of Onset    Diabetes Other     Hypertension Other     Heart Disease Other     Asthma Other     Arthritis-osteo Other     Diabetes Mother    Rob Morris Hypertension Mother        ROS:  General: Negative for fevers, chills, night sweats, fatigue, weight loss, or weight gain. GI: Negative for abdominal pain, change in bowel habits, hematochezia, melena, or GERD. Integumentary: Negative for dermatitis or abnormal moles. HEENT: Negative for visual changes, vertigo, epistaxis, dysphagia, or hoarseness    Cardiac: Negative for chest pain, palpitations, hypertension, edema, or varicosities    Resp: Negative for cough, shortness of breath, wheezing, hemoptysis, snoring, or reactive airway disease    : Negative for hematuria, dysuria, frequency, urgency, nocturia, or stress urinary incontinence    MSK:  Negative for weakness, joint pain, or arthritis    Endocrine: Negative for diabetes, thyroid disease, polyuria, polydipsia, polyphagia, poor wound, heat intolerance, or cold intolerance. Lymph/Heme: Negative for anemia, bruising, or history of blood transfusions. Neuro:  Negative for dizziness, headache, fainting, seizures, and stroke. Psychiatry:  Negative for anxiety or depression    Physical Exam    Visit Vitals  /88 (BP 1 Location: Left upper arm, BP Patient Position: At rest, BP Cuff Size: Adult)   Pulse (!) 110   Temp 98.1 °F (36.7 °C) (Oral)   Resp 20   Ht 5' 6\" (1.676 m)   Wt 129.7 kg (286 lb)   LMP 08/18/2021 (Exact Date)   SpO2 100%   BMI 46.16 kg/m²       Nursing note reviewed. General: 39 y.o. female is in no acute distress. Head: Normocephalic, atraumatic  Resp: Clear to auscultation bilaterally, no wheezing, rhonchi, or rales, excursions normal and symmetrical.  Cardio: Regular rate and rhythm, no murmurs, clicks, gallops, or rubs. No edema or varicosities.   Abdomen: Obese, soft, nontender, nondistended, normoactive bowel sounds, no hernias, no hepatosplenomegaly, wounds clean dry approximated appropriate surrounding induration incisional tenderness without evidence of infectious complications or incisional hernia  Psych: Alert and oriented to person, place, and time.     Impression    Status post laparoscopic gastric bypass August 23, 2021 with appropriate weight loss and comorbidity resolution complicated by small pulmonary embolus treated as an outpatient with anticoagulants      Plan    Follow-up with scheduled pulmonary medicine evaluation as scheduled  Formal bariatric nutrition evaluation for assistance with advancement of diet  Continue compliance with the early post gastric bypass diet, fiber supplementation protocol, initiate a regular exercise program of 30 minutes duration on daily basis, encourage monthly attendance of breath support group meetings  Follow-up November 2021 with 3-month labs for ongoing bariatric surgical evaluation

## 2021-09-15 NOTE — TELEPHONE ENCOUNTER
Pt seen in the office today. Pt had a lot of concerns regarding PE and pneumonia. Dr. Kike Amaro informed pt that her pulmonary doctor would have to address concerns. Pt asked if she had been ambulating, pt stated that her chest pain was to bad and she had to get herself together. Pt was very argumentive  in regards to education and weight loss . Pt states that she does not care and  Does not  want to know how much her current weight is. While the physician is educating her on her vitamins and physician pt is rolling her eyes and displays no interest.  Pt informed to follow up with pulmonary in regards to her chest pain and PE. Pt informed that she had infiltrates and patient states that she doesn't have infiltrates. Pt escorted to [facility] scheduler with Dr. Kike Amaro and myself. Pt requesting my number. Pt given my number as requested.

## 2021-09-20 RX ORDER — IBUPROFEN 800 MG/1
800 TABLET ORAL
COMMUNITY
Start: 2021-08-14 | End: 2021-09-27

## 2021-09-27 ENCOUNTER — OFFICE VISIT (OUTPATIENT)
Dept: PULMONOLOGY | Age: 41
End: 2021-09-27
Payer: COMMERCIAL

## 2021-09-27 VITALS
RESPIRATION RATE: 18 BRPM | HEIGHT: 66 IN | OXYGEN SATURATION: 99 % | BODY MASS INDEX: 44 KG/M2 | SYSTOLIC BLOOD PRESSURE: 130 MMHG | DIASTOLIC BLOOD PRESSURE: 82 MMHG | HEART RATE: 92 BPM | TEMPERATURE: 99 F | WEIGHT: 273.8 LBS

## 2021-09-27 DIAGNOSIS — E66.01 MORBID OBESITY (HCC): ICD-10-CM

## 2021-09-27 DIAGNOSIS — I26.99 ACUTE PULMONARY EMBOLISM WITHOUT ACUTE COR PULMONALE, UNSPECIFIED PULMONARY EMBOLISM TYPE (HCC): ICD-10-CM

## 2021-09-27 DIAGNOSIS — I28.8 ENLARGED PULMONARY ARTERY (HCC): ICD-10-CM

## 2021-09-27 DIAGNOSIS — R06.09 DYSPNEA ON EXERTION: Primary | ICD-10-CM

## 2021-09-27 PROCEDURE — 99204 OFFICE O/P NEW MOD 45 MIN: CPT | Performed by: INTERNAL MEDICINE

## 2021-09-27 RX ORDER — ALBUTEROL SULFATE 90 UG/1
2 AEROSOL, METERED RESPIRATORY (INHALATION)
Qty: 1 EACH | Refills: 3 | Status: SHIPPED | OUTPATIENT
Start: 2021-09-27

## 2021-09-27 RX ORDER — DOXYCYCLINE 100 MG/1
100 CAPSULE ORAL 2 TIMES DAILY
COMMUNITY
Start: 2021-09-21 | End: 2021-12-20

## 2021-09-27 NOTE — PATIENT INSTRUCTIONS
What is the plan?  -Continue Eliquis as prescribed - please do not miss any doses  -Discontinue Doxycycline antibiotic  -Proventil rescue inhaler 1-2 puffs every 4-6 hours as needed for shortness of breath  -Schedule and complete Pulmonary function test  -Schedule and complete echocardiogram of your heart  -Complete COVID-19 vaccination   -Talk to PCP about anxiety and obtaining counseling         Pulmonary Embolism: Care Instructions  Overview     Pulmonary embolism is the sudden blockage of an artery in the lung. Blood clots in the deep veins of the leg or pelvis (deep vein thrombosis, or DVT) are the most common cause. These blood clots can travel to the lungs. Pulmonary embolism can be very serious. Because you have had one pulmonary embolism, you are at greater risk for having another one. But you can take steps to prevent another pulmonary embolism. You will probably take a prescription blood-thinning medicine to prevent blood clots. A blood thinner can stop a blood clot from growing larger and prevent new clots from forming. Follow-up care is a key part of your treatment and safety. Be sure to make and go to all appointments, and call your doctor if you are having problems. It's also a good idea to know your test results and keep a list of the medicines you take. How can you care for yourself at home? · Take your medicines exactly as prescribed. Call your doctor if you think you are having a problem with your medicine. You will get more details on the specific medicines your doctor prescribes. · If you are taking a blood thinner, be sure you get instructions about how to take your medicine safely. Blood thinners can cause serious bleeding problems. · Try to walk several times a day. Walking helps keep blood moving in your legs. Before doing other types of exercise, ask your doctor what type and level of exercise is safe for you. · Take steps to help prevent blood clots in your legs.  For example:  ? Exercise your lower leg muscles if you sit for long periods of time. Pump your feet up and down by pulling your toes up toward your knees then pointing them down. Repeat. ? After an illness or surgery, try to get up and out of bed often. If you can't get out of bed, flex your feet every hour to keep the blood moving through your legs. ? Take plenty of breaks when you travel. On long car trips, stop the car and walk around every hour or so. On the bus, plane, or train, get out of your seat and walk up and down the aisle every hour, if you can.  ? Wear compression stockings if your doctor recommends them. ? Check with your doctor about whether you should use hormonal forms of birth control or hormone therapy. These may increase your risk of blood clots. · Have a healthy lifestyle. This includes being active, staying at a healthy weight, and not smoking. When should you call for help? Call 911 anytime you think you may need emergency care. For example, call if:    · You have shortness of breath.     · You have chest pain.     · You passed out (lost consciousness).     · You cough up blood. Call your doctor now or seek immediate medical care if:    · You have new or worsening pain or swelling in your leg. Watch closely for changes in your health, and be sure to contact your doctor if:    · You do not get better as expected. Where can you learn more? Go to http://www.gray.com/  Enter W241 in the search box to learn more about \"Pulmonary Embolism: Care Instructions. \"  Current as of: July 6, 2021               Content Version: 13.0  © 2006-2021 [employer]. Care instructions adapted under license by [employer] (which disclaims liability or warranty for this information).  If you have questions about a medical condition or this instruction, always ask your healthcare professional. [place] [address] any warranty or liability for your use of this information.

## 2021-09-27 NOTE — PROGRESS NOTES
[facility]                                                       Pulmonary, Critical Care, and Sleep Medicine      Pulmonary Office Initial Consultation    Name: Kiley Gutierrez     : 1980     Date: 2021        Subjective:   Patient has been referred for evaluation of: SOB, PE. Patient is a 39 y.o. female PMHx of HTN, Scoliosis of the thoracic spine, Morbid obesity s/p laparoscopic gastric bypass on  complicated by RLL PE. CT chest  notable for moderate burden RLL PE and RLL wedge-shaped pulmonary infarct. She was seen in the ED on 21 with a cough, SOB and constipation, and was later found to have RLL infiltrate on CXR (treated with Levaquin x5 days). Duplex U/S () were unremarkable. Today in the clinic, she complains of continued shortness of breath with chest tightness underneath her Rt breast. SOB worse with activity. She does admit to a cough which is mild and non-productive. Also admits to fatigue and increased anxiety pertaining to the complications from her gastric bypass. She states that she has been told that she has pneumonia. Has been on doxycycline for a little over 7 days. She admits to decreased appetite and decreased food intake. She reports decreased activity due to fatigue. Had to call-in to work last Friday due to above symptoms. Prior to this, has never had an history of VTE. Patient is currently on Eliquis 5 mg twice daily. No Asthma history. No h/o autoimmune in self or family. She is a never smoker. She is not on any inhalers at this time but would like to try a rescue inhaler. COVID-19: (+)in 2021. Completed 1 dose of covid-19 vaccine. 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      Past Medical History:   Diagnosis Date    Abnormal MRI of head     Nonspecific focus of low attenuation at the right internal capsule on CT and MRI    Anxiety     Chronic low back pain 2014    unknown cause- normal xrays    Depression     Diabetes ([facility] [address]     Pre-DM- no meds    Hypertension     no meds now    Memory loss     Prediabetes     Scoliosis     Stroke ([facility] 2008    denies residual PER PATIENT MINI STROKE( TIA)       Past Surgical History:   Procedure Laterality Date    HX GASTRIC BYPASS      HX GASTRIC BYPASS  08/25/2021       Social History     Socioeconomic History    Marital status: SINGLE     Spouse name: Not on file    Number of children: Not on file    Years of education: Not on file    Highest education level: Not on file   Occupational History    Occupation: apartment complex   Tobacco Use    Smoking status: Never Smoker    Smokeless tobacco: Never Used   Vaping Use    Vaping Use: Never used   Substance and Sexual Activity    Alcohol use: Not Currently     Alcohol/week: 1.0 standard drinks     Types: 1 Glasses of wine per week     Comment: socially    Drug use: Never    Sexual activity: Yes     Partners: Male     Birth control/protection: Condom   Other Topics Concern     Social Determinants of Health     Financial Resource Strain: Low Risk     Difficulty of Paying Living Expenses: Not very hard   Food Insecurity: No Food Insecurity    Worried About Running Out of Food in the Last Year: Never true    Mynor of Food in the Last Year: Never true   Transportation Needs: No Transportation Needs    Lack of Transportation (Medical): No    Lack of Transportation (Non-Medical):  No   Physical Activity:     Days of Exercise per Week:     Minutes of Exercise per Session:    Stress: No Stress Concern Present    Feeling of Stress : Not at all   Social Connections:     Frequency of Communication with Friends and Family:     Frequency of Social Gatherings with Friends and Family:     Attends Anabaptist Services:     Active Member of Clubs or Organizations:     Attends Club or Organization Meetings:     Marital Status:        Family History Problem Relation Age of Onset    Diabetes Other     Hypertension Other     Heart Disease Other     Asthma Other     Arthritis-osteo Other     Diabetes Mother     Hypertension Mother        No Known Allergies    . Current Outpatient Medications   Medication Sig Dispense Refill    doxycycline (VIBRAMYCIN) 100 mg capsule Take 100 mg by mouth two (2) times a day.  multivitamin (ONE A DAY) tablet Take 1 Tablet by mouth daily.  thiamine HCL (B-1) 100 mg tablet Take 100 mg by mouth daily.  calcium-cholecalciferol, d3, (CALCIUM 600 + D) 600-125 mg-unit tab Take 1 Tablet by mouth daily.  cyanocobalamin 1,000 mcg tablet Take 1,000 mcg by mouth daily.  apixaban (Eliquis) 5 mg tablet Take 1 Tablet by mouth two (2) times a day for 72 days. 10 mg twice daily for 7 days followed by 5 mg twice daily. 144 Tablet 0    ibuprofen (MOTRIN) 800 mg tablet Take 800 mg by mouth every eight (8) hours as needed. (Patient not taking: Reported on 9/27/2021)      enoxaparin (Lovenox) 40 mg/0.4 mL Inject one syringe subcutaneously every 12 hours for two doses. (Patient not taking: Reported on 9/15/2021) 2 Each 0    ondansetron (ZOFRAN ODT) 4 mg disintegrating tablet Take 1 Tablet by mouth every eight (8) hours as needed for Nausea or Vomiting. (Patient not taking: Reported on 9/15/2021) 12 Tablet 0         Review of Systems:  HEENT: No epistaxis, no nasal drainage, no difficulty in swallowing, no redness in eyes  Respiratory: as above  Cardiovascular: (+)Rt-sided pleuritic chest pain, no palpitations, no chronic leg edema, no syncope  Gastrointestinal: no abd pain, no vomiting, no diarrhea, no bleeding symptoms  Genitourinary: No urinary symptoms or hematuria  Integument/breast: No ulcers or rashes  Musculoskeletal: Neg  Neurological: No focal weakness, no seizures, no headaches  Behvioral/Psych: (+)Anxiety. (+)Depression.   Constitutional: No fever, no chills, no weight loss, no night sweats     Objective: Visit Vitals  /82 (BP 1 Location: Left upper arm, BP Patient Position: Sitting, BP Cuff Size: Thigh)   Pulse 92   Temp 99 °F (37.2 °C) (Temporal)   Resp 18   Ht 5' 6\" (1.676 m)   Wt 124.2 kg (273 lb 12.8 oz)   LMP 09/13/2021   SpO2 99%   BMI 44.19 kg/m²        Physical Exam:   General: comfortable, no acute distress  HEENT: pupils reactive, sclera anicteric, EOM intact  Neck: No adenopathy or thyroid swelling, no lymphadenopathy or JVD, supple  CVS: S1S2 no murmurs  RS: clear to auscultation bilaterally, no tactile fremitus or egophony, no accessory muscle use  Abd: soft, non tender, no hepatosplenomegaly  Neuro: non focal, awake, alert  Extrm: no leg edema, clubbing or cyanosis  Skin: no rash    Data review:   Pertinent labs: CBC, BMP, LFT's  Serologies, IgE, Allergy panel, A1AT, ACE level: not available for review    PFT (06/2016):  FLOWS:   Normal flows although ATS criteria not met     Volumes:   Functional Residual Capacity is reduced     Flow Volume Loop:   Poor Flow Volume Loop tracing due to patient performance     Bronchodilator: NA     Diffusion:   Normal Diffusion Capacity     Impression:   Isolated reduction of Functional Residual Capacity of uncertain significance   Comment:   See technicians comments. Read by: Jennifer Cowan MD     Date FVC Best [id number] FEV1/FVC TLR77-02% TLC FRC RV VC DLCO   6/2016 2.98(92%) 2.44(90%) 82 2.54(83%) 4.03(86%) 1.49(62%) 1.29(89%) 2.74(84%)                                          6 min walk test: not available  Echo: none    Imaging:  I have personally reviewed the patients radiographs and have reviewed the reports:  XR Results (most recent):  Results from Hospital Encounter encounter on 09/13/21    XR ABD ACUTE W 1 V CHEST    Narrative  EXAM: XR ABD ACUTE W 1 V CHEST    CLINICAL INDICATION/HISTORY: 39 years Female. constipation  ADDITIONAL HISTORY: None    TECHNIQUE: Frontal view the chest. Upright frontal view the upper abdomen.   Supine frontal views of the mid and lower abdomen. COMPARISON: Chest radiograph 9/7/2021    FINDINGS:      CHEST:  The cardiac silhouette is within normal limits in size. Prominent pulmonary  trunk shadow, likely due to pulmonary arterial hypertension. Patchy opacity at  the right lung base. Streaky opacities at the left lung base, likely  subsegmental atelectasis. No definite pleural effusion. There is no evidence of  pneumothorax. No acute osseous abnormality appreciated. ABDOMEN:  Moderate degree of stool throughout the colon. Postsurgical changes in the  epigastric region. No dilated small or large bowel loops are appreciated. There  is no evidence of free intraperitoneal air or portal venous gas. No suspicious  calcifications appreciated. No evidence of acute osseous abnormality. S-shaped  thoracolumbar scoliosis. Impression  1. Patchy right lung base opacity, likely pulmonary infarct. Probable left lung  base segmental atelectasis. 2.  Moderate colonic stool burden. Nonspecific bowel gas pattern, without  evidence of obstruction. 3.  Findings suggestive of pulmonary arterial hypertension. CT Results (most recent):  Results from Hospital Encounter encounter on 09/08/21    CTA CHEST W OR W WO CONT    Addendum 9/8/2021  9:01 PM  Addendum: Lung/airway section should read: Interval increased size of a peripherally oriented ground groundglass opacity  within the right lateral lower lobe, likely pulmonary infarct. Additional scattered bandlike opacities at the dependent bilateral lower lobes  an within the right middle lobe, likely atelectasis although early infiltrates  are not excluded. Narrative  EXAM: CTA CHEST W OR W WO CONT    CLINICAL INDICATION/HISTORY: 39 years Female.  CP, suboptimal study; eval for PE  ADDITIONAL HISTORY: None    TECHNIQUE: CTA of the chest was performed using timing optimized for pulmonary  embolism technique, with 80 cc of Omnipaque 350 IV contrast.  To maximize  sensitivity the sagittal and coronal reconstructions were created using a 3D  multislice MIP (maximal intensity projection) methodology. CT scans at this facility are performed using dose optimization technique as  appropriate to the performed exam, to include automated exposure control,  adjustment of the mA and/or kV according to patient size (including appropriate  matching for site specific examinations), or use of iterative reconstruction  technique. COMPARISON:    FINDINGS:  Pulmonary arteries: Mildly degraded study due to decreased signal-to-noise  ratio. There are multiple pulmonary artery filling defects seen within the right  lower lobe basilar segmental and subsegmental branches, compatible with acute  pulmonary emboli. No definite additional pulmonary artery filling defects are  appreciated allowing for exam limitations. Right Heart Strain Assessment:  -  RV/LV ratio (normal <0.9): Normal  -  Dysfunction or bowing of interventricular septum: None  -  Main pulmonary artery enlargement (>30mm): 3.7 cm diameter, enlarged. -  Contrast reflux from the right heart into the IVC/hepatic veins: Absent    Other cardiovascular: Normal caliber thoracic aorta. No thoracic aortic intimal  flap appreciated. Base of neck: Unremarkable    Lung/Airway: Unremarkable    Pleura:  No pleural effusion or pneumothorax    Mediastinum/lymph nodes: No enlarged mediastinal lymph nodes appreciated, by  size criteria. Interval increased size of a peripherally oriented ground  groundglass opacity within the right lateral lower lobe, likely pulmonary  infarct. Additional scattered bandlike opacities at the dependent bilateral lower lobes  an within the right middle lobe, likely atelectasis although early infiltrates  are not excluded. Upper abdominal structures: Prior gastric bypass. Otherwise unremarkable. Musculoskeletal: S-shaped thoracolumbar scoliosis. No acute fracture.  No  aggressive osseous lesion appreciated. Impression  1. Moderate burden of right lower lobe pulmonary emboli. Right heart strain  assessment as above. 2.  Enlarged pulmonary trunk suggestive of pulmonary arterial hypertension. 3.  Right lower lobe pulmonary infarct. Additional patchy opacities at the  posterior bilateral lower lobes and within the right middle lobe, likely  atelectasis but infiltrates not excluded. Imaging features are atypical and  uncommonly reported for Covid-19 pneumonia. Alternative diagnoses should be  considered. (Covid-19 alternative.)      These findings were discussed with Dr. Carol Ann Sykes by [facility] on 9/8/2021  8:47 PM.       Patient Active Problem List   Diagnosis Code    Prediabetes R73.03    Back pain M54.9    Scoliosis of thoracic spine M41.9    Anxiety F41.9    Depression F32.9    BMI 50.0-59.9, adult ([address] Z68.43    Morbid obesity ([address] E66.01    Hypertension I10    Scoliosis M41.9    Morbid obesity with BMI of 45.0-49.9, adult ([address] E66.01, Z68.42    Pulmonary embolism (HCC) I26.99       IMPRESSION:   · Pulmonary embolism  - provoked; post Laparoscopic gastric bypass. RV/LV ration <0.9. Noted moderate clot burden with present of pulmonary infarct. · Pulmonary infarct, RLL - seen on CT chest; secondary to above. · Widened main pulmonary artery on CT chest concerning for Pulmonary hypertension. · Morbid obesity - s/p LGBP. · Pleuritic Rt-sided chest pain - likely secondary to Pulmonary infarct. · Anxiety      RECOMMENDATIONS:   · Eliquis - continue 5 mg BID; recommend minimum 3 months of therapy; will consider repeating CTA for re-assessment of clot burden and pulmonary infarct prior to discontinuing anticoagulation.   · Obtain Echocardiogram for further risk stratification  · Start on Proventil rescue inhaler 1-2 puffs every 4-6 hrs as needed for shortness of breath  · Discontinue Doxycyline - airspace changes on CT likely pulmonary infarct; clinical history does not support PNA; abx use also likely contributory to GI complaints  · Obtain repeat Pulmonary function testing  · Recommend obtaining second dose of COVID-19 vaccination  · Recommend discussion with PCP regarding resources available for CBT +/- pharmacotherapy   · Follow up - 2 months     Health maintenance screens deferred to Primary care provider.        Jelani Guzmán DO   09/27/21  Pulmonary, Critical Care Medicine  [facility]

## 2021-09-27 NOTE — PROGRESS NOTES
Floresita Restrepo presents today for   Chief Complaint   Patient presents with   [employer] Referral / Consult     referred by Dr. Babs Bee (ER and Dr. Casie aBsurto for PE    Results     CTA 9/7 & 9/8/2021, COVID (+) 2/26/2021, COVID (-) 8/20/2021, DVT 9BLE) 9/7/2021, XR abdomen 9/13/2021       Is someone accompanying this pt? No    Is the patient using any DME equipment during OV? No    -DME Company N/A    Depression Screening:  3 most recent PHQ Screens 4/28/2021   PHQ Not Done -   Little interest or pleasure in doing things Not at all   Feeling down, depressed, irritable, or hopeless Not at all   Total Score PHQ 2 0   Trouble falling or staying asleep, or sleeping too much -   Feeling tired or having little energy -   Poor appetite, weight loss, or overeating -   Feeling bad about yourself - or that you are a failure or have let yourself or your family down -   Trouble concentrating on things such as school, work, reading, or watching TV -   Moving or speaking so slowly that other people could have noticed; or the opposite being so fidgety that others notice -   How difficult have these problems made it for you to do your work, take care of your home and get along with others -       Learning Assessment:  Learning Assessment 1/31/2020   PRIMARY LEARNER Patient   HIGHEST LEVEL OF EDUCATION - PRIMARY LEARNER  -   BARRIERS PRIMARY LEARNER -   CO-LEARNER CAREGIVER -   PRIMARY LANGUAGE ENGLISH   LEARNER PREFERENCE PRIMARY LISTENING     -     -   ANSWERED BY patient   RELATIONSHIP SELF       Abuse Screening:  Abuse Screening Questionnaire 1/31/2020   Do you ever feel afraid of your partner? N   Are you in a relationship with someone who physically or mentally threatens you? N   Is it safe for you to go home? Y       Fall Risk  No flowsheet data found. Coordination of Care:  1. Have you been to the ER, urgent care clinic since your last visit? Hospitalized since your last visit?  Yes; [facility] , When: 8/25/2021-gastric bypass surgery, 9/7/2021-pleurisy, 9/8/2021-pleuristic chest pain, 9/8/2021-right sided chest pain, elevated D-dimer, & PE, 9/12/2021-PE, SOB, & chest pain, & 9/18/2021-RLL pneumonia & drug-induced constipation    2. Have you seen or consulted any other health care providers outside of the [address] [place] [address] since your last visit? Include any pap smears or colon screening. No    Did not received influenza vaccine last season per patient. Received COVID vaccine (1st dose) but does not have vaccine card. Patient does not remember date. Unable to update immunization record.

## 2021-09-28 ENCOUNTER — TELEPHONE (OUTPATIENT)
Dept: FAMILY MEDICINE CLINIC | Age: 41
End: 2021-09-28

## 2021-09-28 DIAGNOSIS — R92.8 ABNORMAL MAMMOGRAM: Primary | ICD-10-CM

## 2021-09-28 NOTE — PROGRESS NOTES
Bilateral diagnostic mammogram   Bilateral breast ultrasound   Dx abnormal mammogram   Per verbal order Dr. Xuan Monge   Patient aware

## 2021-09-28 NOTE — TELEPHONE ENCOUNTER
Bilateral diagnostic mammogram   Bilateral breast ultrasound   Dx abnormal mammogram   Per verbal order Dr. Maricel Elkins   Patient aware

## 2021-09-30 ENCOUNTER — DOCUMENTATION ONLY (OUTPATIENT)
Dept: BARIATRICS/WEIGHT MGMT | Age: 41
End: 2021-09-30

## 2021-10-05 ENCOUNTER — TELEPHONE (OUTPATIENT)
Dept: FAMILY MEDICINE CLINIC | Age: 41
End: 2021-10-05

## 2021-10-05 NOTE — TELEPHONE ENCOUNTER
Pt called states experiencing dizziness with very heavy vaginal bleeding, had  has started her menstrual cycle and is flowing heavily, only a few clots, pt is currently taking Eliquis and is concerned if may need to adjust it due to the amount of blood flow.  Please adv [phone]

## 2021-10-07 ENCOUNTER — HOSPITAL ENCOUNTER (OUTPATIENT)
Dept: BARIATRICS/WEIGHT MGMT | Age: 41
Discharge: HOME | End: 2021-10-07

## 2021-10-07 ENCOUNTER — TELEPHONE (OUTPATIENT)
Dept: FAMILY MEDICINE CLINIC | Age: 41
End: 2021-10-07

## 2021-10-07 ENCOUNTER — DOCUMENTATION ONLY (OUTPATIENT)
Dept: BARIATRICS/WEIGHT MGMT | Age: 41
End: 2021-10-07

## 2021-10-07 NOTE — PROGRESS NOTES
[facility]  POST-OP NUTRITION FOLLOW UP    Patient's Name: Debbie Dave  YOB: 1980  Surgery Date: 2021      Procedure: Gastric Bypass    Surgeon: Dr. Lisbeth Mcallister    Height: 5 f 6     Pre-Op Weight: 317     Current Weight: Patient states she has not weighed since surgery. She states she does not want to get on the scale. Weight Lost:     BMI:      Attendance of support group:   When:   Why not:     Complications  Readmittance: Yes. Patient states she went to [facility] ER and stayed there for 2 days. She states she had a blood clot  Reoperations: None  Complications: Blood clot  IV Fluids:   Yes  ER Trips: Patient states she has gone to the ER multiple times. She states she has went for constipation, chest pain for blood clot. Problem Areas:   Nausea:   States she had an onset of nausea a week ago and won't go away. Vomiting: None   Dumping Syndrome: None  Inadequate Protein: Yes. Not taking. Inadequate Fluids: None, patient states she is getting the fluid in. Food Intolerance: None  Hunger: None  Constipation: Early on, but states this has improved. Eating 3 Meals/Day:  Yes  Portion Size at Meals: 1 ounce     Protein from Food: Yes    Foods being consumed:  Breakfast: Time:8:30 am:  She states she was loving hard boiled egg, but just doesn't like it anymore. She is eating the yolk, which I have talked to her that there is no protein in this. Lunch: Time: Patient is getting chinese food and is eating 2 shrimps and broccoli. She states she is just dipping a small amount of sauce. I have discouraged this. Patient states she is not into meal prepping.      Dinner:  Time: Similar to lunch     In-between eating: n/a    Length of time for meals: 30-40 minutes    food/fluids: 30/30    Fluids: 64 oz/day   Types of Fluids: water, crystal light    MVI: [place] Complete    Number/Day: bid   Taken Separately: Yes    Vitamin B1:  Yes  Dosin mg    Calcium: Citrate    Calcium Dosin mg    Taken Separately: Yes    Vitamin B12: sublingual   Vitamin B12 Dosin mcg    Vitamin D: Yes     Vitamin D dosin IU    Iron: Not taking    Iron dosing:      Protein Supplement: Not taking     Grams of Protein:    Mixed with:      Splitting Protein Drink in 1/2:    Timing of Protein Drinks:   Patient is taking  days a week. Exercise: Walkin minutes a few times a week. Aims for daily. Comments:    Patient is 6 weeks post op. Patient states she has gone to the ER several times with constipation and a blood clot that she has. She states she is often times just feeling blah. She acknowledges that she is not getting any protein shakes in. I spent a lot of time talking to her about the lack of protein shakes and that this is going to lead her to feel nauseated. I have made a lot of suggestions to patient and she has tried some of them, such as [address], but states they make her upset to her stomach. I had previously recommended her to try the protein clear or do a whey protein isolate powder. I have also talked with patient that if these are purchased at the [employer], they will typically give a store credit if she does not like it. Patient has not tried these additional suggestions that I have made. I also talked to her about using a crystal light packet or water to dilute the protein water if it is too sweet. Patient is compliant with the vitamins. She is not doing any meal prep and is often eating [employer] food. She states she is just getting shrimp and broccoli with no sauce, but stated she may do 1-2 dips in the sauce. I have talked to her about these habits she is getting into and that although it is a very small amount now, she is getting in the routine of eating Chinese again and a year down the road, she will be able to eat more and into bad habits.   Patient felt that I was unnecessarily calling her out on her [employer] food. I also discussed the sluggish feeling she has may be due to the food choices she is eating. I have provided her with a handout to incorporate more meal planning and the meals focus on protein and vegetables. She will follow up with [facility] in November and may contact me with any additional questions. Patient was educated on the importance of eating meat and vegetables only. I have talked with patient about the effects of carbohydrates, not only from a weight management perspective, but also what effects it could have on their blood sugar and what reactive hypoglycemia is. She states she has a stinging sensation in her arms and legs when she is sitting on the toilet. Diet Follow Up:  9 month class scheduled for: Patient will call for this visit.      Russell Schwarz MS RD    10/7/2021

## 2021-10-07 NOTE — TELEPHONE ENCOUNTER
Rodri Hurtado from [facility] calling requesting orders for (Bilateral diagnostic mammogram and   Bilateral breast ultrasound) to be electronically signed by provider

## 2021-10-08 ENCOUNTER — HOSPITAL ENCOUNTER (OUTPATIENT)
Dept: MAMMOGRAPHY | Age: 41
Discharge: HOME | End: 2021-10-08
Attending: FAMILY MEDICINE
Payer: COMMERCIAL

## 2021-10-08 DIAGNOSIS — R92.8 ABNORMAL MAMMOGRAM: ICD-10-CM

## 2021-10-08 PROCEDURE — 77062 BREAST TOMOSYNTHESIS BI: CPT

## 2021-10-19 ENCOUNTER — APPOINTMENT (OUTPATIENT)
Dept: GENERAL RADIOLOGY | Age: 41
End: 2021-10-19
Attending: EMERGENCY MEDICINE
Payer: COMMERCIAL

## 2021-10-19 ENCOUNTER — HOSPITAL ENCOUNTER (EMERGENCY)
Age: 41
Discharge: HOME | End: 2021-10-20
Attending: EMERGENCY MEDICINE
Payer: COMMERCIAL

## 2021-10-19 ENCOUNTER — APPOINTMENT (OUTPATIENT)
Dept: CT IMAGING | Age: 41
End: 2021-10-19
Attending: EMERGENCY MEDICINE
Payer: COMMERCIAL

## 2021-10-19 VITALS
HEART RATE: 83 BPM | OXYGEN SATURATION: 100 % | DIASTOLIC BLOOD PRESSURE: 86 MMHG | SYSTOLIC BLOOD PRESSURE: 150 MMHG | HEIGHT: 65 IN | RESPIRATION RATE: 18 BRPM | BODY MASS INDEX: 45.32 KG/M2 | WEIGHT: 272 LBS

## 2021-10-19 DIAGNOSIS — N89.8 VAGINAL ITCHING: ICD-10-CM

## 2021-10-19 DIAGNOSIS — S13.4XXA WHIPLASH INJURY TO NECK, INITIAL ENCOUNTER: Primary | ICD-10-CM

## 2021-10-19 DIAGNOSIS — M54.9 MUSCULOSKELETAL BACK PAIN: ICD-10-CM

## 2021-10-19 LAB
APPEARANCE UR: CLEAR
BILIRUB UR QL: NEGATIVE
CAOX CRY URNS QL MICRO: ABNORMAL
COLOR UR: YELLOW
EPITH CASTS URNS QL MICRO: ABNORMAL /LPF (ref 0–5)
GLUCOSE UR STRIP.AUTO-MCNC: NEGATIVE MG/DL
HCG UR QL: NEGATIVE
HGB UR QL STRIP: NEGATIVE
KETONES UR QL STRIP.AUTO: 15 MG/DL
LEUKOCYTE ESTERASE UR QL STRIP.AUTO: NEGATIVE
NITRITE UR QL STRIP.AUTO: NEGATIVE
PH UR STRIP: 5.5 [PH] (ref 5–8)
PROT UR STRIP-MCNC: ABNORMAL MG/DL
SERVICE CMNT-IMP: NORMAL
SP GR UR REFRACTOMETRY: >1.03 (ref 1–1.03)
UROBILINOGEN UR QL STRIP.AUTO: 1 EU/DL (ref 0.2–1)
WBC URNS QL MICRO: ABNORMAL /HPF (ref 0–4)
WET PREP GENITAL: NORMAL

## 2021-10-19 PROCEDURE — 81025 URINE PREGNANCY TEST: CPT

## 2021-10-19 PROCEDURE — 87210 SMEAR WET MOUNT SALINE/INK: CPT

## 2021-10-19 PROCEDURE — 99283 EMERGENCY DEPT VISIT LOW MDM: CPT

## 2021-10-19 PROCEDURE — 72125 CT NECK SPINE W/O DYE: CPT

## 2021-10-19 PROCEDURE — 81001 URINALYSIS AUTO W/SCOPE: CPT

## 2021-10-19 PROCEDURE — 74011250637 HC RX REV CODE- 250/637: Performed by: EMERGENCY MEDICINE

## 2021-10-19 PROCEDURE — 72100 X-RAY EXAM L-S SPINE 2/3 VWS: CPT

## 2021-10-19 RX ORDER — DIAZEPAM 5 MG/1
10 TABLET ORAL
Status: COMPLETED | OUTPATIENT
Start: 2021-10-19 | End: 2021-10-19

## 2021-10-19 RX ADMIN — DIAZEPAM 10 MG: 5 TABLET ORAL at 20:43
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
## 2021-10-20 RX ORDER — PRAMOXINE HYDROCHLORIDE 10 MG/ML
LOTION TOPICAL 3 TIMES DAILY
Qty: 1 EACH | Refills: 0 | Status: SHIPPED | OUTPATIENT
Start: 2021-10-20 | End: 2022-11-03

## 2021-10-20 RX ORDER — DIAZEPAM 10 MG/1
10 TABLET ORAL EVERY 8 HOURS
Qty: 9 TABLET | Refills: 0 | Status: SHIPPED | OUTPATIENT
Start: 2021-10-20 | End: 2021-10-23

## 2021-11-08 ENCOUNTER — TELEPHONE (OUTPATIENT)
Dept: PULMONOLOGY | Age: 41
End: 2021-11-08

## 2021-11-08 NOTE — TELEPHONE ENCOUNTER
Pt was started on eliquis by Dr. Nate Reyes @ [facility] on 9/11/21. Pt saw Dr. Liat Rosales on 9/27/21 and was advised to continue the medication. Pt called stating that the medication was \"damaged\" and she wanted to know if Dr. Liat Rosales would call in more for her.  Pt uses [facility] on [address]. Please call [phone]

## 2021-11-09 NOTE — TELEPHONE ENCOUNTER
Spoke with patient. She states he medication got wet while in the bottle with the cap on it. She will need refills. Patient also has questions regarding her menstrual cycle while on Eliquis. She states her flow is very watery while on the eliquis and she would like to know if she can stop taking the medication during that time frame.

## 2021-11-12 ENCOUNTER — TELEPHONE (OUTPATIENT)
Dept: PULMONOLOGY | Age: 41
End: 2021-11-12

## 2021-11-12 NOTE — TELEPHONE ENCOUNTER
Eliquis given at ER got wet and unable to use. Was rx for 5mg BID x 72 days, then 10mg BID x 7 days then 5mg BID. She is not sure how long she took the Eliquis before damaged. She feels she needs to just start over. Patient of Dr. Silva Marcano. Has been waiting on the rx for a while and needs asap.

## 2021-11-12 NOTE — TELEPHONE ENCOUNTER
Pt called([phone]). Very upset because she has not gotten a refill called in for Eliquis and she needs it. Told her that message was sent and waiting for response from doctor. She states that he should have an assistant to take care of it. Please call her back.

## 2021-11-22 DIAGNOSIS — K91.2 POSTOPERATIVE MALABSORPTION: Primary | ICD-10-CM

## 2021-11-30 DIAGNOSIS — R06.02 SOB (SHORTNESS OF BREATH): ICD-10-CM

## 2021-11-30 DIAGNOSIS — I26.99 ACUTE PULMONARY EMBOLISM WITHOUT ACUTE COR PULMONALE, UNSPECIFIED PULMONARY EMBOLISM TYPE (HCC): ICD-10-CM

## 2021-11-30 DIAGNOSIS — R06.09 DYSPNEA ON EXERTION: Primary | ICD-10-CM

## 2021-11-30 NOTE — PROGRESS NOTES
Order placed for COVID-19 test, per Verbal Order from Dr. Ananth Frausto on 11/30/2021. Last office visit: 9/27/2021  Follow up Visit: 12/10/2021    Provider is aware of last office visit and follow up. No further action requested from provider.

## 2021-12-03 ENCOUNTER — TELEPHONE (OUTPATIENT)
Dept: PULMONOLOGY | Age: 41
End: 2021-12-03

## 2021-12-03 NOTE — TELEPHONE ENCOUNTER
Called patient to remind her to schedule her Echocardiogram prior to her follow up with Dr. Kelsey Motta on 12/10/2021. Patient verbalized understanding.

## 2021-12-07 ENCOUNTER — HOSPITAL ENCOUNTER (OUTPATIENT)
Dept: LAB | Age: 41
Discharge: HOME | End: 2021-12-07
Payer: COMMERCIAL

## 2021-12-07 DIAGNOSIS — K91.2 POSTOPERATIVE MALABSORPTION: ICD-10-CM

## 2021-12-07 LAB
25(OH)D3 SERPL-MCNC: 63.4 NG/ML (ref 30–100)
ANION GAP SERPL CALC-SCNC: 8 MMOL/L (ref 3–18)
BASOPHILS # BLD: 0 K/UL (ref 0–0.1)
BASOPHILS NFR BLD: 0 % (ref 0–2)
BUN SERPL-MCNC: 7 MG/DL (ref 7–18)
BUN/CREAT SERPL: 13 (ref 12–20)
CALCIUM SERPL-MCNC: 9.1 MG/DL (ref 8.5–10.1)
CHLORIDE SERPL-SCNC: 103 MMOL/L (ref 100–111)
CO2 SERPL-SCNC: 27 MMOL/L (ref 21–32)
CREAT SERPL-MCNC: 0.54 MG/DL (ref 0.6–1.3)
DIFFERENTIAL METHOD BLD: ABNORMAL
EOSINOPHIL # BLD: 0.2 K/UL (ref 0–0.4)
EOSINOPHIL NFR BLD: 2 % (ref 0–5)
ERYTHROCYTE [DISTWIDTH] IN BLOOD BY AUTOMATED COUNT: 17.5 % (ref 11.6–14.5)
FERRITIN SERPL-MCNC: 7 NG/ML (ref 8–388)
FOLATE SERPL-MCNC: 19.5 NG/ML (ref 3.1–17.5)
GLUCOSE SERPL-MCNC: 84 MG/DL (ref 74–99)
HCT VFR BLD AUTO: 33.9 % (ref 35–45)
HGB BLD-MCNC: 10.2 G/DL (ref 12–16)
IRON SERPL-MCNC: 23 UG/DL (ref 50–175)
LYMPHOCYTES # BLD: 2.2 K/UL (ref 0.9–3.6)
LYMPHOCYTES NFR BLD: 25 % (ref 21–52)
MCH RBC QN AUTO: 23.5 PG (ref 24–34)
MCHC RBC AUTO-ENTMCNC: 30.1 G/DL (ref 31–37)
MCV RBC AUTO: 78.1 FL (ref 78–100)
MONOCYTES # BLD: 0.6 K/UL (ref 0.05–1.2)
MONOCYTES NFR BLD: 7 % (ref 3–10)
NEUTS SEG # BLD: 5.8 K/UL (ref 1.8–8)
NEUTS SEG NFR BLD: 66 % (ref 40–73)
NRBC # BLD: 0 K/UL (ref 0–0.01)
NRBC BLD-RTO: 0 PER 100 WBC
PLATELET # BLD AUTO: 252 K/UL (ref 135–420)
PMV BLD AUTO: 12.2 FL (ref 9.2–11.8)
POTASSIUM SERPL-SCNC: 3.7 MMOL/L (ref 3.5–5.5)
RBC # BLD AUTO: 4.34 M/UL (ref 4.2–5.3)
SODIUM SERPL-SCNC: 138 MMOL/L (ref 136–145)
VIT B12 SERPL-MCNC: 737 PG/ML (ref 211–911)
WBC # BLD AUTO: 8.8 K/UL (ref 4.6–13.2)

## 2021-12-07 PROCEDURE — 83540 ASSAY OF IRON: CPT

## 2021-12-07 PROCEDURE — 84425 ASSAY OF VITAMIN B-1: CPT

## 2021-12-07 PROCEDURE — 82607 VITAMIN B-12: CPT

## 2021-12-07 PROCEDURE — 36415 COLL VENOUS BLD VENIPUNCTURE: CPT

## 2021-12-07 PROCEDURE — 80048 BASIC METABOLIC PNL TOTAL CA: CPT

## 2021-12-07 PROCEDURE — 82728 ASSAY OF FERRITIN: CPT

## 2021-12-07 PROCEDURE — 82306 VITAMIN D 25 HYDROXY: CPT

## 2021-12-07 PROCEDURE — 85025 COMPLETE CBC W/AUTO DIFF WBC: CPT

## 2021-12-12 LAB — VIT B1 BLD-SCNC: 213 NMOL/L (ref 66.5–200)

## 2021-12-13 ENCOUNTER — TELEPHONE (OUTPATIENT)
Dept: SURGERY | Age: 41
End: 2021-12-13

## 2021-12-17 ENCOUNTER — DOCUMENTATION ONLY (OUTPATIENT)
Dept: PULMONOLOGY | Age: 41
End: 2021-12-17

## 2021-12-17 NOTE — PROGRESS NOTES
Patient cancelled 12/22 pft and apt; states she has a lot going on right now-pt will call back once she gets echo scheduled-will need pfts and apt and covid test prior

## 2021-12-20 ENCOUNTER — OFFICE VISIT (OUTPATIENT)
Dept: SURGERY | Age: 41
End: 2021-12-20
Payer: COMMERCIAL

## 2021-12-20 ENCOUNTER — NURSE TRIAGE (OUTPATIENT)
Dept: OTHER | Facility: CLINIC | Age: 41
End: 2021-12-20

## 2021-12-20 ENCOUNTER — APPOINTMENT (OUTPATIENT)
Dept: CT IMAGING | Age: 41
End: 2021-12-20
Attending: PHYSICIAN ASSISTANT
Payer: COMMERCIAL

## 2021-12-20 ENCOUNTER — HOSPITAL ENCOUNTER (EMERGENCY)
Age: 41
Discharge: HOME | End: 2021-12-20
Attending: STUDENT IN AN ORGANIZED HEALTH CARE EDUCATION/TRAINING PROGRAM
Payer: COMMERCIAL

## 2021-12-20 VITALS
DIASTOLIC BLOOD PRESSURE: 60 MMHG | SYSTOLIC BLOOD PRESSURE: 92 MMHG | BODY MASS INDEX: 40.48 KG/M2 | RESPIRATION RATE: 16 BRPM | HEIGHT: 65 IN | HEART RATE: 88 BPM | WEIGHT: 243 LBS | OXYGEN SATURATION: 100 % | TEMPERATURE: 97.9 F

## 2021-12-20 VITALS
OXYGEN SATURATION: 99 % | TEMPERATURE: 98.9 F | WEIGHT: 243 LBS | RESPIRATION RATE: 25 BRPM | HEIGHT: 65 IN | BODY MASS INDEX: 40.48 KG/M2 | HEART RATE: 87 BPM | DIASTOLIC BLOOD PRESSURE: 73 MMHG | SYSTOLIC BLOOD PRESSURE: 118 MMHG

## 2021-12-20 DIAGNOSIS — I26.99 OTHER ACUTE PULMONARY EMBOLISM WITHOUT ACUTE COR PULMONALE (HCC): ICD-10-CM

## 2021-12-20 DIAGNOSIS — E66.01 MORBID OBESITY (HCC): ICD-10-CM

## 2021-12-20 DIAGNOSIS — Z98.84 S/P GASTRIC BYPASS: ICD-10-CM

## 2021-12-20 DIAGNOSIS — R00.2 PALPITATIONS: Primary | ICD-10-CM

## 2021-12-20 DIAGNOSIS — N93.8 DUB (DYSFUNCTIONAL UTERINE BLEEDING): ICD-10-CM

## 2021-12-20 DIAGNOSIS — D50.9 IRON DEFICIENCY ANEMIA, UNSPECIFIED IRON DEFICIENCY ANEMIA TYPE: ICD-10-CM

## 2021-12-20 DIAGNOSIS — K91.2 POST-RESECTION MALABSORPTION: Primary | ICD-10-CM

## 2021-12-20 DIAGNOSIS — R11.0 NAUSEA: ICD-10-CM

## 2021-12-20 LAB
ABO + RH BLD: NORMAL
ALBUMIN SERPL-MCNC: 3.4 G/DL (ref 3.4–5)
ALBUMIN/GLOB SERPL: 0.8 {RATIO} (ref 0.8–1.7)
ALP SERPL-CCNC: 85 U/L (ref 45–117)
ALT SERPL-CCNC: 19 U/L (ref 13–56)
ANION GAP SERPL CALC-SCNC: 6 MMOL/L (ref 3–18)
AST SERPL-CCNC: 14 U/L (ref 10–38)
BASOPHILS # BLD: 0 K/UL (ref 0–0.1)
BASOPHILS NFR BLD: 0 % (ref 0–2)
BILIRUB SERPL-MCNC: 0.3 MG/DL (ref 0.2–1)
BLOOD GROUP ANTIBODIES SERPL: NORMAL
BUN SERPL-MCNC: 9 MG/DL (ref 7–18)
BUN/CREAT SERPL: 13 (ref 12–20)
CALCIUM SERPL-MCNC: 9 MG/DL (ref 8.5–10.1)
CHLORIDE SERPL-SCNC: 104 MMOL/L (ref 100–111)
CO2 SERPL-SCNC: 28 MMOL/L (ref 21–32)
CREAT SERPL-MCNC: 0.7 MG/DL (ref 0.6–1.3)
DIFFERENTIAL METHOD BLD: ABNORMAL
EOSINOPHIL # BLD: 0.1 K/UL (ref 0–0.4)
EOSINOPHIL NFR BLD: 2 % (ref 0–5)
ERYTHROCYTE [DISTWIDTH] IN BLOOD BY AUTOMATED COUNT: 16.1 % (ref 11.6–14.5)
GLOBULIN SER CALC-MCNC: 4.5 G/DL (ref 2–4)
GLUCOSE SERPL-MCNC: 89 MG/DL (ref 74–99)
HCG SERPL QL: NEGATIVE
HCT VFR BLD AUTO: 36.1 % (ref 35–45)
HGB BLD-MCNC: 11.2 G/DL (ref 12–16)
IMM GRANULOCYTES # BLD AUTO: 0 K/UL (ref 0–0.04)
IMM GRANULOCYTES NFR BLD AUTO: 0 % (ref 0–0.5)
LYMPHOCYTES # BLD: 2.3 K/UL (ref 0.9–3.6)
LYMPHOCYTES NFR BLD: 27 % (ref 21–52)
MCH RBC QN AUTO: 24 PG (ref 24–34)
MCHC RBC AUTO-ENTMCNC: 31 G/DL (ref 31–37)
MCV RBC AUTO: 77.3 FL (ref 78–100)
MONOCYTES # BLD: 0.5 K/UL (ref 0.05–1.2)
MONOCYTES NFR BLD: 6 % (ref 3–10)
NEUTS SEG # BLD: 5.4 K/UL (ref 1.8–8)
NEUTS SEG NFR BLD: 65 % (ref 40–73)
NRBC # BLD: 0 K/UL (ref 0–0.01)
NRBC BLD-RTO: 0 PER 100 WBC
PLATELET # BLD AUTO: 289 K/UL (ref 135–420)
PMV BLD AUTO: 12.9 FL (ref 9.2–11.8)
POTASSIUM SERPL-SCNC: 3.6 MMOL/L (ref 3.5–5.5)
PROT SERPL-MCNC: 7.9 G/DL (ref 6.4–8.2)
RBC # BLD AUTO: 4.67 M/UL (ref 4.2–5.3)
SODIUM SERPL-SCNC: 138 MMOL/L (ref 136–145)
SPECIMEN EXP DATE BLD: NORMAL
TROPONIN-HIGH SENSITIVITY: 4 NG/L (ref 0–54)
WBC # BLD AUTO: 8.4 K/UL (ref 4.6–13.2)

## 2021-12-20 PROCEDURE — 84703 CHORIONIC GONADOTROPIN ASSAY: CPT

## 2021-12-20 PROCEDURE — 93005 ELECTROCARDIOGRAM TRACING: CPT

## 2021-12-20 PROCEDURE — 84484 ASSAY OF TROPONIN QUANT: CPT

## 2021-12-20 PROCEDURE — 99283 EMERGENCY DEPT VISIT LOW MDM: CPT

## 2021-12-20 PROCEDURE — 99213 OFFICE O/P EST LOW 20 MIN: CPT | Performed by: NURSE PRACTITIONER

## 2021-12-20 PROCEDURE — 85025 COMPLETE CBC W/AUTO DIFF WBC: CPT

## 2021-12-20 PROCEDURE — 80053 COMPREHEN METABOLIC PANEL: CPT

## 2021-12-20 PROCEDURE — 86901 BLOOD TYPING SEROLOGIC RH(D): CPT

## 2021-12-20 RX ORDER — ONDANSETRON 4 MG/1
4 TABLET, ORALLY DISINTEGRATING ORAL
Qty: 30 TABLET | Refills: 0 | Status: SHIPPED | OUTPATIENT
Start: 2021-12-20 | End: 2022-11-03

## 2021-12-20 RX ORDER — FERROUS SULFATE 325(65) MG
325 TABLET, DELAYED RELEASE (ENTERIC COATED) ORAL DAILY
Qty: 90 TABLET | Refills: 1 | Status: SHIPPED | OUTPATIENT
Start: 2021-12-20

## 2021-12-20 NOTE — ED TRIAGE NOTES
Patient states that she is currently on anticoagulant therapy. She states heavy menstrual bleeding x 4 days. She c/o sensation of heart racing prior to arrival to ER. She states that it took approximately thirty minutes for palpitations to resolve. Advises that she is experiencing fatigue.

## 2021-12-20 NOTE — ED PROVIDER NOTES
EMERGENCY DEPARTMENT HISTORY AND PHYSICAL EXAM      Date: 12/20/2021  Patient Name: Madelyn King    History of Presenting Illness     Chief Complaint   Patient presents with    Menstrual Problem    Palpitations       History Provided By: Patient    HPI: Madelyn King, 39 y.o. female PMHx significant for anxiety, depression, htn, CVA, DM presents ambulatory to the ED. Pt reports heavy vaginal bleeding x 4 days. Pt reports changing her tampon every hour. Patient reports she currently takes eliquis for prevoius PE. Pt also reports palpitations earlier today that lasted about 30 minutes with pleuritic chest pain. Denies previous cardiac hx. denies lower leg pain or swelling. Patient has not taken anything for symptoms. Patient reports she has prescribed Eliquis for previous PE. There are no other complaints, changes, or physical findings at this time. PCP: Waylon Dominguez MD    No current facility-administered medications on file prior to encounter. Current Outpatient Medications on File Prior to Encounter   Medication Sig Dispense Refill    ferrous sulfate (IRON) 325 mg (65 mg iron) EC tablet Take 1 Tablet by mouth daily. 90 Tablet 1    ondansetron (ZOFRAN ODT) 4 mg disintegrating tablet Take 1 Tablet by mouth every eight (8) hours as needed for Nausea or Vomiting. 30 Tablet 0    cyclobenzaprine (FLEXERIL) 10 mg tablet       traMADoL (ULTRAM) 50 mg tablet       apixaban (ELIQUIS) 5 mg tablet Take 1 Tablet by mouth two (2) times a day. 60 Tablet 2    pramoxine (Sarna Sensitive) 1 % lotion Apply  to affected area three (3) times daily. 1 Each 0    albuterol (Proventil HFA) 90 mcg/actuation inhaler Take 2 Puffs by inhalation every six (6) hours as needed for Wheezing. 1 Each 3    multivitamin (ONE A DAY) tablet Take 1 Tablet by mouth daily.  thiamine HCL (B-1) 100 mg tablet Take 100 mg by mouth daily.       calcium-cholecalciferol, d3, (CALCIUM 600 + D) 600-125 mg-unit tab Take 1 Tablet by mouth daily.  cyanocobalamin 1,000 mcg tablet Take 1,000 mcg by mouth daily.  [DISCONTINUED] doxycycline (VIBRAMYCIN) 100 mg capsule Take 100 mg by mouth two (2) times a day. (Patient not taking: Reported on 12/20/2021)      [DISCONTINUED] ondansetron (ZOFRAN ODT) 4 mg disintegrating tablet Take 1 Tablet by mouth every eight (8) hours as needed for Nausea or Vomiting. (Patient not taking: Reported on 9/15/2021) 12 Tablet 0       Past History     Past Medical History:  Past Medical History:   Diagnosis Date    Abnormal MRI of head 2010    Nonspecific focus of low attenuation at the right internal capsule on CT and MRI    Anxiety     Chronic low back pain 2014    unknown cause- normal xrays    Depression     Diabetes ([facility]     Pre-DM- no meds    Hypertension     no meds now    Memory loss     Prediabetes     Scoliosis     Stroke ([facility] 2008    denies residual PER PATIENT MINI STROKE( TIA)       Past Surgical History:  Past Surgical History:   Procedure Laterality Date    HX GASTRIC BYPASS      HX GASTRIC BYPASS  08/25/2021       Family History:  Family History   Problem Relation Age of Onset    Diabetes Other     Hypertension Other     Heart Disease Other     Asthma Other     OSTEOARTHRITIS Other     Diabetes Mother     Hypertension Mother        Social History:  Social History     Tobacco Use    Smoking status: Never Smoker    Smokeless tobacco: Never Used   Vaping Use    Vaping Use: Never used   Substance Use Topics    Alcohol use: Not Currently     Alcohol/week: 1.0 standard drink     Types: 1 Glasses of wine per week     Comment: socially    Drug use: Never       Allergies: Allergies   Allergen Reactions    Nsaids (Non-Steroidal Anti-Inflammatory Drug) Other (comments)     Contraindicated related to gastric bypass           Review of Systems   Review of Systems   Constitutional: Negative for chills and fever. Respiratory: Negative for shortness of breath. Cardiovascular: Positive for palpitations. Negative for chest pain. Gastrointestinal: Negative for abdominal pain, nausea and vomiting. Genitourinary: Positive for vaginal bleeding. Negative for flank pain. Musculoskeletal: Negative for back pain and myalgias. Skin: Negative for color change, pallor, rash and wound. Neurological: Negative for dizziness, weakness and light-headedness. All other systems reviewed and are negative. Physical Exam   Physical Exam  Vitals and nursing note reviewed. Constitutional:       General: She is not in acute distress. Appearance: She is well-developed. Comments: Pt in NAD   HENT:      Head: Normocephalic and atraumatic. Eyes:      Conjunctiva/sclera: Conjunctivae normal.   Cardiovascular:      Rate and Rhythm: Normal rate and regular rhythm. Heart sounds: Normal heart sounds. Comments: No murmurs rubs or gallops  No lower leg swelling bilaterally  Pulmonary:      Effort: Pulmonary effort is normal. No respiratory distress. Breath sounds: Normal breath sounds. Comments: Lungs CTA  Not working to breathe  Abdominal:      General: Bowel sounds are normal. There is no distension. Palpations: Abdomen is soft. Comments: Abdomen soft, nontender  Nondistended  No guarding or rigidity   Musculoskeletal:         General: Normal range of motion. Skin:     General: Skin is warm. Findings: No rash. Neurological:      Mental Status: She is alert and oriented to person, place, and time.    Psychiatric:         Behavior: Behavior normal.         Diagnostic Study Results     Labs -     Recent Results (from the past 12 hour(s))   CBC WITH AUTOMATED DIFF    Collection Time: 12/20/21  5:30 PM   Result Value Ref Range    WBC 8.4 4.6 - 13.2 K/uL    RBC 4.67 4.20 - 5.30 M/uL    HGB 11.2 (L) 12.0 - 16.0 g/dL    HCT 36.1 35.0 - 45.0 %    MCV 77.3 (L) 78.0 - 100.0 FL    MCH 24.0 24.0 - 34.0 PG    MCHC 31.0 31.0 - 37.0 g/dL    RDW 16.1 (H) 11.6 - 14.5 %    PLATELET 054 273 - 841 K/uL    MPV 12.9 (H) 9.2 - 11.8 FL    NRBC 0.0 0  WBC    ABSOLUTE NRBC 0.00 0.00 - 0.01 K/uL    NEUTROPHILS 65 40 - 73 %    LYMPHOCYTES 27 21 - 52 %    MONOCYTES 6 3 - 10 %    EOSINOPHILS 2 0 - 5 %    BASOPHILS 0 0 - 2 %    IMMATURE GRANULOCYTES 0 0.0 - 0.5 %    ABS. NEUTROPHILS 5.4 1.8 - 8.0 K/UL    ABS. LYMPHOCYTES 2.3 0.9 - 3.6 K/UL    ABS. MONOCYTES 0.5 0.05 - 1.2 K/UL    ABS. EOSINOPHILS 0.1 0.0 - 0.4 K/UL    ABS. BASOPHILS 0.0 0.0 - 0.1 K/UL    ABS. IMM. GRANS. 0.0 0.00 - 0.04 K/UL    DF AUTOMATED     METABOLIC PANEL, COMPREHENSIVE    Collection Time: 12/20/21  5:30 PM   Result Value Ref Range    Sodium 138 136 - 145 mmol/L    Potassium 3.6 3.5 - 5.5 mmol/L    Chloride 104 100 - 111 mmol/L    CO2 28 21 - 32 mmol/L    Anion gap 6 3.0 - 18 mmol/L    Glucose 89 74 - 99 mg/dL    BUN 9 7.0 - 18 MG/DL    Creatinine 0.70 0.6 - 1.3 MG/DL    BUN/Creatinine ratio 13 12 - 20      GFR est AA >60 >60 ml/min/1.73m2    GFR est non-AA >60 >60 ml/min/1.73m2    Calcium 9.0 8.5 - 10.1 MG/DL    Bilirubin, total 0.3 0.2 - 1.0 MG/DL    ALT (SGPT) 19 13 - 56 U/L    AST (SGOT) 14 10 - 38 U/L    Alk.  phosphatase 85 45 - 117 U/L    Protein, total 7.9 6.4 - 8.2 g/dL    Albumin 3.4 3.4 - 5.0 g/dL    Globulin 4.5 (H) 2.0 - 4.0 g/dL    A-G Ratio 0.8 0.8 - 1.7     TYPE & SCREEN    Collection Time: 12/20/21  5:30 PM   Result Value Ref Range    Crossmatch Expiration 12/23/2021,2359     ABO/Rh(D) PENDING     Antibody screen PENDING    HCG QL SERUM    Collection Time: 12/20/21  5:30 PM   Result Value Ref Range    HCG, Ql. Negative NEG     TROPONIN-HIGH SENSITIVITY    Collection Time: 12/20/21  5:30 PM   Result Value Ref Range    Troponin-High Sensitivity 4 0 - 54 ng/L   EKG, 12 LEAD, INITIAL    Collection Time: 12/20/21  5:48 PM   Result Value Ref Range    Ventricular Rate 82 BPM    Atrial Rate 82 BPM    P-R Interval 152 ms    QRS Duration 88 ms    Q-T Interval 412 ms    QTC Calculation (Bezet) 487 ms    Calculated P Axis 62 degrees    Calculated R Axis 4 degrees    Calculated T Axis 55 degrees    Diagnosis       Normal sinus rhythm  Prolonged QT  Abnormal ECG  When compared with ECG of 10-SEP-2021 15:53,  No significant change was found         Radiologic Studies -   CTA CHEST W OR W WO CONT    (Results Pending)     CT Results  (Last 48 hours)    None        CXR Results  (Last 48 hours)    None          Medical Decision Making   I am the first provider for this patient. I reviewed the vital signs, available nursing notes, past medical history, past surgical history, family history and social history. Vital Signs-Reviewed the patient's vital signs. Patient Vitals for the past 12 hrs:   Temp Pulse Resp BP SpO2   12/20/21 1758  87 25 118/73 99 %   12/20/21 1651 98.9 °F (37.2 °C) 98 18 119/80 100 %         EKG interpretation: (Preliminary)  Rhythm: normal sinus rhythm; and regular . Rate (approx.): 82; Axis: normal; WA interval: normal; QRS interval: prolonged; ST/T wave: normal; Other findings: normal.    No acute ischemic changes. Records Reviewed: Nursing Notes, Old Medical Records, Previous electrocardiograms, Previous Radiology Studies and Previous Laboratory Studies    Provider Notes (Medical Decision Making):   DDx: DUB, Anemia, PE, ACS, Anxiety, Palpitations    38 yo F who presents with heavy menstrual bleeding x4 days. Patient currently takes blood thinner due to PE. Patient also reports intermittent pleuritic chest pain and palpitations. EKG shows no acute ischemic changes with prolonged QT. Negative troponin. Hgb stable. Al other labs are essentially unremarkable. Patient declined CTA and US to evaluate for PE and vaginal bleeding. Discussed with patient that she can return at any time for further evaluation. At time of discharge, pt non-toxic appearing in NAD. Pt stable for prompt outpatient follow-up with PCP 1 to 2 days.   Patient given strict instructions to return if symptoms worsen. ED Course:   Initial assessment performed. The patients presenting problems have been discussed, and they are in agreement with the care plan formulated and outlined with them. I have encouraged them to ask questions as they arise throughout their visit. Pt taken for CTA and while in CT states she would like to decline CTA. Pt alos states she would also not like to stay for US. Pt states she would like to come back tomorrow or f/u with another hospital.  Cessation that she can return anytime for further evaluation. Disposition:  7:51 PM  Discussed lab and imaging results with pt along with dx and treatment plan. Discussed importance of PCP follow up. All questions answered. Pt voiced they understood. Return if sx worsen. PLAN:  1. Discharge Medication List as of 12/20/2021  7:27 PM        2. Follow-up Information     Follow up With Specialties Details Why Contact Info    Noris Mckeon MD Family Medicine Schedule an appointment as soon as possible for a visit in 1 day  [address] Shun [place]       [address] [facility] DEPT Emergency Medicine  As needed, If symptoms worsen [facility]  [phone]        Return to ED if worse     Diagnosis     Clinical Impression:   1. Palpitations    2. DUB (dysfunctional uterine bleeding)        Attestations:    FIFI Lazaro    Please note that this dictation was completed with [employer], the computer voice recognition software. Quite often unanticipated grammatical, syntax, homophones, and other interpretive errors are inadvertently transcribed by the computer software. Please disregard these errors. Please excuse any errors that have escaped final proofreading. Thank you.
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
## 2021-12-20 NOTE — TELEPHONE ENCOUNTER
Received call from MARISA Nicole [address] at [facility], caller not on line. Complaint: Patient heart is racing and doesn't feel well. Market: [employer] Name: [facility] telephone number verified as [phone]    Unsuccessful attempt to re-connect with caller via phone, left message for return call to office. Writer did advise patient to go to [facility] if Urgent symptoms. Reason for Disposition   Message left on unidentified voice mail. Phone number verified.     Protocols used: NO CONTACT OR DUPLICATE CONTACT CALL-ADULT-

## 2021-12-21 LAB
ATRIAL RATE: 82 BPM
CALCULATED P AXIS, ECG09: 62 DEGREES
CALCULATED R AXIS, ECG10: 4 DEGREES
CALCULATED T AXIS, ECG11: 55 DEGREES
DIAGNOSIS, 93000: NORMAL
P-R INTERVAL, ECG05: 152 MS
Q-T INTERVAL, ECG07: 412 MS
QRS DURATION, ECG06: 88 MS
QTC CALCULATION (BEZET), ECG08: 481 MS
VENTRICULAR RATE, ECG03: 82 BPM

## 2021-12-28 ENCOUNTER — PATIENT MESSAGE (OUTPATIENT)
Dept: FAMILY MEDICINE CLINIC | Age: 41
End: 2021-12-28

## 2021-12-28 DIAGNOSIS — R87.622 LGSIL PAP SMEAR OF VAGINA: Primary | ICD-10-CM

## 2021-12-28 DIAGNOSIS — N92.6 MENSTRUAL ABNORMALITY: ICD-10-CM

## 2021-12-28 NOTE — TELEPHONE ENCOUNTER
Chart reviewed. Last ED visit reviewed. Attempted phone call to pt ; o answer ( Mailbox full) ; sent [email or web address] message; referral to gyn placed.

## 2022-01-18 ENCOUNTER — TRANSCRIBE ORDER (OUTPATIENT)
Dept: SCHEDULING | Age: 42
End: 2022-01-18

## 2022-01-18 DIAGNOSIS — S13.9XXD SPRAIN OF JOINTS AND LIGAMENTS OF UNSPECIFIED PARTS OF NECK, SUBSEQUENT ENCOUNTER: ICD-10-CM

## 2022-01-18 DIAGNOSIS — M54.12 CERVICAL RADICULOPATHY: Primary | ICD-10-CM

## 2022-01-28 ENCOUNTER — HOSPITAL ENCOUNTER (OUTPATIENT)
Dept: LAB | Age: 42
Discharge: HOME | End: 2022-01-28
Payer: COMMERCIAL

## 2022-01-28 ENCOUNTER — OFFICE VISIT (OUTPATIENT)
Dept: FAMILY MEDICINE CLINIC | Age: 42
End: 2022-01-28
Payer: COMMERCIAL

## 2022-01-28 VITALS
HEIGHT: 65 IN | BODY MASS INDEX: 39.82 KG/M2 | TEMPERATURE: 98 F | RESPIRATION RATE: 20 BRPM | WEIGHT: 239 LBS | SYSTOLIC BLOOD PRESSURE: 117 MMHG | DIASTOLIC BLOOD PRESSURE: 83 MMHG | HEART RATE: 90 BPM | OXYGEN SATURATION: 98 %

## 2022-01-28 DIAGNOSIS — R10.30 LOWER ABDOMINAL PAIN: Primary | ICD-10-CM

## 2022-01-28 DIAGNOSIS — R10.30 LOWER ABDOMINAL PAIN: ICD-10-CM

## 2022-01-28 DIAGNOSIS — R39.9 UTI SYMPTOMS: ICD-10-CM

## 2022-01-28 DIAGNOSIS — N76.0 ACUTE VAGINITIS: ICD-10-CM

## 2022-01-28 LAB
BILIRUB UR QL STRIP: NEGATIVE
GLUCOSE UR-MCNC: NEGATIVE MG/DL
KETONES P FAST UR STRIP-MCNC: NEGATIVE MG/DL
PH UR STRIP: 6.5 [PH] (ref 4.6–8)
PROT UR QL STRIP: NEGATIVE
SP GR UR STRIP: 1.02 (ref 1–1.03)
UA UROBILINOGEN AMB POC: NORMAL (ref 0.2–1)
URINALYSIS CLARITY POC: CLEAR
URINALYSIS COLOR POC: YELLOW
URINE BLOOD POC: NEGATIVE
URINE LEUKOCYTES POC: NEGATIVE
URINE NITRITES POC: NEGATIVE

## 2022-01-28 PROCEDURE — 87086 URINE CULTURE/COLONY COUNT: CPT

## 2022-01-28 PROCEDURE — 99214 OFFICE O/P EST MOD 30 MIN: CPT | Performed by: NURSE PRACTITIONER

## 2022-01-28 PROCEDURE — 87147 CULTURE TYPE IMMUNOLOGIC: CPT

## 2022-01-28 PROCEDURE — 81003 URINALYSIS AUTO W/O SCOPE: CPT | Performed by: NURSE PRACTITIONER

## 2022-01-28 PROCEDURE — 87798 DETECT AGENT NOS DNA AMP: CPT

## 2022-01-28 RX ORDER — NITROFURANTOIN 25; 75 MG/1; MG/1
100 CAPSULE ORAL 2 TIMES DAILY
Qty: 6 CAPSULE | Refills: 0 | Status: SHIPPED | OUTPATIENT
Start: 2022-01-28 | End: 2022-01-31

## 2022-01-28 RX ORDER — FLUCONAZOLE 150 MG/1
150 TABLET ORAL
Qty: 2 TABLET | Refills: 0 | Status: SHIPPED | OUTPATIENT
Start: 2022-01-28 | End: 2022-02-05

## 2022-01-28 NOTE — PATIENT INSTRUCTIONS
Vaginitis: Care Instructions  Your Care Instructions     Vaginitis is soreness or infection of the vagina. This common problem can cause itching and burning. And it can cause a change in vaginal discharge. Sometimes it can cause pain during sex. Vaginitis may be caused by bacteria, yeast, or other germs. Some infections that cause it are caught from a sexual partner. Bath products, spermicides, and douches can irritate the vagina too. Some women have this problem during and after menopause. A drop in estrogen levels during this time can cause dryness, soreness, and pain during sex. Your doctor can give you medicine to treat an infection. And home care may help you feel better. For certain types of infections, your sex partner must be treated too. Follow-up care is a key part of your treatment and safety. Be sure to make and go to all appointments, and call your doctor if you are having problems. It's also a good idea to know your test results and keep a list of the medicines you take. How can you care for yourself at home? · If your doctor prescribed antibiotics, take them as directed. Do not stop taking them just because you feel better. You need to take the full course of antibiotics. · Take your medicines exactly as prescribed. Call your doctor if you think you are having a problem with your medicine. · Do not eat or drink anything that has alcohol if you are taking metronidazole (Flagyl). · If you have a yeast infection, use over-the-counter products as your doctor tells you to. Or take medicine your doctor prescribes exactly as directed. · Wash your vaginal area daily with water. You also can use a mild, unscented soap if you want. · Do not use scented bath products. And do not use vaginal sprays or douches. · Put a washcloth soaked in cool water on the area to relieve itching. Or you can take cool baths.   · If you have dryness because of menopause, use estrogen cream or pills that your doctor prescribes. · Ask your doctor about when it is okay to have sex. · Use a personal lubricant before sex if you have dryness. Examples are Astroglide, K-Y Jelly, and Wet Lubricant Gel. · Ask your doctor if your sex partner also needs treatment. When should you call for help? Call your doctor now or seek immediate medical care if:    · You have a fever and pelvic pain. Watch closely for changes in your health, and be sure to contact your doctor if:    · You have bleeding other than your period.     · You do not get better as expected. Where can you learn more? Go to http://www.gray.com/  Enter [id number] in the search box to learn more about \"Vaginitis: Care Instructions. \"  Current as of: February 11, 2021               Content Version: 13.0  © 2006-2021 [employer]. Care instructions adapted under license by [employer] (which disclaims liability or warranty for this information). If you have questions about a medical condition or this instruction, always ask your healthcare professional. [place] [address] any warranty or liability for your use of this information. Abdominal Pain: Care Instructions  Your Care Instructions     Abdominal pain has many possible causes. Some aren't serious and get better on their own in a few days. Others need more testing and treatment. If your pain continues or gets worse, you need to be rechecked and may need more tests to find out what is wrong. You may need surgery to correct the problem. Don't ignore new symptoms, such as fever, nausea and vomiting, urination problems, pain that gets worse, and dizziness. These may be signs of a more serious problem. Your doctor may have recommended a follow-up visit in the next 8 to 12 hours. If you are not getting better, you may need more tests or treatment. The doctor has checked you carefully, but problems can develop later.  If you notice any problems or new symptoms, get medical treatment right away. Follow-up care is a key part of your treatment and safety. Be sure to make and go to all appointments, and call your doctor if you are having problems. It's also a good idea to know your test results and keep a list of the medicines you take. How can you care for yourself at home? · Rest until you feel better. · To prevent dehydration, drink plenty of fluids. Choose water and other clear liquids until you feel better. If you have kidney, heart, or liver disease and have to limit fluids, talk with your doctor before you increase the amount of fluids you drink. · If your stomach is upset, eat mild foods, such as rice, dry toast or crackers, bananas, and applesauce. Try eating several small meals instead of two or three large ones. · Wait until 48 hours after all symptoms have gone away before you have spicy foods, alcohol, and drinks that contain caffeine. · Do not eat foods that are high in fat. · Avoid anti-inflammatory medicines such as aspirin, ibuprofen (Advil, Motrin), and naproxen (Aleve). These can cause stomach upset. Talk to your doctor if you take daily aspirin for another health problem. When should you call for help? Call 911 anytime you think you may need emergency care. For example, call if:    · You passed out (lost consciousness).     · You pass maroon or very bloody stools.     · You vomit blood or what looks like coffee grounds.     · You have new, severe belly pain. Call your doctor now or seek immediate medical care if:    · Your pain gets worse, especially if it becomes focused in one area of your belly.     · You have a new or higher fever.     · Your stools are black and look like tar, or they have streaks of blood.     · You have unexpected vaginal bleeding.     · You have symptoms of a urinary tract infection. These may include:  ? Pain when you urinate. ? Urinating more often than usual.  ?  Blood in your urine.     · You are dizzy or lightheaded, or you feel like you may faint. Watch closely for changes in your health, and be sure to contact your doctor if:    · You are not getting better after 1 day (24 hours). Where can you learn more? Go to http://www.gray.com/  Enter R401 in the search box to learn more about \"Abdominal Pain: Care Instructions. \"  Current as of: July 1, 2021               Content Version: 13.0  © 2006-2021 [employer]. Care instructions adapted under license by [employer] (which disclaims liability or warranty for this information). If you have questions about a medical condition or this instruction, always ask your healthcare professional. [place] [address] any warranty or liability for your use of this information. Urinary Tract Infection (UTI) in Women: Care Instructions  Overview     A urinary tract infection, or UTI, is a general term for an infection anywhere between the kidneys and the urethra (where urine comes out). Most UTIs are bladder infections. They often cause pain or burning when you urinate. UTIs are caused by bacteria and can be cured with antibiotics. Be sure to complete your treatment so that the infection does not get worse. Follow-up care is a key part of your treatment and safety. Be sure to make and go to all appointments, and call your doctor if you are having problems. It's also a good idea to know your test results and keep a list of the medicines you take. How can you care for yourself at home? · Take your antibiotics as directed. Do not stop taking them just because you feel better. You need to take the full course of antibiotics. · Drink extra water and other fluids for the next day or two. This will help make the urine less concentrated and help wash out the bacteria that are causing the infection.  (If you have kidney, heart, or liver disease and have to limit fluids, talk with your doctor before you increase the amount of fluids you drink.)  · Avoid drinks that are carbonated or have caffeine. They can irritate the bladder. · Urinate often. Try to empty your bladder each time. · To relieve pain, take a hot bath or lay a heating pad set on low over your lower belly or genital area. Never go to sleep with a heating pad in place. To prevent UTIs  · Drink plenty of water each day. This helps you urinate often, which clears bacteria from your system. (If you have kidney, heart, or liver disease and have to limit fluids, talk with your doctor before you increase the amount of fluids you drink.)  · Urinate when you need to. · If you are sexually active, urinate right after you have sex. · Change sanitary pads often. · Avoid douches, bubble baths, feminine hygiene sprays, and other feminine hygiene products that have deodorants. · After going to the bathroom, wipe from front to back. When should you call for help? Call your doctor now or seek immediate medical care if:    · Symptoms such as fever, chills, nausea, or vomiting get worse or appear for the first time.     · You have new pain in your back just below your rib cage. This is called flank pain.     · There is new blood or pus in your urine.     · You have any problems with your antibiotic medicine. Watch closely for changes in your health, and be sure to contact your doctor if:    · You are not getting better after taking an antibiotic for 2 days.     · Your symptoms go away but then come back. Where can you learn more? Go to http://www.gray.com/  Enter K568 in the search box to learn more about \"Urinary Tract Infection (UTI) in Women: Care Instructions. \"  Current as of: February 10, 2021               Content Version: 13.0  © 9690-7281 [employer]. Care instructions adapted under license by [employer] (which disclaims liability or warranty for this information).  If you have questions about a medical condition or this instruction, always ask your healthcare professional. [place] [address] any warranty or liability for your use of this information.

## 2022-01-28 NOTE — PROGRESS NOTES
Sadiq Zaidi is a 39 y.o. female who was seen in clinic today (1/28/2022) for Abdominal Pain and Vaginal Discharge    Assessment & Plan:   Diagnoses and all orders for this visit:    1. Lower abdominal pain  -     AMB POC URINALYSIS DIP STICK AUTO W/O MICRO  -     CULTURE, URINE; Future  -     NUSWAB VAGINITIS PLUS; Future  -     AMB POC URINE PREGNANCY TEST, VISUAL COLOR COMPARISON    2. Acute vaginitis  -     NUSWAB VAGINITIS PLUS; Future    3. UTI symptoms    Other orders  -     nitrofurantoin, macrocrystal-monohydrate, (Macrobid) 100 mg capsule; Take 1 Capsule by mouth two (2) times a day for 3 days. -     fluconazole (DIFLUCAN) 150 mg tablet; Take 1 Tablet by mouth every seven (7) days for 2 doses. I have discussed the diagnosis with the patient and the intended plan as seen in the above orders. The patient has received an after-visit summary and questions were answered concerning future plans. I have discussed medication side effects and warnings with the patient as well. Patient agreeable with above plan and verbalizes understanding. Follow-up and Dispositions    · Return if symptoms worsen or fail to improve. Subjective:   Patient states she is having lower abd pain pain/discomfort for the last week/.  Patient admits to vaginal discharge clear. Denies vaginal odor. Patient states she has pressure with urination. She also admits to frequency. Lab Results   Component Value Date/Time    Sodium 138 12/20/2021 05:30 PM    Potassium 3.6 12/20/2021 05:30 PM    Chloride 104 12/20/2021 05:30 PM    CO2 28 12/20/2021 05:30 PM    Anion gap 6 12/20/2021 05:30 PM    Glucose 89 12/20/2021 05:30 PM    BUN 9 12/20/2021 05:30 PM    Creatinine 0.70 12/20/2021 05:30 PM    BUN/Creatinine ratio 13 12/20/2021 05:30 PM    GFR est AA >60 12/20/2021 05:30 PM    GFR est non-AA >60 12/20/2021 05:30 PM    Calcium 9.0 12/20/2021 05:30 PM    Bilirubin, total 0.3 12/20/2021 05:30 PM    Alk.  phosphatase 85 12/20/2021 05:30 PM    Protein, total 7.9 12/20/2021 05:30 PM    Albumin 3.4 12/20/2021 05:30 PM    Globulin 4.5 (H) 12/20/2021 05:30 PM    A-G Ratio 0.8 12/20/2021 05:30 PM    ALT (SGPT) 19 12/20/2021 05:30 PM    AST (SGOT) 14 12/20/2021 05:30 PM     Lab Results   Component Value Date/Time    Cholesterol, total 109 08/07/2021 07:48 AM    HDL Cholesterol 39 (L) 08/07/2021 07:48 AM    LDL, calculated 55.6 08/07/2021 07:48 AM    VLDL, calculated 14.4 08/07/2021 07:48 AM    Triglyceride 72 08/07/2021 07:48 AM    CHOL/HDL Ratio 2.8 08/07/2021 07:48 AM     Lab Results   Component Value Date/Time    Hemoglobin A1c 5.6 07/26/2019 01:50 PM    Hemoglobin A1c (POC) 5.4 06/27/2017 03:50 PM     Lab Results   Component Value Date/Time    Vitamin D 25-Hydroxy 63.4 12/07/2021 04:34 PM       Lab Results   Component Value Date/Time    WBC 8.4 12/20/2021 05:30 PM    Hemoglobin, POC 11.9 (L) 06/12/2012 10:51 AM    HGB 11.2 (L) 12/20/2021 05:30 PM    Hematocrit, POC 35 (L) 06/12/2012 10:51 AM    HCT 36.1 12/20/2021 05:30 PM    PLATELET 409 22/37/4916 05:30 PM    MCV 77.3 (L) 12/20/2021 05:30 PM     Wt Readings from Last 3 Encounters:   12/20/21 243 lb (110.2 kg)   12/20/21 243 lb (110.2 kg)   10/19/21 272 lb (123.4 kg)     Temp Readings from Last 3 Encounters:   12/20/21 98.9 °F (37.2 °C)   12/20/21 97.9 °F (36.6 °C) (Temporal)   09/27/21 99 °F (37.2 °C) (Temporal)     BP Readings from Last 3 Encounters:   12/20/21 118/73   12/20/21 92/60   10/19/21 (!) 150/86     Pulse Readings from Last 3 Encounters:   12/20/21 87   12/20/21 88   10/19/21 83       Prior to Admission medications    Medication Sig Start Date End Date Taking? Authorizing Provider   ferrous sulfate (IRON) 325 mg (65 mg iron) EC tablet Take 1 Tablet by mouth daily.  12/20/21   Bruno Sullivan NP   ondansetron (ZOFRAN ODT) 4 mg disintegrating tablet Take 1 Tablet by mouth every eight (8) hours as needed for Nausea or Vomiting. 12/20/21   Bruno Sullivan NP cyclobenzaprine (FLEXERIL) 10 mg tablet  11/17/21   Provider, Historical   traMADoL (ULTRAM) 50 mg tablet  11/17/21   Provider, Historical   apixaban (ELIQUIS) 5 mg tablet Take 1 Tablet by mouth two (2) times a day. 11/12/21   Maribell Guillen MD   pramoxine (Sarna Sensitive) 1 % lotion Apply  to affected area three (3) times daily. 10/20/21   Vanessa Vick MD   albuterol (Proventil HFA) 90 mcg/actuation inhaler Take 2 Puffs by inhalation every six (6) hours as needed for Wheezing. 9/27/21   AsahJelani T, DO   multivitamin (ONE A DAY) tablet Take 1 Tablet by mouth daily. Provider, Historical   thiamine HCL (B-1) 100 mg tablet Take 100 mg by mouth daily. Provider, Historical   calcium-cholecalciferol, d3, (CALCIUM 600 + D) 600-125 mg-unit tab Take 1 Tablet by mouth daily. Provider, Historical   cyanocobalamin 1,000 mcg tablet Take 1,000 mcg by mouth daily. Provider, Historical     The following sections were reviewed & updated as appropriate: PMH, PSH, FH, and SH. Review of Systems   Constitutional: Negative for activity change, appetite change, chills, fatigue and fever. Respiratory: Negative for chest tightness and shortness of breath. Cardiovascular: Negative for chest pain. Gastrointestinal: Positive for abdominal pain. Genitourinary: Positive for frequency and vaginal discharge. Neurological: Negative for dizziness and headaches. Objective:     Visit Vitals  /83 (BP 1 Location: Left arm, BP Patient Position: Sitting, BP Cuff Size: Adult)   Pulse 90   Temp 98 °F (36.7 °C) (Temporal)   Resp 20   Ht 5' 5\" (1.651 m)   Wt 239 lb (108.4 kg)   LMP 12/27/2021   SpO2 98%   BMI 39.77 kg/m²      Physical Exam  Constitutional:       General: She is not in acute distress. Appearance: She is well-developed. HENT:      Head: Normocephalic and atraumatic. Neck:      Vascular: No carotid bruit. Cardiovascular:      Rate and Rhythm: Normal rate and regular rhythm.       Heart sounds: Normal heart sounds. No murmur heard. No friction rub. No gallop. Pulmonary:      Effort: Pulmonary effort is normal.      Breath sounds: Normal breath sounds. No decreased breath sounds, wheezing, rhonchi or rales. Abdominal:      General: Bowel sounds are normal.      Palpations: Abdomen is soft. Tenderness: There is abdominal tenderness (mild) in the suprapubic area. Genitourinary:     Comments: Pelvic exam: VULVA: normal appearing vulva with no masses, tenderness or lesions, VAGINA: normal appearing vagina with normal color and discharge, no lesions, CERVIX: normal appearing cervix without discharge or lesions, cervical motion tenderness absent, nuswab obtained, UTERUS: uterus is normal size, shape, consistency and nontender, ADNEXA: non tender bilaterally. Musculoskeletal:      Cervical back: Normal range of motion and neck supple. Lymphadenopathy:      Cervical: No cervical adenopathy. Skin:     General: Skin is warm and dry. Neurological:      Mental Status: She is alert and oriented to person, place, and time. Disclaimer: The patient understands our medical plan. Alternatives have been explained and offered. The risks, benefits and significant side effects of all medications have been reviewed. Anticipated time course and progression of condition reviewed. All questions have been addressed. She is encouraged to employ the information provided in the after visit summary, which was reviewed. Where applicable, she is instructed to call the clinic if she has not been notified either by phone or through [address] with the results of her tests or with an appointment plan for any referrals within 1 week(s). No news is not good news; it's no news. The patient  is to call if her condition worsens or fails to improve or if significant side effects are experienced. Aspects of this note may have been generated using voice recognition software.  Despite editing, there may be unrecognized errors.        Aroldo Camejo NP

## 2022-01-29 LAB
BACTERIA SPEC CULT: ABNORMAL
CC UR VC: ABNORMAL
SERVICE CMNT-IMP: ABNORMAL

## 2022-02-01 LAB
A VAGINAE DNA VAG QL NAA+PROBE: NORMAL SCORE
BVAB2 DNA VAG QL NAA+PROBE: NORMAL SCORE
C ALBICANS DNA VAG QL NAA+PROBE: NEGATIVE
C GLABRATA DNA VAG QL NAA+PROBE: NEGATIVE
C TRACH RRNA SPEC QL NAA+PROBE: NEGATIVE
MEGA1 DNA VAG QL NAA+PROBE: NORMAL SCORE
N GONORRHOEA RRNA SPEC QL NAA+PROBE: NEGATIVE
SPECIMEN SOURCE: NORMAL
T VAGINALIS RRNA SPEC QL NAA+PROBE: NEGATIVE

## 2022-02-04 ENCOUNTER — DOCUMENTATION ONLY (OUTPATIENT)
Dept: BARIATRICS/WEIGHT MGMT | Age: 42
End: 2022-02-04

## 2022-02-04 NOTE — PROGRESS NOTES
2/4/2022:  Patient is  ~ 6 months out. She called and stated she is struggling with the protein shakes. She states she has not been doing them as they tear up her stomach. I have recommended her to do the [address], as it is lactose free, but patient stated she already tried that. I recommended her to look into [employer], which is an unflavored powder that is a whey protein isolate. They also make a high protein chicken broth. Patient inquired if she can make her protein drink with skim milk. I discouraged this due to the high amount of sugar in it and it will add 90 calories. I did encouraged her to try unsweetened almond milk or using the carb master milk from [employer], which is higher in protein than regular milk. Patient is going to try this and reach back to me if needed.     Efraín Mcallister MS RD

## 2022-02-07 ENCOUNTER — HOSPITAL ENCOUNTER (OUTPATIENT)
Age: 42
Discharge: HOME | End: 2022-02-07
Attending: ORTHOPAEDIC SURGERY
Payer: COMMERCIAL

## 2022-02-07 DIAGNOSIS — M54.12 CERVICAL RADICULOPATHY: ICD-10-CM

## 2022-02-07 DIAGNOSIS — S13.9XXD SPRAIN OF JOINTS AND LIGAMENTS OF UNSPECIFIED PARTS OF NECK, SUBSEQUENT ENCOUNTER: ICD-10-CM

## 2022-02-07 PROCEDURE — 72141 MRI NECK SPINE W/O DYE: CPT

## 2022-03-18 PROBLEM — F32.A DEPRESSION: Status: ACTIVE | Noted: 2018-05-11

## 2022-03-19 PROBLEM — F41.9 ANXIETY: Status: ACTIVE | Noted: 2018-05-11

## 2022-03-19 PROBLEM — E66.01 MORBID OBESITY (HCC): Status: ACTIVE | Noted: 2020-03-26

## 2022-03-19 PROBLEM — I26.99 PULMONARY EMBOLISM (HCC): Status: ACTIVE | Noted: 2021-09-10

## 2022-03-19 PROBLEM — M41.9 SCOLIOSIS OF THORACIC SPINE: Status: ACTIVE | Noted: 2017-06-29

## 2022-03-20 PROBLEM — E66.01 MORBID OBESITY WITH BMI OF 45.0-49.9, ADULT (HCC): Status: ACTIVE | Noted: 2021-08-25

## 2022-04-29 ENCOUNTER — NURSE TRIAGE (OUTPATIENT)
Dept: OTHER | Facility: CLINIC | Age: 42
End: 2022-04-29

## 2022-04-29 NOTE — TELEPHONE ENCOUNTER
Received call from [facility]  at [facility] with Red Flag Complaint. Subjective: Caller states \" I have been having memory issues, the doctor put me on a medication. I didn't start it due to starting a new job and they were asking about the med. The last time I went to see someone, they sent me to neuro and I went and they put me in a small room, asking me all type of questions and the lady had a body odor and I had a melt down because I didn't know what was going to happen and no one explained anything. I broke down. The first doctor made me take a test and he said I have ADD and he gave me Aderall. I never took the med and I lost it. I did have a stroke in 2008 so I dont know if it is from that or what. I have been dealing with it but it is getting worse. I cant find my keys, paying bills, remembering where I put stuff. I have to work and I cant remember stuff. Its not just the memory, it is just daily living things. It has been going on for years with where did I put my keys at, or what did I have on 2 days ago. But, I can remember my grandma passing away. I don't remember my kids childhood. Its not fun whatever I have going on. Im getting ready to start a new job and I need to be on point and Im not on point. \"     Current Symptoms: memory problems     Onset: pt unsure, states she cant remember     Associated Symptoms: NA    Pain Severity: 0/10; Recommended disposition: See in Office Within 2 Weeks- Pt is starting a new job and would like a sooner appt. Writer did attempt to call the PCP office but was unable to get through. Care advice provided, patient verbalizes understanding; denies any other questions or concerns; instructed to call back for any new or worsening symptoms. Patient/Caller agrees with recommended disposition; writer provided warm transfer to [facility]  at [facility] for appointment scheduling. [facility] advised that pt is requesting a sooner appt.      Pt disconnected prior to connecting to [facility] so [facility] at [address] [facility] will attempt to call the pt back to get scheduled. Attention Provider: Thank you for allowing me to participate in the care of your patient. The patient was connected to triage in response to information provided to the ECC. Please do not respond through this encounter as the response is not directed to a shared pool.       Reason for Disposition   New or worsening memory (forgetfulness) problems    Protocols used: DEMENTIA SYMPTOMS AND QUESTIONS-ADULT-

## 2022-05-25 ENCOUNTER — VIRTUAL VISIT (OUTPATIENT)
Dept: FAMILY MEDICINE CLINIC | Age: 42
End: 2022-05-25
Payer: COMMERCIAL

## 2022-05-25 DIAGNOSIS — N89.8 VAGINAL DISCHARGE: Primary | ICD-10-CM

## 2022-05-25 DIAGNOSIS — R39.9 UTI SYMPTOMS: ICD-10-CM

## 2022-05-25 PROCEDURE — 99213 OFFICE O/P EST LOW 20 MIN: CPT | Performed by: NURSE PRACTITIONER

## 2022-05-25 NOTE — PROGRESS NOTES
Komal Aleman is a 43 y.o. female who was seen by synchronous (real-time) audio-video technology on 5/25/2022 for Vaginal Discharge and Urinary Retention    Assessment & Plan:   Diagnoses and all orders for this visit:    1. Vaginal discharge    2. UTI symptoms  -     AMB POC URINALYSIS DIP STICK AUTO W/O MICRO      Follow-up and Dispositions    · Return if symptoms worsen or fail to improve with PCP. 12  Subjective:     Vaginal Discharge Review:    Komal Aleman presents with vaginal discharge, that is clear and odorless for the past 1 months. Pt has associated No pain, but pt states that she is having some irratation. Pt has not tried anything for her symptoms. She says these remedies n/a worked. Urinary Changes:    Pt C/O  frequency, urgency, vaginal discharge, new onset enuresis, incontinence for 2 weeks. This is a follow up of a pre-existing problem. Associated with discharge. She denies dysuria, hematuria, abnormal smelling urine. She reports symptoms are not changed. Patient has tried: nothing for these symptoms. Pt states that she is also having an inability to hold her urine. Patient was asked to bring in a urine sample prior to treatment of a urinary tract infection. Patient states she could drop off a urine sample tomorrow. There is a history of recurrent UTI.     Results for orders placed or performed in visit on 01/28/22   AMB POC URINALYSIS DIP STICK AUTO W/O MICRO     Status: None   Result Value Ref Range Status    Color (UA POC) Yellow  Final    Clarity (UA POC) Clear  Final    Glucose (UA POC) Negative Negative Final    Bilirubin (UA POC) Negative Negative Final    Ketones (UA POC) Negative Negative Final    Specific gravity (UA POC) 1.025 1.001 Final    Blood (UA POC) Negative Negative Final    pH (UA POC) 6.5 4.6 - 8.0 Final    Protein (UA POC) Negative Negative Final    Urobilinogen (UA POC) 0.2 mg/dL 0.2 - 1 Final    Nitrites (UA POC) Negative Negative Final Leukocyte esterase (UA POC) Negative Negative Final   Results for orders placed or performed in visit on 03/05/19   AMB POC URINALYSIS DIP STICK AUTO W/ MICRO     Status: None   Result Value Ref Range Status    Color (UA POC) Dark Yellow  Final    Clarity (UA POC) Cloudy  Final    Glucose (UA POC) Negative Negative Final    Bilirubin (UA POC) Negative Negative Final    Ketones (UA POC) Trace Negative Final    Specific gravity (UA POC) 1.025 1.001 - 1.035 Final    Blood (UA POC) Negative Negative Final    pH (UA POC) 7.0 4.6 - 8.0 Final    Protein (UA POC) 1+ Negative Final    Urobilinogen (UA POC) 1 mg/dL 0.2 - 1 Final    Nitrites (UA POC) Negative Negative Final    Leukocyte esterase (UA POC) Trace Negative Final             Prior to Admission medications    Medication Sig Start Date End Date Taking? Authorizing Provider   ferrous sulfate (IRON) 325 mg (65 mg iron) EC tablet Take 1 Tablet by mouth daily. 12/20/21  Yes Holly Gonzalez NP   cyclobenzaprine (FLEXERIL) 10 mg tablet  11/17/21  Yes Provider, Historical   traMADoL (ULTRAM) 50 mg tablet  11/17/21  Yes Provider, Historical   pramoxine (Sarna Sensitive) 1 % lotion Apply  to affected area three (3) times daily. 10/20/21  Yes Vanessa Vick MD   albuterol (Proventil HFA) 90 mcg/actuation inhaler Take 2 Puffs by inhalation every six (6) hours as needed for Wheezing. 9/27/21  Yes Jelani Guzmán T, DO   multivitamin (ONE A DAY) tablet Take 1 Tablet by mouth daily. Yes Provider, Historical   thiamine HCL (B-1) 100 mg tablet Take 100 mg by mouth daily. Yes Provider, Historical   calcium-cholecalciferol, d3, (CALCIUM 600 + D) 600-125 mg-unit tab Take 1 Tablet by mouth daily. Yes Provider, Historical   cyanocobalamin 1,000 mcg tablet Take 1,000 mcg by mouth daily. Yes Provider, Historical   ondansetron (ZOFRAN ODT) 4 mg disintegrating tablet Take 1 Tablet by mouth every eight (8) hours as needed for Nausea or Vomiting.   Patient not taking: Reported on 5/25/2022 12/20/21   Sarah Goncalves NP   apixaban (ELIQUIS) 5 mg tablet Take 1 Tablet by mouth two (2) times a day. Patient not taking: Reported on 5/25/2022 11/12/21   MD DUKE An    Objective:   No flowsheet data found. General: alert, cooperative, no distress   Mental  status: normal mood, behavior, speech, dress, motor activity, and thought processes, able to follow commands   HENT: NCAT   Neck: no visualized mass   Resp: no respiratory distress   Neuro: no gross deficits   Skin: no discoloration or lesions of concern on visible areas   Psychiatric: normal affect, consistent with stated mood, no evidence of hallucinations     Additional exam findings: We discussed the expected course, resolution and complications of the diagnosis(es) in detail. Medication risks, benefits, costs, interactions, and alternatives were discussed as indicated. I advised her to contact the office if her condition worsens, changes or fails to improve as anticipated. She expressed understanding with the diagnosis(es) and plan. Jmaes Chisholm, was evaluated through a synchronous (real-time) audio-video encounter. The patient (or guardian if applicable) is aware that this is a billable service, which includes applicable co-pays. Verbal consent to proceed has been obtained. The visit was conducted pursuant to the emergency declaration under the [facility], [address] [place] [address] [facility], [facility] authority and the [employer] General Act. Patient identification was verified, and a caregiver was present when appropriate. The patient was located at home in a state where the provider was licensed to provide care.       Annette Vivas NP

## 2022-05-25 NOTE — PROGRESS NOTES
1. \"Have you been to the ER, urgent care clinic since your last visit? Hospitalized since your last visit? \" No    2. \"Have you seen or consulted any other health care providers outside of the [address] [place] [address] since your last visit? \" Yes gastric bipass     3. For patients aged 39-70: Has the patient had a colonoscopy / FIT/ Cologuard? NA - based on age      If the patient is female:    4. For patients aged 41-77: Has the patient had a mammogram within the past 2 years? Yes - no Care Gap present      5. For patients aged 21-65: Has the patient had a pap smear?  Yes - no Care Gap present

## 2022-07-01 ENCOUNTER — DOCUMENTATION ONLY (OUTPATIENT)
Dept: SURGERY | Age: 42
End: 2022-07-01

## 2022-07-01 NOTE — PROGRESS NOTES
Per [facility] requirements;  E-mail and letter sent for follow up appointment. [facility] [address] 89      Dear Patient,    Your health is our main concern. It is important for your health to have follow-up lab work and to see your surgeon at 3 months, 6 months and annually after your weight loss surgery. Additionally, the Department of bariatric Surgery at our hospital is a member of the Metabolic and Bariatric Surgery Accreditation and Quality Improvement Program [facility]). As a participant in this program, we gather information on the outcomes of our patients after surgery. Please call the office for a follow up appointment at [phone] [facility] [place]) or [phone] [facility]). If you have moved out of the area or have changed surgeons please call us and let us know the name of your doctor. Your health and feedback are important to us. We greatly appreciate your response.        Thank you,  [facility] [address] [facility]

## 2022-07-19 DIAGNOSIS — K91.2 POSTOPERATIVE MALABSORPTION: Primary | ICD-10-CM

## 2022-07-22 ENCOUNTER — TELEPHONE (OUTPATIENT)
Dept: FAMILY MEDICINE CLINIC | Age: 42
End: 2022-07-22

## 2022-07-22 NOTE — TELEPHONE ENCOUNTER
Patient calling for urine results. The last urine sample in the system was in 1/22. The patient states that she had one about a month ago. Patient also stated she came in that day for what she thought was an office visit but ended up being a urine sample. She stated that she used a bathroom in the lobby, putting the urine in a brown paper bag and handed it through the window at the . I informed the pt that the  does not take the samples, that we send the patient back to the lab to drop it off. I also stated to the patient if there were no labs in our system and she dropped of the urine it could have never been sent. She then stated that she would figure it out. The line was disconnected.       KALEB

## 2022-07-28 ENCOUNTER — VIRTUAL VISIT (OUTPATIENT)
Dept: FAMILY MEDICINE CLINIC | Age: 42
End: 2022-07-28
Payer: COMMERCIAL

## 2022-07-28 DIAGNOSIS — R41.840 CONCENTRATION DEFICIT: ICD-10-CM

## 2022-07-28 DIAGNOSIS — R87.612 LGSIL ON PAP SMEAR OF CERVIX: ICD-10-CM

## 2022-07-28 DIAGNOSIS — R41.3 MEMORY LOSS: Primary | ICD-10-CM

## 2022-07-28 PROCEDURE — 99214 OFFICE O/P EST MOD 30 MIN: CPT | Performed by: FAMILY MEDICINE

## 2022-07-28 NOTE — PROGRESS NOTES
Nathan Hernandez (: 1980) is a 43 y.o. female, established patient, here for evaluation of the following chief complaint(s):   Memory Loss         Nathan Hernandez, was evaluated through a synchronous (real-time) audio-video encounter. The patient (or guardian if applicable) is aware that this is a billable service, which includes applicable co-pays. This Virtual Visit was conducted with patient's (and/or legal guardian's) consent. The visit was conducted pursuant to the emergency declaration under the [address] [place] [address], [address] [place] [address] authority and the [employer] General Act. Patient identification was verified, and a caregiver was present when appropriate. The patient was located at: Home: [place] [address]  The provider was located at: Facility (Appt Department): [address] [place] [address] Rd  [address]       An [address] [facility] was used to authenticate this note.   -- Chasity Davis

## 2022-07-28 NOTE — PROGRESS NOTES
Prem Shahid is a 43 y.o. female who was seen by synchronous (real-time) audio-video technology on 7/28/2022 for Memory Loss        Assessment & Plan:   Diagnoses and all orders for this visit:    1. Memory loss  -     REFERRAL TO NEUROPSYCHOLOGY    2. Concentration deficit  -     REFERRAL TO NEUROPSYCHOLOGY    3. LGSIL on Pap smear of cervix  -     REFERRAL TO OBSTETRICS AND GYNECOLOGY      As above  Not controlled  Symptoms above have caused difficulty with patient maintaining jobs and managing her daily objectives. Etiology is unclear  Neuropsychology evaluation is still recommended. Will place order for [facility] Neuropsych dept. Follow-up and Dispositions    Return in about 3 months (around 10/28/2022), or memory. This has been fully explained to the patient, who indicates understanding. [facility] and pt has been advised of same. Time for managing patient's visit today: 25 minutes. Subjective:   Patient is here with the complaints of memory loss and difficulty concentrating. Patient reports that she is having struggles with staying on top of her tasks. She frequently is getting behind. She is writing things down at her job to keep up but still does not remember many of her objectives. She reports that she procrastinates. She has question whether she has ADD or ADHD. At one time she had attempted to try Adderall but apparently this was not maintained. She references having a stroke in 2008. She is unsure if this has any bearing on her symptoms. Her things to do was is not effective. Her organizational skills are impaired. She has recently started a new job and she is trying not to make mistakes on this new job. She does case management for housing. Today patient is on her way to work. She agrees to referral again to neuropsychology. Of note patient also is in need of a new referral to gynecology for an abnormal Pap.   She states that the last referral that was placed for this , the provider did not accept her insurance. Will place referral for this at this time. Prior to Admission medications    Medication Sig Start Date End Date Taking? Authorizing Provider   ferrous sulfate (IRON) 325 mg (65 mg iron) EC tablet Take 1 Tablet by mouth daily. 12/20/21  Yes Arnold Dubois NP   ondansetron (ZOFRAN ODT) 4 mg disintegrating tablet Take 1 Tablet by mouth every eight (8) hours as needed for Nausea or Vomiting. 12/20/21  Yes Arnold Dubois NP   cyclobenzaprine (FLEXERIL) 10 mg tablet  11/17/21  Yes Provider, Historical   traMADoL (ULTRAM) 50 mg tablet  11/17/21  Yes Provider, Historical   pramoxine (Sarna Sensitive) 1 % lotion Apply  to affected area three (3) times daily. 10/20/21  Yes Vanessa Vick MD   albuterol (Proventil HFA) 90 mcg/actuation inhaler Take 2 Puffs by inhalation every six (6) hours as needed for Wheezing. 9/27/21  Yes Jelani Guzmán, DO   multivitamin (ONE A DAY) tablet Take 1 Tablet by mouth daily. Yes Provider, Historical   thiamine HCL (B-1) 100 mg tablet Take 100 mg by mouth daily. Yes Provider, Historical   calcium-cholecalciferol, d3, (CALCIUM 600 + D) 600-125 mg-unit tab Take 1 Tablet by mouth daily. Yes Provider, Historical   cyanocobalamin 1,000 mcg tablet Take 1,000 mcg by mouth daily. Yes Provider, Historical   apixaban (ELIQUIS) 5 mg tablet Take 1 Tablet by mouth two (2) times a day.   Patient not taking: No sig reported 11/12/21   Lionel Bartholomew MD     Patient Active Problem List    Diagnosis Date Noted    Pulmonary embolism ([facility] 09/10/2021    Morbid obesity with BMI of 45.0-49.9, adult ([facility] 08/25/2021    Hypertension     Scoliosis     Morbid obesity ([facility] 03/26/2020    Anxiety 05/11/2018    Depression 05/11/2018    BMI 50.0-59.9, adult ([facility] 05/11/2018    Scoliosis of thoracic spine 06/29/2017    Back pain     Prediabetes 08/19/2015     Current Outpatient Medications   Medication Sig Dispense Refill ferrous sulfate (IRON) 325 mg (65 mg iron) EC tablet Take 1 Tablet by mouth daily. 90 Tablet 1    ondansetron (ZOFRAN ODT) 4 mg disintegrating tablet Take 1 Tablet by mouth every eight (8) hours as needed for Nausea or Vomiting. 30 Tablet 0    cyclobenzaprine (FLEXERIL) 10 mg tablet       traMADoL (ULTRAM) 50 mg tablet       pramoxine (Sarna Sensitive) 1 % lotion Apply  to affected area three (3) times daily. 1 Each 0    albuterol (Proventil HFA) 90 mcg/actuation inhaler Take 2 Puffs by inhalation every six (6) hours as needed for Wheezing. 1 Each 3    multivitamin (ONE A DAY) tablet Take 1 Tablet by mouth daily. thiamine HCL (B-1) 100 mg tablet Take 100 mg by mouth daily. calcium-cholecalciferol, d3, (CALCIUM 600 + D) 600-125 mg-unit tab Take 1 Tablet by mouth daily. cyanocobalamin 1,000 mcg tablet Take 1,000 mcg by mouth daily. apixaban (ELIQUIS) 5 mg tablet Take 1 Tablet by mouth two (2) times a day.  (Patient not taking: No sig reported) 60 Tablet 2     Allergies   Allergen Reactions    Nsaids (Non-Steroidal Anti-Inflammatory Drug) Other (comments)     Contraindicated related to gastric bypass       Past Medical History:   Diagnosis Date    Abnormal MRI of head 2010    Nonspecific focus of low attenuation at the right internal capsule on CT and MRI    Anxiety     Chronic low back pain 2014    unknown cause- normal xrays    Depression     Diabetes ([address]     Pre-DM- no meds    Hypertension     no meds now    Memory loss     Prediabetes     Scoliosis     Stroke ([address] 2008    denies residual PER PATIENT MINI STROKE( TIA)     Past Surgical History:   Procedure Laterality Date    HX GASTRIC BYPASS      HX GASTRIC BYPASS  08/25/2021     Family History   Problem Relation Age of Onset    Diabetes Other     Hypertension Other     Heart Disease Other     Asthma Other     OSTEOARTHRITIS Other     Diabetes Mother     Hypertension Mother      Social History     Tobacco Use    Smoking status: Never Smokeless tobacco: Never   Substance Use Topics    Alcohol use: Not Currently     Alcohol/week: 1.0 standard drink     Types: 1 Glasses of wine per week     Comment: socially       ROS as per HPI    Objective:   No flowsheet data found. General: alert, cooperative, no distress   Mental  status: normal mood, behavior, speech, dress, motor activity, and thought processes, able to follow commands   HENT: NCAT   Neck: no visualized mass   Resp: no respiratory distress   Neuro: no gross deficits   Skin: no discoloration or lesions of concern on visible areas   Psychiatric: normal affect, consistent with stated mood, no evidence of hallucinations     Additional exam findings: none      We discussed the expected course, resolution and complications of the diagnosis(es) in detail. Medication risks, benefits, costs, interactions, and alternatives were discussed as indicated. I advised her to contact the office if her condition worsens, changes or fails to improve as anticipated. She expressed understanding with the diagnosis(es) and plan. Mando Jurado, was evaluated through a synchronous (real-time) audio-video encounter. The patient (or guardian if applicable) is aware that this is a billable service, which includes applicable co-pays. This Virtual Visit was conducted with patient's (and/or legal guardian's) consent. The visit was conducted pursuant to the emergency declaration under the [address] [place] [address] authority and the [employer] Act. Patient identification was verified, and a caregiver was present when appropriate. The patient was located at: Other: work  The provider was located at:  Facility (Appt Department): [id number] Rudy Shabazz MD

## 2022-08-02 ENCOUNTER — HOSPITAL ENCOUNTER (OUTPATIENT)
Dept: LAB | Age: 42
Discharge: HOME | End: 2022-08-02
Payer: COMMERCIAL

## 2022-08-02 DIAGNOSIS — K91.2 POSTOPERATIVE MALABSORPTION: ICD-10-CM

## 2022-08-02 LAB
25(OH)D3 SERPL-MCNC: 58.7 NG/ML (ref 30–100)
ALBUMIN SERPL-MCNC: 3.3 G/DL (ref 3.4–5)
ALBUMIN/GLOB SERPL: 0.9 {RATIO} (ref 0.8–1.7)
ALP SERPL-CCNC: 87 U/L (ref 45–117)
ALT SERPL-CCNC: 19 U/L (ref 13–56)
ANION GAP SERPL CALC-SCNC: 5 MMOL/L (ref 3–18)
AST SERPL-CCNC: 13 U/L (ref 10–38)
BASOPHILS # BLD: 0 K/UL (ref 0–0.1)
BASOPHILS NFR BLD: 1 % (ref 0–2)
BILIRUB SERPL-MCNC: 0.4 MG/DL (ref 0.2–1)
BUN SERPL-MCNC: 10 MG/DL (ref 7–18)
BUN/CREAT SERPL: 14 (ref 12–20)
CALCIUM SERPL-MCNC: 9 MG/DL (ref 8.5–10.1)
CHLORIDE SERPL-SCNC: 109 MMOL/L (ref 100–111)
CO2 SERPL-SCNC: 28 MMOL/L (ref 21–32)
CREAT SERPL-MCNC: 0.69 MG/DL (ref 0.6–1.3)
DIFFERENTIAL METHOD BLD: ABNORMAL
EOSINOPHIL # BLD: 0.1 K/UL (ref 0–0.4)
EOSINOPHIL NFR BLD: 2 % (ref 0–5)
ERYTHROCYTE [DISTWIDTH] IN BLOOD BY AUTOMATED COUNT: 14.8 % (ref 11.6–14.5)
FERRITIN SERPL-MCNC: 7 NG/ML (ref 8–388)
FOLATE SERPL-MCNC: >20 NG/ML (ref 3.1–17.5)
GLOBULIN SER CALC-MCNC: 3.6 G/DL (ref 2–4)
GLUCOSE SERPL-MCNC: 75 MG/DL (ref 74–99)
HCT VFR BLD AUTO: 37.5 % (ref 35–45)
HGB BLD-MCNC: 11.6 G/DL (ref 12–16)
IMM GRANULOCYTES # BLD AUTO: 0 K/UL (ref 0–0.04)
IMM GRANULOCYTES NFR BLD AUTO: 0 % (ref 0–0.5)
IRON SERPL-MCNC: 103 UG/DL (ref 50–175)
LYMPHOCYTES # BLD: 1.9 K/UL (ref 0.9–3.6)
LYMPHOCYTES NFR BLD: 37 % (ref 21–52)
MCH RBC QN AUTO: 25.3 PG (ref 24–34)
MCHC RBC AUTO-ENTMCNC: 30.9 G/DL (ref 31–37)
MCV RBC AUTO: 81.7 FL (ref 78–100)
MONOCYTES # BLD: 0.5 K/UL (ref 0.05–1.2)
MONOCYTES NFR BLD: 9 % (ref 3–10)
NEUTS SEG # BLD: 2.7 K/UL (ref 1.8–8)
NEUTS SEG NFR BLD: 52 % (ref 40–73)
NRBC # BLD: 0 K/UL (ref 0–0.01)
NRBC BLD-RTO: 0 PER 100 WBC
PLATELET # BLD AUTO: 196 K/UL (ref 135–420)
PMV BLD AUTO: 12.7 FL (ref 9.2–11.8)
POTASSIUM SERPL-SCNC: 3.9 MMOL/L (ref 3.5–5.5)
PROT SERPL-MCNC: 6.9 G/DL (ref 6.4–8.2)
RBC # BLD AUTO: 4.59 M/UL (ref 4.2–5.3)
SODIUM SERPL-SCNC: 142 MMOL/L (ref 136–145)
VIT B12 SERPL-MCNC: 544 PG/ML (ref 211–911)
WBC # BLD AUTO: 5.2 K/UL (ref 4.6–13.2)

## 2022-08-02 PROCEDURE — 83540 ASSAY OF IRON: CPT

## 2022-08-02 PROCEDURE — 80053 COMPREHEN METABOLIC PANEL: CPT

## 2022-08-02 PROCEDURE — 36415 COLL VENOUS BLD VENIPUNCTURE: CPT

## 2022-08-02 PROCEDURE — 82306 VITAMIN D 25 HYDROXY: CPT

## 2022-08-02 PROCEDURE — 82607 VITAMIN B-12: CPT

## 2022-08-02 PROCEDURE — 85025 COMPLETE CBC W/AUTO DIFF WBC: CPT

## 2022-08-02 PROCEDURE — 82728 ASSAY OF FERRITIN: CPT

## 2022-08-02 PROCEDURE — 84425 ASSAY OF VITAMIN B-1: CPT

## 2022-08-05 LAB — VIT B1 BLD-SCNC: 210.4 NMOL/L (ref 66.5–200)

## 2022-09-07 NOTE — LETTER
NOTIFICATION RETURN TO WORK / SCHOOL 
 
6/24/2020 4:07 PM 
 
Ms. Lorra Simmonds [address] [address] To Whom It May Concern: 
 
Lorra Simmonds is currently under the care of Eric Ambriz Dr. She will return to work/school on: 6/29/2020 If there are questions or concerns please have the patient contact our office. Sincerely, Jose Moore MD 
 
                                
 

no

## 2022-09-12 ENCOUNTER — OFFICE VISIT (OUTPATIENT)
Dept: SURGERY | Age: 42
End: 2022-09-12
Payer: COMMERCIAL

## 2022-09-12 VITALS
TEMPERATURE: 98.2 F | HEART RATE: 82 BPM | BODY MASS INDEX: 37.32 KG/M2 | SYSTOLIC BLOOD PRESSURE: 117 MMHG | WEIGHT: 224 LBS | RESPIRATION RATE: 16 BRPM | HEIGHT: 65 IN | OXYGEN SATURATION: 99 % | DIASTOLIC BLOOD PRESSURE: 80 MMHG

## 2022-09-12 DIAGNOSIS — K91.2 POST-RESECTION MALABSORPTION: Primary | ICD-10-CM

## 2022-09-12 DIAGNOSIS — E66.9 OBESITY (BMI 30-39.9): ICD-10-CM

## 2022-09-12 DIAGNOSIS — Z98.84 S/P GASTRIC BYPASS: ICD-10-CM

## 2022-09-12 PROCEDURE — 99213 OFFICE O/P EST LOW 20 MIN: CPT | Performed by: NURSE PRACTITIONER

## 2022-09-12 RX ORDER — URSODIOL 200 MG/1
CAPSULE ORAL
Qty: 90 EACH | Refills: 5 | Status: CANCELLED | OUTPATIENT
Start: 2022-09-12

## 2022-09-12 NOTE — PROGRESS NOTES
Bariatric Postoperative Progress Note    CC: 12 Month LGBP Follow Up    Lewis Rosas is a 43 y.o. female now 1 years status post laparoscopic gastric bypass surgery performed on 8/25/2021. Doing well overall. Currently eating salads, grilled chicken, ground beef, protein shakes, peanuts. Taking in 64 oz water,  unknown g protein. Active at work but not doing specific exercising. Bowel movements are regular. She is compliant with multivitamins, calcium, Vit D and B12 supplements. Snacking on peanuts. Stopped Eliquis on her own as she was no longer experiencing chest pain, she reports she has not followed up with pulmonology. ETOH (1-2 shots 2-3 x weekly), denies tobacco and NSAIDs.    Weight Loss Metrics 9/12/2022 9/12/2022 1/28/2022 12/20/2021 12/20/2021 12/20/2021 10/19/2021   Pre op / Initial Wt 317 - - 317 - - -   Today's Wt - 224 lb 239 lb - 243 lb 243 lb 272 lb   BMI - 37.28 kg/m2 39.77 kg/m2 - 40.44 kg/m2 40.44 kg/m2 45.26 kg/m2   Ideal Body Wt 136 - - 134 - - -   Excess Body Wt 181 - - 183 - - -   Goal Wt 172 - - - - - -   Wt loss to date 93 - - 74 - - -   % Wt Loss 0.64 - - 0.51 - - -   80% .8 - - 146.4 - - -       Comorbidities:  Hypertension: resolved  Diabetes: not applicable  Obstructive Sleep Apnea: not applicable  Hyperlipidemia: not applicable  Stress Urinary Incontinence: resolved  Gastroesophageal Reflux: not applicable  Weight related arthropathy:improved      Past Medical History:   Diagnosis Date    Abnormal MRI of head 2010    Nonspecific focus of low attenuation at the right internal capsule on CT and MRI    Anxiety     Chronic low back pain 2014    unknown cause- normal xrays    Depression     Diabetes ([facility])     Pre-DM- no meds    Hypertension     no meds now    Memory loss     Prediabetes     Scoliosis     Stroke ([facility] 2008    denies residual PER PATIENT MINI STROKE( TIA)       Past Surgical History:   Procedure Laterality Date    HX GASTRIC BYPASS      HX GASTRIC BYPASS  08/25/2021       Current Outpatient Medications   Medication Sig Dispense Refill    ferrous sulfate (IRON) 325 mg (65 mg iron) EC tablet Take 1 Tablet by mouth daily. 90 Tablet 1    ondansetron (ZOFRAN ODT) 4 mg disintegrating tablet Take 1 Tablet by mouth every eight (8) hours as needed for Nausea or Vomiting. 30 Tablet 0    cyclobenzaprine (FLEXERIL) 10 mg tablet       pramoxine (Sarna Sensitive) 1 % lotion Apply  to affected area three (3) times daily. 1 Each 0    albuterol (Proventil HFA) 90 mcg/actuation inhaler Take 2 Puffs by inhalation every six (6) hours as needed for Wheezing. 1 Each 3    multivitamin (ONE A DAY) tablet Take 1 Tablet by mouth daily. thiamine HCL (B-1) 100 mg tablet Take 100 mg by mouth daily. calcium-cholecalciferol, d3, (CALCIUM 600 + D) 600-125 mg-unit tab Take 1 Tablet by mouth daily. cyanocobalamin 1,000 mcg tablet Take 1,000 mcg by mouth daily. apixaban (ELIQUIS) 5 mg tablet Take 1 Tablet by mouth two (2) times a day. (Patient not taking: No sig reported) 60 Tablet 2       Allergies   Allergen Reactions    Nsaids (Non-Steroidal Anti-Inflammatory Drug) Other (comments)     Contraindicated related to gastric bypass         ROS:  Review of Systems   Constitutional:  Positive for weight loss. Negative for malaise/fatigue. Eyes: Negative. Respiratory: Negative. Cardiovascular:  Negative for chest pain and palpitations. Gastrointestinal:  Negative for abdominal pain, blood in stool, constipation, diarrhea, heartburn, melena, nausea and vomiting. Skin: Negative. Neurological:  Negative for dizziness, tingling, loss of consciousness, weakness and headaches. Physicial Exam:  Visit Vitals  /80   Pulse 82   Temp 98.2 °F (36.8 °C)   Resp 16   Ht 5' 5\" (1.651 m)   Wt 101.6 kg (224 lb)   SpO2 99%   BMI 37.28 kg/m²     Physical Exam  Vitals and nursing note reviewed. Constitutional:       Appearance: She is obese.    HENT:      Head: Normocephalic and atraumatic. Cardiovascular:      Rate and Rhythm: Normal rate. Heart sounds: Normal heart sounds. Pulmonary:      Effort: Pulmonary effort is normal.      Breath sounds: Normal breath sounds. Abdominal:      General: Bowel sounds are normal. There is no distension. Palpations: Abdomen is soft. There is no mass. Tenderness: There is no abdominal tenderness. Hernia: No hernia is present. Musculoskeletal:         General: Normal range of motion. Skin:     General: Skin is warm and dry. Neurological:      General: No focal deficit present. Mental Status: She is alert and oriented to person, place, and time. Psychiatric:         Mood and Affect: Mood normal.         Behavior: Behavior normal.       Labs:   No results found for this or any previous visit (from the past 672 hour(s)). Assessment/Plan:   She is currently 1 year s/p laparoscopic gastric bypass surgery with a total weight loss of 93 lbs to date. Labs were reviewed and pt was instructed to continue MVI/B1/B12/D supplementation. Ensure getting in at least 60 g protein daily. Discussed risks of regular ETOH drinking after bariatric surgery such as addiction transference as well as gastritis, ulcers, and GI bleeding. Measure amounts of nuts. Importance of follow up after PE with pulmonology discussed with pt as well as dangers of stopping Eliquis when not recommended by a provider. We have reviewed the components of a successful postoperative course including requirement for a high protein, low carbohydrate diet, 64 oz a day of zero calorie liquids, daily vitamin supplementation, daily exercise (150 mis/week), regular follow-up, and participation in support groups. Refer to registered dietitian for more detailed medical nutrition therapy as needed. The primary encounter diagnosis was Post-resection malabsorption.  Diagnoses of S/P gastric bypass, Obesity (BMI 30-39.9), and BMI 37.0-37.9, adult were also pertinent to this visit. Follow-up and Dispositions    Return in about 6 months (around 3/12/2023) for Weight Check. sooner as needed.   Carmela Kincaid NP

## 2022-09-26 ENCOUNTER — TELEPHONE (OUTPATIENT)
Dept: FAMILY MEDICINE CLINIC | Age: 42
End: 2022-09-26

## 2022-09-26 NOTE — TELEPHONE ENCOUNTER
----- Message from Booker Prince sent at 9/26/2022  8:35 AM EDT -----  Subject: Message to Provider    QUESTIONS  Information for Provider? Pt stated she was referred to a   Neuropsychologist, however the location she was referred to does not have   a Neuropsychologist at that location. Pt would like a new referral.  ---------------------------------------------------------------------------  --------------  [facility]  [phone]; OK to leave message on voicemail  ---------------------------------------------------------------------------  --------------  SCRIPT ANSWERS  Relationship to Patient?  Self

## 2022-09-27 ENCOUNTER — OFFICE VISIT (OUTPATIENT)
Dept: FAMILY MEDICINE CLINIC | Age: 42
End: 2022-09-27

## 2022-09-27 VITALS
SYSTOLIC BLOOD PRESSURE: 106 MMHG | HEIGHT: 65 IN | OXYGEN SATURATION: 95 % | DIASTOLIC BLOOD PRESSURE: 64 MMHG | HEART RATE: 80 BPM | BODY MASS INDEX: 38.25 KG/M2 | RESPIRATION RATE: 16 BRPM | TEMPERATURE: 97.2 F | WEIGHT: 229.6 LBS

## 2022-09-27 DIAGNOSIS — Z86.73 H/O: CVA (CEREBROVASCULAR ACCIDENT): Primary | ICD-10-CM

## 2022-09-27 DIAGNOSIS — F41.9 ANXIETY AND DEPRESSION: ICD-10-CM

## 2022-09-27 DIAGNOSIS — R87.612 LGSIL ON PAP SMEAR OF CERVIX: ICD-10-CM

## 2022-09-27 DIAGNOSIS — R41.3 MEMORY DISTURBANCE: ICD-10-CM

## 2022-09-27 DIAGNOSIS — F32.A ANXIETY AND DEPRESSION: ICD-10-CM

## 2022-09-27 PROCEDURE — 99214 OFFICE O/P EST MOD 30 MIN: CPT | Performed by: FAMILY MEDICINE

## 2022-09-27 RX ORDER — SERTRALINE HYDROCHLORIDE 50 MG/1
50 TABLET, FILM COATED ORAL DAILY
Qty: 30 TABLET | Refills: 6 | Status: SHIPPED | OUTPATIENT
Start: 2022-09-27 | End: 2022-11-03

## 2022-09-27 NOTE — PROGRESS NOTES
1. \"Have you been to the ER, urgent care clinic since your last visit? Hospitalized since your last visit? \" No    2. \"Have you seen or consulted any other health care providers outside of the [address] [place] [address] since your last visit? \" No     3. For patients aged 39-70: Has the patient had a colonoscopy / FIT/ Cologuard? yES      If the patient is female:    4. For patients aged 41-77: Has the patient had a mammogram within the past 2 years? Yes - no Care Gap present      5. For patients aged 21-65: Has the patient had a pap smear?  Yes - no Care Gap present

## 2022-09-27 NOTE — PROGRESS NOTES
She can HPI:  Debbie Dave is a 43 y.o. female who presents today with   Chief Complaint   Patient presents with    Anxiety    Headache     X 4 DAYS         Last Friday she had a panic attack while at work in a meeting. She excused herself at the time and went home;  Had heart racing, felt overwhelmed and states things were \" all over place\" . She had decreased appetite on Saturday and Sunday. She has known memory concerns and she has not \" felt right\"   NO suicidal ideation; Her PHQ 9 score is noted. She has had HA X 4 days; Headaches are frontal; Head feels tight;  there is no vomiting. She is considering applying for SS disability      She does still work.; has difficulty concentrating at her job. She has been referred to neuropsych. ; apparently neuropsych does not take her insurance. Pt also has been referred to GYN for LGSIL on pap;  she has not had this evaluated because she states GYN to m she was referred also does not accept her insurance.   Will need to refer to a different GYN    3 most recent PHQ Screens 9/27/2022   PHQ Not Done -   Little interest or pleasure in doing things Nearly every day   Feeling down, depressed, irritable, or hopeless Not at all   Total Score PHQ 2 3   Trouble falling or staying asleep, or sleeping too much Nearly every day   Feeling tired or having little energy Nearly every day   Poor appetite, weight loss, or overeating Nearly every day   Feeling bad about yourself - or that you are a failure or have let yourself or your family down Not at all   Trouble concentrating on things such as school, work, reading, or watching TV Nearly every day   Moving or speaking so slowly that other people could have noticed; or the opposite being so fidgety that others notice Not at all   Thoughts of being better off dead, or hurting yourself in some way Not at all   PHQ 9 Score 15   How difficult have these problems made it for you to do your work, take care of your home and get along with others Somewhat difficult               PMH,  Meds, Allergies, Family History, Social history reviewed      Current Outpatient Medications   Medication Sig Dispense Refill    ferrous sulfate (IRON) 325 mg (65 mg iron) EC tablet Take 1 Tablet by mouth daily. 90 Tablet 1    cyclobenzaprine (FLEXERIL) 10 mg tablet       albuterol (Proventil HFA) 90 mcg/actuation inhaler Take 2 Puffs by inhalation every six (6) hours as needed for Wheezing. 1 Each 3    multivitamin (ONE A DAY) tablet Take 1 Tablet by mouth daily. thiamine HCL (B-1) 100 mg tablet Take 100 mg by mouth daily. calcium-cholecalciferol, d3, (CALCIUM 600 + D) 600-125 mg-unit tab Take 1 Tablet by mouth daily. cyanocobalamin 1,000 mcg tablet Take 1,000 mcg by mouth daily. ondansetron (ZOFRAN ODT) 4 mg disintegrating tablet Take 1 Tablet by mouth every eight (8) hours as needed for Nausea or Vomiting. (Patient not taking: Reported on 9/27/2022) 30 Tablet 0    pramoxine (Sarna Sensitive) 1 % lotion Apply  to affected area three (3) times daily.  (Patient not taking: Reported on 9/27/2022) 1 Each 0        Allergies   Allergen Reactions    Nsaids (Non-Steroidal Anti-Inflammatory Drug) Other (comments)     Contraindicated related to gastric bypass                    ROS as per HPI      Visit Vitals  /64 (BP 1 Location: Right arm, BP Patient Position: Sitting, BP Cuff Size: Large adult)   Pulse 80   Temp 97.2 °F (36.2 °C) (Temporal)   Resp 16   Ht 5' 5\" (1.651 m)   Wt 229 lb 9.6 oz (104.1 kg)   LMP 09/25/2022 (Approximate)   SpO2 95%   BMI 38.21 kg/m²     Physical Exam  General appearance: alert, cooperative, no distress, appears stated age  Neck: supple, symmetrical, trachea midline, no adenopathy, thyroid: not enlarged, symmetric, no tenderness/mass/nodules, no carotid bruit and no JVD  Lungs: clear to auscultation bilaterally  Heart: regular rate and rhythm, S1, S2 normal, no murmur, click, rub or gallop  Abdomen: soft, non-tender. Bowel sounds normal. No masses,  no organomegaly  Extremities: extremities normal, atraumatic, no cyanosis or edema  Neurological exam reveals alert, oriented, normal speech, no focal findings or movement disorder noted, screening mental status exam normal. ( Hypoglossal nerve not tested)      Assessment/Plan:    Diagnoses and all orders for this visit:    1. H/O: CVA (cerebrovascular accident)  -     REFERRAL TO NEUROLOGY  -     CT HEAD W WO CONT; Future    2. Anxiety and depression    3. Memory disturbance  -     REFERRAL TO NEUROLOGY    4. LGSIL on Pap smear of cervix  -     REFERRAL TO OBSTETRICS AND GYNECOLOGY    Other orders  -     sertraline (ZOLOFT) 50 mg tablet; Take 1 Tablet by mouth daily. As above  Not controlled  Neurology consult given patient's history of stroke, memory disturbance  Zoloft as ordered. This may indeed help with her depression and anxiety. Check a CT scan. Will need to submit a referral for patient's history of LGSIL in the past.  Follow-up and Dispositions    Return in about 6 weeks (around 11/8/2022) for depression/ memory/ follow up CT. This has been fully explained to the patient, who indicates understanding.       Lisa Mccarthy MD

## 2022-09-27 NOTE — PATIENT INSTRUCTIONS
Anxiety Disorder: Care Instructions  Your Care Instructions     Anxiety is a normal reaction to stress. Difficult situations can cause you to have symptoms such as sweaty palms and a nervous feeling. In an anxiety disorder, the symptoms are far more severe. Constant worry, muscle tension, trouble sleeping, nausea and diarrhea, and other symptoms can make normal daily activities difficult or impossible. These symptoms may occur for no reason, and they can affect your work, school, or social life. Medicines, counseling, and self-care can all help. Follow-up care is a key part of your treatment and safety. Be sure to make and go to all appointments, and call your doctor if you are having problems. It's also a good idea to know your test results and keep a list of the medicines you take. How can you care for yourself at home? Take medicines exactly as directed. Call your doctor if you think you are having a problem with your medicine. Go to your counseling sessions and follow-up appointments. Recognize and accept your anxiety. Then, when you are in a situation that makes you anxious, say to yourself, \"This is not an emergency. I feel uncomfortable, but I am not in danger. I can keep going even if I feel anxious. \"  Be kind to your body:  Relieve tension with exercise or a massage. Get enough rest.  Avoid alcohol, caffeine, nicotine, and illegal drugs. They can increase your anxiety level and cause sleep problems. Learn and do relaxation techniques. See below for more about these techniques. Engage your mind. Get out and do something you enjoy. Go to a funny movie, or take a walk or hike. Plan your day. Having too much or too little to do can make you anxious. Keep a record of your symptoms. Discuss your fears with a good friend or family member, or join a support group for people with similar problems. Talking to others sometimes relieves stress. Get involved in social groups, or volunteer to help others. Being alone sometimes makes things seem worse than they are. Get at least 30 minutes of exercise on most days of the week to relieve stress. Walking is a good choice. You also may want to do other activities, such as running, swimming, cycling, or playing tennis or team sports. Relaxation techniques  Do relaxation exercises 10 to 20 minutes a day. You can play soothing, relaxing music while you do them, if you wish. Tell others in your house that you are going to do your relaxation exercises. Ask them not to disturb you. Find a comfortable place, away from all distractions and noise. Lie down on your back, or sit with your back straight. Focus on your breathing. Make it slow and steady. Breathe in through your nose. Breathe out through either your nose or mouth. Breathe deeply, filling up the area between your navel and your rib cage. Breathe so that your belly goes up and down. Do not hold your breath. Breathe like this for 5 to 10 minutes. Notice the feeling of calmness throughout your whole body. As you continue to breathe slowly and deeply, relax by doing the following for another 5 to 10 minutes:  Tighten and relax each muscle group in your body. You can begin at your toes and work your way up to your head. Imagine your muscle groups relaxing and becoming heavy. Empty your mind of all thoughts. Let yourself relax more and more deeply. Become aware of the state of calmness that surrounds you. When your relaxation time is over, you can bring yourself back to alertness by moving your fingers and toes and then your hands and feet and then stretching and moving your entire body. Sometimes people fall asleep during relaxation, but they usually wake up shortly afterward. Always give yourself time to return to full alertness before you drive a car or do anything that might cause an accident if you are not fully alert. Never play a relaxation tape while you drive a car. When should you call for help? Call 911 anytime you think you may need emergency care. For example, call if:    You feel you cannot stop from hurting yourself or someone else. Keep the numbers for these national suicide hotlines: 5-390-130-TALK (1-806.762.5447) and 0-404-IGJOJIR (8-648.728.5092). If you or someone you know talks about suicide or feeling hopeless, get help right away. Watch closely for changes in your health, and be sure to contact your doctor if:    You have anxiety or fear that affects your life. You have symptoms of anxiety that are new or different from those you had before. Where can you learn more? Go to http://www.[employer].com/  Enter P754 in the search box to learn more about \"Anxiety Disorder: Care Instructions. \"  Current as of: June 16, 2021               Content Version: 13.2  © 4770-3602 [employer], [employer]. Care instructions adapted under license by [employer] (which disclaims liability or warranty for this information). If you have questions about a medical condition or this instruction, always ask your healthcare professional. [address] any warranty or liability for your use of this information.

## 2022-09-27 NOTE — LETTER
NOTIFICATION RETURN TO WORK / SCHOOL    9/27/2022 10:12 AM    Ms[address]      To Whom It May Concern:    Prem Shahid is currently under the care of [address] She will return to work/school on: 10/3/2022    If there are questions or concerns please have the patient contact our office.         Sincerely,      Jose Luis Reese MD

## 2022-11-03 ENCOUNTER — HOSPITAL ENCOUNTER (EMERGENCY)
Age: 42
Discharge: HOME | End: 2022-11-03
Attending: EMERGENCY MEDICINE | Admitting: EMERGENCY MEDICINE
Payer: COMMERCIAL

## 2022-11-03 VITALS
DIASTOLIC BLOOD PRESSURE: 91 MMHG | TEMPERATURE: 98.4 F | HEIGHT: 65 IN | WEIGHT: 224 LBS | HEART RATE: 69 BPM | RESPIRATION RATE: 18 BRPM | BODY MASS INDEX: 37.32 KG/M2 | OXYGEN SATURATION: 100 % | SYSTOLIC BLOOD PRESSURE: 130 MMHG

## 2022-11-03 DIAGNOSIS — K12.2 CELLULITIS AND ABSCESS OF MOUTH: Primary | ICD-10-CM

## 2022-11-03 PROCEDURE — 99283 EMERGENCY DEPT VISIT LOW MDM: CPT | Performed by: EMERGENCY MEDICINE

## 2022-11-03 RX ORDER — FLUCONAZOLE 150 MG/1
150 TABLET ORAL
Qty: 1 TABLET | Refills: 0 | Status: SHIPPED | OUTPATIENT
Start: 2022-11-03 | End: 2022-11-03

## 2022-11-03 RX ORDER — CLINDAMYCIN HYDROCHLORIDE 300 MG/1
300 CAPSULE ORAL 4 TIMES DAILY
Qty: 28 CAPSULE | Refills: 0 | Status: SHIPPED | OUTPATIENT
Start: 2022-11-03 | End: 2022-11-10

## 2022-11-03 RX ORDER — HYDROCODONE BITARTRATE AND ACETAMINOPHEN 5; 325 MG/1; MG/1
1 TABLET ORAL
Qty: 10 TABLET | Refills: 0 | Status: SHIPPED | OUTPATIENT
Start: 2022-11-03 | End: 2022-11-06

## 2022-11-03 NOTE — ED PROVIDER NOTES
[facility] (DP/SNF)  Emergency Department Treatment Report        Patient: Luciana Arreaga Age: 43 y.o. Sex: female    YOB: 1980 Admit Date: 11/3/2022 PCP: Jesus Alvarado MD   MRN: [id number]  CSN: [id number]  Attending: Lazarus Keller, MD      Room: HPIT/PIT Time Dictated: 1:41 PM PA-C: FIFI Stark     Chief Complaint   Dental pain    History of Present Illness   1:42 PM   43 y.o. female presents to the ED C/O RT upper molar pain for the last several weeks. Pt reports that she has been to [facility] and they still haven't removed the tooth. She had some left over Tylenol #3 but it isn't helping anymore. She had Gastric Bypass 1/2021 so she cannot take NSAIDS. Pt denies fever, chills, SOB, difficulty breathing, sore throat, swelling tot he floor of her mouth, glossitis or difficulty swallowing. PMHx: Gastric Bypass, Anxiety, Chronic LBP, DM, Depression, HTN, Memory loss,  Stroke        Review of Systems   Review of Systems   Constitutional:  Negative for appetite change, chills and fever. HENT:  Positive for dental problem. Negative for congestion, drooling, ear pain, sore throat and trouble swallowing. Eyes:  Negative for discharge and redness. Respiratory:  Negative for cough, chest tightness, shortness of breath and wheezing. Cardiovascular:  Negative for chest pain. Gastrointestinal:  Negative for abdominal pain, constipation, diarrhea, nausea and vomiting. Endocrine: Negative for polyuria. Genitourinary:  Negative for dysuria, frequency, hematuria, pelvic pain, urgency, vaginal bleeding, vaginal discharge and vaginal pain. Musculoskeletal:  Negative for back pain, joint swelling and neck pain. Skin:  Negative for rash and wound. Allergic/Immunologic: Negative for immunocompromised state. Neurological:  Negative for dizziness, syncope, light-headedness, numbness and headaches. Hematological:  Negative for adenopathy.    Psychiatric/Behavioral: Negative for agitation and confusion. The patient is not nervous/anxious.     Past Medical/Surgical History     Past Medical History:   Diagnosis Date    Abnormal MRI of head 2010    Nonspecific focus of low attenuation at the right internal capsule on CT and MRI    Anxiety     Chronic low back pain 2014    unknown cause- normal xrays    Depression     Diabetes ([facility] [address]     Pre-DM- no meds    Hypertension     no meds now    Memory loss     Prediabetes     Scoliosis     Stroke ([facility] [address] 2008    denies residual PER PATIENT MINI STROKE( TIA)     Past Surgical History:   Procedure Laterality Date    HX GASTRIC BYPASS      HX GASTRIC BYPASS  08/25/2021       Social History     Social History     Socioeconomic History    Marital status: SINGLE     Spouse name: Not on file    Number of children: Not on file    Years of education: Not on file    Highest education level: Not on file   Occupational History    Occupation: apartment complex   Tobacco Use    Smoking status: Never    Smokeless tobacco: Never   Vaping Use    Vaping Use: Never used   Substance and Sexual Activity    Alcohol use: Not Currently     Alcohol/week: 1.0 standard drink     Types: 1 Glasses of wine per week     Comment: socially    Drug use: Never    Sexual activity: Yes     Partners: Male     Birth control/protection: Condom   Other Topics Concern     Service Not Asked    Blood Transfusions Not Asked    Caffeine Concern Not Asked    Occupational Exposure Not Asked    Hobby Hazards Not Asked    Sleep Concern Not Asked    Stress Concern Not Asked    Weight Concern Not Asked    Special Diet Not Asked    Back Care Not Asked    Exercise Not Asked    Bike Helmet Not Asked    Seat Belt Not Asked    Self-Exams Not Asked   Social History Narrative    Not on file     Social Determinants of Health     Financial Resource Strain: Low Risk     Difficulty of Paying Living Expenses: Not very hard   Food Insecurity: No Food Insecurity    Worried About Running Out of Food in the Last Year: Never true    Ran Out of Food in the Last Year: Never true   Transportation Needs: Not on file   Physical Activity: Not on file   Stress: Not on file   Social Connections: Not on file   Intimate Partner Violence: Not on file   Housing Stability: Not on file       Family History     Family History   Problem Relation Age of Onset    Diabetes Other     Hypertension Other     Heart Disease Other     Asthma Other     OSTEOARTHRITIS Other     Diabetes Mother     Hypertension Mother        Current Medications     Prior to Admission Medications   Prescriptions Last Dose Informant Patient Reported? Taking? albuterol (Proventil HFA) 90 mcg/actuation inhaler   No No   Sig: Take 2 Puffs by inhalation every six (6) hours as needed for Wheezing. calcium-cholecalciferol, d3, (CALCIUM 600 + D) 600-125 mg-unit tab   Yes No   Sig: Take 1 Tablet by mouth daily. cyanocobalamin 1,000 mcg tablet   Yes No   Sig: Take 1,000 mcg by mouth daily. cyclobenzaprine (FLEXERIL) 10 mg tablet   Yes No   ferrous sulfate (IRON) 325 mg (65 mg iron) EC tablet   No No   Sig: Take 1 Tablet by mouth daily. multivitamin (ONE A DAY) tablet   Yes No   Sig: Take 1 Tablet by mouth daily. thiamine HCL (B-1) 100 mg tablet   Yes No   Sig: Take 100 mg by mouth daily. Facility-Administered Medications: None       Allergies     Allergies   Allergen Reactions    Nsaids (Non-Steroidal Anti-Inflammatory Drug) Other (comments)     Contraindicated related to gastric bypass         Physical Exam   Patient Vitals for the past 8 hrs:   Temp Pulse Resp BP SpO2   11/03/22 1245 98.4 °F (36.9 °C) 69 18 (!) 130/91 100 %       Physical Exam  Vitals and nursing note reviewed. Constitutional:       General: She is not in acute distress. Appearance: She is well-developed. She is not diaphoretic. HENT:      Head: Normocephalic and atraumatic. Right Ear: Tympanic membrane, ear canal and external ear normal. There is no impacted cerumen. Left Ear: Tympanic membrane, ear canal and external ear normal. There is no impacted cerumen. Nose: Nose normal. No congestion or rhinorrhea. Mouth/Throat:      Mouth: Mucous membranes are moist.      Pharynx: No oropharyngeal exudate or posterior oropharyngeal erythema. Comments: RT upper 2nd molar is chipped with mild buccal gingival erythema and edema. Eyes:      General:         Right eye: No discharge. Left eye: No discharge. Conjunctiva/sclera: Conjunctivae normal.   Cardiovascular:      Rate and Rhythm: Normal rate and regular rhythm. Heart sounds: Normal heart sounds. No murmur heard. No friction rub. No gallop. Pulmonary:      Effort: Pulmonary effort is normal. No respiratory distress. Breath sounds: Normal breath sounds. No wheezing or rales. Chest:      Chest wall: No tenderness. Abdominal:      General: Bowel sounds are normal. There is no distension. Palpations: Abdomen is soft. There is no mass. Tenderness: There is no abdominal tenderness. There is no guarding or rebound. Musculoskeletal:         General: No tenderness or deformity. Normal range of motion. Cervical back: Normal range of motion and neck supple. Lymphadenopathy:      Cervical: No cervical adenopathy. Skin:     General: Skin is warm and dry. Findings: No rash. Neurological:      Mental Status: She is alert and oriented to person, place, and time. Psychiatric:         Behavior: Behavior normal.         Thought Content: Thought content normal.         Judgment: Judgment normal.       Diagnostic Studies   RESULTS:    No orders to display       Labs Reviewed - No data to display    No results found for this or any previous visit (from the past 12 hour(s)).     MEDICATIONS GIVEN:  Medications - No data to display    Procedures  Procedures    Impression / ED Course / Medical Decision Making   MDM  Number of Diagnoses or Management Options  Cellulitis and abscess of mouth: minor  Diagnosis management comments: DDx: Toothache, mouth abscess, dental caries, dry socket    Impression: Mouth cellulitis/tooth abscess    Management Plan: Evaluate and treat dental pain. Prescribed Clindamycin and Ibuprofen. Advised F/U with Dentist as soon as possible. Patient was in agreement with discharge plan and discharged in good condition. Risk of Complications, Morbidity, and/or Mortality  Presenting problems: low  Diagnostic procedures: low  Management options: low    Patient Progress  Patient progress: stable      PROGRESS NOTE:   1:42 PM   Initial assessment completed. DISCHARGE NOTE:  1:58 PM   Jessa Servin's  results have been reviewed with her. She has been counseled regarding her diagnosis, treatment, and plan. She verbally conveys understanding and agreement of the signs, symptoms, diagnosis, treatment and prognosis and additionally agrees to follow up as discussed. She also agrees with the care-plan and conveys that all of her questions have been answered. I have also provided discharge instructions for her that include: educational information regarding their diagnosis and treatment, and list of reasons why they would want to return to the ED prior to their follow-up appointment, should her condition change. Final Diagnosis     1. Cellulitis and abscess of mouth        Disposition     Current Discharge Medication List        START taking these medications    Details   clindamycin (CLEOCIN) 300 mg capsule Take 1 Capsule by mouth four (4) times daily for 7 days. Qty: 28 Capsule, Refills: 0  Start date: 11/3/2022, End date: 11/10/2022      HYDROcodone-acetaminophen (Norco) 5-325 mg per tablet Take 1 Tablet by mouth every six (6) hours as needed for Pain for up to 3 days. Max Daily Amount: 4 Tablets.   Qty: 10 Tablet, Refills: 0  Start date: 11/3/2022, End date: 11/6/2022    Associated Diagnoses: Cellulitis and abscess of mouth      fluconazole (Diflucan) 150 mg tablet Take 1 Tablet by mouth now for 1 dose. FDA advises cautious prescribing of oral fluconazole in pregnancy. Qty: 1 Tablet, Refills: 0  Start date: 11/3/2022, End date: 11/3/2022              Follow-up Information       Follow up With Specialties Details Why Contact [facility]anna [place]  Go to  As scheduled on 11/22/2022 [phone]               Nursing notes have been reviewed by the physician/ advanced practice    Clinician.     Janet Boykin PA-C  November 3, 2022

## 2022-11-03 NOTE — ED NOTES
Discharge instructions reviewed with patient by Yasmin Paula RN. Patient verbalized understanding. Patient advised to follow up as directed on discharge instructions. Patient denies questions, needs or concerns at this time. Patient verbalized understanding. No s/sx of distress noted.

## 2022-11-03 NOTE — ED TRIAGE NOTES
Patient c/o dental pain to right upper tooth. She states being evaluated at [facility] a few days ago and received prescription for Tylenol #3, which she states was helping her pain.

## 2022-11-08 ENCOUNTER — TELEPHONE (OUTPATIENT)
Dept: FAMILY MEDICINE CLINIC | Age: 42
End: 2022-11-08

## 2022-11-08 NOTE — TELEPHONE ENCOUNTER
----- Message from Daren Lizarraga sent at 11/4/2022 11:01 AM EDT -----  Subject: Message to Provider    QUESTIONS  Information for Provider? Pt is following up with the office for paperwork   that was sent on her behalf from [employer]. She is looking for a reasonable accommodations form to be   filled out by her PCP so she doesn't loose section 8 housing. Please   advise if paperwork has been received.   ---------------------------------------------------------------------------  --------------  Lorin KOVACS  [phone]; OK to leave message on voicemail  ---------------------------------------------------------------------------  --------------  SCRIPT ANSWERS  Relationship to Patient?  Self

## 2022-11-09 NOTE — TELEPHONE ENCOUNTER
Patient checking on a call back from the nurse. Stating she has been terminated from her housing.      Please advise

## 2022-11-11 NOTE — TELEPHONE ENCOUNTER
Spoke with the patient in regards to Request for Reasonable Accommodations form. Patient comment she suffers from memory loss. [employer] are trying to take my Section 8 because Im not filling out paperwork correctly\". \"Then I dont turn it in on time. \" \" I have memory issues and they need compassion\". She procrastinates will bills, tasks and paperwork. Inquired how much time would she need to complete paperwork. States she is unsure. \"I have memory issues from my stroke in 2008\" States she is also unorganized. Will make a list but never completes. \"You can tell me to fill out a form I will put it on a list and wont do it\" \"I dont know why\". Inquired on what will the accommodations accomplish. Patient comments she is unsure she will forget to turn in the form. \"Just put on the form whatever works for Ms. Win Rivers because I dont know what else they are looking for. Will give information and form to the provider for review. She verbalized understanding.

## 2022-11-17 ENCOUNTER — HOSPITAL ENCOUNTER (OUTPATIENT)
Dept: CT IMAGING | Age: 42
Discharge: HOME | End: 2022-11-17
Attending: FAMILY MEDICINE
Payer: COMMERCIAL

## 2022-11-17 DIAGNOSIS — Z86.73 H/O: CVA (CEREBROVASCULAR ACCIDENT): ICD-10-CM

## 2022-11-17 PROCEDURE — 70470 CT HEAD/BRAIN W/O & W/DYE: CPT

## 2022-11-17 PROCEDURE — 74011000636 HC RX REV CODE- 636: Performed by: FAMILY MEDICINE

## 2022-11-17 RX ADMIN — IOPAMIDOL 80 ML: 612 INJECTION, SOLUTION INTRAVENOUS at 12:08

## 2022-11-18 NOTE — TELEPHONE ENCOUNTER
Called patient to let know paperwork was faxed over and scanned into chart. Patient verbalized understanding and had no further questions.

## 2022-11-29 ENCOUNTER — TELEPHONE (OUTPATIENT)
Dept: FAMILY MEDICINE CLINIC | Age: 42
End: 2022-11-29

## 2022-11-29 NOTE — TELEPHONE ENCOUNTER
Pt called to see if any earlier appts available. States her vv scheduled for 3:20 is too late in the evening and she may not be able to mentally ready for her in person appt for the next day.  Adv of next available appt, stated too far away and would try to keep current appt

## 2022-12-01 ENCOUNTER — VIRTUAL VISIT (OUTPATIENT)
Dept: FAMILY MEDICINE CLINIC | Age: 42
End: 2022-12-01

## 2022-12-05 ENCOUNTER — TELEPHONE (OUTPATIENT)
Dept: FAMILY MEDICINE CLINIC | Age: 42
End: 2022-12-05

## 2022-12-05 NOTE — TELEPHONE ENCOUNTER
Patient called stating she had an appointment for virtual visit on 12/01/22 and never received the link for her appointment also stated that she needs letter stating that due to her mental state she is not able to seat in front of a panel interview for her housing and can not wait till her appointment on 12/14/22 to discuss with provider because her appt with housing is on 12/08/22 and if nothing is done she will be homeless. Yolis Valles

## 2022-12-05 NOTE — LETTER
12/5/2022     Ms. [address] [facility] [id number]      To Whom It May Concern:    Christina De La Cruz [facility] Please be advised that Ms. Roberth Holliday has a history of anxiety and memory disturbances. Due to these conditions she feels as though she is unable to sit for a panel interview to discuss her housing.         Sincerely,      Gaurav Arciniega MD

## 2022-12-14 ENCOUNTER — VIRTUAL VISIT (OUTPATIENT)
Dept: FAMILY MEDICINE CLINIC | Age: 42
End: 2022-12-14
Payer: COMMERCIAL

## 2022-12-14 DIAGNOSIS — F41.9 ANXIETY AND DEPRESSION: Primary | ICD-10-CM

## 2022-12-14 DIAGNOSIS — F32.A ANXIETY AND DEPRESSION: Primary | ICD-10-CM

## 2022-12-14 DIAGNOSIS — Z86.73 H/O: CVA (CEREBROVASCULAR ACCIDENT): ICD-10-CM

## 2022-12-14 DIAGNOSIS — R41.3 MEMORY DISTURBANCE: ICD-10-CM

## 2022-12-14 PROCEDURE — 99442 PR PHYS/QHP TELEPHONE EVALUATION 11-20 MIN: CPT | Performed by: FAMILY MEDICINE

## 2022-12-14 NOTE — PATIENT INSTRUCTIONS
Anxiety Disorder: Care Instructions  Your Care Instructions     Anxiety is a normal reaction to stress. Difficult situations can cause you to have symptoms such as sweaty palms and a nervous feeling. In an anxiety disorder, the symptoms are far more severe. Constant worry, muscle tension, trouble sleeping, nausea and diarrhea, and other symptoms can make normal daily activities difficult or impossible. These symptoms may occur for no reason, and they can affect your work, school, or social life. Medicines, counseling, and self-care can all help. Follow-up care is a key part of your treatment and safety. Be sure to make and go to all appointments, and call your doctor if you are having problems. It's also a good idea to know your test results and keep a list of the medicines you take. How can you care for yourself at home? Take medicines exactly as directed. Call your doctor if you think you are having a problem with your medicine. Go to your counseling sessions and follow-up appointments. Recognize and accept your anxiety. Then, when you are in a situation that makes you anxious, say to yourself, \"This is not an emergency. I feel uncomfortable, but I am not in danger. I can keep going even if I feel anxious. \"  Be kind to your body:  Relieve tension with exercise or a massage. Get enough rest.  Avoid alcohol, caffeine, nicotine, and illegal drugs. They can increase your anxiety level and cause sleep problems. Learn and do relaxation techniques. See below for more about these techniques. Engage your mind. Get out and do something you enjoy. Go to a funny movie, or take a walk or hike. Plan your day. Having too much or too little to do can make you anxious. Keep a record of your symptoms. Discuss your fears with a good friend or family member, or join a support group for people with similar problems. Talking to others sometimes relieves stress. Get involved in social groups, or volunteer to help others. Being alone sometimes makes things seem worse than they are. Get at least 30 minutes of exercise on most days of the week to relieve stress. Walking is a good choice. You also may want to do other activities, such as running, swimming, cycling, or playing tennis or team sports. Relaxation techniques  Do relaxation exercises 10 to 20 minutes a day. You can play soothing, relaxing music while you do them, if you wish. Tell others in your house that you are going to do your relaxation exercises. Ask them not to disturb you. Find a comfortable place, away from all distractions and noise. Lie down on your back, or sit with your back straight. Focus on your breathing. Make it slow and steady. Breathe in through your nose. Breathe out through either your nose or mouth. Breathe deeply, filling up the area between your navel and your rib cage. Breathe so that your belly goes up and down. Do not hold your breath. Breathe like this for 5 to 10 minutes. Notice the feeling of calmness throughout your whole body. As you continue to breathe slowly and deeply, relax by doing the following for another 5 to 10 minutes:  Tighten and relax each muscle group in your body. You can begin at your toes and work your way up to your head. Imagine your muscle groups relaxing and becoming heavy. Empty your mind of all thoughts. Let yourself relax more and more deeply. Become aware of the state of calmness that surrounds you. When your relaxation time is over, you can bring yourself back to alertness by moving your fingers and toes and then your hands and feet and then stretching and moving your entire body. Sometimes people fall asleep during relaxation, but they usually wake up shortly afterward. Always give yourself time to return to full alertness before you drive a car or do anything that might cause an accident if you are not fully alert. Never play a relaxation tape while you drive a car. When should you call for help? Call 911 anytime you think you may need emergency care. For example, call if:    You feel you cannot stop from hurting yourself or someone else. Keep the numbers for these national suicide hotlines: 4-354-852-TALK (4-762.369.4490) and 0-164-ETAEPZL (3-917.537.5480). If you or someone you know talks about suicide or feeling hopeless, get help right away. Watch closely for changes in your health, and be sure to contact your doctor if:    You have anxiety or fear that affects your life. You have symptoms of anxiety that are new or different from those you had before. Where can you learn more? Go to http://www.[employer].com/  Enter P754 in the search box to learn more about \"Anxiety Disorder: Care Instructions. \"  Current as of: June 16, 2021               Content Version: 13.2  © 8883-9337 [employer], [employer]. Care instructions adapted under license by [employer] (which disclaims liability or warranty for this information). If you have questions about a medical condition or this instruction, always ask your healthcare professional. [address] any warranty or liability for your use of this information.

## 2022-12-14 NOTE — PROGRESS NOTES
1. \"Have you been to the ER, urgent care clinic since your last visit? Hospitalized since your last visit? \" No    2. \"Have you seen or consulted any other health care providers outside of the [address] [place] [address] since your last visit? \" No     3. For patients aged 39-70: Has the patient had a colonoscopy / FIT/ Cologuard? NA - based on age      If the patient is female:    4. For patients aged 41-77: Has the patient had a mammogram within the past 2 years? Yes - no Care Gap present      5. For patients aged 21-65: Has the patient had a pap smear?  Yes - no Care Gap present

## 2022-12-14 NOTE — PROGRESS NOTES
Verna Espinosa is a 43 y.o. female, evaluated via audio-only technology on 12/14/2022 for Results  . Pt was in the queue, but left the queue before connection; audio only call done instead. Assessment & Plan:   Diagnoses and all orders for this visit:    1. Anxiety and depression  -     REFERRAL TO PSYCHIATRY    2. H/O: CVA (cerebrovascular accident)  -     REFERRAL TO NEUROLOGY    3. Memory disturbance  -     REFERRAL TO NEUROLOGY      As above, still not controlled  Pt unable to complete previous neuropsych assessment; last resource referred to did not have neuropsych services ( [facility]); unable to complete an ED assessment as well. Will refer then to Psych and to Neurology    This has been fully explained to the patient, who indicates understanding. [email or web address] and pt has been advised of same. Subjective:   Still has some memory changes. This affects her day to day activities with respect to her work, keeping deadlines, staying focused enough to fill out forms. States she is having difficulty keeping up with her bills. She has inattention and feels \" cloudy\"  She has had a hearing to assess her ability to maintain her housing voucher. She has been prescribed zoloft in the past.  She was unable to tolerate zoloft. She had a panic attack recently when she had a complaint on her at her job. She has lost her job. She has  been referred to neuropsychology but was unable to complete these  assessments. Prior to Admission medications    Medication Sig Start Date End Date Taking? Authorizing Provider   ferrous sulfate (IRON) 325 mg (65 mg iron) EC tablet Take 1 Tablet by mouth daily. 12/20/21  Yes Manda Sharif NP   albuterol (Proventil HFA) 90 mcg/actuation inhaler Take 2 Puffs by inhalation every six (6) hours as needed for Wheezing. 9/27/21  Yes Jelani Guzmán, DO   multivitamin (ONE A DAY) tablet Take 1 Tablet by mouth daily.    Yes Provider, Historical   thiamine HCL (B-1) 100 mg tablet Take 100 mg by mouth daily. Yes Provider, Historical   calcium-cholecalciferol, d3, (CALCIUM 600 + D) 600-125 mg-unit tab Take 1 Tablet by mouth daily. Yes Provider, Historical   cyanocobalamin 1,000 mcg tablet Take 1,000 mcg by mouth daily. Yes Provider, Historical     Patient Active Problem List    Diagnosis Date Noted    Pulmonary embolism ([facility] 09/10/2021    Morbid obesity with BMI of 45.0-49.9, adult ([facility] 08/25/2021    Hypertension     Scoliosis     Morbid obesity ([facility] 03/26/2020    Anxiety 05/11/2018    Depression 05/11/2018    BMI 50.0-59.9, adult ([facility] 05/11/2018    Scoliosis of thoracic spine 06/29/2017    Back pain     Prediabetes 08/19/2015     Current Outpatient Medications   Medication Sig Dispense Refill    ferrous sulfate (IRON) 325 mg (65 mg iron) EC tablet Take 1 Tablet by mouth daily. 90 Tablet 1    albuterol (Proventil HFA) 90 mcg/actuation inhaler Take 2 Puffs by inhalation every six (6) hours as needed for Wheezing. 1 Each 3    multivitamin (ONE A DAY) tablet Take 1 Tablet by mouth daily. thiamine HCL (B-1) 100 mg tablet Take 100 mg by mouth daily. calcium-cholecalciferol, d3, (CALCIUM 600 + D) 600-125 mg-unit tab Take 1 Tablet by mouth daily. cyanocobalamin 1,000 mcg tablet Take 1,000 mcg by mouth daily.        Allergies   Allergen Reactions    Nsaids (Non-Steroidal Anti-Inflammatory Drug) Other (comments)     Contraindicated related to gastric bypass       Past Medical History:   Diagnosis Date    Abnormal MRI of head 2010    Nonspecific focus of low attenuation at the right internal capsule on CT and MRI    Anxiety     Chronic low back pain 2014    unknown cause- normal xrays    Depression     Diabetes ([facility]     Pre-DM- no meds    Hypertension     no meds now    Memory loss     Prediabetes     Scoliosis     Stroke [facility]) 2008    denies residual PER PATIENT MINI STROKE( TIA)     Past Surgical History:   Procedure Laterality Date    HX GASTRIC BYPASS HX GASTRIC BYPASS  08/25/2021     Family History   Problem Relation Age of Onset    Diabetes Other     Hypertension Other     Heart Disease Other     Asthma Other     OSTEOARTHRITIS Other     Diabetes Mother     Hypertension Mother      Social History     Tobacco Use    Smoking status: Never    Smokeless tobacco: Never   Substance Use Topics    Alcohol use: Not Currently     Alcohol/week: 1.0 standard drink     Types: 1 Glasses of wine per week     Comment: socially       ROS as per HPI    No data recorded     Fran Maldonado was evaluated through a patient-initiated, synchronous (real-time) audio only encounter. She (or guardian if applicable) is aware that it is a billable service, which includes applicable co-pays, with coverage as determined by her insurance carrier. This visit was conducted with the patient's (and/or Yojana Reyes guardian's) verbal consent. She has not had a related appointment within my department in the past 7 days or scheduled within the next 24 hours. The patient was located in a state where the provider was licensed to provide care. The patient was located at: Home: [address] [place] [phone]  The provider was located at:  Facility (Appt Department): [facility] [address]    Total Time: minutes: 11-20 minutes    Veronica Bonds MD

## 2023-01-05 ENCOUNTER — VIRTUAL VISIT (OUTPATIENT)
Dept: FAMILY MEDICINE CLINIC | Age: 43
End: 2023-01-05
Payer: COMMERCIAL

## 2023-01-05 DIAGNOSIS — F32.A ANXIETY AND DEPRESSION: ICD-10-CM

## 2023-01-05 DIAGNOSIS — Z86.73 H/O: CVA (CEREBROVASCULAR ACCIDENT): ICD-10-CM

## 2023-01-05 DIAGNOSIS — F41.9 ANXIETY AND DEPRESSION: ICD-10-CM

## 2023-01-05 DIAGNOSIS — B00.9 HSV-2 INFECTION: ICD-10-CM

## 2023-01-05 DIAGNOSIS — R41.3 MEMORY DISTURBANCE: Primary | ICD-10-CM

## 2023-01-05 RX ORDER — VALACYCLOVIR HYDROCHLORIDE 1 G/1
1000 TABLET, FILM COATED ORAL DAILY
Qty: 30 TABLET | Refills: 6 | Status: SHIPPED | OUTPATIENT
Start: 2023-01-05

## 2023-01-05 NOTE — PROGRESS NOTES
Annelise Barrios (: 1980) is a 43 y.o. female, established patient, here for evaluation of the following chief complaint(s): Other (Accomodation Forms)           Annelise Barrios, was evaluated through a synchronous (real-time) audio-video encounter. The patient (or guardian if applicable) is aware that this is a billable service, which includes applicable co-pays. This Virtual Visit was conducted with patient's (and/or legal guardian's) consent. The visit was conducted pursuant to the emergency declaration under the [address] [place] [address], [address] [place] [address] [facility] authority and the [employer] and "[employer] General Act. Patient identification was verified, and a caregiver was present when appropriate. The patient was located at: Home: [address] [place] [phone]  The provider was located at: Facility (Appt Department): [address]  [address]       An [address] [facility] was used to authenticate this note.   -- Graciela Naranjo

## 2023-01-09 NOTE — PROGRESS NOTES
Lorra Simmonds is a 43 y.o. female who was seen by synchronous (real-time) audio-video technology on 1/5/2023 for Other (Accomodation Forms) and Memory Loss        Assessment & Plan:   Diagnoses and all orders for this visit:    1. Memory disturbance    2. Anxiety and depression    3. H/O: CVA (cerebrovascular accident)    4. HSV-2 infection    Other orders  -     valACYclovir (VALTREX) 1 gram tablet; Take 1 Tablet by mouth daily. As above  The condition and status is of the above are unchanged  Given that the initial information to support the need for her continued housing voucher was not sufficient patient is to provide the documentation needed to approve her housing voucher. The objective status of her medical conditions has already been provided to her for submission to her housing officials. Patient is to consider behavioral health consultation if this will help with her housing voucher accommodations form. She apparently has also found another housing resource for which she will need accommodations. She will also provide what is needed for this housing resource  as far as the accommodations as well. Prescription for suppression therapy with valtrex  for HSV-2 orderedfor the patient. Follow-up and Dispositions    Return if symptoms worsen or fail to improve. This has been fully explained to the patient, who indicates understanding. [facility] and pt has been advised of same. '    Subjective:       Patient has a history of memory disturbance, CVA and anxiety and depression. She has been attempting to get an extension of her section 8 housing voucher. She has been given an accommodations form as well as a supporting letter in this endeavor. She  has had a hearing regarding the same and she is trying to get the decision  to not extend her  housing voucher reversed. She states her anxiety has increased due to the difficulty she has had with this.   She has been assigned a housing counselor who has apparently recommended that patient see a behavioral health specialist.  Patient has apparently found other housing  and will need an accommodations letter for this housing source  as well. Patient still finds it difficult to fill out paperwork, remembering appointments, remembering the days of the week all of which makes it difficult and per patient, \"mentally unable to fight\" for her housing accommodations. Prior to Admission medications    Medication Sig Start Date End Date Taking? Authorizing Provider   valACYclovir (VALTREX) 1 gram tablet Take 1 Tablet by mouth daily. 1/5/23  Yes Satinder Duran MD   ferrous sulfate (IRON) 325 mg (65 mg iron) EC tablet Take 1 Tablet by mouth daily. 12/20/21  Yes Harsh Hooks NP   multivitamin (ONE A DAY) tablet Take 1 Tablet by mouth daily. Yes Provider, Historical   thiamine HCL (B-1) 100 mg tablet Take 100 mg by mouth daily. Yes Provider, Historical   calcium-cholecalciferol, d3, (CALCIUM 600 + D) 600-125 mg-unit tab Take 1 Tablet by mouth daily. Yes Provider, Historical   cyanocobalamin 1,000 mcg tablet Take 1,000 mcg by mouth daily. Yes Provider, Historical     Patient Active Problem List   Diagnosis Code    Prediabetes R73.03    Back pain M54.9    Scoliosis of thoracic spine M41.9    Anxiety F41.9    Depression F32. A    BMI 50.0-59.9, adult ([facility]) Z68.43    Morbid obesity ([facility]) E66.01    Hypertension I10    Scoliosis M41.9    Morbid obesity with BMI of 45.0-49.9, adult ([facility]) E66.01, Z68.42    Pulmonary embolism ([facility]) I26.99     Current Outpatient Medications   Medication Sig Dispense Refill    valACYclovir (VALTREX) 1 gram tablet Take 1 Tablet by mouth daily. 30 Tablet 6    ferrous sulfate (IRON) 325 mg (65 mg iron) EC tablet Take 1 Tablet by mouth daily. 90 Tablet 1    multivitamin (ONE A DAY) tablet Take 1 Tablet by mouth daily. thiamine HCL (B-1) 100 mg tablet Take 100 mg by mouth daily. calcium-cholecalciferol, d3, (CALCIUM 600 + D) 600-125 mg-unit tab Take 1 Tablet by mouth daily. cyanocobalamin 1,000 mcg tablet Take 1,000 mcg by mouth daily. Allergies   Allergen Reactions    Nsaids (Non-Steroidal Anti-Inflammatory Drug) Other (comments)     Contraindicated related to gastric bypass       Past Medical History:   Diagnosis Date    Abnormal MRI of head 2010    Nonspecific focus of low attenuation at the right internal capsule on CT and MRI    Anxiety     Chronic low back pain 2014    unknown cause- normal xrays    Depression     Diabetes ([facility]     Pre-DM- no meds    Hypertension     no meds now    Memory loss     Prediabetes     Scoliosis     Stroke ([facility] 2008    denies residual PER PATIENT MINI STROKE( TIA)     Past Surgical History:   Procedure Laterality Date    HX GASTRIC BYPASS      HX GASTRIC BYPASS  08/25/2021     Family History   Problem Relation Age of Onset    Diabetes Other     Hypertension Other     Heart Disease Other     Asthma Other     OSTEOARTHRITIS Other     Diabetes Mother     Hypertension Mother      Social History     Tobacco Use    Smoking status: Never    Smokeless tobacco: Never   Substance Use Topics    Alcohol use: Not Currently     Alcohol/week: 1.0 standard drink     Types: 1 Glasses of wine per week     Comment: socially       ROS as per hpi    Objective:   No flowsheet data found. General: alert, cooperative, no distress   Mental  status: normal mood, behavior, speech, dress, motor activity, and thought processes, able to follow commands   HENT: NCAT   Neck: no visualized mass   Resp: no respiratory distress   Neuro: no gross deficits   Skin: no discoloration or lesions of concern on visible areas   Psychiatric: normal affect, consistent with stated mood, no evidence of hallucinations     Additional exam findings: none      We discussed the expected course, resolution and complications of the diagnosis(es) in detail.   Medication risks, benefits, costs, interactions, and alternatives were discussed as indicated. I advised her to contact the office if her condition worsens, changes or fails to improve as anticipated. She expressed understanding with the diagnosis(es) and plan. Renata Aditya, was evaluated through a synchronous (real-time) audio-video encounter. The patient (or guardian if applicable) is aware that this is a billable service, which includes applicable co-pays. This Virtual Visit was conducted with patient's (and/or legal guardian's) consent. The visit was conducted pursuant to the emergency declaration under the [address] [place] [address], [address] [place] [address] authority and the [employer] and [employer] General Act. Patient identification was verified, and a caregiver was present when appropriate. The patient was located at: Home: [address] [place] [phone]  The provider was located at: Home: [unfilled]    Total time for visit 45 minutes.   Call from    Chloe Tate MD

## 2023-02-20 NOTE — PROGRESS NOTES
I have reviewed discharge  instruction with the patient. The patient verbalized understanding. Discharged medications reviewed with patient and appropriate educational materials and adverse effects of medications teaching were provided. Patient's VS was stable and Pt is cleared by OT/PT. Patient armband was removed and shredded. Patient is cleared for discharge. Patient escorted to the main entrance via wheelchair, all belonging sent with patient. Island Pedicle Flap With Canthal Suspension Text: The defect edges were debeveled with a #15 scalpel blade.  Given the location of the defect, shape of the defect and the proximity to free margins an island pedicle advancement flap was deemed most appropriate.  Using a sterile surgical marker, an appropriate advancement flap was drawn incorporating the defect, outlining the appropriate donor tissue and placing the expected incisions within the relaxed skin tension lines where possible. The area thus outlined was incised deep to adipose tissue with a #15 scalpel blade.  The skin margins were undermined to an appropriate distance in all directions around the primary defect and laterally outward around the island pedicle utilizing iris scissors.  There was minimal undermining beneath the pedicle flap. A suspension suture was placed in the canthal tendon to prevent tension and prevent ectropion.

## 2023-03-06 ENCOUNTER — OFFICE VISIT (OUTPATIENT)
Age: 43
End: 2023-03-06

## 2023-03-06 VITALS
OXYGEN SATURATION: 99 % | HEIGHT: 65 IN | WEIGHT: 235 LBS | SYSTOLIC BLOOD PRESSURE: 128 MMHG | TEMPERATURE: 98.3 F | HEART RATE: 77 BPM | RESPIRATION RATE: 16 BRPM | BODY MASS INDEX: 39.15 KG/M2 | DIASTOLIC BLOOD PRESSURE: 78 MMHG

## 2023-03-06 DIAGNOSIS — K91.2 POST-RESECTION MALABSORPTION: Primary | ICD-10-CM

## 2023-03-06 DIAGNOSIS — Z98.84 S/P GASTRIC BYPASS: ICD-10-CM

## 2023-03-06 DIAGNOSIS — F50.89 PAGOPHAGIA: ICD-10-CM

## 2023-03-06 DIAGNOSIS — E66.09 CLASS 2 OBESITY DUE TO EXCESS CALORIES WITHOUT SERIOUS COMORBIDITY WITH BODY MASS INDEX (BMI) OF 39.0 TO 39.9 IN ADULT: ICD-10-CM

## 2023-03-06 ASSESSMENT — ENCOUNTER SYMPTOMS
BLOOD IN STOOL: 0
NAUSEA: 0
ABDOMINAL DISTENTION: 1
ABDOMINAL PAIN: 0
CONSTIPATION: 0
VOMITING: 0
DIARRHEA: 0

## 2023-03-06 NOTE — PROGRESS NOTES
Bariatric Postoperative Progress Note    CC: 18 Month LGBP Follow Up/Weight Gain    Arnie Reese is a 43 y.o. female now 18 months status post laparoscopic gastric bypass surgery performed on 8/25/2021. Accompanied  by her mother in office today. +11 lbs since last visit in September  Currently eating chicken, soup, cheez-its, raisins. Taking in 24 oz sugar free fluids,  unknown g protein. 10-15 min of walking 7 days a week. Bowel movements are regular. She is compliant with multivitamins, calcium, Vit D and B12 supplements. Has been under a great deal of stress lately with loss of job and home. Reports she has increased her ETOH intake, usually 2 drinks daily (shots of tequila with pineapple juice and grenadine.)    Reports discomfort/pinch in upper right side of chest. Has not followed up with pulmonology as recommended at last visit. Reports chewing on ice often, has also started eating Mentos often as well as the mint is soothing to her stomach. Diet Recall:  B: meat  L: turkey and cheese  D: meat and veg or soup  Snacking often on popcorn, peanuts throughout the day and in evenings. Denies tobacco and NSAIDs.    Weight Loss Metrics 3/7/2023 3/6/2023   Pre-op weight (manual entry) - 317 lbs   Height 5' 5\" 5' 5\"   Weight 236 lbs 235 lbs   BMI (Calculated) 39.4 kg/m2 39.2 kg/m2   Ideal body weight (manual entry) - 136 lbs   EBW in lbs. - 181   Goal weight - 272 lbs   Weight loss to date in lbs. - 82   Percent weight loss (%) - 25.87 %   Percent EBW loss (%) - 45.3 %   Some recent data might be hidden     Comorbidities:  Hypertension: resolved  Diabetes: not applicable  Obstructive Sleep Apnea: not applicable  Hyperlipidemia: not applicable  Stress Urinary Incontinence: resolved  Gastroesophageal Reflux: not applicable  Weight related arthropathy:improved      Past Medical History:   Diagnosis Date    Abnormal MRI of head 2010    Nonspecific focus of low attenuation at the right internal capsule on CT and MRI    Anxiety     Chronic low back pain 2014    unknown cause- normal xrays    Depression     Diabetes ([address]     Pre-DM- no meds    Hypertension     no meds now    Memory loss     Prediabetes     Scoliosis     Stroke [facility]) 2008    denies residual PER PATIENT MINI STROKE( TIA)       Past Surgical History:   Procedure Laterality Date    GASTRIC BYPASS SURGERY      GASTRIC BYPASS SURGERY  08/25/2021       Current Outpatient Medications   Medication Sig Dispense Refill    Multiple Vitamin (MULTIVITAMIN ADULT PO) Take 1 tablet by mouth daily      valACYclovir (VALTREX) 1 g tablet TAKE 1 TABLET BY MOUTH EVERY DAY      buPROPion (WELLBUTRIN XL) 150 MG extended release tablet Take 1 tablet by mouth every morning 30 tablet 3    albuterol sulfate HFA (PROVENTIL;VENTOLIN;PROAIR) 108 (90 Base) MCG/ACT inhaler Inhale 2 puffs into the lungs every 6 hours as needed      Calcium Carbonate-Vitamin D 600-3. 125 MG-MCG TABS Take 1 tablet by mouth daily      cyanocobalamin 1000 MCG tablet Take 1,000 mcg by mouth daily      ferrous sulfate (FE TABS 325) 325 (65 Fe) MG EC tablet Take 325 mg by mouth daily      thiamine 100 MG tablet Take 100 mg by mouth daily       No current facility-administered medications for this visit. Allergies   Allergen Reactions    Nsaids Other (See Comments)     Contraindicated related to gastric bypass       ROS:  Review of Systems   Constitutional:  Positive for fatigue. Negative for chills and fever. Respiratory:  Negative for apnea, cough and shortness of breath. Cardiovascular:  Positive for palpitations (anxiety). Negative for chest pain and leg swelling. Gastrointestinal:  Positive for abdominal distention. Negative for abdominal pain, blood in stool, constipation, diarrhea, nausea and vomiting. Genitourinary:  Negative for dysuria and flank pain. \"Sweet smelling urine\"   Neurological:  Negative for dizziness and headaches.    Psychiatric/Behavioral:  Negative for self-injury and suicidal ideas. Depression         Physical Exam:  Vitals:    03/06/23 1331   BP: 128/78   Site: Left Upper Arm   Position: Sitting   Pulse: 77   Resp: 16   Temp: 98.3 °F (36.8 °C)   TempSrc: Temporal   SpO2: 99%   Weight: 235 lb (106.6 kg)   Height: 5' 5\" (1.651 m)      Body mass index is 39.11 kg/m². Physical Exam  Vitals and nursing note reviewed. Constitutional:       Appearance: She is obese. HENT:      Head: Normocephalic and atraumatic. Cardiovascular:      Rate and Rhythm: Normal rate and regular rhythm. Heart sounds: Normal heart sounds. Pulmonary:      Effort: Pulmonary effort is normal.      Breath sounds: Normal breath sounds. Abdominal:      General: Abdomen is flat. Bowel sounds are normal. There is no distension. Palpations: Abdomen is soft. There is no mass. Tenderness: There is no abdominal tenderness. Hernia: No hernia is present. Musculoskeletal:         General: Normal range of motion. Skin:     General: Skin is warm and dry. Neurological:      General: No focal deficit present. Mental Status: She is alert and oriented to person, place, and time.    Psychiatric:         Mood and Affect: Mood normal.         Behavior: Behavior normal.        Labs:   Recent Results (from the past 672 hour(s))   Vitamin B12    Collection Time: 03/07/23  9:01 AM   Result Value Ref Range    Vitamin B-12 530 211 - 911 pg/mL   CBC with Auto Differential    Collection Time: 03/07/23  9:01 AM   Result Value Ref Range    WBC 5.5 4.6 - 13.2 K/uL    RBC 4.49 4.20 - 5.30 M/uL    Hemoglobin 11.5 (L) 12.0 - 16.0 g/dL    Hematocrit 36.8 35.0 - 45.0 %    MCV 82.0 78.0 - 100.0 FL    MCH 25.6 24.0 - 34.0 PG    MCHC 31.3 31.0 - 37.0 g/dL    RDW 14.8 (H) 11.6 - 14.5 %    Platelets 765 726 - 097 K/uL    MPV 12.9 (H) 9.2 - 11.8 FL    Nucleated RBCs 0.0 0  WBC    nRBC 0.00 0.00 - 0.01 K/uL    Seg Neutrophils 54 40 - 73 %    Lymphocytes 35 21 - 52 %    Monocytes 9 3 - 10 % Eosinophils % 2 0 - 5 %    Basophils 1 0 - 2 %    Immature Granulocytes 0 0.0 - 0.5 %    Segs Absolute 3.0 1.8 - 8.0 K/UL    Absolute Lymph # 1.9 0.9 - 3.6 K/UL    Absolute Mono # 0.5 0.05 - 1.2 K/UL    Absolute Eos # 0.1 0.0 - 0.4 K/UL    Basophils Absolute 0.0 0.0 - 0.1 K/UL    Absolute Immature Granulocyte 0.0 0.00 - 0.04 K/UL    Differential Type AUTOMATED     Ferritin    Collection Time: 03/07/23  9:01 AM   Result Value Ref Range    Ferritin 5 (L) 8 - 388 NG/ML   Iron    Collection Time: 03/07/23  9:01 AM   Result Value Ref Range    Iron 142 50 - 175 ug/dL   Comprehensive Metabolic Panel    Collection Time: 03/07/23  9:01 AM   Result Value Ref Range    Sodium 136 136 - 145 mmol/L    Potassium 3.9 3.5 - 5.5 mmol/L    Chloride 106 100 - 111 mmol/L    CO2 27 21 - 32 mmol/L    Anion Gap 3 3.0 - 18 mmol/L    Glucose 94 74 - 99 mg/dL    BUN 10 7.0 - 18 MG/DL    Creatinine 0.64 0.6 - 1.3 MG/DL    Bun/Cre Ratio 16 12 - 20      Est, Glom Filt Rate >60 >60 ml/min/1.73m2    Calcium 8.8 8.5 - 10.1 MG/DL    Total Bilirubin 0.4 0.2 - 1.0 MG/DL    ALT 20 13 - 56 U/L    AST 19 10 - 38 U/L    Alk Phosphatase 85 45 - 117 U/L    Total Protein 7.0 6.4 - 8.2 g/dL    Albumin 3.4 3.4 - 5.0 g/dL    Globulin 3.6 2.0 - 4.0 g/dL    Albumin/Globulin Ratio 0.9 0.8 - 1.7     Vitamin D 25 Hydroxy    Collection Time: 03/07/23  9:02 AM   Result Value Ref Range    Vit D, 25-Hydroxy 54.1 30 - 100 ng/mL   HEMOGLOBIN A1C W/O EAG    Collection Time: 03/07/23  9:02 AM   Result Value Ref Range    Hemoglobin A1C 4.9 4.2 - 5.6 %   Urinalysis    Collection Time: 03/07/23  9:03 AM   Result Value Ref Range    Color, UA YELLOW      Appearance CLEAR      Specific Gravity, UA 1.017 1.005 - 1.030      pH, Urine 6.5 5.0 - 8.0      Protein, UA Negative NEG mg/dL    Glucose, UA Negative NEG mg/dL    Ketones, Urine Negative NEG mg/dL    Bilirubin Urine Negative NEG      Blood, Urine Negative NEG      Urobilinogen, Urine 1.0 0.2 - 1.0 EU/dL    Nitrite, Urine Negative NEG      Leukocyte Esterase, Urine Negative NEG         Assessment/Plan:   She is currently 18 months s/p laparoscopic gastric bypass surgery  with a total weight loss of 82 lbs to date, struggling with stressors and weight regain. Risks of regular ETOH use after bariatric surgery discussed. This is also contributing to weight regain. Bariatric lab panel ordered, with reports of pagophagia may have KRYSTYNA. Will also order UA and A1c since she is reporting sweet smelling urine. She will get these completed and will call when resulted. Increase SF fluid intake to at least 64 oz. Limit ETOH. Increase activity as tolerated. Limit snacking. Long discussion regarding high density carbohydrates and their impacts to weight regain, hunger, cravings and reactive hypoglycemia. Will also have Julia Laura RD reach out to further assist with dietary recommendations. Follow up with pulmonology as she has not followed up since stopping Eliquis. We have reviewed the components of a successful postoperative course including requirement for a high protein, low carbohydrate diet, 64 oz a day of zero calorie liquids, daily vitamin supplementation, daily exercise (150 mis/week), regular follow-up, and participation in support groups. Refer to registered dietitian for more detailed medical nutrition therapy as needed. The primary encounter diagnosis was Post-resection malabsorption. Diagnoses of S/P gastric bypass, Class 2 obesity due to excess calories without serious comorbidity with body mass index (BMI) of 39.0 to 39.9 in adult, and Pagophagia were also pertinent to this visit. Return in about 4 weeks (around 4/3/2023) for Weight Management. sooner as needed.   NURA Wallace NP

## 2023-03-06 NOTE — Clinical Note
18 months s/p bypass with some weight regain. Also drinking ETOH regularly. Discussed to limit ETOH and snacking, can you reach out for further assistance to get back on track?

## 2023-03-07 ENCOUNTER — OFFICE VISIT (OUTPATIENT)
Age: 43
End: 2023-03-07
Payer: COMMERCIAL

## 2023-03-07 ENCOUNTER — HOSPITAL ENCOUNTER (OUTPATIENT)
Facility: HOSPITAL | Age: 43
Discharge: HOME | End: 2023-03-10
Payer: COMMERCIAL

## 2023-03-07 VITALS
RESPIRATION RATE: 16 BRPM | HEIGHT: 65 IN | WEIGHT: 236 LBS | OXYGEN SATURATION: 100 % | HEART RATE: 82 BPM | SYSTOLIC BLOOD PRESSURE: 123 MMHG | DIASTOLIC BLOOD PRESSURE: 82 MMHG | TEMPERATURE: 98.2 F | BODY MASS INDEX: 39.32 KG/M2

## 2023-03-07 DIAGNOSIS — Z98.84 S/P GASTRIC BYPASS: ICD-10-CM

## 2023-03-07 DIAGNOSIS — K91.2 POST-RESECTION MALABSORPTION: ICD-10-CM

## 2023-03-07 DIAGNOSIS — F50.89 PAGOPHAGIA: ICD-10-CM

## 2023-03-07 DIAGNOSIS — R41.3 MEMORY LOSS: Primary | ICD-10-CM

## 2023-03-07 DIAGNOSIS — E66.09 CLASS 2 OBESITY DUE TO EXCESS CALORIES WITHOUT SERIOUS COMORBIDITY WITH BODY MASS INDEX (BMI) OF 39.0 TO 39.9 IN ADULT: ICD-10-CM

## 2023-03-07 DIAGNOSIS — F32.1 CURRENT MODERATE EPISODE OF MAJOR DEPRESSIVE DISORDER WITHOUT PRIOR EPISODE (HCC): ICD-10-CM

## 2023-03-07 LAB
25(OH)D3 SERPL-MCNC: 54.1 NG/ML (ref 30–100)
ALBUMIN SERPL-MCNC: 3.4 G/DL (ref 3.4–5)
ALBUMIN/GLOB SERPL: 0.9 (ref 0.8–1.7)
ALP SERPL-CCNC: 85 U/L (ref 45–117)
ALT SERPL-CCNC: 20 U/L (ref 13–56)
ANION GAP SERPL CALC-SCNC: 3 MMOL/L (ref 3–18)
APPEARANCE UR: CLEAR
AST SERPL-CCNC: 19 U/L (ref 10–38)
BASOPHILS # BLD: 0 K/UL (ref 0–0.1)
BASOPHILS NFR BLD: 1 % (ref 0–2)
BILIRUB SERPL-MCNC: 0.4 MG/DL (ref 0.2–1)
BILIRUB UR QL: NEGATIVE
BUN SERPL-MCNC: 10 MG/DL (ref 7–18)
BUN/CREAT SERPL: 16 (ref 12–20)
CALCIUM SERPL-MCNC: 8.8 MG/DL (ref 8.5–10.1)
CHLORIDE SERPL-SCNC: 106 MMOL/L (ref 100–111)
CO2 SERPL-SCNC: 27 MMOL/L (ref 21–32)
COLOR UR: YELLOW
CREAT SERPL-MCNC: 0.64 MG/DL (ref 0.6–1.3)
DIFFERENTIAL METHOD BLD: ABNORMAL
EOSINOPHIL # BLD: 0.1 K/UL (ref 0–0.4)
EOSINOPHIL NFR BLD: 2 % (ref 0–5)
ERYTHROCYTE [DISTWIDTH] IN BLOOD BY AUTOMATED COUNT: 14.8 % (ref 11.6–14.5)
FERRITIN SERPL-MCNC: 5 NG/ML (ref 8–388)
GLOBULIN SER CALC-MCNC: 3.6 G/DL (ref 2–4)
GLUCOSE SERPL-MCNC: 94 MG/DL (ref 74–99)
GLUCOSE UR STRIP.AUTO-MCNC: NEGATIVE MG/DL
HBA1C MFR BLD: 4.9 % (ref 4.2–5.6)
HCT VFR BLD AUTO: 36.8 % (ref 35–45)
HGB BLD-MCNC: 11.5 G/DL (ref 12–16)
HGB UR QL STRIP: NEGATIVE
IMM GRANULOCYTES # BLD AUTO: 0 K/UL (ref 0–0.04)
IMM GRANULOCYTES NFR BLD AUTO: 0 % (ref 0–0.5)
IRON SERPL-MCNC: 142 UG/DL (ref 50–175)
KETONES UR QL STRIP.AUTO: NEGATIVE MG/DL
LEUKOCYTE ESTERASE UR QL STRIP.AUTO: NEGATIVE
LYMPHOCYTES # BLD: 1.9 K/UL (ref 0.9–3.6)
LYMPHOCYTES NFR BLD: 35 % (ref 21–52)
MCH RBC QN AUTO: 25.6 PG (ref 24–34)
MCHC RBC AUTO-ENTMCNC: 31.3 G/DL (ref 31–37)
MCV RBC AUTO: 82 FL (ref 78–100)
MONOCYTES # BLD: 0.5 K/UL (ref 0.05–1.2)
MONOCYTES NFR BLD: 9 % (ref 3–10)
NEUTS SEG # BLD: 3 K/UL (ref 1.8–8)
NEUTS SEG NFR BLD: 54 % (ref 40–73)
NITRITE UR QL STRIP.AUTO: NEGATIVE
NRBC # BLD: 0 K/UL (ref 0–0.01)
NRBC BLD-RTO: 0 PER 100 WBC
PH UR STRIP: 6.5 (ref 5–8)
PLATELET # BLD AUTO: 219 K/UL (ref 135–420)
PMV BLD AUTO: 12.9 FL (ref 9.2–11.8)
POTASSIUM SERPL-SCNC: 3.9 MMOL/L (ref 3.5–5.5)
PROT SERPL-MCNC: 7 G/DL (ref 6.4–8.2)
PROT UR STRIP-MCNC: NEGATIVE MG/DL
RBC # BLD AUTO: 4.49 M/UL (ref 4.2–5.3)
SODIUM SERPL-SCNC: 136 MMOL/L (ref 136–145)
SP GR UR REFRACTOMETRY: 1.02 (ref 1–1.03)
UROBILINOGEN UR QL STRIP.AUTO: 1 EU/DL (ref 0.2–1)
VIT B12 SERPL-MCNC: 530 PG/ML (ref 211–911)
WBC # BLD AUTO: 5.5 K/UL (ref 4.6–13.2)

## 2023-03-07 PROCEDURE — 82306 VITAMIN D 25 HYDROXY: CPT

## 2023-03-07 PROCEDURE — 99214 OFFICE O/P EST MOD 30 MIN: CPT | Performed by: FAMILY MEDICINE

## 2023-03-07 PROCEDURE — 36415 COLL VENOUS BLD VENIPUNCTURE: CPT

## 2023-03-07 PROCEDURE — 82607 VITAMIN B-12: CPT

## 2023-03-07 PROCEDURE — 85025 COMPLETE CBC W/AUTO DIFF WBC: CPT

## 2023-03-07 PROCEDURE — 3074F SYST BP LT 130 MM HG: CPT | Performed by: FAMILY MEDICINE

## 2023-03-07 PROCEDURE — 84425 ASSAY OF VITAMIN B-1: CPT

## 2023-03-07 PROCEDURE — 83036 HEMOGLOBIN GLYCOSYLATED A1C: CPT

## 2023-03-07 PROCEDURE — 82728 ASSAY OF FERRITIN: CPT

## 2023-03-07 PROCEDURE — 81003 URINALYSIS AUTO W/O SCOPE: CPT

## 2023-03-07 PROCEDURE — 83540 ASSAY OF IRON: CPT

## 2023-03-07 PROCEDURE — 80053 COMPREHEN METABOLIC PANEL: CPT

## 2023-03-07 PROCEDURE — 3078F DIAST BP <80 MM HG: CPT | Performed by: FAMILY MEDICINE

## 2023-03-07 RX ORDER — VALACYCLOVIR HYDROCHLORIDE 1 G/1
TABLET, FILM COATED ORAL
COMMUNITY
Start: 2023-01-05

## 2023-03-07 RX ORDER — BUPROPION HYDROCHLORIDE 150 MG/1
150 TABLET ORAL EVERY MORNING
Qty: 30 TABLET | Refills: 3 | Status: SHIPPED | OUTPATIENT
Start: 2023-03-07

## 2023-03-07 SDOH — ECONOMIC STABILITY: FOOD INSECURITY: WITHIN THE PAST 12 MONTHS, YOU WORRIED THAT YOUR FOOD WOULD RUN OUT BEFORE YOU GOT MONEY TO BUY MORE.: OFTEN TRUE

## 2023-03-07 SDOH — ECONOMIC STABILITY: FOOD INSECURITY: WITHIN THE PAST 12 MONTHS, THE FOOD YOU BOUGHT JUST DIDN'T LAST AND YOU DIDN'T HAVE MONEY TO GET MORE.: OFTEN TRUE

## 2023-03-07 SDOH — ECONOMIC STABILITY: HOUSING INSECURITY
IN THE LAST 12 MONTHS, WAS THERE A TIME WHEN YOU DID NOT HAVE A STEADY PLACE TO SLEEP OR SLEPT IN A SHELTER (INCLUDING NOW)?: NO

## 2023-03-07 SDOH — ECONOMIC STABILITY: INCOME INSECURITY: HOW HARD IS IT FOR YOU TO PAY FOR THE VERY BASICS LIKE FOOD, HOUSING, MEDICAL CARE, AND HEATING?: VERY HARD

## 2023-03-07 ASSESSMENT — PATIENT HEALTH QUESTIONNAIRE - PHQ9
1. LITTLE INTEREST OR PLEASURE IN DOING THINGS: 3
10. IF YOU CHECKED OFF ANY PROBLEMS, HOW DIFFICULT HAVE THESE PROBLEMS MADE IT FOR YOU TO DO YOUR WORK, TAKE CARE OF THINGS AT HOME, OR GET ALONG WITH OTHER PEOPLE: 3
8. MOVING OR SPEAKING SO SLOWLY THAT OTHER PEOPLE COULD HAVE NOTICED. OR THE OPPOSITE, BEING SO FIGETY OR RESTLESS THAT YOU HAVE BEEN MOVING AROUND A LOT MORE THAN USUAL: 1
SUM OF ALL RESPONSES TO PHQ QUESTIONS 1-9: 15
4. FEELING TIRED OR HAVING LITTLE ENERGY: 3
SUM OF ALL RESPONSES TO PHQ QUESTIONS 1-9: 15
3. TROUBLE FALLING OR STAYING ASLEEP: 1
9. THOUGHTS THAT YOU WOULD BE BETTER OFF DEAD, OR OF HURTING YOURSELF: 0
5. POOR APPETITE OR OVEREATING: 3
6. FEELING BAD ABOUT YOURSELF - OR THAT YOU ARE A FAILURE OR HAVE LET YOURSELF OR YOUR FAMILY DOWN: 3
SUM OF ALL RESPONSES TO PHQ QUESTIONS 1-9: 15
2. FEELING DOWN, DEPRESSED OR HOPELESS: 1
SUM OF ALL RESPONSES TO PHQ QUESTIONS 1-9: 15
SUM OF ALL RESPONSES TO PHQ9 QUESTIONS 1 & 2: 4
7. TROUBLE CONCENTRATING ON THINGS, SUCH AS READING THE NEWSPAPER OR WATCHING TELEVISION: 0

## 2023-03-07 ASSESSMENT — ENCOUNTER SYMPTOMS
COUGH: 0
APNEA: 0
SHORTNESS OF BREATH: 0
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
## 2023-03-07 NOTE — PROGRESS NOTES
1. \"Have you been to the ER, urgent care clinic since your last visit? Hospitalized since your last visit? \" No    2. \"Have you seen or consulted any other health care providers outside of the [address] [place] [address] since your last visit? \" Yes, Mental Health Therapist- Rona Reagan [facility] service in [facility]     3. For patients aged 39-70: Has the patient had a colonoscopy / FIT/ Cologuard? NA - based on age      If the patient is female:    4. For patients aged 41-77: Has the patient had a mammogram within the past 2 years? No      5. For patients aged 21-65: Has the patient had a pap smear? Yes - Care Gap present.  Most recent result on file

## 2023-03-09 ENCOUNTER — CLINICAL DOCUMENTATION (OUTPATIENT)
Facility: HOSPITAL | Age: 43
End: 2023-03-09

## 2023-03-09 LAB — VIT B1 BLD-SCNC: 161.7 NMOL/L (ref 66.5–200)

## 2023-03-09 NOTE — PROGRESS NOTES
I have reached out to patient by phone, left a voicemail,  and sent an e-mail. I am waiting for a response so we can set up an appointment.   Carmen Campo RD

## 2023-03-10 ENCOUNTER — CLINICAL DOCUMENTATION (OUTPATIENT)
Facility: HOSPITAL | Age: 43
End: 2023-03-10

## 2023-03-10 NOTE — PROGRESS NOTES
[address] [facility] [address], [address]      Patient's Name: Nita Echavarria   Age: 43 y.o. YOB: 1980   Sex: female    Date:   3/10/2023    Height: 5'6\" Weight:    234      Lbs. BMI: 39     Surgery Date: 08/25/2021    Surgeon: Dr. Joselo Del Rosario    Starting Weight: 307   Last Recorded Weight: 236  Overall Pounds Lost: 73     Procedure: LGBP    Lowest Weight patient has achieved since surgery: 224    How long has patient been at this weight for: 10    Other Pertinent Information: much trama in life. Diet History (reported by patient on diet history form)    Do you smoke: no    Alcohol Intake: \"I have cut all the alcohol this week. \"  Patient has     Meals per day:   Breakfast:  raisen bran, mix with milk. ..Luwana Gess. Lunch:  grilled nuggets, chic filet sauce. eating 5, no vegetables. Dinner yesterday:  cooked a few crab legs      Patient likes johnson. .. Kaylan crispy johnson. Portion sizes ~: small styrofoam cup    Simple sugar intake: Cheeze it, lunchables,     Experiencing dumping syndrome: no    Carbohydrate intake: patient likes crunchy,     Symptoms that occur when carbohydrates are consumed: not much    Ounces of fluid consumed per day: 4- 16 ounces of water daily. Fluids being consumed: water, Protein shakes, Crystal Light, coffee small cup Splenda. Patient 0-1 drinking protein drinks    Patient is snacking on: Cheeze Its, Popcorn, \"I like a crunching sensation. \"     Exercise History    Patient is doing exercise three times a week. Yesterday went to the Gym. Vitamins    Patient is taking 2 Multivitamins per day: patient takes a Flinstones. Patient is taking Calcium in the form of Calcium Carbonate. Patient is taking 1500 mg per day. Patient is taking 1000 mcg of Vitamin B12. Patient is taking 5,000 IU of Vitamin D 3 today. Patient is taking 250 mg of Vitamin B1.     Patient is taking iron on her cycle.    Summary: Weight loss is at 73 pounds. I have reinforced the key diet principles to the patient. Patient was instructed to follow a 3 meal a day routine. I have reinforced the importance of filling up on meat and vegetables and avoiding carbohydrates. I have talked with patient about reactive hypoglycemia and although carbohydrates are not good from a weight-management point of view, they are actually very dangerous and should be avoided. If patient is getting hungry between meals focus on meat and vegetables and a list of food choices was reviewed with patient. Reinforced to patient the importance of being properly hydrated and the need to get 64 ounces of fluid in per day. Reinforced to patient the need to do 30 minutes of exercise per day. We also spent some time talking about the vitamins and the importance of taking them. Reinforced the dosage of vitamins to patient. Patient was reminded that the vitamins are lifelong.       Becki Ramirez, RD  3/10/2023

## 2023-03-24 ENCOUNTER — CLINICAL DOCUMENTATION (OUTPATIENT)
Facility: HOSPITAL | Age: 43
End: 2023-03-24

## 2023-04-06 ENCOUNTER — CLINICAL DOCUMENTATION (OUTPATIENT)
Facility: HOSPITAL | Age: 43
End: 2023-04-06

## 2023-04-06 NOTE — PROGRESS NOTES
If patient is getting hungry between meals focus on meat and vegetables and a list of food choices was reviewed with patient. Reinforced to patient the importance of being properly hydrated and the need to get 64 ounces of fluid in per day. Reinforced to patient the need to do 30 minutes of exercise per day. We also spent some time talking about the vitamins and the importance of taking them. Reinforced the dosage of vitamins to patient. Patient was reminded that the vitamins are lifelong. Other Pertinent Information:   SMART goals:   Monitor grazing. Eating no more than 5 times per day.         Rema Bryant, JB  4/6/2023

## 2023-04-24 ENCOUNTER — HOSPITAL ENCOUNTER (OUTPATIENT)
Facility: HOSPITAL | Age: 43
Setting detail: SPECIMEN
Discharge: HOME | End: 2023-04-27
Payer: COMMERCIAL

## 2023-04-24 ENCOUNTER — OFFICE VISIT (OUTPATIENT)
Age: 43
End: 2023-04-24
Payer: COMMERCIAL

## 2023-04-24 VITALS
TEMPERATURE: 98.1 F | HEART RATE: 85 BPM | HEIGHT: 65 IN | DIASTOLIC BLOOD PRESSURE: 69 MMHG | RESPIRATION RATE: 18 BRPM | SYSTOLIC BLOOD PRESSURE: 109 MMHG | BODY MASS INDEX: 39.22 KG/M2 | WEIGHT: 235.4 LBS

## 2023-04-24 DIAGNOSIS — B37.31 CANDIDA VAGINITIS: ICD-10-CM

## 2023-04-24 DIAGNOSIS — N76.0 ACUTE VAGINITIS: Primary | ICD-10-CM

## 2023-04-24 PROCEDURE — 87661 TRICHOMONAS VAGINALIS AMPLIF: CPT

## 2023-04-24 PROCEDURE — 99214 OFFICE O/P EST MOD 30 MIN: CPT | Performed by: FAMILY MEDICINE

## 2023-04-24 PROCEDURE — 87801 DETECT AGNT MULT DNA AMPLI: CPT

## 2023-04-24 PROCEDURE — 87491 CHLMYD TRACH DNA AMP PROBE: CPT

## 2023-04-24 PROCEDURE — 87591 N.GONORRHOEAE DNA AMP PROB: CPT

## 2023-04-24 PROCEDURE — 87798 DETECT AGENT NOS DNA AMP: CPT

## 2023-04-24 PROCEDURE — 3078F DIAST BP <80 MM HG: CPT | Performed by: FAMILY MEDICINE

## 2023-04-24 PROCEDURE — 3074F SYST BP LT 130 MM HG: CPT | Performed by: FAMILY MEDICINE

## 2023-04-24 RX ORDER — BENZONATATE 100 MG/1
100 CAPSULE ORAL 3 TIMES DAILY PRN
Qty: 30 CAPSULE | Refills: 1 | Status: SHIPPED | OUTPATIENT
Start: 2023-04-24 | End: 2023-05-04

## 2023-04-24 RX ORDER — ALBUTEROL SULFATE 90 UG/1
2 AEROSOL, METERED RESPIRATORY (INHALATION) EVERY 6 HOURS PRN
Qty: 18 G | Refills: 1 | Status: SHIPPED | OUTPATIENT
Start: 2023-04-24

## 2023-04-24 RX ORDER — FLUCONAZOLE 150 MG/1
150 TABLET ORAL ONCE
Qty: 1 TABLET | Refills: 0 | Status: SHIPPED | OUTPATIENT
Start: 2023-04-24 | End: 2023-04-24

## 2023-04-24 RX ORDER — SERTRALINE HYDROCHLORIDE 100 MG/1
100 TABLET, FILM COATED ORAL DAILY
Qty: 90 TABLET | Refills: 1 | Status: SHIPPED | OUTPATIENT
Start: 2023-04-24

## 2023-04-24 ASSESSMENT — PATIENT HEALTH QUESTIONNAIRE - PHQ9
7. TROUBLE CONCENTRATING ON THINGS, SUCH AS READING THE NEWSPAPER OR WATCHING TELEVISION: 3
SUM OF ALL RESPONSES TO PHQ QUESTIONS 1-9: 19
4. FEELING TIRED OR HAVING LITTLE ENERGY: 3
1. LITTLE INTEREST OR PLEASURE IN DOING THINGS: 0
3. TROUBLE FALLING OR STAYING ASLEEP: 3
5. POOR APPETITE OR OVEREATING: 1
SUM OF ALL RESPONSES TO PHQ9 QUESTIONS 1 & 2: 3
10. IF YOU CHECKED OFF ANY PROBLEMS, HOW DIFFICULT HAVE THESE PROBLEMS MADE IT FOR YOU TO DO YOUR WORK, TAKE CARE OF THINGS AT HOME, OR GET ALONG WITH OTHER PEOPLE: 3
SUM OF ALL RESPONSES TO PHQ QUESTIONS 1-9: 19
6. FEELING BAD ABOUT YOURSELF - OR THAT YOU ARE A FAILURE OR HAVE LET YOURSELF OR YOUR FAMILY DOWN: 3
2. FEELING DOWN, DEPRESSED OR HOPELESS: 3
SUM OF ALL RESPONSES TO PHQ QUESTIONS 1-9: 19
9. THOUGHTS THAT YOU WOULD BE BETTER OFF DEAD, OR OF HURTING YOURSELF: 0
8. MOVING OR SPEAKING SO SLOWLY THAT OTHER PEOPLE COULD HAVE NOTICED. OR THE OPPOSITE, BEING SO FIGETY OR RESTLESS THAT YOU HAVE BEEN MOVING AROUND A LOT MORE THAN USUAL: 3
SUM OF ALL RESPONSES TO PHQ QUESTIONS 1-9: 19

## 2023-04-24 ASSESSMENT — ANXIETY QUESTIONNAIRES
7. FEELING AFRAID AS IF SOMETHING AWFUL MIGHT HAPPEN: 3
1. FEELING NERVOUS, ANXIOUS, OR ON EDGE: 3
4. TROUBLE RELAXING: 3
GAD7 TOTAL SCORE: 18
2. NOT BEING ABLE TO STOP OR CONTROL WORRYING: 3
3. WORRYING TOO MUCH ABOUT DIFFERENT THINGS: 3
6. BECOMING EASILY ANNOYED OR IRRITABLE: 3
IF YOU CHECKED OFF ANY PROBLEMS ON THIS QUESTIONNAIRE, HOW DIFFICULT HAVE THESE PROBLEMS MADE IT FOR YOU TO DO YOUR WORK, TAKE CARE OF THINGS AT HOME, OR GET ALONG WITH OTHER PEOPLE: EXTREMELY DIFFICULT
5. BEING SO RESTLESS THAT IT IS HARD TO SIT STILL: 0

## 2023-04-24 ASSESSMENT — COLUMBIA-SUICIDE SEVERITY RATING SCALE - C-SSRS
1. WITHIN THE PAST MONTH, HAVE YOU WISHED YOU WERE DEAD OR WISHED YOU COULD GO TO SLEEP AND NOT WAKE UP?: NO
6. HAVE YOU EVER DONE ANYTHING, STARTED TO DO ANYTHING, OR PREPARED TO DO ANYTHING TO END YOUR LIFE?: NO
2. HAVE YOU ACTUALLY HAD ANY THOUGHTS OF KILLING YOURSELF?: NO

## 2023-04-25 ENCOUNTER — HOSPITAL ENCOUNTER (OUTPATIENT)
Facility: HOSPITAL | Age: 43
Discharge: HOME | End: 2023-04-28

## 2023-04-25 ENCOUNTER — CLINICAL DOCUMENTATION (OUTPATIENT)
Facility: HOSPITAL | Age: 43
End: 2023-04-25

## 2023-04-25 NOTE — PROGRESS NOTES
RD and patient set some goals to work on before our next appointment using SMART goals:   Patient will do her best to make sure she is drinking 64 ounces of water daily. Patient will visit the gym 3 times a week. We are set to connect again in 5 weeks.    Talat Driscoll RD

## 2023-04-29 LAB
A VAGINAE DNA VAG QL NAA+PROBE: NORMAL SCORE
BVAB2 DNA VAG QL NAA+PROBE: NORMAL SCORE
C ALBICANS DNA VAG QL NAA+PROBE: NEGATIVE
C GLABRATA DNA VAG QL NAA+PROBE: NEGATIVE
C TRACH RRNA SPEC QL NAA+PROBE: NEGATIVE
CANDIDA KRUSEI: NEGATIVE
CANDIDA LUSITANIAE, NAA, 180015: NEGATIVE
CANDIDA PARAPSILOSIS/TROPICALIS: NEGATIVE
MEGA1 DNA VAG QL NAA+PROBE: NORMAL SCORE
N GONORRHOEA RRNA SPEC QL NAA+PROBE: NEGATIVE
T VAGINALIS RRNA SPEC QL NAA+PROBE: NEGATIVE

## 2023-05-05 ENCOUNTER — OFFICE VISIT (OUTPATIENT)
Age: 43
End: 2023-05-05
Payer: COMMERCIAL

## 2023-05-05 VITALS
DIASTOLIC BLOOD PRESSURE: 74 MMHG | WEIGHT: 233 LBS | TEMPERATURE: 98.2 F | SYSTOLIC BLOOD PRESSURE: 114 MMHG | BODY MASS INDEX: 38.82 KG/M2 | RESPIRATION RATE: 18 BRPM | HEIGHT: 65 IN | HEART RATE: 78 BPM

## 2023-05-05 DIAGNOSIS — Z98.84 S/P GASTRIC BYPASS: ICD-10-CM

## 2023-05-05 DIAGNOSIS — E66.09 CLASS 2 OBESITY DUE TO EXCESS CALORIES WITHOUT SERIOUS COMORBIDITY WITH BODY MASS INDEX (BMI) OF 39.0 TO 39.9 IN ADULT: Primary | ICD-10-CM

## 2023-05-05 DIAGNOSIS — K91.2 POST-RESECTION MALABSORPTION: ICD-10-CM

## 2023-05-05 DIAGNOSIS — Z71.3 ENCOUNTER FOR WEIGHT LOSS COUNSELING: ICD-10-CM

## 2023-05-05 PROCEDURE — 3074F SYST BP LT 130 MM HG: CPT | Performed by: NURSE PRACTITIONER

## 2023-05-05 PROCEDURE — 99213 OFFICE O/P EST LOW 20 MIN: CPT | Performed by: NURSE PRACTITIONER

## 2023-05-05 PROCEDURE — 3078F DIAST BP <80 MM HG: CPT | Performed by: NURSE PRACTITIONER

## 2023-05-05 RX ORDER — CHOLECALCIFEROL (VITAMIN D3) 1250 MCG
CAPSULE ORAL
COMMUNITY

## 2023-05-05 RX ORDER — PHENTERMINE HYDROCHLORIDE 37.5 MG/1
TABLET ORAL
Qty: 30 TABLET | Refills: 0 | Status: SHIPPED | OUTPATIENT
Start: 2023-05-05 | End: 2023-05-09 | Stop reason: SDUPTHER

## 2023-05-08 ENCOUNTER — TELEPHONE (OUTPATIENT)
Age: 43
End: 2023-05-08

## 2023-05-08 ASSESSMENT — ENCOUNTER SYMPTOMS
VOMITING: 0
DIARRHEA: 0
CONSTIPATION: 0
NAUSEA: 0
SHORTNESS OF BREATH: 0
COUGH: 0
ABDOMINAL PAIN: 0

## 2023-05-08 NOTE — TELEPHONE ENCOUNTER
Called patient to advise that provider is out of office and has a follow up appointment in June patient understood and disconnected.

## 2023-05-09 RX ORDER — PHENTERMINE HYDROCHLORIDE 37.5 MG/1
TABLET ORAL
Qty: 30 TABLET | Refills: 0 | Status: SHIPPED | OUTPATIENT
Start: 2023-05-09 | End: 2023-06-08

## 2023-06-05 ENCOUNTER — CLINICAL DOCUMENTATION (OUTPATIENT)
Facility: HOSPITAL | Age: 43
End: 2023-06-05

## 2023-06-05 ENCOUNTER — OFFICE VISIT (OUTPATIENT)
Age: 43
End: 2023-06-05
Payer: COMMERCIAL

## 2023-06-05 ENCOUNTER — HOSPITAL ENCOUNTER (OUTPATIENT)
Facility: HOSPITAL | Age: 43
Discharge: HOME | End: 2023-06-08

## 2023-06-05 VITALS
WEIGHT: 221 LBS | OXYGEN SATURATION: 100 % | TEMPERATURE: 98.4 F | SYSTOLIC BLOOD PRESSURE: 120 MMHG | RESPIRATION RATE: 18 BRPM | BODY MASS INDEX: 36.82 KG/M2 | HEIGHT: 65 IN | HEART RATE: 72 BPM | DIASTOLIC BLOOD PRESSURE: 84 MMHG

## 2023-06-05 DIAGNOSIS — K91.2 POST-RESECTION MALABSORPTION: ICD-10-CM

## 2023-06-05 DIAGNOSIS — Z71.3 ENCOUNTER FOR WEIGHT LOSS COUNSELING: ICD-10-CM

## 2023-06-05 DIAGNOSIS — Z98.84 S/P GASTRIC BYPASS: ICD-10-CM

## 2023-06-05 DIAGNOSIS — E66.09 CLASS 2 OBESITY DUE TO EXCESS CALORIES WITHOUT SERIOUS COMORBIDITY WITH BODY MASS INDEX (BMI) OF 39.0 TO 39.9 IN ADULT: Primary | ICD-10-CM

## 2023-06-05 DIAGNOSIS — Z79.899 FOLLOW-UP ENCOUNTER INVOLVING MEDICATION: ICD-10-CM

## 2023-06-05 PROCEDURE — 3078F DIAST BP <80 MM HG: CPT | Performed by: NURSE PRACTITIONER

## 2023-06-05 PROCEDURE — 3074F SYST BP LT 130 MM HG: CPT | Performed by: NURSE PRACTITIONER

## 2023-06-05 PROCEDURE — 99213 OFFICE O/P EST LOW 20 MIN: CPT | Performed by: NURSE PRACTITIONER

## 2023-06-05 RX ORDER — PHENTERMINE HYDROCHLORIDE 37.5 MG/1
37.5 TABLET ORAL
Qty: 30 TABLET | Refills: 0 | Status: SHIPPED | OUTPATIENT
Start: 2023-06-05 | End: 2023-07-05

## 2023-06-05 NOTE — PROGRESS NOTES
Bariatric Postoperative Progress Note    CC: Weight Management/Medication Follow Up    Rosaura Bhatt is a 43 y.o. female now 21 months status post laparoscopic gastric bypass surgery performed on 8/25/21.  -12 lbs  Doing well overall. Currently eating fish, shrimp, fish, crabs. Taking in 43 oz sugar free fluids,  unknown g protein. 30-45 min of activity 3 days a week. Bowel movements are regular. She is compliant with recommended bariatric vitamin supplementation. Currently taking full tab 37.5 mg phentermine, denies any SE and desires to continue. Continues to work with JB Gan on diet. Continues to work on cutting down on ETOH. Weight Loss Metrics 6/5/2023 5/5/2023 4/24/2023 3/7/2023 3/6/2023 11/3/2022 9/27/2022   Pre-op weight (manual entry) 317 lbs 317 lbs - - 317 lbs - -   Height 5' 5\" 5' 5\" 5' 5\" 5' 5\" 5' 5\" 5' 5\" 5' 5\"   Weight 221 lbs 233 lbs 235 lbs 6 oz 236 lbs 235 lbs 224 lbs 229 lbs 10 oz   BMI (Calculated) 36.9 kg/m2 38.9 kg/m2 39.3 kg/m2 39.4 kg/m2 39.2 kg/m2 37.4 kg/m2 38.3 kg/m2   Ideal body weight (manual entry) 134 lbs 134 lbs - - 136 lbs - -   EBW in lbs. 183 183 - - 181 - -   Goal weight - - - - 272 lbs - -   Weight loss to date in lbs.  96 84 - - 82 - -   Percent weight loss (%) 30.28 % 26.5 % - - 25.87 % - -   Percent EBW loss (%) 52.5 % 45.9 % - - 45.3 % - -         Past Medical History:   Diagnosis Date    Abnormal MRI of head 2010    Nonspecific focus of low attenuation at the right internal capsule on CT and MRI    Anxiety     Chronic low back pain 2014    unknown cause- normal xrays    Depression     Diabetes ([address]     Pre-DM- no meds    Hypertension     no meds now    Memory loss     Prediabetes     Scoliosis     Stroke [facility]) 2008    denies residual PER PATIENT MINI STROKE( TIA)       Past Surgical History:   Procedure Laterality Date    GASTRIC BYPASS SURGERY      GASTRIC BYPASS SURGERY  08/25/2021       Current Outpatient Medications   Medication Sig Dispense Refill

## 2023-06-05 NOTE — PROGRESS NOTES
RD and patient visited over the phone to review past SMART goals and set more. Patient shares that she is drinking often, \"I always have a cup near me\". Patient has moved and has access to a gym and goes often. RD and patient decided on new SMART goals. Patient to include more vegetables with her protein at meals. Patient will continue to be aware of the need for >64 ounces of water. Patient will try lower carb snacks. RD discussed options. W are set to talk again on July 10th.   Richie Brittle, RD

## 2023-06-06 ASSESSMENT — ENCOUNTER SYMPTOMS
NAUSEA: 0
ABDOMINAL PAIN: 0
SHORTNESS OF BREATH: 0
DIARRHEA: 0
CONSTIPATION: 0
VOMITING: 0
COUGH: 0

## 2023-06-20 ENCOUNTER — TRANSCRIBE ORDERS (OUTPATIENT)
Facility: HOSPITAL | Age: 43
End: 2023-06-20

## 2023-06-20 DIAGNOSIS — G40.019 BENIGN ROLANDIC EPILEPSY, INTRACTABLE (HCC): Primary | ICD-10-CM

## 2023-06-22 ENCOUNTER — TELEMEDICINE (OUTPATIENT)
Age: 43
End: 2023-06-22
Payer: COMMERCIAL

## 2023-06-22 DIAGNOSIS — F33.1 MODERATE EPISODE OF RECURRENT MAJOR DEPRESSIVE DISORDER (HCC): ICD-10-CM

## 2023-06-22 DIAGNOSIS — R41.3 MEMORY LOSS: Primary | ICD-10-CM

## 2023-06-22 PROCEDURE — 99213 OFFICE O/P EST LOW 20 MIN: CPT | Performed by: FAMILY MEDICINE

## 2023-06-22 RX ORDER — QUETIAPINE FUMARATE 50 MG/1
TABLET, FILM COATED ORAL
COMMUNITY
Start: 2023-06-14

## 2023-06-22 RX ORDER — BUSPIRONE HYDROCHLORIDE 5 MG/1
TABLET ORAL
COMMUNITY
Start: 2023-05-16

## 2023-06-22 RX ORDER — FLUOXETINE HYDROCHLORIDE 20 MG/1
CAPSULE ORAL
COMMUNITY
Start: 2023-06-14

## 2023-06-22 ASSESSMENT — PATIENT HEALTH QUESTIONNAIRE - PHQ9
SUM OF ALL RESPONSES TO PHQ QUESTIONS 1-9: 17
6. FEELING BAD ABOUT YOURSELF - OR THAT YOU ARE A FAILURE OR HAVE LET YOURSELF OR YOUR FAMILY DOWN: 1
10. IF YOU CHECKED OFF ANY PROBLEMS, HOW DIFFICULT HAVE THESE PROBLEMS MADE IT FOR YOU TO DO YOUR WORK, TAKE CARE OF THINGS AT HOME, OR GET ALONG WITH OTHER PEOPLE: 3
SUM OF ALL RESPONSES TO PHQ QUESTIONS 1-9: 17
1. LITTLE INTEREST OR PLEASURE IN DOING THINGS: 3
2. FEELING DOWN, DEPRESSED OR HOPELESS: 3
SUM OF ALL RESPONSES TO PHQ9 QUESTIONS 1 & 2: 6
8. MOVING OR SPEAKING SO SLOWLY THAT OTHER PEOPLE COULD HAVE NOTICED. OR THE OPPOSITE, BEING SO FIGETY OR RESTLESS THAT YOU HAVE BEEN MOVING AROUND A LOT MORE THAN USUAL: 2
SUM OF ALL RESPONSES TO PHQ QUESTIONS 1-9: 17
SUM OF ALL RESPONSES TO PHQ QUESTIONS 1-9: 17
3. TROUBLE FALLING OR STAYING ASLEEP: 1
4. FEELING TIRED OR HAVING LITTLE ENERGY: 3
5. POOR APPETITE OR OVEREATING: 2
7. TROUBLE CONCENTRATING ON THINGS, SUCH AS READING THE NEWSPAPER OR WATCHING TELEVISION: 2
9. THOUGHTS THAT YOU WOULD BE BETTER OFF DEAD, OR OF HURTING YOURSELF: 0

## 2023-06-29 ENCOUNTER — HOSPITAL ENCOUNTER (OUTPATIENT)
Facility: HOSPITAL | Age: 43
Discharge: HOME | End: 2023-06-29
Payer: COMMERCIAL

## 2023-06-29 DIAGNOSIS — G40.019 BENIGN ROLANDIC EPILEPSY, INTRACTABLE (HCC): ICD-10-CM

## 2023-06-29 DIAGNOSIS — I63.033 CEREBRAL INFARCTION DUE TO BILATERAL THROMBOSIS OF CAROTID ARTERIES (HCC): ICD-10-CM

## 2023-06-29 LAB
ERYTHROCYTE [SEDIMENTATION RATE] IN BLOOD: 17 MM/HR (ref 0–20)
FOLATE SERPL-MCNC: 13.7 NG/ML (ref 3.1–17.5)
TSH SERPL DL<=0.05 MIU/L-ACNC: 1.43 UIU/ML (ref 0.36–3.74)
VIT B12 SERPL-MCNC: 659 PG/ML (ref 211–911)

## 2023-06-29 PROCEDURE — 85652 RBC SED RATE AUTOMATED: CPT

## 2023-06-29 PROCEDURE — 86780 TREPONEMA PALLIDUM: CPT

## 2023-06-29 PROCEDURE — 82746 ASSAY OF FOLIC ACID SERUM: CPT

## 2023-06-29 PROCEDURE — 82607 VITAMIN B-12: CPT

## 2023-06-29 PROCEDURE — 36415 COLL VENOUS BLD VENIPUNCTURE: CPT

## 2023-06-29 PROCEDURE — 70551 MRI BRAIN STEM W/O DYE: CPT

## 2023-06-29 PROCEDURE — 86038 ANTINUCLEAR ANTIBODIES: CPT

## 2023-06-29 PROCEDURE — 84443 ASSAY THYROID STIM HORMONE: CPT

## 2023-07-06 LAB
ANA SPECKLED TITR SER: NORMAL {TITER}
ANA TITR SER IF: POSITIVE
NOTE: NORMAL
T PALLIDUM AB SER QL IF: NON REACTIVE

## 2023-07-10 ENCOUNTER — CLINICAL DOCUMENTATION (OUTPATIENT)
Facility: HOSPITAL | Age: 43
End: 2023-07-10

## 2023-07-10 ENCOUNTER — HOSPITAL ENCOUNTER (OUTPATIENT)
Facility: HOSPITAL | Age: 43
Discharge: HOME | End: 2023-07-13
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
## 2023-07-28 ENCOUNTER — TRANSCRIBE ORDERS (OUTPATIENT)
Dept: SCHEDULING | Age: 43
End: 2023-07-28

## 2023-07-28 DIAGNOSIS — Z90.01: ICD-10-CM

## 2023-07-28 DIAGNOSIS — S06.0X0A CONCUSSION WITHOUT LOSS OF CONSCIOUSNESS, INITIAL ENCOUNTER: ICD-10-CM

## 2023-07-28 DIAGNOSIS — R94.02 ABNORMAL BRAIN SCAN: Primary | ICD-10-CM

## 2023-07-30 ENCOUNTER — HOSPITAL ENCOUNTER (OUTPATIENT)
Facility: HOSPITAL | Age: 43
Discharge: HOME | End: 2023-08-02
Payer: COMMERCIAL

## 2023-07-30 DIAGNOSIS — R94.02 ABNORMAL BRAIN SCAN: ICD-10-CM

## 2023-07-30 DIAGNOSIS — Z90.01: ICD-10-CM

## 2023-07-30 DIAGNOSIS — S06.0X0A CONCUSSION WITHOUT LOSS OF CONSCIOUSNESS, INITIAL ENCOUNTER: ICD-10-CM

## 2023-07-30 PROCEDURE — 70552 MRI BRAIN STEM W/DYE: CPT

## 2023-07-30 PROCEDURE — 6360000004 HC RX CONTRAST MEDICATION: Performed by: PSYCHIATRY & NEUROLOGY

## 2023-07-30 PROCEDURE — A9577 INJ MULTIHANCE: HCPCS | Performed by: PSYCHIATRY & NEUROLOGY

## 2023-07-30 RX ADMIN — GADOBENATE DIMEGLUMINE 20 ML: 529 INJECTION, SOLUTION INTRAVENOUS at 12:38

## 2023-08-01 ENCOUNTER — HOSPITAL ENCOUNTER (OUTPATIENT)
Facility: HOSPITAL | Age: 43
Discharge: HOME | End: 2023-08-04
Payer: COMMERCIAL

## 2023-08-01 DIAGNOSIS — G40.019 BENIGN ROLANDIC EPILEPSY, INTRACTABLE (HCC): ICD-10-CM

## 2023-08-01 PROCEDURE — 95816 EEG AWAKE AND DROWSY: CPT

## 2023-08-21 ENCOUNTER — CLINICAL DOCUMENTATION (OUTPATIENT)
Facility: HOSPITAL | Age: 43
End: 2023-08-21

## 2023-08-21 ENCOUNTER — HOSPITAL ENCOUNTER (OUTPATIENT)
Facility: HOSPITAL | Age: 43
Discharge: HOME | End: 2023-08-24

## 2023-08-21 NOTE — PROGRESS NOTES
Patient shares that she has lost weight. She weighs #214 per her scale. Last recorded 06/05/2023   221#    She shares that she is struggling with alcohol again. She will have \"1 shot navas tequila with pineapple or cherry juice. \"    Her future SMART goals are:   Wean away from alcohol. Research some (small portion) low glycemic carbs she can have with her protein and vegetables. She thinks if she has more carbs she may not crave alcohol. We are set to speak in October again.   Daniella Perkins RD

## 2023-08-25 ENCOUNTER — HOSPITAL ENCOUNTER (OUTPATIENT)
Facility: HOSPITAL | Age: 43
End: 2023-08-25
Payer: COMMERCIAL

## 2023-08-25 DIAGNOSIS — M54.12 BRACHIAL NEURITIS: ICD-10-CM

## 2023-08-25 PROCEDURE — 72141 MRI NECK SPINE W/O DYE: CPT

## 2023-09-07 ENCOUNTER — HOSPITAL ENCOUNTER (OUTPATIENT)
Facility: HOSPITAL | Age: 43
Discharge: HOME | End: 2023-09-07

## 2023-09-07 DIAGNOSIS — M54.12 BRACHIAL NEURITIS: ICD-10-CM

## 2023-09-07 PROCEDURE — 72156 MRI NECK SPINE W/O & W/DYE: CPT

## 2023-09-07 PROCEDURE — A9577 INJ MULTIHANCE: HCPCS | Performed by: PSYCHIATRY & NEUROLOGY

## 2023-09-07 PROCEDURE — 6360000004 HC RX CONTRAST MEDICATION: Performed by: PSYCHIATRY & NEUROLOGY

## 2023-09-07 RX ADMIN — GADOBENATE DIMEGLUMINE 20 ML: 529 INJECTION, SOLUTION INTRAVENOUS at 15:52
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
## 2023-09-29 ENCOUNTER — OFFICE VISIT (OUTPATIENT)
Age: 43
End: 2023-09-29
Payer: MEDICAID

## 2023-09-29 VITALS
SYSTOLIC BLOOD PRESSURE: 124 MMHG | WEIGHT: 220 LBS | TEMPERATURE: 98.1 F | HEIGHT: 66 IN | RESPIRATION RATE: 17 BRPM | DIASTOLIC BLOOD PRESSURE: 86 MMHG | HEART RATE: 80 BPM | BODY MASS INDEX: 35.36 KG/M2 | OXYGEN SATURATION: 99 %

## 2023-09-29 DIAGNOSIS — Z71.3 ENCOUNTER FOR WEIGHT LOSS COUNSELING: ICD-10-CM

## 2023-09-29 DIAGNOSIS — Z98.84 S/P GASTRIC BYPASS: ICD-10-CM

## 2023-09-29 DIAGNOSIS — E66.09 CLASS 2 OBESITY DUE TO EXCESS CALORIES WITHOUT SERIOUS COMORBIDITY WITH BODY MASS INDEX (BMI) OF 39.0 TO 39.9 IN ADULT: Primary | ICD-10-CM

## 2023-09-29 PROCEDURE — 3078F DIAST BP <80 MM HG: CPT | Performed by: NURSE PRACTITIONER

## 2023-09-29 PROCEDURE — 99213 OFFICE O/P EST LOW 20 MIN: CPT | Performed by: NURSE PRACTITIONER

## 2023-09-29 PROCEDURE — 3074F SYST BP LT 130 MM HG: CPT | Performed by: NURSE PRACTITIONER

## 2023-09-29 RX ORDER — PHENTERMINE HYDROCHLORIDE 37.5 MG/1
37.5 TABLET ORAL
Qty: 30 TABLET | Refills: 0 | Status: SHIPPED | OUTPATIENT
Start: 2023-09-29 | End: 2023-10-29

## 2023-09-29 ASSESSMENT — ENCOUNTER SYMPTOMS: BACK PAIN: 1

## 2023-10-02 ASSESSMENT — ENCOUNTER SYMPTOMS
SHORTNESS OF BREATH: 0
NAUSEA: 0
COUGH: 0
VOMITING: 0
CONSTIPATION: 0
DIARRHEA: 0
ABDOMINAL PAIN: 0

## 2023-10-09 ENCOUNTER — CLINICAL DOCUMENTATION (OUTPATIENT)
Facility: HOSPITAL | Age: 43
End: 2023-10-09

## 2023-10-09 NOTE — PROGRESS NOTES
Patient no-showed set phone appointment today. RD has called and left a message and e-mailed patient.    Silverio Pierson RD

## 2024-02-21 ENCOUNTER — HOSPITAL ENCOUNTER (OUTPATIENT)
Dept: WOMENS IMAGING | Facility: HOSPITAL | Age: 44
Discharge: HOME | End: 2024-02-24
Payer: COMMERCIAL

## 2024-02-21 ENCOUNTER — HOSPITAL ENCOUNTER (OUTPATIENT)
Facility: HOSPITAL | Age: 44
Discharge: HOME | End: 2024-02-24
Payer: COMMERCIAL

## 2024-02-21 DIAGNOSIS — N63.15 BREAST LUMP ON RIGHT SIDE AT 9 O'CLOCK POSITION: ICD-10-CM

## 2024-02-21 PROCEDURE — G0279 TOMOSYNTHESIS, MAMMO: HCPCS

## 2024-02-21 PROCEDURE — 76642 ULTRASOUND BREAST LIMITED: CPT

## 2024-02-26 ENCOUNTER — CLINICAL DOCUMENTATION (OUTPATIENT)
Facility: HOSPITAL | Age: 44
End: 2024-02-26

## 2024-02-26 NOTE — PROGRESS NOTES
RD received a message patient wants to talk.  RD called and left a voice mail and sent an e-mail.   Mara Calero RD

## 2024-03-08 ENCOUNTER — OFFICE VISIT (OUTPATIENT)
Age: 44
End: 2024-03-08
Payer: COMMERCIAL

## 2024-03-08 VITALS
SYSTOLIC BLOOD PRESSURE: 117 MMHG | OXYGEN SATURATION: 99 % | HEART RATE: 75 BPM | TEMPERATURE: 97.6 F | HEIGHT: 66 IN | RESPIRATION RATE: 17 BRPM | BODY MASS INDEX: 36.16 KG/M2 | WEIGHT: 225 LBS | DIASTOLIC BLOOD PRESSURE: 78 MMHG

## 2024-03-08 DIAGNOSIS — Z98.84 S/P GASTRIC BYPASS: ICD-10-CM

## 2024-03-08 DIAGNOSIS — D50.8 IRON DEFICIENCY ANEMIA SECONDARY TO INADEQUATE DIETARY IRON INTAKE: ICD-10-CM

## 2024-03-08 DIAGNOSIS — K91.2 POSTOPERATIVE INTESTINAL MALABSORPTION: ICD-10-CM

## 2024-03-08 DIAGNOSIS — E66.09 CLASS 2 OBESITY DUE TO EXCESS CALORIES WITHOUT SERIOUS COMORBIDITY WITH BODY MASS INDEX (BMI) OF 39.0 TO 39.9 IN ADULT: Primary | ICD-10-CM

## 2024-03-08 PROCEDURE — 99214 OFFICE O/P EST MOD 30 MIN: CPT | Performed by: NURSE PRACTITIONER

## 2024-03-08 PROCEDURE — 3074F SYST BP LT 130 MM HG: CPT | Performed by: NURSE PRACTITIONER

## 2024-03-08 PROCEDURE — 3078F DIAST BP <80 MM HG: CPT | Performed by: NURSE PRACTITIONER

## 2024-03-08 RX ORDER — HYDROXYZINE PAMOATE 25 MG/1
CAPSULE ORAL
COMMUNITY
Start: 2024-02-05

## 2024-03-08 RX ORDER — BUSPIRONE HYDROCHLORIDE 10 MG/1
10 TABLET ORAL 2 TIMES DAILY
COMMUNITY
Start: 2024-02-05

## 2024-03-08 RX ORDER — GABAPENTIN 300 MG/1
CAPSULE ORAL
COMMUNITY
Start: 2023-12-06

## 2024-03-08 RX ORDER — QUETIAPINE FUMARATE 100 MG/1
100 TABLET, FILM COATED ORAL
COMMUNITY
Start: 2024-02-03

## 2024-03-08 RX ORDER — CYCLOBENZAPRINE HCL 10 MG
TABLET ORAL
COMMUNITY
Start: 2024-02-05

## 2024-03-08 RX ORDER — FLUOXETINE HYDROCHLORIDE 40 MG/1
40 CAPSULE ORAL DAILY
COMMUNITY
Start: 2024-02-05

## 2024-03-08 RX ORDER — AMITRIPTYLINE HYDROCHLORIDE 25 MG/1
TABLET, FILM COATED ORAL
COMMUNITY
Start: 2024-02-27

## 2024-03-08 NOTE — PROGRESS NOTES
Bariatric Postoperative Nurse Note      Arnie Gale is a 43 y.o. female status post laparoscopic gastric bypass surgery performed on 08/25/2021.    All Post-Ops (including two weeks)  -# of grams of protein daily? 60g+  -sources of protein? Spinach, Stewart, Shrimp, Turkey,  -# of oz of sugar free fluids from all sources daily? 32oz to 48oz.  -Nausea? No  -Vomiting? No  -Difficulty swallow/food sticking? No  -Heartburn/regurgitation? No  -Character of bowel movements (diarrhea/constipation/bloody stools?) regular  -Which multivitamin product are you taking? Flintstones   -What dose and how frequently are you taking calcium citrate? 500mg 2x daily.  - from any iron-containing multivitamin by 2 hours? No  -Ulcer risk exposures:   NSAID No  Tobacco No  Alcohol No  Steroids No  -Minutes of physical activity and what type? Walking inconsistent.        
Monocytes Absolute 0.4 0.05 - 1.2 K/UL    Eosinophils Absolute 0.1 0.0 - 0.4 K/UL    Basophils Absolute 0.1 0.0 - 0.1 K/UL    Absolute Immature Granulocyte 0.0 0.00 - 0.04 K/UL    Differential Type AUTOMATED     Hemoglobin A1C    Collection Time: 03/11/24  8:59 AM   Result Value Ref Range    Hemoglobin A1C 4.9 4.2 - 5.6 %    Estimated Avg Glucose 94 mg/dL   Lipid Panel    Collection Time: 03/11/24  8:59 AM   Result Value Ref Range    LIPID PANEL        Cholesterol, Total 137 <200 MG/DL    Triglycerides 72 <150 MG/DL    HDL 64 (H) 40 - 60 MG/DL    LDL Calculated 58.6 0 - 100 MG/DL    VLDL Cholesterol Calculated 14.4 MG/DL    Chol/HDL Ratio 2.1 0 - 5.0         Assessment:  2 years s/p laparoscopic gastric bypass surgery  with a total weight loss of 92 lbs to date, struggling with weight regain.    Plan:   Labs were not completed prior to visit, she reports she will get these done after visit today and will call when received.  Pt was instructed to continue recommended bariatric vitamin supplementation.   Continue working with JB Terry and following a high-protein/low-carb bariatric diet.  Will send in Wegov once labs are completed, side effects and titration schedule discussed with patient she denies history of MTC.  Orlistat: contraindicated as she is s/p bariatric surgery with history of vitamin D deficiency  Phentermine (Adipex-P): Took May 2023 through October 2023 with minimal weight loss (treatment failure)  Phentermine-topiramate (Qysmia): Contraindicated due to interaction between topiramate and amitriptyline  Bupropion-naltrexone (Contrave): contraindicated due to current use of BuSpar, Prozac, and Seroquel.  Other: work with RD, low carb/high protein diet, and regular exercise.     We reviewed the components of a successful postoperative course including requirement for a high protein, low carbohydrate diet, 64 oz a day of zero calorie liquids, daily vitamin supplementation, daily exercise (150 mis/week),

## 2024-03-11 ENCOUNTER — HOSPITAL ENCOUNTER (OUTPATIENT)
Facility: HOSPITAL | Age: 44
Discharge: HOME | End: 2024-03-14
Payer: COMMERCIAL

## 2024-03-11 DIAGNOSIS — E66.09 CLASS 2 OBESITY DUE TO EXCESS CALORIES WITHOUT SERIOUS COMORBIDITY WITH BODY MASS INDEX (BMI) OF 39.0 TO 39.9 IN ADULT: ICD-10-CM

## 2024-03-11 DIAGNOSIS — Z98.84 S/P GASTRIC BYPASS: ICD-10-CM

## 2024-03-11 DIAGNOSIS — K91.2 POSTOPERATIVE INTESTINAL MALABSORPTION: ICD-10-CM

## 2024-03-11 LAB
25(OH)D3 SERPL-MCNC: 42.3 NG/ML (ref 30–100)
ALBUMIN SERPL-MCNC: 3.6 G/DL (ref 3.4–5)
ALBUMIN/GLOB SERPL: 0.9 (ref 0.8–1.7)
ALP SERPL-CCNC: 98 U/L (ref 45–117)
ALT SERPL-CCNC: 21 U/L (ref 13–56)
ANION GAP SERPL CALC-SCNC: 6 MMOL/L (ref 3–18)
AST SERPL-CCNC: 19 U/L (ref 10–38)
BASOPHILS # BLD: 0.1 K/UL (ref 0–0.1)
BASOPHILS NFR BLD: 1 % (ref 0–2)
BILIRUB SERPL-MCNC: 0.3 MG/DL (ref 0.2–1)
BUN SERPL-MCNC: 8 MG/DL (ref 7–18)
BUN/CREAT SERPL: 11 (ref 12–20)
CALCIUM SERPL-MCNC: 8.7 MG/DL (ref 8.5–10.1)
CHLORIDE SERPL-SCNC: 106 MMOL/L (ref 100–111)
CHOLEST SERPL-MCNC: 137 MG/DL
CO2 SERPL-SCNC: 28 MMOL/L (ref 21–32)
CREAT SERPL-MCNC: 0.73 MG/DL (ref 0.6–1.3)
DIFFERENTIAL METHOD BLD: ABNORMAL
EOSINOPHIL # BLD: 0.1 K/UL (ref 0–0.4)
EOSINOPHIL NFR BLD: 2 % (ref 0–5)
ERYTHROCYTE [DISTWIDTH] IN BLOOD BY AUTOMATED COUNT: 16.7 % (ref 11.6–14.5)
EST. AVERAGE GLUCOSE BLD GHB EST-MCNC: 94 MG/DL
FERRITIN SERPL-MCNC: 3 NG/ML (ref 8–388)
GLOBULIN SER CALC-MCNC: 3.9 G/DL (ref 2–4)
GLUCOSE SERPL-MCNC: 92 MG/DL (ref 74–99)
HBA1C MFR BLD: 4.9 % (ref 4.2–5.6)
HCT VFR BLD AUTO: 29.2 % (ref 35–45)
HDLC SERPL-MCNC: 64 MG/DL (ref 40–60)
HDLC SERPL: 2.1 (ref 0–5)
HGB BLD-MCNC: 8.6 G/DL (ref 12–16)
IMM GRANULOCYTES # BLD AUTO: 0 K/UL (ref 0–0.04)
IMM GRANULOCYTES NFR BLD AUTO: 0 % (ref 0–0.5)
IRON SERPL-MCNC: 15 UG/DL (ref 50–175)
LDLC SERPL CALC-MCNC: 58.6 MG/DL (ref 0–100)
LIPID PANEL: ABNORMAL
LYMPHOCYTES # BLD: 1.8 K/UL (ref 0.9–3.6)
LYMPHOCYTES NFR BLD: 37 % (ref 21–52)
MCH RBC QN AUTO: 21.2 PG (ref 24–34)
MCHC RBC AUTO-ENTMCNC: 29.5 G/DL (ref 31–37)
MCV RBC AUTO: 71.9 FL (ref 78–100)
MONOCYTES # BLD: 0.4 K/UL (ref 0.05–1.2)
MONOCYTES NFR BLD: 9 % (ref 3–10)
NEUTS SEG # BLD: 2.5 K/UL (ref 1.8–8)
NEUTS SEG NFR BLD: 51 % (ref 40–73)
NRBC # BLD: 0 K/UL (ref 0–0.01)
NRBC BLD-RTO: 0 PER 100 WBC
PLATELET # BLD AUTO: 303 K/UL (ref 135–420)
PMV BLD AUTO: 11.8 FL (ref 9.2–11.8)
POTASSIUM SERPL-SCNC: 3.6 MMOL/L (ref 3.5–5.5)
PROT SERPL-MCNC: 7.5 G/DL (ref 6.4–8.2)
RBC # BLD AUTO: 4.06 M/UL (ref 4.2–5.3)
SODIUM SERPL-SCNC: 140 MMOL/L (ref 136–145)
TRIGL SERPL-MCNC: 72 MG/DL
VIT B12 SERPL-MCNC: 376 PG/ML (ref 211–911)
VLDLC SERPL CALC-MCNC: 14.4 MG/DL
WBC # BLD AUTO: 4.8 K/UL (ref 4.6–13.2)

## 2024-03-11 PROCEDURE — 82607 VITAMIN B-12: CPT

## 2024-03-11 PROCEDURE — 80053 COMPREHEN METABOLIC PANEL: CPT

## 2024-03-11 PROCEDURE — 83036 HEMOGLOBIN GLYCOSYLATED A1C: CPT

## 2024-03-11 PROCEDURE — 84425 ASSAY OF VITAMIN B-1: CPT

## 2024-03-11 PROCEDURE — 85025 COMPLETE CBC W/AUTO DIFF WBC: CPT

## 2024-03-11 PROCEDURE — 82728 ASSAY OF FERRITIN: CPT

## 2024-03-11 PROCEDURE — 36415 COLL VENOUS BLD VENIPUNCTURE: CPT

## 2024-03-11 PROCEDURE — 82306 VITAMIN D 25 HYDROXY: CPT

## 2024-03-11 PROCEDURE — 80061 LIPID PANEL: CPT

## 2024-03-11 PROCEDURE — 83540 ASSAY OF IRON: CPT

## 2024-03-13 LAB — VIT B1 BLD-SCNC: 116.2 NMOL/L (ref 66.5–200)

## 2024-03-14 ENCOUNTER — TELEPHONE (OUTPATIENT)
Age: 44
End: 2024-03-14

## 2024-03-14 PROBLEM — Z98.84 S/P GASTRIC BYPASS: Status: ACTIVE | Noted: 2024-03-14

## 2024-03-14 PROBLEM — D50.8 IRON DEFICIENCY ANEMIA SECONDARY TO INADEQUATE DIETARY IRON INTAKE: Status: ACTIVE | Noted: 2024-03-14

## 2024-03-14 PROBLEM — K91.2 POSTOPERATIVE INTESTINAL MALABSORPTION: Status: ACTIVE | Noted: 2024-03-14

## 2024-03-14 RX ORDER — SODIUM CHLORIDE 9 MG/ML
INJECTION, SOLUTION INTRAVENOUS CONTINUOUS
OUTPATIENT
Start: 2024-03-25

## 2024-03-14 RX ORDER — SEMAGLUTIDE 0.25 MG/.5ML
0.25 INJECTION, SOLUTION SUBCUTANEOUS
Qty: 2 ML | Refills: 0 | Status: SHIPPED | OUTPATIENT
Start: 2024-03-14

## 2024-03-14 RX ORDER — SEMAGLUTIDE 0.5 MG/.5ML
0.5 INJECTION, SOLUTION SUBCUTANEOUS
Qty: 2 ML | Refills: 0 | Status: SHIPPED | OUTPATIENT
Start: 2024-03-14

## 2024-03-14 RX ORDER — SODIUM CHLORIDE 9 MG/ML
5-250 INJECTION, SOLUTION INTRAVENOUS PRN
OUTPATIENT
Start: 2024-03-25

## 2024-03-14 RX ORDER — HEPARIN SODIUM (PORCINE) LOCK FLUSH IV SOLN 100 UNIT/ML 100 UNIT/ML
500 SOLUTION INTRAVENOUS PRN
OUTPATIENT
Start: 2024-03-25

## 2024-03-14 RX ORDER — FAMOTIDINE 10 MG/ML
20 INJECTION, SOLUTION INTRAVENOUS
OUTPATIENT
Start: 2024-03-25

## 2024-03-14 RX ORDER — DIPHENHYDRAMINE HYDROCHLORIDE 50 MG/ML
50 INJECTION INTRAMUSCULAR; INTRAVENOUS
OUTPATIENT
Start: 2024-03-25

## 2024-03-14 RX ORDER — ACETAMINOPHEN 325 MG/1
650 TABLET ORAL
OUTPATIENT
Start: 2024-03-25

## 2024-03-14 RX ORDER — ONDANSETRON 2 MG/ML
8 INJECTION INTRAMUSCULAR; INTRAVENOUS
OUTPATIENT
Start: 2024-03-25

## 2024-03-14 RX ORDER — ALBUTEROL SULFATE 90 UG/1
4 AEROSOL, METERED RESPIRATORY (INHALATION) PRN
OUTPATIENT
Start: 2024-03-25

## 2024-03-14 RX ORDER — SODIUM CHLORIDE 0.9 % (FLUSH) 0.9 %
5-40 SYRINGE (ML) INJECTION PRN
OUTPATIENT
Start: 2024-03-25

## 2024-03-14 RX ORDER — SEMAGLUTIDE 1 MG/.5ML
1 INJECTION, SOLUTION SUBCUTANEOUS
Qty: 2 ML | Refills: 0 | Status: SHIPPED | OUTPATIENT
Start: 2024-03-14

## 2024-03-14 ASSESSMENT — ENCOUNTER SYMPTOMS
SHORTNESS OF BREATH: 0
DIARRHEA: 0
NAUSEA: 0
VOMITING: 0
COUGH: 0
CONSTIPATION: 0
ABDOMINAL PAIN: 0

## 2024-03-14 NOTE — TELEPHONE ENCOUNTER
Significant KRYSTYNA, I put in orders for Venofer infusion so [facility] will call her to set that up.  Otherwise labs look great and to continue bariatric vitamins.  Wegovy sent to [employer] on [address], give us up to 2 weeks to submit prior authorization and receive response.

## 2024-03-20 ENCOUNTER — HOSPITAL ENCOUNTER (OUTPATIENT)
Dept: WOMENS IMAGING | Facility: HOSPITAL | Age: 44
Discharge: HOME | End: 2024-03-23
Payer: COMMERCIAL

## 2024-03-20 DIAGNOSIS — R92.8 ABNORMAL FINDINGS ON DIAGNOSTIC IMAGING OF BREAST: ICD-10-CM

## 2024-03-20 DIAGNOSIS — R92.8 ABNORMAL FINDING ON BREAST IMAGING: ICD-10-CM

## 2024-03-20 PROCEDURE — 88305 TISSUE EXAM BY PATHOLOGIST: CPT

## 2024-03-20 PROCEDURE — 77065 DX MAMMO INCL CAD UNI: CPT

## 2024-03-20 PROCEDURE — 2500000003 HC RX 250 WO HCPCS: Performed by: RADIOLOGY

## 2024-03-20 PROCEDURE — 2720000010 MAM 3D TOMO STEREO VAC BX BREAST RT 1ST LES W/CLIP SPEC

## 2024-03-20 RX ORDER — LIDOCAINE HYDROCHLORIDE AND EPINEPHRINE 10; 10 MG/ML; UG/ML
50 INJECTION, SOLUTION INFILTRATION; PERINEURAL ONCE
Status: COMPLETED | OUTPATIENT
Start: 2024-03-20 | End: 2024-03-20

## 2024-03-20 RX ORDER — LIDOCAINE HYDROCHLORIDE 10 MG/ML
30 INJECTION, SOLUTION EPIDURAL; INFILTRATION; INTRACAUDAL; PERINEURAL ONCE
Status: COMPLETED | OUTPATIENT
Start: 2024-03-20 | End: 2024-03-20

## 2024-03-20 RX ADMIN — LIDOCAINE HYDROCHLORIDE 5 ML: 10 INJECTION, SOLUTION EPIDURAL; INFILTRATION; INTRACAUDAL; PERINEURAL at 13:21

## 2024-03-20 RX ADMIN — LIDOCAINE HYDROCHLORIDE,EPINEPHRINE BITARTRATE 20 ML: 10; .01 INJECTION, SOLUTION INFILTRATION; PERINEURAL at 13:22

## 2024-03-20 NOTE — PROGRESS NOTES
Patient arrived for RIGHT breast Stereotactic Biopsy with clip placement. Pt arrived in [facility] ambulatory, alert and oriented. Pt tolerated Biopsy well without complaint. Specimens obtained, and placed in labeled specimen container for pathology. Direct pressure applied to incision site, bleeding controlled, steri-strips applied and covered with gauze prior to post mammogram films for confirmation of clip placement. Clean Dry gauze and ice pack applied and bra on.   reviewed Post procedure discharge instructions with patient. Patient verbalizes understanding and written copy given to patient. Pt D/C'd [id number]

## 2024-03-22 ENCOUNTER — TELEPHONE (OUTPATIENT)
Age: 44
End: 2024-03-22

## 2024-03-22 NOTE — TELEPHONE ENCOUNTER
The patient called regarding the prior authorization for the Wegovy.  Advised the patient that the medication was denied by issuance.  The patient would like phentermine.       The patient was called by [facility] and was told the iron infusion is on back order and it will be a while before they get the infusion.

## 2024-04-02 ENCOUNTER — HOSPITAL ENCOUNTER (OUTPATIENT)
Facility: HOSPITAL | Age: 44
Setting detail: INFUSION SERIES
Discharge: HOME | End: 2024-04-05
Payer: COMMERCIAL

## 2024-04-02 VITALS
HEART RATE: 73 BPM | OXYGEN SATURATION: 100 % | RESPIRATION RATE: 18 BRPM | TEMPERATURE: 97 F | SYSTOLIC BLOOD PRESSURE: 131 MMHG | DIASTOLIC BLOOD PRESSURE: 85 MMHG

## 2024-04-02 DIAGNOSIS — Z98.84 S/P GASTRIC BYPASS: ICD-10-CM

## 2024-04-02 DIAGNOSIS — D50.8 IRON DEFICIENCY ANEMIA SECONDARY TO INADEQUATE DIETARY IRON INTAKE: Primary | ICD-10-CM

## 2024-04-02 DIAGNOSIS — K91.2 POSTOPERATIVE INTESTINAL MALABSORPTION: ICD-10-CM

## 2024-04-02 PROCEDURE — 2580000003 HC RX 258: Performed by: NURSE PRACTITIONER

## 2024-04-02 PROCEDURE — 96366 THER/PROPH/DIAG IV INF ADDON: CPT

## 2024-04-02 PROCEDURE — 6360000002 HC RX W HCPCS: Performed by: NURSE PRACTITIONER

## 2024-04-02 PROCEDURE — 96365 THER/PROPH/DIAG IV INF INIT: CPT

## 2024-04-02 RX ORDER — SODIUM CHLORIDE 9 MG/ML
INJECTION, SOLUTION INTRAVENOUS CONTINUOUS
OUTPATIENT
Start: 2024-04-16

## 2024-04-02 RX ORDER — ONDANSETRON 2 MG/ML
8 INJECTION INTRAMUSCULAR; INTRAVENOUS
OUTPATIENT
Start: 2024-04-16

## 2024-04-02 RX ORDER — DIPHENHYDRAMINE HYDROCHLORIDE 50 MG/ML
50 INJECTION INTRAMUSCULAR; INTRAVENOUS
OUTPATIENT
Start: 2024-04-16

## 2024-04-02 RX ORDER — SODIUM CHLORIDE 9 MG/ML
5-250 INJECTION, SOLUTION INTRAVENOUS PRN
OUTPATIENT
Start: 2024-04-16

## 2024-04-02 RX ORDER — ACETAMINOPHEN 325 MG/1
650 TABLET ORAL
OUTPATIENT
Start: 2024-04-16

## 2024-04-02 RX ORDER — HEPARIN 100 UNIT/ML
500 SYRINGE INTRAVENOUS PRN
OUTPATIENT
Start: 2024-04-16

## 2024-04-02 RX ORDER — SODIUM CHLORIDE 0.9 % (FLUSH) 0.9 %
5-40 SYRINGE (ML) INJECTION PRN
OUTPATIENT
Start: 2024-04-16

## 2024-04-02 RX ORDER — ALBUTEROL SULFATE 90 UG/1
4 AEROSOL, METERED RESPIRATORY (INHALATION) PRN
OUTPATIENT
Start: 2024-04-16

## 2024-04-02 RX ORDER — SODIUM CHLORIDE 0.9 % (FLUSH) 0.9 %
5-40 SYRINGE (ML) INJECTION PRN
Status: ACTIVE | OUTPATIENT
Start: 2024-04-02 | End: 2024-04-03

## 2024-04-02 RX ORDER — SODIUM CHLORIDE 9 MG/ML
5-250 INJECTION, SOLUTION INTRAVENOUS PRN
Status: ACTIVE | OUTPATIENT
Start: 2024-04-02 | End: 2024-04-03

## 2024-04-02 RX ADMIN — SODIUM CHLORIDE, PRESERVATIVE FREE 10 ML: 5 INJECTION INTRAVENOUS at 09:30

## 2024-04-02 RX ADMIN — SODIUM CHLORIDE 300 MG: 9 INJECTION, SOLUTION INTRAVENOUS at 09:55

## 2024-04-02 RX ADMIN — SODIUM CHLORIDE 25 ML/HR: 9 INJECTION, SOLUTION INTRAVENOUS at 09:30

## 2024-04-03 NOTE — PROGRESS NOTES
[facility] Progress Note    Date: 2024    Name: Arnie Gale    MRN: [id number]         : 1980        Here for first of 3 doses of Iron Sucrose (Venofer).     Ms. Gale arrived to [facility] at 0905, ambulatory and in stable condition.      Ms. Gale was assessed and verbal and written education  provided on Iron Sucrose (Venofer). Actions and S&S adverse reactions were discussed and questions were answered to patient's satisfaction.      Ms. Gale vitals were reviewed.  Vitals:    24 0924 1305   BP: 108/76 131/85   Pulse: 75 73   Resp: 18 18   Temp: 99 °F (37.2 °C) 97 °F (36.1 °C)   TempSrc: Oral Oral   SpO2: 100% 100%         24 ga PIV inserted in RIGHT AC VEIN on first attempt. Brisk blood return was obtained and PIV flushed easily with normal saline, as ordered.     250 mL 0.9% Sodium Chloride hung to infuse @ KVO rate throughout today's treatment.     Iron Sucrose (Venofer) 300 mg hung to infuse over approximately 90 minutes, followed by NS flush.    Just prior to completion of the Venofer dose, her IV infiltrated and was D/C'ed. She has poor peripheral vascular access and IV was restarted on 4th attempt. #22 ga PIV inserted in RIGHT AC VEIN and flushed easily with NS per protocol. The Venofer infusion was completed with no further incidence.     Ms. Gale remained in [facility] for thirty minutes post Venofer infusion just as a precaution. Normal saline from the flush bag infused throughout the observation period.     Ms. Gale  tolerated the Venofer infusion well, without complaints.     PIV removed/ intact. Gauze and coban dressing applied to site.     Discharge instructions reviewed with patient and she expressed understanding. Patient's armband was removed and shredded.     [facility]. Gale  [facility], ambulatory and in stable condition at 1310.  She is to return on Tuesday, 24, at 0800, for her second of 3 Venofer

## 2024-04-08 ENCOUNTER — TELEPHONE (OUTPATIENT)
Age: 44
End: 2024-04-08

## 2024-04-08 NOTE — TELEPHONE ENCOUNTER
Patient called and stated that she just started getting a headache since getting her IV infusion on 04/02/2024 and wanted to know if this was normal. I advised patient that headaches are a common side effect after infusions and to take Tylenol as needed but should cease on its own. Patient understood and also stated she is no longer craving ice and is working on getting her fluids in.

## 2024-04-18 ENCOUNTER — HOSPITAL ENCOUNTER (OUTPATIENT)
Facility: HOSPITAL | Age: 44
Setting detail: INFUSION SERIES
End: 2024-04-18
Payer: COMMERCIAL

## 2024-04-18 VITALS
HEART RATE: 69 BPM | TEMPERATURE: 98.1 F | RESPIRATION RATE: 16 BRPM | SYSTOLIC BLOOD PRESSURE: 102 MMHG | DIASTOLIC BLOOD PRESSURE: 68 MMHG | OXYGEN SATURATION: 100 %

## 2024-04-18 DIAGNOSIS — K91.2 POSTOPERATIVE INTESTINAL MALABSORPTION: ICD-10-CM

## 2024-04-18 DIAGNOSIS — Z98.84 S/P GASTRIC BYPASS: ICD-10-CM

## 2024-04-18 DIAGNOSIS — D50.8 IRON DEFICIENCY ANEMIA SECONDARY TO INADEQUATE DIETARY IRON INTAKE: Primary | ICD-10-CM

## 2024-04-18 PROCEDURE — 96365 THER/PROPH/DIAG IV INF INIT: CPT

## 2024-04-18 PROCEDURE — 2580000003 HC RX 258: Performed by: NURSE PRACTITIONER

## 2024-04-18 PROCEDURE — 96366 THER/PROPH/DIAG IV INF ADDON: CPT

## 2024-04-18 PROCEDURE — 6360000002 HC RX W HCPCS: Performed by: NURSE PRACTITIONER

## 2024-04-18 RX ORDER — SODIUM CHLORIDE 9 MG/ML
5-250 INJECTION, SOLUTION INTRAVENOUS PRN
Status: ACTIVE | OUTPATIENT
Start: 2024-04-18 | End: 2024-04-19

## 2024-04-18 RX ORDER — SODIUM CHLORIDE 9 MG/ML
INJECTION, SOLUTION INTRAVENOUS CONTINUOUS
OUTPATIENT
Start: 2024-04-30

## 2024-04-18 RX ORDER — DIPHENHYDRAMINE HYDROCHLORIDE 50 MG/ML
50 INJECTION INTRAMUSCULAR; INTRAVENOUS
OUTPATIENT
Start: 2024-04-30

## 2024-04-18 RX ORDER — SODIUM CHLORIDE 9 MG/ML
5-250 INJECTION, SOLUTION INTRAVENOUS PRN
OUTPATIENT
Start: 2024-04-30

## 2024-04-18 RX ORDER — HEPARIN 100 UNIT/ML
500 SYRINGE INTRAVENOUS PRN
OUTPATIENT
Start: 2024-04-30

## 2024-04-18 RX ORDER — SODIUM CHLORIDE 0.9 % (FLUSH) 0.9 %
5-40 SYRINGE (ML) INJECTION PRN
Status: ACTIVE | OUTPATIENT
Start: 2024-04-18 | End: 2024-04-19

## 2024-04-18 RX ORDER — ALBUTEROL SULFATE 90 UG/1
4 AEROSOL, METERED RESPIRATORY (INHALATION) PRN
OUTPATIENT
Start: 2024-04-30

## 2024-04-18 RX ORDER — ACETAMINOPHEN 325 MG/1
650 TABLET ORAL
OUTPATIENT
Start: 2024-04-30

## 2024-04-18 RX ORDER — ONDANSETRON 2 MG/ML
8 INJECTION INTRAMUSCULAR; INTRAVENOUS
OUTPATIENT
Start: 2024-04-30

## 2024-04-18 RX ORDER — SODIUM CHLORIDE 0.9 % (FLUSH) 0.9 %
5-40 SYRINGE (ML) INJECTION PRN
OUTPATIENT
Start: 2024-04-30

## 2024-04-18 RX ADMIN — SODIUM CHLORIDE, PRESERVATIVE FREE 10 ML: 5 INJECTION INTRAVENOUS at 09:30

## 2024-04-18 RX ADMIN — SODIUM CHLORIDE 300 MG: 9 INJECTION, SOLUTION INTRAVENOUS at 09:40

## 2024-04-18 RX ADMIN — SODIUM CHLORIDE 30 ML/HR: 9 INJECTION, SOLUTION INTRAVENOUS at 09:35

## 2024-04-18 ASSESSMENT — PAIN - FUNCTIONAL ASSESSMENT
PAIN_FUNCTIONAL_ASSESSMENT: NONE - DENIES PAIN

## 2024-04-18 NOTE — PROGRESS NOTES
[facility] Progress Note    Date: 2024    Name: Arnie Gale    MRN: [id number]         : 1980    Ms. Gale arrived in the [facility] today at 0915, in stable condition, here for her VENOFER INFUSION (VENOFER EVERY 2 WEEKS X 3 TOTAL DOSES). She was assessed and education was provided.     DOSE # 2 OF 3, IV VENOFER TODAY.     Ms. Gale's vitals were reviewed.  Vitals:    24 0915   BP: 106/72   Pulse: 70   Resp: 16   Temp: 98.5 °F (36.9 °C)   SpO2: 100%       Ms. Gale presented to the infusion center today stating that she was doing well, and voicing no major complaints or concerns other than fatigue.       Ms. Gale stated that she tolerated her last dose of IV VENOFER very well and without any side effects or issues. However, she was still verbally reminded of the potential side effects and adverse reactions associated with the IV VENOFER, and she verbalized understanding.       PIV # 22 G was established in her RIGHT AC at 0930 without incident, and brisk blood return was obtained.          ml IV BAG was initiated to infuse @ KVO throughout treatment today.       Iron Sucrose (VENOFER) 300 mg IV, was administered per order, over approximately 90 minutes without incident.           After completion of the VENOFER infusion, Ms. Gale was kept for approximately 30 minutes of observation, just as a precaution. The NS was infused @ 193 ml/hr (same as Venofer infusion rate) throughout the observation period.       After completion of the observation period, her PIV was removed and gauze//coban was applied.      Ms. Gale tolerated treatment very well today, and she voiced no complaints.     [facility]. Gale was [facility] in stable condition at 1150.     She is to return in 2 weeks, on Thursday, 24 at 0800, for her next appointment, for Dose # 3 of 3, IV VENOFER infusion.     Satish Olivarez RN  2024  9:28 AM

## 2024-04-30 ENCOUNTER — APPOINTMENT (OUTPATIENT)
Facility: HOSPITAL | Age: 44
End: 2024-04-30
Payer: COMMERCIAL

## 2024-05-02 ENCOUNTER — HOSPITAL ENCOUNTER (OUTPATIENT)
Facility: HOSPITAL | Age: 44
Setting detail: INFUSION SERIES
End: 2024-05-02

## 2024-05-08 ENCOUNTER — HOSPITAL ENCOUNTER (OUTPATIENT)
Facility: HOSPITAL | Age: 44
Setting detail: INFUSION SERIES
End: 2024-05-08

## 2024-05-09 ENCOUNTER — TELEPHONE (OUTPATIENT)
Age: 44
End: 2024-05-09

## 2024-06-06 ENCOUNTER — HOSPITAL ENCOUNTER (OUTPATIENT)
Facility: HOSPITAL | Age: 44
Setting detail: INFUSION SERIES
End: 2024-06-06
Payer: COMMERCIAL

## 2024-06-06 VITALS
RESPIRATION RATE: 20 BRPM | DIASTOLIC BLOOD PRESSURE: 74 MMHG | HEART RATE: 64 BPM | SYSTOLIC BLOOD PRESSURE: 108 MMHG | OXYGEN SATURATION: 100 % | TEMPERATURE: 97.8 F

## 2024-06-06 DIAGNOSIS — K91.2 POSTOPERATIVE INTESTINAL MALABSORPTION: ICD-10-CM

## 2024-06-06 DIAGNOSIS — D50.8 IRON DEFICIENCY ANEMIA SECONDARY TO INADEQUATE DIETARY IRON INTAKE: Primary | ICD-10-CM

## 2024-06-06 DIAGNOSIS — Z98.84 S/P GASTRIC BYPASS: ICD-10-CM

## 2024-06-06 PROCEDURE — 6360000002 HC RX W HCPCS: Performed by: NURSE PRACTITIONER

## 2024-06-06 PROCEDURE — 96365 THER/PROPH/DIAG IV INF INIT: CPT

## 2024-06-06 PROCEDURE — 2580000003 HC RX 258: Performed by: NURSE PRACTITIONER

## 2024-06-06 PROCEDURE — 96366 THER/PROPH/DIAG IV INF ADDON: CPT

## 2024-06-06 RX ORDER — ACETAMINOPHEN 325 MG/1
650 TABLET ORAL
OUTPATIENT
Start: 2024-06-06

## 2024-06-06 RX ORDER — SODIUM CHLORIDE 9 MG/ML
5-250 INJECTION, SOLUTION INTRAVENOUS PRN
OUTPATIENT
Start: 2024-06-06

## 2024-06-06 RX ORDER — SODIUM CHLORIDE 9 MG/ML
INJECTION, SOLUTION INTRAVENOUS CONTINUOUS
OUTPATIENT
Start: 2024-06-06

## 2024-06-06 RX ORDER — ALBUTEROL SULFATE 90 UG/1
4 AEROSOL, METERED RESPIRATORY (INHALATION) PRN
OUTPATIENT
Start: 2024-06-06

## 2024-06-06 RX ORDER — SODIUM CHLORIDE 0.9 % (FLUSH) 0.9 %
5-40 SYRINGE (ML) INJECTION PRN
Status: ACTIVE | OUTPATIENT
Start: 2024-06-06 | End: 2024-06-07

## 2024-06-06 RX ORDER — HEPARIN 100 UNIT/ML
500 SYRINGE INTRAVENOUS PRN
OUTPATIENT
Start: 2024-06-06

## 2024-06-06 RX ORDER — DIPHENHYDRAMINE HYDROCHLORIDE 50 MG/ML
50 INJECTION INTRAMUSCULAR; INTRAVENOUS
OUTPATIENT
Start: 2024-06-06

## 2024-06-06 RX ORDER — SODIUM CHLORIDE 0.9 % (FLUSH) 0.9 %
5-40 SYRINGE (ML) INJECTION PRN
OUTPATIENT
Start: 2024-06-06

## 2024-06-06 RX ORDER — SODIUM CHLORIDE 9 MG/ML
5-250 INJECTION, SOLUTION INTRAVENOUS PRN
Status: ACTIVE | OUTPATIENT
Start: 2024-06-06 | End: 2024-06-07

## 2024-06-06 RX ORDER — ONDANSETRON 2 MG/ML
8 INJECTION INTRAMUSCULAR; INTRAVENOUS
OUTPATIENT
Start: 2024-06-06

## 2024-06-06 RX ADMIN — SODIUM CHLORIDE, PRESERVATIVE FREE 10 ML: 5 INJECTION INTRAVENOUS at 10:30

## 2024-06-06 RX ADMIN — SODIUM CHLORIDE 400 MG: 9 INJECTION, SOLUTION INTRAVENOUS at 10:40

## 2024-06-06 RX ADMIN — SODIUM CHLORIDE 25 ML/HR: 9 INJECTION, SOLUTION INTRAVENOUS at 10:30

## 2024-06-06 NOTE — PROGRESS NOTES
[facility] Progress Note    Date: 2024    Name: Arnie Gale    MRN: [id number]         : 1980        Here for third of 3 doses of Iron Sucrose (Venofer).     Ms. Gale [facility] at 1015, ambulatory and in stable condition.      Ms. Gale was assessed and education  provided.      Ms. Gale vitals were reviewed.  Vitals:    24 1015   BP: 109/75   Pulse: 81   Resp: 20   Temp: 98.2 °F (36.8 °C)   TempSrc: Oral   SpO2: 100%         24 ga PIV inserted in RIGHT AC VEIN on first attempt. Brisk blood return was obtained and PIV flushed easily with normal saline, as ordered.     250 mL 0.9% Sodium Chloride hung to infuse @ KVO rate throughout today's treatment.     Iron Sucrose (Venofer) 400 mg hung to infuse over approximately 150 minutes, followed by NS flush.    Ms. Gale remained in [facility] for thirty minutes post Venofer infusion just as a precaution. Normal saline from the flush bag infused throughout the observation period.     Ms. Gale  tolerated the Venofer infusion well, without complaints.     PIV removed/ intact. Gauze and coban dressing applied to site.     Discharge instructions reviewed with patient and she expressed understanding. Patient's armband was removed and shredded.     Ms. Gale  [facility], ambulatory and in stable condition at 1410. She has no further [facility] appointments scheduled and was instructed to follow up with her referring MD. She expressed understanding.       Zoë Salguero RN

## 2024-06-10 ENCOUNTER — OFFICE VISIT (OUTPATIENT)
Age: 44
End: 2024-06-10
Payer: COMMERCIAL

## 2024-06-10 VITALS
RESPIRATION RATE: 14 BRPM | HEIGHT: 66 IN | BODY MASS INDEX: 35.07 KG/M2 | WEIGHT: 218.2 LBS | SYSTOLIC BLOOD PRESSURE: 122 MMHG | TEMPERATURE: 97.5 F | DIASTOLIC BLOOD PRESSURE: 82 MMHG

## 2024-06-10 DIAGNOSIS — N64.89 RADIAL SCAR OF RIGHT BREAST: Primary | ICD-10-CM

## 2024-06-10 DIAGNOSIS — R92.8 ABNORMAL MAMMOGRAM OF RIGHT BREAST: ICD-10-CM

## 2024-06-10 PROCEDURE — 3079F DIAST BP 80-89 MM HG: CPT | Performed by: SURGERY

## 2024-06-10 PROCEDURE — 99214 OFFICE O/P EST MOD 30 MIN: CPT | Performed by: SURGERY

## 2024-06-10 PROCEDURE — 3074F SYST BP LT 130 MM HG: CPT | Performed by: SURGERY

## 2024-06-10 RX ORDER — DIAZEPAM 2 MG/1
2 TABLET ORAL EVERY 8 HOURS PRN
Qty: 3 TABLET | Refills: 0 | Status: SHIPPED | OUTPATIENT
Start: 2024-06-10 | End: 2024-07-10

## 2024-06-10 NOTE — PROGRESS NOTES
Arnie Gale is a 44 y.o. female (: 1980)     Chief Complaint   Patient presents with    New Patient     Right breast        Medication list and allergies have been reviewed with Arnie Gale and updated as of today's date.     I have gone over all Medical, Surgical and Social History with Arnie Gale and updated/added the information accordingly.

## 2024-06-11 ENCOUNTER — TELEPHONE (OUTPATIENT)
Age: 44
End: 2024-06-11

## 2024-06-11 ASSESSMENT — ENCOUNTER SYMPTOMS
CHEST TIGHTNESS: 0
SHORTNESS OF BREATH: 0

## 2024-06-11 NOTE — TELEPHONE ENCOUNTER
[place] from authorization department calling about pt MRI, needs provider to sign off on office note to get authorization to go through.

## 2024-06-11 NOTE — PROGRESS NOTES
CC:   Chief Complaint   Patient presents with    New Patient     Right breast         Assessment:    ICD-10-CM    1. Radial scar of right breast  N64.89 MRI BREAST BILATERAL W WO CONTRAST     diazePAM (VALIUM) 2 MG tablet      2. Abnormal mammogram of right breast  R92.8           Plan: We discussed the pathology results of radial scar in detail. We discussed that it is not malignant. It may cause mass and/or abnormality on imaging. It has a complex appearance under the microscope and can resemble cancer. Traditional recommendation is for excision. Risks, benefits and options of conservative management and excision were both discussed. Conservative management was described to include observation with serial exams and imaging. Excision would include preoperative localization. Ms. Arnie Gale prefers localized excision biopsy. Prior to scheduling this however I do recommend MRI of the breast as additional area of distortion was noted on the diagnostic mammogram but not seen at time of biopsy. Once she completes MRI I will call her back with results to ensure there is not a second area that needs excisional biopsy. She agrees with this plan.         HPI:  Arnie Gale is a 44 y.o. female who is referred for right breast radial scar found on mammogram when she underwent clinical breast exam by her physician. She reports no changes to self breast exams and overall doing well. There is no family history of breast, pancreatic or colon cancer she is aware of.     Allergies:  Allergies   Allergen Reactions    Nsaids Other (See Comments)     Contraindicated related to gastric bypass       Medication Review:  Current Outpatient Medications on File Prior to Visit   Medication Sig Dispense Refill    Semaglutide-Weight Management (WEGOVY) 0.25 MG/0.5ML SOAJ SC injection Inject 0.25 mg into the skin every 7 days Weeks 1-4 2 mL 0    Semaglutide-Weight Management (WEGOVY) 0.5 MG/0.5ML SOAJ SC injection Inject 0.5 mg

## 2024-06-13 ENCOUNTER — HOSPITAL ENCOUNTER (OUTPATIENT)
Facility: HOSPITAL | Age: 44
Discharge: HOME | End: 2024-06-13
Attending: SURGERY
Payer: COMMERCIAL

## 2024-06-13 DIAGNOSIS — N64.89 RADIAL SCAR OF RIGHT BREAST: ICD-10-CM

## 2024-06-13 PROCEDURE — C8908 MRI W/O FOL W/CONT, BREAST,: HCPCS

## 2024-06-13 PROCEDURE — 6360000004 HC RX CONTRAST MEDICATION: Performed by: SURGERY

## 2024-06-13 PROCEDURE — A9577 INJ MULTIHANCE: HCPCS | Performed by: SURGERY

## 2024-06-13 RX ADMIN — GADOBENATE DIMEGLUMINE 20 ML: 529 INJECTION, SOLUTION INTRAVENOUS at 10:39

## 2024-06-18 ENCOUNTER — TELEMEDICINE (OUTPATIENT)
Age: 44
End: 2024-06-18
Payer: COMMERCIAL

## 2024-06-18 VITALS — HEIGHT: 66 IN | BODY MASS INDEX: 34.07 KG/M2 | WEIGHT: 212 LBS

## 2024-06-18 DIAGNOSIS — Z79.899 FOLLOW-UP ENCOUNTER INVOLVING MEDICATION: ICD-10-CM

## 2024-06-18 DIAGNOSIS — D50.8 IRON DEFICIENCY ANEMIA SECONDARY TO INADEQUATE DIETARY IRON INTAKE: ICD-10-CM

## 2024-06-18 DIAGNOSIS — Z71.3 ENCOUNTER FOR WEIGHT LOSS COUNSELING: ICD-10-CM

## 2024-06-18 DIAGNOSIS — Z98.84 S/P GASTRIC BYPASS: ICD-10-CM

## 2024-06-18 DIAGNOSIS — E66.9 CLASS 1 OBESITY WITH SERIOUS COMORBIDITY AND BODY MASS INDEX (BMI) OF 34.0 TO 34.9 IN ADULT, UNSPECIFIED OBESITY TYPE: ICD-10-CM

## 2024-06-18 DIAGNOSIS — K91.2 POSTOPERATIVE INTESTINAL MALABSORPTION: Primary | ICD-10-CM

## 2024-06-18 PROCEDURE — 99214 OFFICE O/P EST MOD 30 MIN: CPT | Performed by: NURSE PRACTITIONER

## 2024-06-18 RX ORDER — CYANOCOBALAMIN (VITAMIN B-12) 100 MCG
1 TABLET ORAL DAILY
Qty: 90 TABLET | Refills: 3 | Status: SHIPPED | OUTPATIENT
Start: 2024-06-18

## 2024-06-18 RX ORDER — SEMAGLUTIDE 2.4 MG/.75ML
2.4 INJECTION, SOLUTION SUBCUTANEOUS
Qty: 3 ML | Refills: 1 | Status: SHIPPED | OUTPATIENT
Start: 2024-06-18

## 2024-06-18 RX ORDER — SEMAGLUTIDE 1.7 MG/.75ML
1.7 INJECTION, SOLUTION SUBCUTANEOUS
Qty: 3 ML | Refills: 1 | Status: SHIPPED | OUTPATIENT
Start: 2024-06-18

## 2024-06-18 RX ORDER — LANOLIN ALCOHOL/MO/W.PET/CERES
100 CREAM (GRAM) TOPICAL DAILY
Qty: 90 TABLET | Refills: 3 | Status: SHIPPED | OUTPATIENT
Start: 2024-06-18

## 2024-06-18 ASSESSMENT — ENCOUNTER SYMPTOMS
NAUSEA: 0
CONSTIPATION: 0
COUGH: 0
ABDOMINAL PAIN: 0
SHORTNESS OF BREATH: 0
VOMITING: 0
DIARRHEA: 0

## 2024-06-18 NOTE — PROGRESS NOTES
Arnie Gale, was evaluated through a synchronous (real-time) audio-video encounter. The patient (or guardian if applicable) is aware that this is a billable service, which includes applicable co-pays. This Virtual Visit was conducted with patient's (and/or legal guardian's) consent. Patient identification was verified, and a caregiver was present when appropriate.   The patient was located at Home: [address]  [address]  [facility] [place] [place] [address]  Provider was located at Facility (Appt Dept): [address], [address]  [place] [address]  Confirm you are appropriately licensed, registered, or certified to deliver care in the state where the patient is located as indicated above. If you are not or unsure, please re-schedule the visit: Yes, I confirm.      Total time spent for this encounter: Not billed by time    --NURA Pierce - NP on 6/18/2024 at 10:48 AM    An electronic signature was used to authenticate this note.       Bariatric Postoperative Progress Note    Chief Complaint   Patient presents with    Weight Management     Medication follow up, LGBP 8/25/21       Arnie Gale is a 44 y.o. female status post laparoscopic gastric bypass surgery performed on 8/25/2021.    -13 lbs  Doing well on 1 mg Wegovy, denies any side effects and desires to continue.  Completed Venofer infusions and reports increased energy, is not currently taking any p.o. iron.  Experiencing dry mouth but unsure if related to Wegovy or not.    See nurse note for additional subjective information.         6/18/2024     9:00 AM 6/10/2024     3:04 PM 3/8/2024     3:01 PM 9/29/2023    10:56 AM 6/14/2023     2:08 PM 6/5/2023     2:50 PM 5/5/2023    10:39 AM   Weight Loss Metrics   Pre-op weight (manual entry) 317 lbs  317 lbs 317 lbs  317 lbs 317 lbs   Height 5' 6\" 5' 6\" 5' 6\" 5' 6\" 5' 6\" 5' 5\" 5' 5\"   Weight - Scale 212 lbs 218 lbs 3 oz 225 lbs 220 lbs 221 lbs 221 lbs 233 lbs   BMI (Calculated) 34.3 kg/m2 35.3 kg/m2 36.4

## 2024-06-18 NOTE — PROGRESS NOTES
Bariatric Postoperative Nurse Note      Arnie Gale is a 44 y.o. female status post laparoscopic gastric bypass surgery performed on 8/25/21.      All Post-Ops (including two weeks)  -# of grams of protein daily? Unknown   -sources of protein? Fish, chicken, beans, turkey, mussels   -# of oz of sugar free fluids from all sources daily?  48 oz   -Nausea? No  -Vomiting? No  -Difficulty swallow/food sticking? No  -Heartburn/regurgitation? No  -Character of bowel movements (diarrhea/constipation/bloody stools?) regular  -Which multivitamin product are you taking? Flintstones   -What dose and how frequently are you taking calcium citrate? BID  - from any iron-containing multivitamin by 2 hours? Yes  -Ulcer risk exposures:   NSAID Yes, occ   Tobacco No  Alcohol No  Steroids No  -Minutes of physical activity and what type? Walking 40 min 2 days a week

## 2024-07-03 ENCOUNTER — HOSPITAL ENCOUNTER (OUTPATIENT)
Facility: HOSPITAL | Age: 44
Discharge: HOME | End: 2024-07-06
Attending: SURGERY
Payer: COMMERCIAL

## 2024-07-03 ENCOUNTER — TELEPHONE (OUTPATIENT)
Facility: HOSPITAL | Age: 44
End: 2024-07-03

## 2024-07-03 ENCOUNTER — HOSPITAL ENCOUNTER (OUTPATIENT)
Facility: HOSPITAL | Age: 44
End: 2024-07-03
Attending: SURGERY
Payer: COMMERCIAL

## 2024-07-03 DIAGNOSIS — R92.8 ABNORMAL MAMMOGRAM: ICD-10-CM

## 2024-07-03 DIAGNOSIS — N63.10 MASS OF RIGHT BREAST, UNSPECIFIED QUADRANT: ICD-10-CM

## 2024-07-03 DIAGNOSIS — N64.89 RADIAL SCAR OF RIGHT BREAST: Primary | ICD-10-CM

## 2024-07-03 DIAGNOSIS — R92.8 ABNORMAL MRI, BREAST: ICD-10-CM

## 2024-07-03 PROCEDURE — 19085 BX BREAST 1ST LESION MR IMAG: CPT

## 2024-07-03 PROCEDURE — 77066 DX MAMMO INCL CAD BI: CPT

## 2024-07-03 PROCEDURE — 88305 TISSUE EXAM BY PATHOLOGIST: CPT

## 2024-07-03 PROCEDURE — A9577 INJ MULTIHANCE: HCPCS | Performed by: SURGERY

## 2024-07-03 PROCEDURE — 6360000004 HC RX CONTRAST MEDICATION: Performed by: SURGERY

## 2024-07-03 RX ORDER — HYDROCODONE BITARTRATE AND ACETAMINOPHEN 5; 325 MG/1; MG/1
1 TABLET ORAL EVERY 4 HOURS PRN
Qty: 18 TABLET | Refills: 0 | Status: SHIPPED | OUTPATIENT
Start: 2024-07-03 | End: 2024-07-06

## 2024-07-03 RX ADMIN — GADOBENATE DIMEGLUMINE 20 ML: 529 INJECTION, SOLUTION INTRAVENOUS at 10:48

## 2024-08-29 NOTE — PROGRESS NOTES
Arnie Gale is a 44 y.o. female (: 1980)     Chief Complaint   Patient presents with    Follow-up     Right breast;states tingling and tender       Medication list and allergies have been reviewed with Arnie Gale and updated as of today's date.     I have gone over all Medical, Surgical and Social History with Arnie Gale and updated/added the information accordingly.      1. Have you been to the ER, urgent care clinic since your last visit?  Hospitalized since your last visit?No    2. Have you seen or consulted any other health care providers outside of the [facility] since your last visit?  Include any pap smears or colon screening. No

## 2024-08-30 ENCOUNTER — OFFICE VISIT (OUTPATIENT)
Age: 44
End: 2024-08-30
Payer: COMMERCIAL

## 2024-08-30 VITALS
TEMPERATURE: 96.9 F | BODY MASS INDEX: 34.39 KG/M2 | SYSTOLIC BLOOD PRESSURE: 124 MMHG | DIASTOLIC BLOOD PRESSURE: 72 MMHG | WEIGHT: 214 LBS | HEIGHT: 66 IN

## 2024-08-30 DIAGNOSIS — N64.89 RADIAL SCAR OF RIGHT BREAST: Primary | ICD-10-CM

## 2024-08-30 PROCEDURE — 99214 OFFICE O/P EST MOD 30 MIN: CPT | Performed by: SURGERY

## 2024-08-30 PROCEDURE — 3074F SYST BP LT 130 MM HG: CPT | Performed by: SURGERY

## 2024-08-30 PROCEDURE — 3078F DIAST BP <80 MM HG: CPT | Performed by: SURGERY

## 2024-09-19 ENCOUNTER — OFFICE VISIT (OUTPATIENT)
Age: 44
End: 2024-09-19
Payer: COMMERCIAL

## 2024-09-19 VITALS
HEART RATE: 75 BPM | RESPIRATION RATE: 17 BRPM | BODY MASS INDEX: 34.39 KG/M2 | DIASTOLIC BLOOD PRESSURE: 80 MMHG | OXYGEN SATURATION: 98 % | TEMPERATURE: 97.9 F | HEIGHT: 66 IN | SYSTOLIC BLOOD PRESSURE: 118 MMHG | WEIGHT: 214 LBS

## 2024-09-19 DIAGNOSIS — Z71.3 ENCOUNTER FOR WEIGHT LOSS COUNSELING: ICD-10-CM

## 2024-09-19 DIAGNOSIS — Z98.84 S/P GASTRIC BYPASS: ICD-10-CM

## 2024-09-19 DIAGNOSIS — E66.9 CLASS 1 OBESITY WITHOUT SERIOUS COMORBIDITY WITH BODY MASS INDEX (BMI) OF 34.0 TO 34.9 IN ADULT, UNSPECIFIED OBESITY TYPE: Primary | ICD-10-CM

## 2024-09-19 DIAGNOSIS — Z79.899 FOLLOW-UP ENCOUNTER INVOLVING MEDICATION: ICD-10-CM

## 2024-09-19 DIAGNOSIS — K91.2 POSTOPERATIVE INTESTINAL MALABSORPTION: ICD-10-CM

## 2024-09-19 PROCEDURE — 99214 OFFICE O/P EST MOD 30 MIN: CPT | Performed by: NURSE PRACTITIONER

## 2024-09-19 PROCEDURE — 3074F SYST BP LT 130 MM HG: CPT | Performed by: NURSE PRACTITIONER

## 2024-09-19 PROCEDURE — 3079F DIAST BP 80-89 MM HG: CPT | Performed by: NURSE PRACTITIONER

## 2024-09-19 RX ORDER — SEMAGLUTIDE 2.4 MG/.75ML
2.4 INJECTION, SOLUTION SUBCUTANEOUS
Qty: 3 ML | Refills: 0 | Status: SHIPPED | OUTPATIENT
Start: 2024-09-19

## 2024-09-19 ASSESSMENT — ENCOUNTER SYMPTOMS
CONSTIPATION: 0
RESPIRATORY NEGATIVE: 1
VOMITING: 0
ABDOMINAL PAIN: 0
DIARRHEA: 0
NAUSEA: 0

## 2024-09-20 ENCOUNTER — CLINICAL DOCUMENTATION (OUTPATIENT)
Facility: HOSPITAL | Age: 44
End: 2024-09-20

## 2024-11-05 ENCOUNTER — TELEPHONE (OUTPATIENT)
Age: 44
End: 2024-11-05

## 2024-11-05 DIAGNOSIS — E66.811 CLASS 1 OBESITY WITHOUT SERIOUS COMORBIDITY WITH BODY MASS INDEX (BMI) OF 34.0 TO 34.9 IN ADULT, UNSPECIFIED OBESITY TYPE: ICD-10-CM

## 2024-11-05 RX ORDER — SEMAGLUTIDE 2.4 MG/.75ML
2.4 INJECTION, SOLUTION SUBCUTANEOUS
Qty: 3 ML | Refills: 1 | Status: SHIPPED | OUTPATIENT
Start: 2024-11-05

## 2024-11-06 ENCOUNTER — TELEPHONE (OUTPATIENT)
Age: 44
End: 2024-11-06

## 2024-11-14 ENCOUNTER — TELEPHONE (OUTPATIENT)
Age: 44
End: 2024-11-14

## 2024-11-14 NOTE — TELEPHONE ENCOUNTER
----- Message from JING STEINER RN sent at 11/13/2024  1:57 PM EST -----  Please call in regards to wegovy.  Thank you!

## 2024-11-14 NOTE — TELEPHONE ENCOUNTER
Lvm to return call. ZAIDA Mosley is working on appeal for Wegovy and ZAIDA Mosley also states she needs to schedule her follow-up

## 2024-11-18 ENCOUNTER — OFFICE VISIT (OUTPATIENT)
Age: 44
End: 2024-11-18
Payer: COMMERCIAL

## 2024-11-18 VITALS
WEIGHT: 216.6 LBS | TEMPERATURE: 96.9 F | RESPIRATION RATE: 16 BRPM | BODY MASS INDEX: 34.81 KG/M2 | HEIGHT: 66 IN | SYSTOLIC BLOOD PRESSURE: 126 MMHG | DIASTOLIC BLOOD PRESSURE: 82 MMHG

## 2024-11-18 DIAGNOSIS — N64.89 RADIAL SCAR OF RIGHT BREAST: Primary | ICD-10-CM

## 2024-11-18 DIAGNOSIS — F32.A DEPRESSION, UNSPECIFIED DEPRESSION TYPE: ICD-10-CM

## 2024-11-18 PROCEDURE — 3079F DIAST BP 80-89 MM HG: CPT | Performed by: SURGERY

## 2024-11-18 PROCEDURE — 3074F SYST BP LT 130 MM HG: CPT | Performed by: SURGERY

## 2024-11-18 PROCEDURE — 99214 OFFICE O/P EST MOD 30 MIN: CPT | Performed by: SURGERY

## 2024-11-22 ENCOUNTER — OFFICE VISIT (OUTPATIENT)
Age: 44
End: 2024-11-22
Payer: COMMERCIAL

## 2024-11-22 VITALS
HEART RATE: 74 BPM | WEIGHT: 216 LBS | BODY MASS INDEX: 34.72 KG/M2 | HEIGHT: 66 IN | SYSTOLIC BLOOD PRESSURE: 118 MMHG | DIASTOLIC BLOOD PRESSURE: 75 MMHG

## 2024-11-22 DIAGNOSIS — Z71.3 ENCOUNTER FOR WEIGHT LOSS COUNSELING: ICD-10-CM

## 2024-11-22 DIAGNOSIS — Z79.899 FOLLOW-UP ENCOUNTER INVOLVING MEDICATION: ICD-10-CM

## 2024-11-22 DIAGNOSIS — K91.2 POSTOPERATIVE INTESTINAL MALABSORPTION: ICD-10-CM

## 2024-11-22 DIAGNOSIS — E55.9 VITAMIN D DEFICIENCY: ICD-10-CM

## 2024-11-22 DIAGNOSIS — E66.811 CLASS 1 OBESITY WITHOUT SERIOUS COMORBIDITY WITH BODY MASS INDEX (BMI) OF 34.0 TO 34.9 IN ADULT, UNSPECIFIED OBESITY TYPE: ICD-10-CM

## 2024-11-22 DIAGNOSIS — Z71.3 ENCOUNTER FOR DIETARY COUNSELING AND SURVEILLANCE: ICD-10-CM

## 2024-11-22 DIAGNOSIS — Z98.84 S/P GASTRIC BYPASS: Primary | ICD-10-CM

## 2024-11-22 PROCEDURE — 3074F SYST BP LT 130 MM HG: CPT

## 2024-11-22 PROCEDURE — 3078F DIAST BP <80 MM HG: CPT

## 2024-11-22 PROCEDURE — 99213 OFFICE O/P EST LOW 20 MIN: CPT

## 2024-11-22 ASSESSMENT — ENCOUNTER SYMPTOMS
CONSTIPATION: 0
RESPIRATORY NEGATIVE: 1
NAUSEA: 0
ABDOMINAL PAIN: 0
DIARRHEA: 0
VOMITING: 0

## 2024-11-22 NOTE — PROGRESS NOTES
Chief Complaint   Patient presents with    Follow-up     LGBP 8/25/21;craving sugar a lot;under lots of stress    Bariatric Postoperative Nurse Note      Arnie Gale is a 44 y.o. female status post laparoscopic gastric bypass surgery performed on 8/25/21.    All Post-Ops (including two weeks)  -# of grams of protein daily? unsure  -sources of protein? Just eats  -# of oz of sugar free fluids from all sources daily?  almost 60  -Nausea? No  -Vomiting? No  -Difficulty swallow/food sticking? No  -Heartburn/regurgitation? No  -Character of bowel movements (diarrhea/constipation/bloody stools?) regular  -Which multivitamin product are you taking? Flintstones   -What dose and how frequently are you taking calcium citrate? yes  - from any iron-containing multivitamin by 2 hours? No  -Ulcer risk exposures:   NSAID No  Tobacco No  Alcohol No  Steroids No  -Minutes of physical activity and what type? none      
change. Negative for fatigue.        Increased cravings for sugar/sweets   Respiratory: Negative.     Cardiovascular: Negative.    Gastrointestinal:  Negative for abdominal pain, constipation, diarrhea, nausea and vomiting.   Musculoskeletal: Negative.    Skin: Negative.    Neurological: Negative.    Psychiatric/Behavioral:          Depressed     Objective:  Vitals:    11/22/24 1012   BP: 118/75   Pulse: 74   Weight: 98 kg (216 lb)   Height: 1.676 m (5' 6\")      Physical Exam  Constitutional:       Appearance: Normal appearance. She is obese.   HENT:      Head: Normocephalic and atraumatic.   Pulmonary:      Effort: Pulmonary effort is normal.   Musculoskeletal:         General: Normal range of motion.      Cervical back: Normal range of motion and neck supple.   Neurological:      General: No focal deficit present.      Mental Status: She is alert and oriented to person, place, and time. Mental status is at baseline.   Psychiatric:         Thought Content: Thought content normal.         Judgment: Judgment normal.        Labs:   No results found for this or any previous visit (from the past 672 hour(s)).    Assessment:  3 years s/p laparoscopic gastric bypass surgery  with a total weight loss of 101 lbs to date, struggling with weight regain. Ms. Gale has gained 2 lbs. She verbalizes that she understands the appeal process for the Wegovy 2.4 mg. Patient does not seem motivated to participate in physical activity. Not having her weight loss medication could be a factor in this. Patient given emotional and spiritual support, as well as encouragement to continue to move forward to achieve her weight loss goals and improve quality of life.    Plan:   -Continue a high protein/low carb bariatric diet, <1500 kcal daily, focusing on smaller portion sizes and limiting snacking  -Continue taking vitamins  -Choose healthier snacks  -Start walking daily for at least 30 minutes  -Try to avoid high carbs or sugary

## 2024-11-23 ASSESSMENT — ENCOUNTER SYMPTOMS
CHEST TIGHTNESS: 0
SHORTNESS OF BREATH: 0

## 2024-11-23 NOTE — PROGRESS NOTES
Arnie Gale is a 44 y.o. female (: 1980)     Chief Complaint   Patient presents with    Follow-up     Right breast        Medication list and allergies have been reviewed with Arnie Gale and updated as of today's date.     I have gone over all Medical, Surgical and Social History with Arnie Gale and updated/added the information accordingly.     1. Have you been to the ER, urgent care clinic since your last visit?  Hospitalized since your last visit?No    2. Have you seen or consulted any other health care providers outside of the [facility] since your last visit?  Include any pap smears or colon screening. No      
guided   biopsy with  clip placement x2.      FINDINGS: Preliminary pre and post contrast images were performed with a  dedicated breast coil. With the patient positioned prone, and following  placement in the compression device, the breasts were imaged, and the   non-mass  enhancement in both breasts were targeted.  Appropriate coordinates   records were  obtained.      Site 1. Suspicious RIGHT breast 9:00 NME  The skin was cleansed. Local anesthesia was achieved with 5 mL 1%   lidocaine at  the superficial skin and 10 mL 1% lidocaine with epinephrine in the   subcutaneous  soft tissues.  After making a small incision in the skin with a # 11   scalpel  blade, the trocar and biopsy sheath were advanced to the appropriate   depth.   After confirmation of appropriate positioning the 9 gauge vacuum assisted   biopsy  needle was advanced to the appropriate depth.  Subsequently a series of  approximately 9 vacuum assisted samples were acquired. An additional 10 mL   1%  lidocaine with epinephrine was administered during tissue acquisition.   Prior to  removal of the sheath, using coaxial technique, a [employer] [employer] hourglass biopsy  marker was placed to [place] area sampled.    Site 2. Suspicious LEFT breast 10:00 NME  The skin was cleansed. Local anesthesia was achieved with 5 mL 1%   lidocaine at  the superficial skin and 10 mL 1% lidocaine with epinephrine in the   subcutaneous  soft tissues.  After making a small incision in the skin with a # 11   scalpel  blade, the trocar and biopsy sheath were advanced to the appropriate   depth.   After confirmation of appropriate positioning the 9 gauge vacuum assisted   biopsy  needle was advanced to the appropriate depth.  Subsequently a series of  approximately 8 vacuum assisted samples were acquired. An additional 10 mL   1%  lidocaine with epinephrine was administered during tissue acquisition.   Prior to  removal of the sheath, using coaxial technique, a [employer] hourglass

## 2024-12-17 ENCOUNTER — TELEPHONE (OUTPATIENT)
Age: 44
End: 2024-12-17

## 2024-12-17 NOTE — TELEPHONE ENCOUNTER
Patient left message stating she was supposed to be getting a referral for plastic surgery. Can be reached at [phone]

## 2024-12-18 ENCOUNTER — TELEPHONE (OUTPATIENT)
Facility: HOSPITAL | Age: 44
End: 2024-12-18

## 2024-12-18 DIAGNOSIS — N64.89 RADIAL SCAR OF RIGHT BREAST: Primary | ICD-10-CM

## 2024-12-27 ENCOUNTER — TELEPHONE (OUTPATIENT)
Age: 44
End: 2024-12-27

## 2024-12-27 NOTE — TELEPHONE ENCOUNTER
Pt requesting call back. Patient wants to know who to call for plastic surgery she has not received a call from them as of yet.

## 2024-12-30 ENCOUNTER — TELEPHONE (OUTPATIENT)
Age: 44
End: 2024-12-30

## 2025-01-21 ENCOUNTER — TELEPHONE (OUTPATIENT)
Age: 45
End: 2025-01-21

## 2025-01-24 ENCOUNTER — TELEPHONE (OUTPATIENT)
Age: 45
End: 2025-01-24

## 2025-01-27 NOTE — PROGRESS NOTES
Called and left patient a msg at this time. Msg informed patient to call her insurance company for an appeal update, our office has not yet received a response.

## 2025-02-25 ENCOUNTER — OFFICE VISIT (OUTPATIENT)
Age: 45
End: 2025-02-25
Payer: COMMERCIAL

## 2025-02-25 VITALS
WEIGHT: 231 LBS | HEIGHT: 66 IN | TEMPERATURE: 97.3 F | DIASTOLIC BLOOD PRESSURE: 64 MMHG | RESPIRATION RATE: 18 BRPM | HEART RATE: 72 BPM | BODY MASS INDEX: 37.12 KG/M2 | SYSTOLIC BLOOD PRESSURE: 118 MMHG | OXYGEN SATURATION: 97 %

## 2025-02-25 DIAGNOSIS — E66.812 CLASS 2 OBESITY WITHOUT SERIOUS COMORBIDITY WITH BODY MASS INDEX (BMI) OF 35.0 TO 35.9 IN ADULT, UNSPECIFIED OBESITY TYPE: Primary | ICD-10-CM

## 2025-02-25 PROCEDURE — 3078F DIAST BP <80 MM HG: CPT

## 2025-02-25 PROCEDURE — 99213 OFFICE O/P EST LOW 20 MIN: CPT

## 2025-02-25 PROCEDURE — 3074F SYST BP LT 130 MM HG: CPT

## 2025-02-25 RX ORDER — PHENTERMINE HYDROCHLORIDE 37.5 MG/1
TABLET ORAL
Qty: 30 TABLET | Refills: 0 | Status: SHIPPED | OUTPATIENT
Start: 2025-02-25 | End: 2025-03-30

## 2025-02-25 ASSESSMENT — ENCOUNTER SYMPTOMS
NAUSEA: 0
ABDOMINAL PAIN: 0
CONSTIPATION: 0
RESPIRATORY NEGATIVE: 1
DIARRHEA: 0
VOMITING: 0

## 2025-02-25 NOTE — PROGRESS NOTES
bariatric vitamins  -Choose healthier snacks  -Exercise twice a week for at least 30 minutes, increase as tolerated  -Try to avoid high carbs or sugary foods/drinks  -Continue seeing therapist    We reviewed the components of a successful postoperative course including requirement for a high protein, low carbohydrate diet, 64 oz a day of zero calorie liquids, daily vitamin supplementation, daily exercise (150 mis/week), regular follow-up, and participation in support groups.    The encounter diagnosis was Class 2 obesity without serious comorbidity with body mass index (BMI) of 35.0 to 35.9 in adult, unspecified obesity type.     Return in about 4 weeks (around 3/25/2025) for medication follow-up, weight management.     with labs, sooner as needed.  Melany Zaldivar, [email or web address]

## 2025-03-25 ENCOUNTER — OFFICE VISIT (OUTPATIENT)
Age: 45
End: 2025-03-25
Payer: COMMERCIAL

## 2025-03-25 VITALS
HEIGHT: 66 IN | SYSTOLIC BLOOD PRESSURE: 117 MMHG | BODY MASS INDEX: 36.8 KG/M2 | WEIGHT: 229 LBS | HEART RATE: 82 BPM | TEMPERATURE: 97.6 F | DIASTOLIC BLOOD PRESSURE: 79 MMHG | OXYGEN SATURATION: 97 %

## 2025-03-25 DIAGNOSIS — K91.2 POSTOPERATIVE INTESTINAL MALABSORPTION: ICD-10-CM

## 2025-03-25 DIAGNOSIS — Z79.899 FOLLOW-UP ENCOUNTER INVOLVING MEDICATION: ICD-10-CM

## 2025-03-25 DIAGNOSIS — Z98.84 S/P GASTRIC BYPASS: Primary | ICD-10-CM

## 2025-03-25 DIAGNOSIS — E55.9 VITAMIN D DEFICIENCY: ICD-10-CM

## 2025-03-25 DIAGNOSIS — Z71.3 ENCOUNTER FOR DIETARY COUNSELING AND SURVEILLANCE: ICD-10-CM

## 2025-03-25 DIAGNOSIS — E66.812 CLASS 2 OBESITY WITHOUT SERIOUS COMORBIDITY WITH BODY MASS INDEX (BMI) OF 36.0 TO 36.9 IN ADULT, UNSPECIFIED OBESITY TYPE: ICD-10-CM

## 2025-03-25 DIAGNOSIS — Z71.3 ENCOUNTER FOR WEIGHT LOSS COUNSELING: ICD-10-CM

## 2025-03-25 PROCEDURE — 3078F DIAST BP <80 MM HG: CPT

## 2025-03-25 PROCEDURE — 3074F SYST BP LT 130 MM HG: CPT

## 2025-03-25 PROCEDURE — 99213 OFFICE O/P EST LOW 20 MIN: CPT

## 2025-03-25 NOTE — PROGRESS NOTES
Chief Complaint   Patient presents with    Follow-up     LGBP 8/25/21    Bariatric Postoperative Nurse Note      Arnie Gale is a 44 y.o. female status post laparoscopic gastric bypass surgery performed on 8/25/21.    All Post-Ops (including two weeks)  -# of grams of protein daily? Almost 80  -sources of protein? reg  -# of oz of sugar free fluids from all sources daily?  64  -Nausea? No  -Vomiting? No  -Difficulty swallow/food sticking? No  -Heartburn/regurgitation? No  -Character of bowel movements (diarrhea/constipation/bloody stools?) regular  -Which multivitamin product are you taking? Flintstones   -What dose and how frequently are you taking calcium citrate? yes  - from any iron-containing multivitamin by 2 hours? Yes  -Ulcer risk exposures:   NSAID No  Tobacco No  Alcohol No  Steroids No  -Minutes of physical activity and what type? walking  [facility] Nurse Note:       CC: Weight Management    Arnie Gale is a 44 y.o. female who is here for her f/up medical provider visit for the Medication program.       Did you have any problems adhering to the program since last visit? No  If yes, please explain:     Since your last visit, have you experienced any complications? No    Have you received any other medical care since last visit? No  If yes, where and for what?     Have you had any change in your medications since your last visit? No If yes what?     Are you taking an anti-obesity medication? Yes If yes what and are you experiencing any side effects?      Would you still like to continue your current medication and dosage? Yes    BP Readings from Last 3 Encounters:   03/25/25 117/79   02/25/25 118/64   11/22/24 118/75        Eating Habits Over Last Week:    How many oz of sugar-free fluids are you consuming? 64    Did you consume your prescribed meal replacement regimen each day? No    Physical Activity Routine:    Aerobic exercise: walking    Resistance

## 2025-03-28 RX ORDER — PHENTERMINE HYDROCHLORIDE 37.5 MG/1
37.5 TABLET ORAL
Qty: 30 TABLET | Refills: 2 | Status: SHIPPED | OUTPATIENT
Start: 2025-03-28 | End: 2025-06-26

## 2025-03-28 ASSESSMENT — ENCOUNTER SYMPTOMS
VOMITING: 0
CONSTIPATION: 0
NAUSEA: 0
RESPIRATORY NEGATIVE: 1
ABDOMINAL PAIN: 0
DIARRHEA: 0

## 2025-03-28 NOTE — PROGRESS NOTES
Bariatric Postoperative Progress Note    Chief Complaint   Patient presents with    Follow-up     LGBP 8/25/21       Arnie Gale is a 44 y.o. female status post laparoscopic gastric bypass surgery performed on 8/25/21. She is currently prescribed phentermine.    Patient shares that the medication has curbed her appetite. She is eating small meals, not eating fast food, but she is still eating sweets. She also shares that she is drinking alcohol once a week on Sundays.     See nurse note for additional subjective information.         3/25/2025     2:50 PM 2/25/2025     8:38 AM 11/22/2024    10:12 AM 11/18/2024     2:13 PM 9/19/2024     2:03 PM 8/30/2024     1:43 PM 6/18/2024     9:00 AM   Weight Loss Metrics   Pre-op weight (manual entry)   317 lbs  317 lbs  317 lbs   Height 5' 6\" 5' 6\" 5' 6\" 5' 6\" 5' 6\" 5' 6\" 5' 6\"   Weight - Scale 229 lbs 231 lbs 216 lbs 216 lbs 10 oz 214 lbs 214 lbs 212 lbs   BMI (Calculated) 37 kg/m2 37.4 kg/m2 34.9 kg/m2 35 kg/m2 34.6 kg/m2 34.6 kg/m2 34.3 kg/m2   Ideal body weight (manual entry)   137 lbs  137 lbs  137 lbs   EBW in lbs.   180  180  180   Weight loss to date in lbs.   101  103  105   Percent weight loss (%)   31.86 %  32.49 %  33.12 %   Percent EBW loss (%)   56.1 %  57.2 %  58.3 %        Comorbidities:  Hypertension: resolved  Diabetes: not applicable  Obstructive Sleep Apnea: not applicable  Hyperlipidemia: not applicable  Gastroesophageal Reflux: not applicable  Weight related arthropathy: improved       Past Medical History:   Diagnosis Date    Abnormal MRI of head 2010    Nonspecific focus of low attenuation at the right internal capsule on CT and MRI    Anxiety     Chronic low back pain 2014    unknown cause- normal xrays    Depression     Diabetes (HCC)     Pre-DM- no meds    Hypertension     no meds now    Iron deficiency anemia secondary to inadequate dietary iron intake     Memory loss     Prediabetes     S/P gastric bypass 08/2021    Scoliosis     Stroke

## 2025-05-23 NOTE — PROGRESS NOTES
9/30/2021:  Patient is 5 weeks post op. She contacted me and is still struggling with the protein shakes. I have made suggestions that may be more tolerable and recommended unflavored powder or using Unjury powder. She states she is not sure how much fluid she is getting in. I have talked to her about measuring her fluid and being consistent with this and aiming for 64 ounces of fluid per day. She inquired about fruit and other foods if she can have. I have talked to patient to just stay on the soft protein diet until our 6 week visit on October 7. From there, we will add more vegetables to the diet.     Adriana Balderrama MS RD
Average build

## 2025-05-30 ENCOUNTER — OFFICE VISIT (OUTPATIENT)
Age: 45
End: 2025-05-30
Payer: COMMERCIAL

## 2025-05-30 VITALS
SYSTOLIC BLOOD PRESSURE: 124 MMHG | DIASTOLIC BLOOD PRESSURE: 80 MMHG | BODY MASS INDEX: 37.77 KG/M2 | HEIGHT: 66 IN | WEIGHT: 235 LBS

## 2025-05-30 DIAGNOSIS — K91.2 POSTOPERATIVE INTESTINAL MALABSORPTION: ICD-10-CM

## 2025-05-30 DIAGNOSIS — Z71.3 ENCOUNTER FOR DIETARY COUNSELING AND SURVEILLANCE: ICD-10-CM

## 2025-05-30 DIAGNOSIS — Z71.3 ENCOUNTER FOR WEIGHT LOSS COUNSELING: ICD-10-CM

## 2025-05-30 DIAGNOSIS — Z79.899 FOLLOW-UP ENCOUNTER INVOLVING MEDICATION: ICD-10-CM

## 2025-05-30 DIAGNOSIS — E66.812 CLASS 2 OBESITY WITHOUT SERIOUS COMORBIDITY WITH BODY MASS INDEX (BMI) OF 37.0 TO 37.9 IN ADULT, UNSPECIFIED OBESITY TYPE: ICD-10-CM

## 2025-05-30 DIAGNOSIS — Z98.84 S/P GASTRIC BYPASS: Primary | ICD-10-CM

## 2025-05-30 PROCEDURE — 3079F DIAST BP 80-89 MM HG: CPT

## 2025-05-30 PROCEDURE — 99213 OFFICE O/P EST LOW 20 MIN: CPT

## 2025-05-30 PROCEDURE — 3074F SYST BP LT 130 MM HG: CPT

## 2025-05-30 RX ORDER — DIETHYLPROPION HYDROCHLORIDE 25 MG/1
1 TABLET ORAL 3 TIMES DAILY PRN
Qty: 90 TABLET | Refills: 0 | Status: SHIPPED | OUTPATIENT
Start: 2025-05-30 | End: 2025-06-29

## 2025-05-30 NOTE — PROGRESS NOTES
Chief Complaint   Patient presents with    Follow-up     LGBP 8/25/21    Bariatric Postoperative Nurse Note      Arnie Gale is a 44 y.o. female status post laparoscopic gastric bypass surgery performed on 8/25/2021.      All Post-Ops (including two weeks)  -# of grams of protein daily? 80  -sources of protein? reg  -# of oz of sugar free fluids from all sources daily?  64  -Nausea? No  -Vomiting? No  -Difficulty swallow/food sticking? No  -Heartburn/regurgitation? No  -Character of bowel movements (diarrhea/constipation/bloody stools?) regular  -Which multivitamin product are you taking? Flintstones   -What dose and how frequently are you taking calcium citrate? yes 3 times a day  - from any iron-containing multivitamin by 2 hours? Yes  -Ulcer risk exposures:   NSAID No  Tobacco No  Alcohol No  Steroids No  -Minutes of physical activity and what type? none due to recent family emergency with mom

## 2025-06-03 ENCOUNTER — TELEPHONE (OUTPATIENT)
Age: 45
End: 2025-06-03

## 2025-06-03 NOTE — TELEPHONE ENCOUNTER
Patient calling office to inquire about scheduling procedure with breast surgeon. Patient had consult with plastics recently.

## 2025-06-05 ASSESSMENT — ENCOUNTER SYMPTOMS
VOMITING: 0
ABDOMINAL PAIN: 0
RESPIRATORY NEGATIVE: 1
DIARRHEA: 0
CONSTIPATION: 0
NAUSEA: 0

## 2025-06-05 NOTE — PROGRESS NOTES
Bariatric Postoperative Progress Note    Chief Complaint   Patient presents with    Follow-up     LGBP 8/25/21       Arnie Gale is a 44 y.o. female status post laparoscopic gastric bypass surgery performed on 8/25/21. She is currently prescribed phentermine.    Patient shares that the medication is no longer working to suppress her appetite. She states that her mother has been recently ill and just started dialysis. She has been accompanying her back and forth and has not focused on a healthy diet. She is eating regular meals. Patient states that she sometimes craves sugar, she still drinks alcohol a little here and there, and she had not participated in any consistent exercise due to the care of her mother. She is interested in alternative medications for weight loss.     See nurse note for additional subjective information.         5/30/2025    10:52 AM 3/25/2025     2:50 PM 2/25/2025     8:38 AM 11/22/2024    10:12 AM 11/18/2024     2:13 PM 9/19/2024     2:03 PM 8/30/2024     1:43 PM   Weight Loss Metrics   Pre-op weight (manual entry)    317 lbs  317 lbs    Height 5' 6\" 5' 6\" 5' 6\" 5' 6\" 5' 6\" 5' 6\" 5' 6\"   Weight - Scale 235 lbs 229 lbs 231 lbs 216 lbs 216 lbs 10 oz 214 lbs 214 lbs   BMI (Calculated) 38 kg/m2 37 kg/m2 37.4 kg/m2 34.9 kg/m2 35 kg/m2 34.6 kg/m2 34.6 kg/m2   Ideal body weight (manual entry)    137 lbs  137 lbs    EBW in lbs.    180  180    Weight loss to date in lbs.    101  103    Percent weight loss (%)    31.86 %  32.49 %    Percent EBW loss (%)    56.1 %  57.2 %       Comorbidities:  Hypertension: resolved  Diabetes: not applicable  Obstructive Sleep Apnea: not applicable  Hyperlipidemia: not applicable  Gastroesophageal Reflux: not applicable  Weight related arthropathy: improved       Past Medical History:   Diagnosis Date    Abnormal MRI of head 2010    Nonspecific focus of low attenuation at the right internal capsule on CT and MRI    Anxiety     Chronic low back pain 2014

## 2025-06-25 ENCOUNTER — TELEPHONE (OUTPATIENT)
Age: 45
End: 2025-06-25

## 2025-06-25 NOTE — TELEPHONE ENCOUNTER
I spoke to patient this morning about her surgery date with Dr. Barclay and Dr. Call on 7/15/2025 @ [facility]. She will need to do her Mammogram on 6/30/2025 at 12:30 arrival @[facility] and her Gloria Tag placement on 7/7/2025 @ 9:30 arrival @ [facility]. Patient is aware of both appointments need to be done before we can do surgery on 7/15/2025.

## 2025-06-30 ENCOUNTER — HOSPITAL ENCOUNTER (OUTPATIENT)
Facility: HOSPITAL | Age: 45
Discharge: HOME | End: 2025-07-03
Payer: COMMERCIAL

## 2025-06-30 ENCOUNTER — PREP FOR PROCEDURE (OUTPATIENT)
Age: 45
End: 2025-06-30

## 2025-06-30 VITALS — BODY MASS INDEX: 36.16 KG/M2 | WEIGHT: 225 LBS | HEIGHT: 66 IN

## 2025-06-30 DIAGNOSIS — R92.8 ABNORMAL FINDING ON MAMMOGRAPHY: ICD-10-CM

## 2025-06-30 DIAGNOSIS — N64.89 RADIAL SCAR OF RIGHT BREAST: ICD-10-CM

## 2025-06-30 PROCEDURE — G0279 TOMOSYNTHESIS, MAMMO: HCPCS

## 2025-07-07 ENCOUNTER — HOSPITAL ENCOUNTER (OUTPATIENT)
Facility: HOSPITAL | Age: 45
Discharge: HOME | End: 2025-07-10
Payer: COMMERCIAL

## 2025-07-07 ENCOUNTER — HOSPITAL ENCOUNTER (EMERGENCY)
Age: 45
Discharge: HOME | End: 2025-07-07
Payer: COMMERCIAL

## 2025-07-07 VITALS
DIASTOLIC BLOOD PRESSURE: 66 MMHG | TEMPERATURE: 98.4 F | OXYGEN SATURATION: 100 % | SYSTOLIC BLOOD PRESSURE: 116 MMHG | HEIGHT: 66 IN | RESPIRATION RATE: 17 BRPM | BODY MASS INDEX: 36.16 KG/M2 | WEIGHT: 225 LBS | HEART RATE: 80 BPM

## 2025-07-07 DIAGNOSIS — M62.830 LUMBAR PARASPINAL MUSCLE SPASM: Primary | ICD-10-CM

## 2025-07-07 DIAGNOSIS — N64.89 RADIAL SCAR OF RIGHT BREAST: ICD-10-CM

## 2025-07-07 DIAGNOSIS — R92.8 ABNORMAL FINDING ON MAMMOGRAPHY: ICD-10-CM

## 2025-07-07 PROCEDURE — A4648 IMPLANTABLE TISSUE MARKER: HCPCS

## 2025-07-07 PROCEDURE — 6360000002 HC RX W HCPCS: Performed by: SURGERY

## 2025-07-07 PROCEDURE — 6360000002 HC RX W HCPCS: Performed by: HEALTH CARE PROVIDER

## 2025-07-07 PROCEDURE — 6370000000 HC RX 637 (ALT 250 FOR IP): Performed by: HEALTH CARE PROVIDER

## 2025-07-07 PROCEDURE — 99284 EMERGENCY DEPT VISIT MOD MDM: CPT

## 2025-07-07 RX ORDER — DIAZEPAM 5 MG/1
5 TABLET ORAL ONCE
Status: COMPLETED | OUTPATIENT
Start: 2025-07-07 | End: 2025-07-07

## 2025-07-07 RX ORDER — KETOROLAC TROMETHAMINE 15 MG/ML
15 INJECTION, SOLUTION INTRAMUSCULAR; INTRAVENOUS ONCE
Status: COMPLETED | OUTPATIENT
Start: 2025-07-07 | End: 2025-07-07

## 2025-07-07 RX ORDER — LIDOCAINE HYDROCHLORIDE 10 MG/ML
10 INJECTION, SOLUTION INFILTRATION; PERINEURAL ONCE
Status: COMPLETED | OUTPATIENT
Start: 2025-07-07 | End: 2025-07-07

## 2025-07-07 RX ADMIN — LIDOCAINE HYDROCHLORIDE 10 ML: 10 INJECTION, SOLUTION EPIDURAL; INFILTRATION; INTRACAUDAL; PERINEURAL at 11:04

## 2025-07-07 RX ADMIN — KETOROLAC TROMETHAMINE 15 MG: 15 INJECTION, SOLUTION INTRAMUSCULAR; INTRAVENOUS at 20:01

## 2025-07-07 RX ADMIN — DIAZEPAM 5 MG: 5 TABLET ORAL at 20:01

## 2025-07-07 ASSESSMENT — PAIN DESCRIPTION - LOCATION
LOCATION: BACK
LOCATION: BACK

## 2025-07-07 ASSESSMENT — ENCOUNTER SYMPTOMS
SHORTNESS OF BREATH: 0
CHEST TIGHTNESS: 0

## 2025-07-07 ASSESSMENT — LIFESTYLE VARIABLES
HOW OFTEN DO YOU HAVE A DRINK CONTAINING ALCOHOL: 2-3 TIMES A WEEK
HOW MANY STANDARD DRINKS CONTAINING ALCOHOL DO YOU HAVE ON A TYPICAL DAY: 1 OR 2

## 2025-07-07 ASSESSMENT — PAIN SCALES - GENERAL
PAINLEVEL_OUTOF10: 10
PAINLEVEL_OUTOF10: 10

## 2025-07-07 ASSESSMENT — PAIN DESCRIPTION - ORIENTATION: ORIENTATION: RIGHT;LOWER

## 2025-07-07 ASSESSMENT — PAIN - FUNCTIONAL ASSESSMENT: PAIN_FUNCTIONAL_ASSESSMENT: 0-10
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
## 2025-07-09 ENCOUNTER — TELEPHONE (OUTPATIENT)
Age: 45
End: 2025-07-09
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
## 2025-07-14 ENCOUNTER — TELEPHONE (OUTPATIENT)
Age: 45
End: 2025-07-14

## 2025-07-14 NOTE — TELEPHONE ENCOUNTER
Ms. Williamson called this afternoon and wanted to cancel her Lumpectomy that is scheduled for tomorrow 7/15 /2025 because the plastic surgeon part of the surgery was denied. She already has a Gloria marker placed but she wanted to wait to see if she gets approved for the additional surgery. She will call back. Surgery was canceled.

## 2025-07-18 DIAGNOSIS — Z98.84 S/P GASTRIC BYPASS: ICD-10-CM

## 2025-07-18 DIAGNOSIS — D50.8 IRON DEFICIENCY ANEMIA SECONDARY TO INADEQUATE DIETARY IRON INTAKE: ICD-10-CM

## 2025-07-18 DIAGNOSIS — E66.811 CLASS 1 OBESITY WITH SERIOUS COMORBIDITY AND BODY MASS INDEX (BMI) OF 34.0 TO 34.9 IN ADULT, UNSPECIFIED OBESITY TYPE: ICD-10-CM

## 2025-07-18 DIAGNOSIS — K91.2 POSTOPERATIVE INTESTINAL MALABSORPTION: ICD-10-CM

## 2025-07-18 RX ORDER — LANOLIN ALCOHOL/MO/W.PET/CERES
100 CREAM (GRAM) TOPICAL DAILY
Qty: 90 TABLET | Refills: 3 | Status: SHIPPED | OUTPATIENT
Start: 2025-07-18 | End: 2025-07-18 | Stop reason: SDUPTHER

## 2025-07-18 RX ORDER — LANOLIN ALCOHOL/MO/W.PET/CERES
100 CREAM (GRAM) TOPICAL DAILY
Qty: 90 TABLET | Refills: 11 | Status: SHIPPED | OUTPATIENT
Start: 2025-07-18

## 2025-07-18 NOTE — TELEPHONE ENCOUNTER
New Encounter: Pt is requesting a call back states was denied for her plastic surgery and put in a denial.   
Pt states need refills on vitamins  
No

## 2025-08-12 ENCOUNTER — OFFICE VISIT (OUTPATIENT)
Age: 45
End: 2025-08-12

## 2025-08-12 VITALS
RESPIRATION RATE: 18 BRPM | HEIGHT: 66 IN | WEIGHT: 241.2 LBS | HEART RATE: 86 BPM | DIASTOLIC BLOOD PRESSURE: 83 MMHG | BODY MASS INDEX: 38.76 KG/M2 | OXYGEN SATURATION: 98 % | SYSTOLIC BLOOD PRESSURE: 128 MMHG

## 2025-08-12 DIAGNOSIS — Z98.84 S/P GASTRIC BYPASS: Primary | ICD-10-CM

## 2025-08-12 DIAGNOSIS — Z79.899 FOLLOW-UP ENCOUNTER INVOLVING MEDICATION: ICD-10-CM

## 2025-08-12 DIAGNOSIS — Z71.3 ENCOUNTER FOR WEIGHT LOSS COUNSELING: ICD-10-CM

## 2025-08-12 DIAGNOSIS — K91.2 POSTOPERATIVE INTESTINAL MALABSORPTION: ICD-10-CM

## 2025-08-12 DIAGNOSIS — Z71.3 ENCOUNTER FOR DIETARY COUNSELING AND SURVEILLANCE: ICD-10-CM

## 2025-08-12 DIAGNOSIS — E66.812 CLASS 2 OBESITY WITHOUT SERIOUS COMORBIDITY WITH BODY MASS INDEX (BMI) OF 38.0 TO 38.9 IN ADULT, UNSPECIFIED OBESITY TYPE: ICD-10-CM

## 2025-08-14 ENCOUNTER — OFFICE VISIT (OUTPATIENT)
Age: 45
End: 2025-08-14

## 2025-08-14 VITALS
RESPIRATION RATE: 18 BRPM | HEART RATE: 84 BPM | BODY MASS INDEX: 38.57 KG/M2 | SYSTOLIC BLOOD PRESSURE: 130 MMHG | HEIGHT: 66 IN | WEIGHT: 240 LBS | DIASTOLIC BLOOD PRESSURE: 88 MMHG | TEMPERATURE: 97 F

## 2025-08-14 DIAGNOSIS — K21.9 GASTROESOPHAGEAL REFLUX DISEASE, UNSPECIFIED WHETHER ESOPHAGITIS PRESENT: ICD-10-CM

## 2025-08-14 DIAGNOSIS — Z98.84 S/P GASTRIC BYPASS: Primary | ICD-10-CM

## 2025-08-14 DIAGNOSIS — Z71.3 ENCOUNTER FOR WEIGHT LOSS COUNSELING: ICD-10-CM

## 2025-08-14 DIAGNOSIS — R11.0 NAUSEA: ICD-10-CM

## 2025-08-14 DIAGNOSIS — R13.10 DYSPHAGIA, UNSPECIFIED TYPE: ICD-10-CM

## 2025-08-14 ASSESSMENT — LIFESTYLE VARIABLES
HOW OFTEN DO YOU HAVE A DRINK CONTAINING ALCOHOL: 2-4 TIMES A MONTH
HOW MANY STANDARD DRINKS CONTAINING ALCOHOL DO YOU HAVE ON A TYPICAL DAY: 1 OR 2

## 2025-08-21 ENCOUNTER — HOSPITAL ENCOUNTER (OUTPATIENT)
Facility: HOSPITAL | Age: 45
Discharge: HOME | End: 2025-08-24
Attending: SURGERY
Payer: COMMERCIAL

## 2025-08-21 DIAGNOSIS — K21.9 GASTROESOPHAGEAL REFLUX DISEASE, UNSPECIFIED WHETHER ESOPHAGITIS PRESENT: ICD-10-CM

## 2025-08-21 DIAGNOSIS — Z98.84 S/P GASTRIC BYPASS: ICD-10-CM

## 2025-08-21 DIAGNOSIS — K22.4 MOTILITY DISORDER, ESOPHAGEAL: ICD-10-CM

## 2025-08-21 DIAGNOSIS — Z71.3 ENCOUNTER FOR WEIGHT LOSS COUNSELING: ICD-10-CM

## 2025-08-21 DIAGNOSIS — R11.0 NAUSEA: ICD-10-CM

## 2025-08-21 DIAGNOSIS — R13.10 DYSPHAGIA, UNSPECIFIED TYPE: ICD-10-CM

## 2025-08-21 PROCEDURE — 74220 X-RAY XM ESOPHAGUS 1CNTRST: CPT

## 2025-08-21 PROCEDURE — 74246 X-RAY XM UPR GI TRC 2CNTRST: CPT

## 2025-08-21 PROCEDURE — 2500000003 HC RX 250 WO HCPCS: Performed by: SURGERY

## 2025-08-21 RX ADMIN — BARIUM SULFATE 176 G: 960 POWDER, FOR SUSPENSION ORAL at 08:57

## 2025-08-22 ASSESSMENT — ENCOUNTER SYMPTOMS
DIARRHEA: 0
NAUSEA: 0
ABDOMINAL PAIN: 0
VOMITING: 0
RESPIRATORY NEGATIVE: 1
CONSTIPATION: 0

## 2025-08-25 ENCOUNTER — HOSPITAL ENCOUNTER (OUTPATIENT)
Facility: HOSPITAL | Age: 45
Discharge: HOME | End: 2025-08-28
Payer: COMMERCIAL

## 2025-08-25 DIAGNOSIS — R11.0 NAUSEA: ICD-10-CM

## 2025-08-25 DIAGNOSIS — Z71.3 ENCOUNTER FOR WEIGHT LOSS COUNSELING: ICD-10-CM

## 2025-08-25 DIAGNOSIS — Z98.84 S/P GASTRIC BYPASS: ICD-10-CM

## 2025-08-25 DIAGNOSIS — K21.9 GASTROESOPHAGEAL REFLUX DISEASE, UNSPECIFIED WHETHER ESOPHAGITIS PRESENT: ICD-10-CM

## 2025-08-25 DIAGNOSIS — R13.10 DYSPHAGIA, UNSPECIFIED TYPE: ICD-10-CM

## 2025-08-25 LAB — TSH, 3RD GENERATION: 4.04 UIU/ML (ref 0.27–4.2)

## 2025-08-25 PROCEDURE — 36415 COLL VENOUS BLD VENIPUNCTURE: CPT

## 2025-08-25 PROCEDURE — 84443 ASSAY THYROID STIM HORMONE: CPT

## 2025-08-26 PROBLEM — Z71.3 ENCOUNTER FOR WEIGHT LOSS COUNSELING: Status: ACTIVE | Noted: 2025-08-26

## 2025-08-26 PROBLEM — K21.9 GASTROESOPHAGEAL REFLUX DISEASE: Status: ACTIVE | Noted: 2025-08-26

## 2025-08-26 PROBLEM — R11.0 NAUSEA: Status: ACTIVE | Noted: 2025-08-26

## 2025-08-26 PROBLEM — R13.10 DYSPHAGIA: Status: ACTIVE | Noted: 2025-08-26

## (undated) DEVICE — STAPLER SKIN LN REINF 60 MM ECHELON ENDOPATH

## (undated) DEVICE — TROCAR ENDOSCP SHFT L100MM DIA12MM INTEGR STBL ENDOPATH

## (undated) DEVICE — TISSUE RETRIEVAL SYSTEM: Brand: INZII RETRIEVAL SYSTEM

## (undated) DEVICE — GLOVE SURG SZ 7 L11.33IN FNGR THK9.8MIL STRW LTX POLYMER

## (undated) DEVICE — SUTURE VCRL SZ 2-0 L54IN ABSRB VLT W/O NDL POLYGLACTIN 910 J618H

## (undated) DEVICE — SUT SLK 2-0SH 30IN BLK --

## (undated) DEVICE — SET SUCT IRR TIP DISP STRYKEFLOW2

## (undated) DEVICE — TROCAR ENDOSCP BLDELSS 12X100 MM W/ HNDL STBL SL OPT TIP

## (undated) DEVICE — RELOAD STPL L60MM H1.5-3.6MM REG TISS BLU GRIPPING SURF B

## (undated) DEVICE — BLANKET WRM W29.9XL79.1IN UP BODY FORC AIR MISTRAL-AIR

## (undated) DEVICE — TROCAR ENDOSCP L100MM DIA12MM STBL SL BLDELSS ENDOPATH XCEL

## (undated) DEVICE — HANDLE PRB DIA5MM HND CTRL PSTL GRP ENDOPATH PRB + II

## (undated) DEVICE — STAPLER SKIN L440MM 32MM LNG 12 FIRING B FRM PWR + GRIPPING

## (undated) DEVICE — BLANKET WRM AD W50XL85.8IN PACU FULL BODY FORC AIR

## (undated) DEVICE — STRIP,CLOSURE,WOUND,MEDI-STRIP,1/2X4: Brand: MEDLINE

## (undated) DEVICE — TRUE CONTENT TO BE POPULATED AS PART OF REBRANDING: Brand: ARGYLE

## (undated) DEVICE — COVER,LIGHT HANDLE,FLX,1/PK: Brand: MEDLINE INDUSTRIES, INC.

## (undated) DEVICE — TROCAR LAP L100MM DIA5MM BLDELSS W/ STBL SL ENDOPATH XCEL

## (undated) DEVICE — TAPE ADH W3INXL10YD PLAS TRNSPAR H2O RESIST HYPOALRG CURAD

## (undated) DEVICE — 4-PORT MANIFOLD: Brand: NEPTUNE 2

## (undated) DEVICE — INTENDED FOR TISSUE SEPARATION, AND OTHER PROCEDURES THAT REQUIRE A SHARP SURGICAL BLADE TO PUNCTURE OR CUT.: Brand: BARD-PARKER SAFETY BLADES SIZE 11, STERILE

## (undated) DEVICE — SCISSORS ENDOSCP DIA5MM CRV MPLR CAUT W/ RATCH HNDL

## (undated) DEVICE — SHEAR HARMONIC ACET 5MMX36CM -- ACE PLUS

## (undated) DEVICE — SOLUTION IRRIG 1000ML H2O STRL BLT

## (undated) DEVICE — GARMENT,MEDLINE,DVT,INT,CALF,MED, GEN2: Brand: MEDLINE

## (undated) DEVICE — REM POLYHESIVE ADULT PATIENT RETURN ELECTRODE: Brand: VALLEYLAB

## (undated) DEVICE — BAG DRNGE C650ML H SZ 5-38 MAMM TISS EXP CNTOUR PROF NACL

## (undated) DEVICE — STAPLE INT WHT BLU G GRN BLK REINF FOR ENDOPATH ECHELON FLX

## (undated) DEVICE — SOLUTION IV 1000ML 0.9% SOD CHL

## (undated) DEVICE — SUTURE MCRYL SZ 4-0 L27IN ABSRB UD L24MM PS-1 3/8 CIR PRIM Y935H

## (undated) DEVICE — PACK PROCEDURE SURG LAPAROSCOPY 17X7 MM BRTRC PRIMUS

## (undated) DEVICE — 3M™ STERI-STRIP™ COMPOUND BENZOIN TINCTURE 40 BAGS/CARTON 4 CARTONS/CASE C1544: Brand: 3M™ STERI-STRIP™

## (undated) DEVICE — SUTURE VCRL SZ 3-0 L27IN ABSRB VLT L26MM SH 1/2 CIR J316H

## (undated) DEVICE — GAUZE SPONGES,8 PLY: Brand: CURITY

## (undated) DEVICE — RELOAD STPL L60MM H1-2.6MM MESENTERY THN TISS WHT 6 ROW

## (undated) DEVICE — SOFT SILICONE HYDROCELLULAR SACRUM DRESSING WITH LOCK AWAY LAYER: Brand: ALLEVYN LIFE SACRUM (LARGE) PACK OF 10